# Patient Record
Sex: MALE | Race: BLACK OR AFRICAN AMERICAN | NOT HISPANIC OR LATINO
[De-identification: names, ages, dates, MRNs, and addresses within clinical notes are randomized per-mention and may not be internally consistent; named-entity substitution may affect disease eponyms.]

---

## 2020-06-24 PROBLEM — Z00.00 ENCOUNTER FOR PREVENTIVE HEALTH EXAMINATION: Status: ACTIVE | Noted: 2020-06-24

## 2020-06-27 ENCOUNTER — APPOINTMENT (OUTPATIENT)
Dept: DISASTER EMERGENCY | Facility: CLINIC | Age: 65
End: 2020-06-27

## 2020-06-27 LAB — SARS-COV-2 N GENE NPH QL NAA+PROBE: NOT DETECTED

## 2020-06-30 ENCOUNTER — RESULT REVIEW (OUTPATIENT)
Age: 65
End: 2020-06-30

## 2020-06-30 ENCOUNTER — OUTPATIENT (OUTPATIENT)
Dept: OUTPATIENT SERVICES | Facility: HOSPITAL | Age: 65
LOS: 1 days | Discharge: HOME | End: 2020-06-30
Payer: COMMERCIAL

## 2020-06-30 VITALS
HEIGHT: 69 IN | WEIGHT: 199.08 LBS | SYSTOLIC BLOOD PRESSURE: 161 MMHG | TEMPERATURE: 98 F | DIASTOLIC BLOOD PRESSURE: 76 MMHG | OXYGEN SATURATION: 100 % | RESPIRATION RATE: 16 BRPM | HEART RATE: 80 BPM

## 2020-06-30 PROBLEM — E03.9 HYPOTHYROIDISM, UNSPECIFIED: Chronic | Status: ACTIVE | Noted: 2020-06-24

## 2020-06-30 PROBLEM — Z86.79 PERSONAL HISTORY OF OTHER DISEASES OF THE CIRCULATORY SYSTEM: Chronic | Status: ACTIVE | Noted: 2020-06-24

## 2020-06-30 PROBLEM — I10 ESSENTIAL (PRIMARY) HYPERTENSION: Chronic | Status: ACTIVE | Noted: 2020-06-24

## 2020-06-30 PROBLEM — Z86.73 PERSONAL HISTORY OF TRANSIENT ISCHEMIC ATTACK (TIA), AND CEREBRAL INFARCTION WITHOUT RESIDUAL DEFICITS: Chronic | Status: ACTIVE | Noted: 2020-06-24

## 2020-06-30 PROBLEM — E11.9 TYPE 2 DIABETES MELLITUS WITHOUT COMPLICATIONS: Chronic | Status: ACTIVE | Noted: 2020-06-24

## 2020-06-30 LAB
A1C WITH ESTIMATED AVERAGE GLUCOSE RESULT: 5.8 % — HIGH (ref 4–5.6)
ALBUMIN SERPL ELPH-MCNC: 4.4 G/DL — SIGNIFICANT CHANGE UP (ref 3.3–5)
ALP SERPL-CCNC: 60 U/L — SIGNIFICANT CHANGE UP (ref 40–120)
ALT FLD-CCNC: SIGNIFICANT CHANGE UP U/L (ref 10–45)
ANION GAP SERPL CALC-SCNC: 10 MMOL/L — SIGNIFICANT CHANGE UP (ref 5–17)
APPEARANCE UR: CLEAR — SIGNIFICANT CHANGE UP
APTT BLD: 29.5 SEC — SIGNIFICANT CHANGE UP (ref 27.5–35.5)
AST SERPL-CCNC: SIGNIFICANT CHANGE UP U/L (ref 10–40)
BASOPHILS # BLD AUTO: 0.06 K/UL — SIGNIFICANT CHANGE UP (ref 0–0.2)
BASOPHILS NFR BLD AUTO: 0.8 % — SIGNIFICANT CHANGE UP (ref 0–2)
BILIRUB SERPL-MCNC: 1.4 MG/DL — HIGH (ref 0.2–1.2)
BILIRUB UR-MCNC: NEGATIVE — SIGNIFICANT CHANGE UP
BLD GP AB SCN SERPL QL: NEGATIVE — SIGNIFICANT CHANGE UP
BUN SERPL-MCNC: 27 MG/DL — HIGH (ref 7–23)
CALCIUM SERPL-MCNC: 9.8 MG/DL — SIGNIFICANT CHANGE UP (ref 8.4–10.5)
CHLORIDE SERPL-SCNC: 104 MMOL/L — SIGNIFICANT CHANGE UP (ref 96–108)
CHOLEST SERPL-MCNC: 118 MG/DL — SIGNIFICANT CHANGE UP (ref 10–199)
CK MB CFR SERPL CALC: 9.5 NG/ML — HIGH (ref 0–6.7)
CK SERPL-CCNC: 391 U/L — HIGH (ref 30–200)
CO2 SERPL-SCNC: 23 MMOL/L — SIGNIFICANT CHANGE UP (ref 22–31)
COLOR SPEC: YELLOW — SIGNIFICANT CHANGE UP
CREAT SERPL-MCNC: 0.77 MG/DL — SIGNIFICANT CHANGE UP (ref 0.5–1.3)
DIFF PNL FLD: ABNORMAL
EOSINOPHIL # BLD AUTO: 0.31 K/UL — SIGNIFICANT CHANGE UP (ref 0–0.5)
EOSINOPHIL NFR BLD AUTO: 4.3 % — SIGNIFICANT CHANGE UP (ref 0–6)
ESTIMATED AVERAGE GLUCOSE: 120 MG/DL — HIGH (ref 68–114)
GLUCOSE BLDC GLUCOMTR-MCNC: 138 MG/DL — HIGH (ref 70–99)
GLUCOSE SERPL-MCNC: 147 MG/DL — HIGH (ref 70–99)
GLUCOSE UR QL: NEGATIVE — SIGNIFICANT CHANGE UP
HCT VFR BLD CALC: 33.8 % — LOW (ref 39–50)
HCV AB S/CO SERPL IA: 0.15 S/CO — SIGNIFICANT CHANGE UP
HCV AB SERPL-IMP: SIGNIFICANT CHANGE UP
HDLC SERPL-MCNC: 43 MG/DL — SIGNIFICANT CHANGE UP
HGB BLD-MCNC: 10.9 G/DL — LOW (ref 13–17)
IMM GRANULOCYTES NFR BLD AUTO: 0.1 % — SIGNIFICANT CHANGE UP (ref 0–1.5)
INR BLD: 1.13 — SIGNIFICANT CHANGE UP (ref 0.88–1.16)
KETONES UR-MCNC: NEGATIVE — SIGNIFICANT CHANGE UP
LEUKOCYTE ESTERASE UR-ACNC: NEGATIVE — SIGNIFICANT CHANGE UP
LIPID PNL WITH DIRECT LDL SERPL: 60 MG/DL — SIGNIFICANT CHANGE UP
LYMPHOCYTES # BLD AUTO: 2.02 K/UL — SIGNIFICANT CHANGE UP (ref 1–3.3)
LYMPHOCYTES # BLD AUTO: 28.1 % — SIGNIFICANT CHANGE UP (ref 13–44)
MCHC RBC-ENTMCNC: 28.7 PG — SIGNIFICANT CHANGE UP (ref 27–34)
MCHC RBC-ENTMCNC: 32.2 GM/DL — SIGNIFICANT CHANGE UP (ref 32–36)
MCV RBC AUTO: 88.9 FL — SIGNIFICANT CHANGE UP (ref 80–100)
MONOCYTES # BLD AUTO: 0.56 K/UL — SIGNIFICANT CHANGE UP (ref 0–0.9)
MONOCYTES NFR BLD AUTO: 7.8 % — SIGNIFICANT CHANGE UP (ref 2–14)
NEUTROPHILS # BLD AUTO: 4.24 K/UL — SIGNIFICANT CHANGE UP (ref 1.8–7.4)
NEUTROPHILS NFR BLD AUTO: 58.9 % — SIGNIFICANT CHANGE UP (ref 43–77)
NITRITE UR-MCNC: NEGATIVE — SIGNIFICANT CHANGE UP
NRBC # BLD: 0 /100 WBCS — SIGNIFICANT CHANGE UP (ref 0–0)
PH UR: 6 — SIGNIFICANT CHANGE UP (ref 5–8)
PLATELET # BLD AUTO: 217 K/UL — SIGNIFICANT CHANGE UP (ref 150–400)
POTASSIUM SERPL-MCNC: 4.6 MMOL/L — SIGNIFICANT CHANGE UP (ref 3.5–5.3)
POTASSIUM SERPL-MCNC: SIGNIFICANT CHANGE UP MMOL/L (ref 3.5–5.3)
POTASSIUM SERPL-SCNC: 4.6 MMOL/L — SIGNIFICANT CHANGE UP (ref 3.5–5.3)
POTASSIUM SERPL-SCNC: SIGNIFICANT CHANGE UP MMOL/L (ref 3.5–5.3)
PROT SERPL-MCNC: 7.6 G/DL — SIGNIFICANT CHANGE UP (ref 6–8.3)
PROT UR-MCNC: NEGATIVE MG/DL — SIGNIFICANT CHANGE UP
PROTHROM AB SERPL-ACNC: 13.5 SEC — SIGNIFICANT CHANGE UP (ref 10.6–13.6)
RBC # BLD: 3.8 M/UL — LOW (ref 4.2–5.8)
RBC # FLD: 12 % — SIGNIFICANT CHANGE UP (ref 10.3–14.5)
RH IG SCN BLD-IMP: POSITIVE — SIGNIFICANT CHANGE UP
SODIUM SERPL-SCNC: 137 MMOL/L — SIGNIFICANT CHANGE UP (ref 135–145)
SP GR SPEC: <=1.005 — SIGNIFICANT CHANGE UP (ref 1–1.03)
T4 AB SER-ACNC: 8.06 UG/DL — SIGNIFICANT CHANGE UP (ref 3.17–11.72)
TOTAL CHOLESTEROL/HDL RATIO MEASUREMENT: 2.7 RATIO — LOW (ref 3.4–9.6)
TRIGL SERPL-MCNC: 73 MG/DL — SIGNIFICANT CHANGE UP (ref 10–149)
TSH SERPL-MCNC: 0.03 UIU/ML — LOW (ref 0.35–4.94)
UROBILINOGEN FLD QL: 0.2 E.U./DL — SIGNIFICANT CHANGE UP
WBC # BLD: 7.2 K/UL — SIGNIFICANT CHANGE UP (ref 3.8–10.5)
WBC # FLD AUTO: 7.2 K/UL — SIGNIFICANT CHANGE UP (ref 3.8–10.5)

## 2020-06-30 PROCEDURE — 93880 EXTRACRANIAL BILAT STUDY: CPT

## 2020-06-30 PROCEDURE — 36415 COLL VENOUS BLD VENIPUNCTURE: CPT

## 2020-06-30 PROCEDURE — 84436 ASSAY OF TOTAL THYROXINE: CPT

## 2020-06-30 PROCEDURE — C1887: CPT

## 2020-06-30 PROCEDURE — 93458 L HRT ARTERY/VENTRICLE ANGIO: CPT

## 2020-06-30 PROCEDURE — 80053 COMPREHEN METABOLIC PANEL: CPT

## 2020-06-30 PROCEDURE — 93005 ELECTROCARDIOGRAM TRACING: CPT

## 2020-06-30 PROCEDURE — 93458 L HRT ARTERY/VENTRICLE ANGIO: CPT | Mod: 26

## 2020-06-30 PROCEDURE — 81001 URINALYSIS AUTO W/SCOPE: CPT

## 2020-06-30 PROCEDURE — 80061 LIPID PANEL: CPT

## 2020-06-30 PROCEDURE — 82553 CREATINE MB FRACTION: CPT

## 2020-06-30 PROCEDURE — 94150 VITAL CAPACITY TEST: CPT

## 2020-06-30 PROCEDURE — 82550 ASSAY OF CK (CPK): CPT

## 2020-06-30 PROCEDURE — 93571 IV DOP VEL&/PRESS C FLO 1ST: CPT | Mod: 26,LD

## 2020-06-30 PROCEDURE — 93306 TTE W/DOPPLER COMPLETE: CPT

## 2020-06-30 PROCEDURE — 94010 BREATHING CAPACITY TEST: CPT | Mod: 26

## 2020-06-30 PROCEDURE — 86803 HEPATITIS C AB TEST: CPT

## 2020-06-30 PROCEDURE — 93571 IV DOP VEL&/PRESS C FLO 1ST: CPT | Mod: LD

## 2020-06-30 PROCEDURE — 86850 RBC ANTIBODY SCREEN: CPT

## 2020-06-30 PROCEDURE — 86901 BLOOD TYPING SEROLOGIC RH(D): CPT

## 2020-06-30 PROCEDURE — 84132 ASSAY OF SERUM POTASSIUM: CPT

## 2020-06-30 PROCEDURE — 93880 EXTRACRANIAL BILAT STUDY: CPT | Mod: 26

## 2020-06-30 PROCEDURE — 85730 THROMBOPLASTIN TIME PARTIAL: CPT

## 2020-06-30 PROCEDURE — 70450 CT HEAD/BRAIN W/O DYE: CPT | Mod: 26

## 2020-06-30 PROCEDURE — C1894: CPT

## 2020-06-30 PROCEDURE — 99205 OFFICE O/P NEW HI 60 MIN: CPT

## 2020-06-30 PROCEDURE — 85610 PROTHROMBIN TIME: CPT

## 2020-06-30 PROCEDURE — 93010 ELECTROCARDIOGRAM REPORT: CPT

## 2020-06-30 PROCEDURE — 93306 TTE W/DOPPLER COMPLETE: CPT | Mod: 26

## 2020-06-30 PROCEDURE — 71250 CT THORAX DX C-: CPT | Mod: 26

## 2020-06-30 PROCEDURE — C1753: CPT

## 2020-06-30 PROCEDURE — C1769: CPT

## 2020-06-30 PROCEDURE — 71045 X-RAY EXAM CHEST 1 VIEW: CPT | Mod: 26

## 2020-06-30 PROCEDURE — 85025 COMPLETE CBC W/AUTO DIFF WBC: CPT

## 2020-06-30 PROCEDURE — 70450 CT HEAD/BRAIN W/O DYE: CPT

## 2020-06-30 PROCEDURE — 71045 X-RAY EXAM CHEST 1 VIEW: CPT

## 2020-06-30 PROCEDURE — 82962 GLUCOSE BLOOD TEST: CPT

## 2020-06-30 PROCEDURE — 84480 ASSAY TRIIODOTHYRONINE (T3): CPT

## 2020-06-30 PROCEDURE — C1889: CPT

## 2020-06-30 PROCEDURE — 84443 ASSAY THYROID STIM HORMONE: CPT

## 2020-06-30 PROCEDURE — 71250 CT THORAX DX C-: CPT

## 2020-06-30 PROCEDURE — 83036 HEMOGLOBIN GLYCOSYLATED A1C: CPT

## 2020-06-30 RX ORDER — SODIUM CHLORIDE 9 MG/ML
500 INJECTION INTRAMUSCULAR; INTRAVENOUS; SUBCUTANEOUS
Refills: 0 | Status: DISCONTINUED | OUTPATIENT
Start: 2020-06-30 | End: 2020-06-30

## 2020-06-30 RX ORDER — ASPIRIN/CALCIUM CARB/MAGNESIUM 324 MG
325 TABLET ORAL ONCE
Refills: 0 | Status: COMPLETED | OUTPATIENT
Start: 2020-06-30 | End: 2020-06-30

## 2020-06-30 RX ORDER — DEXTROSE 50 % IN WATER 50 %
12.5 SYRINGE (ML) INTRAVENOUS ONCE
Refills: 0 | Status: DISCONTINUED | OUTPATIENT
Start: 2020-06-30 | End: 2020-07-15

## 2020-06-30 RX ORDER — SODIUM CHLORIDE 9 MG/ML
500 INJECTION INTRAMUSCULAR; INTRAVENOUS; SUBCUTANEOUS
Refills: 0 | Status: DISCONTINUED | OUTPATIENT
Start: 2020-06-30 | End: 2020-07-15

## 2020-06-30 RX ORDER — CHLORHEXIDINE GLUCONATE 213 G/1000ML
1 SOLUTION TOPICAL ONCE
Refills: 0 | Status: DISCONTINUED | OUTPATIENT
Start: 2020-06-30 | End: 2020-06-30

## 2020-06-30 RX ORDER — CLOPIDOGREL BISULFATE 75 MG/1
225 TABLET, FILM COATED ORAL ONCE
Refills: 0 | Status: COMPLETED | OUTPATIENT
Start: 2020-06-30 | End: 2020-06-30

## 2020-06-30 RX ORDER — GLUCAGON INJECTION, SOLUTION 0.5 MG/.1ML
1 INJECTION, SOLUTION SUBCUTANEOUS ONCE
Refills: 0 | Status: DISCONTINUED | OUTPATIENT
Start: 2020-06-30 | End: 2020-07-15

## 2020-06-30 RX ORDER — DEXTROSE 50 % IN WATER 50 %
15 SYRINGE (ML) INTRAVENOUS ONCE
Refills: 0 | Status: DISCONTINUED | OUTPATIENT
Start: 2020-06-30 | End: 2020-07-15

## 2020-06-30 RX ORDER — SODIUM CHLORIDE 9 MG/ML
1000 INJECTION, SOLUTION INTRAVENOUS
Refills: 0 | Status: DISCONTINUED | OUTPATIENT
Start: 2020-06-30 | End: 2020-07-15

## 2020-06-30 RX ORDER — CLOPIDOGREL BISULFATE 75 MG/1
75 TABLET, FILM COATED ORAL ONCE
Refills: 0 | Status: COMPLETED | OUTPATIENT
Start: 2020-06-30 | End: 2020-06-30

## 2020-06-30 RX ORDER — INSULIN LISPRO 100/ML
VIAL (ML) SUBCUTANEOUS ONCE
Refills: 0 | Status: DISCONTINUED | OUTPATIENT
Start: 2020-06-30 | End: 2020-07-15

## 2020-06-30 RX ORDER — DEXTROSE 50 % IN WATER 50 %
25 SYRINGE (ML) INTRAVENOUS ONCE
Refills: 0 | Status: DISCONTINUED | OUTPATIENT
Start: 2020-06-30 | End: 2020-07-15

## 2020-06-30 RX ADMIN — Medication 325 MILLIGRAM(S): at 11:22

## 2020-06-30 RX ADMIN — SODIUM CHLORIDE 75 MILLILITER(S): 9 INJECTION INTRAMUSCULAR; INTRAVENOUS; SUBCUTANEOUS at 16:43

## 2020-06-30 RX ADMIN — CLOPIDOGREL BISULFATE 225 MILLIGRAM(S): 75 TABLET, FILM COATED ORAL at 12:15

## 2020-06-30 RX ADMIN — CLOPIDOGREL BISULFATE 75 MILLIGRAM(S): 75 TABLET, FILM COATED ORAL at 11:23

## 2020-06-30 RX ADMIN — SODIUM CHLORIDE 50 MILLILITER(S): 9 INJECTION INTRAMUSCULAR; INTRAVENOUS; SUBCUTANEOUS at 11:15

## 2020-06-30 NOTE — CONSULT NOTE ADULT - ASSESSMENT
CAD      Dr. Conde has reviewed all the patient's medical records and diagnostic studies during his consultation.  He has had a lengthy discussion with the patient regarding his coronary artery disease and progression.  He has recommended that the patient is a candidate for a coronary artery bypass grafting. He has reviewed all risks, benefits, and alternatives of surgery with the patient including but not limited to stoke, bleeding, infection and death. He has quoted the patient a 1-2% surgical risk.  The patient agrees to proceed with surgery 7/6/20. All preoperative instructions including antibacterial showers x3 reviewed with the patient.

## 2020-06-30 NOTE — PROGRESS NOTE ADULT - SUBJECTIVE AND OBJECTIVE BOX
Interventional Cardiology PA SDA Discharge Note    Patient without complaints. Ambulated and voided without difficulties    Afebrile, VSS    Ext: Right Radial: No hematoma, no bleeding, dressing; C/D/I      Pulses: 2+ intact RAD to baseline     A/P:  63 y/o Obese male, non-smoker, history of Covid Positive (antibody positive), with PMHx HTN, HLD, DM, Hypothyroidism, TIA, who in light of the pt's PMHx, recent abnormal NST and current CCS class III Anginal symptoms, pt is referred for cardiac catheterization with possible intervention.   Pt is now s/p dx cardiac cath on 6/30/20 revealing 3V Coronary Artery Disease:  LM: Normal  LAD: 70% stenosis in pLAD, iFR 0.81  LCx: 80% stenosis in midLCx.   OM2: 80% stenosis  OM3: 100% stenosis  RCA: 100% stenosis in mRCA  Left Heart Catheterization:  LV Gram: 60% EF  LV EDP 18mmHg    CT surgery called to evaluate for possible bypass surgery. Pt will f/u with Dr. Conde for further w/u and management.    1.	Stable for discharge as per attending Dr. Muñiz after bed rest, pt voids, wrist stable and 30 minutes of ambulation.  2.	Follow-up with PMD/Cardiologist Dr. Muñiz/Dr. Mercedes in 1-2 weeks  3.	Discharged forms signed and copies in chart Interventional Cardiology PA SDA Discharge Note    Patient without complaints. Ambulated and voided without difficulties    Afebrile, VSS    Ext: Right Radial: No hematoma, no bleeding, dressing; C/D/I      Pulses: 2+ intact RAD to baseline     A/P:  65 y/o Obese male, non-smoker, history of Covid Positive (antibody positive), with PMHx HTN, HLD, DM, Hypothyroidism, TIA, who in light of the pt's PMHx, recent abnormal NST and current CCS class III Anginal symptoms, pt is referred for cardiac catheterization with possible intervention.   Pt is now s/p dx cardiac cath on 6/30/20 revealing 3V Coronary Artery Disease:  LM: Normal  LAD: 70% stenosis in pLAD, iFR 0.81  LCx: 80% stenosis in midLCx.   OM2: 80% stenosis  OM3: 100% stenosis  RCA: 100% stenosis in mRCA  Left Heart Catheterization:  LV Gram: 60% EF  LV EDP 18mmHg    CTS consulted for CABG. Pt will return on Monday, 7/6/20, for CABG with Dr. Conde. Per CTS recs, pt was instructed to d/c Plavix, start ASA 81mg QD, and instructed to take last dose of Enalapril on Friday 7/3/20. Pt also instructed to hold home Metformin until Thursday 7/2/20.    1.	Stable for discharge as per attending Dr. Muñiz after bed rest, pt voids, wrist stable and 30 minutes of ambulation.  2.	Follow-up with PMD/Cardiologist Dr. Muñiz in 1-2 weeks  3.	Discharged forms signed and copies in chart

## 2020-06-30 NOTE — H&P ADULT - NSICDXPASTMEDICALHX_GEN_ALL_CORE_FT
PAST MEDICAL HISTORY:  DM (diabetes mellitus)     H/O cardiomyopathy     History of TIAs     HLD (hyperlipidemia)     HTN (hypertension)     Hypothyroidism

## 2020-06-30 NOTE — CHART NOTE - NSCHARTNOTEFT_GEN_A_CORE
Cardiac Catheterization Note:    Indication: Unstable Angina with (+) Nuclear Stress Test    Coronary Angiogram:  3V Coronary Artery Disease    LM: Normal  LAD: 70% stenosis in pLAD, iFR 0.81  LCx: 80% stenosis in midLCx.   OM2: 80% stenosis  OM3: 100% stenosis  RCA: 100% stenosis in mRCA    Left Heart Catheterization:    LV Gram: 60% EF  LV EDP 18mmHg    Right Radial Artery Access. D-Stat placed. No complications.    CT surgery called to evaluate for possible bypass surgery.

## 2020-06-30 NOTE — H&P ADULT - ASSESSMENT
63 y/o Obese male, non-smoker, history of Covid Positive (antibody positive), with PMHx HTN, HLD, DM, Hypothyroidism, TIA, Cardiomopathy (EF 66%) presents for cardiac catheterization with possible intervention due to patient's risk factors, CCS class III anginal symptoms, and abnormal NST.    - EKG: NSR at 80 BPM, Q waves in avF and III  - ASA: III	Mallampati: III  - H/H stable at 10.9/33.8. Platelets/Coags stable. Cr. stable.  - Given patient's history of cardiomyopathy, patient given NS @ 50 cc/hr x 4 hours.  - Patient endorses compliance with Plavix 75 mg daily for the past few years. Patient given Plavix 75 mg once and Aspirin 325 mg once prior to procedure.   - Patient has not used metformin since 06/28/2020. Continue to hold until 48 hours post procedure. MISS ordered.   - Patient is a suitable candidate for moderate sedation.     Risks & benefits of procedure and alternative therapy have been explained to the patient including but not limited to: allergic reaction, bleeding w/possible need for blood transfusion, infection, renal and vascular compromise, limb damage, arrhythmia, stroke, vessel dissection/perforation, Myocardial infarction, emergent CABG. Informed consent obtained and in chart.

## 2020-06-30 NOTE — H&P ADULT - HISTORY OF PRESENT ILLNESS
Covid test on 6/27/2 at [facility]. NEGATIVE    Pharmacy:   [facility], [address] [id number].  Escort: Brother       65 y/o Obese male, non-smoker, history of Covid Positive (antibody positive), with PMHx HTN, HLD, DM, Hypothyroidism, TIA, Cardiomopathy (EF 66%) presented to Dr. Francis Muñiz office with c/o CP associated with dyspnea on exertion (grade 1-2), resolves with rest.  Per pt he get a sharp, CP, 3/10, mostly right-sided, when walking up greater than one flight of subway step, lasting a few minutes resolves with breathing deeply and rest.  Pt denies associated SOB, palpitations, diaphoresis, orthopnea, PND, dizziness, syncope, LE swelling or any other complaints at this time.   Subsequent NST 6/13/20:  Anterior, septal and lateral wall ischemia identified.  Diaphragmatic.  EF 66%. Per MD's note, ECHO revealed dilated LA and LV with hypokinetic inferior wall, EF 50%.      In light of the pt's PMHx, recent abnormal NST and current CCS class III Anginal symptoms, pt is referred for cardiac catheterization with possible intervention. Covid test on 6/27/2 at [facility]. NEGATIVE    Pharmacy:   [facility], [address] [address].  Escort: Brother       63 y/o Obese male, non-smoker, history of Covid Positive (antibody positive), with PMHx HTN, HLD, DM, Hypothyroidism, TIA, Cardiomopathy (EF by NST 06/13/2020 66%) presented to Dr. Francis Muñiz office with c/o CP associated with dyspnea on exertion (grade 1-2), resolves with rest.  Per pt he get a sharp, CP, 3/10, mostly right-sided, when walking up greater than one flight of subway step, lasting a few minutes resolves with breathing deeply and rest.  Pt denies associated SOB, palpitations, diaphoresis, orthopnea, PND, dizziness, syncope, LE swelling or any other complaints at this time.   Subsequent NST 6/13/20:  Anterior, septal and lateral wall ischemia identified.  Diaphragmatic.  EF 66%. Per MD's note, ECHO revealed dilated LA and LV with hypokinetic inferior wall, EF 50%.      In light of the pt's PMHx, recent abnormal NST and current CCS class III Anginal symptoms, pt is referred for cardiac catheterization with possible intervention.

## 2020-06-30 NOTE — CONSULT NOTE ADULT - SUBJECTIVE AND OBJECTIVE BOX
Surgeon: Dr. PRIMO Conde     Requesting Physician: Dr. Francis Muñiz (cardiologist); Dr. Nick Corbett (PCP)    HISTORY OF PRESENT ILLNESS:  64 year old male, non-smoker, with a past medical history of COVID infection (antibody positive), with a past medical history of TIA on Plavix 75mg only (6 years ago with left hand weakness that lasted a few minutes, no recurrence), HTN, HLD, DM, hypothyroidism, presented to Dr. Francis Muñiz with c/o chest pain associated with dyspnea on exertion ambulating up 1-2 flights of stairs. Symptoms began in early May 2020 and resolve on own after resting and breathing.  Subsequent NST 6/13/20:  Anterior, septal and lateral wall ischemia identified.  Diaphragmatic.  EF 66%. Patient is now s/p dx cardiac cath on 6/30/20 revealing 3V Coronary Artery Disease:  LM: Normal  LAD: 70% stenosis in pLAD, iFR 0.81  LCx: 80% stenosis in midLCx.   OM2: 80% stenosis  OM3: 100% stenosis  RCA: 100% stenosis in mRCA  Left Heart Catheterization:  LV Gram: 60% EF  LV EDP 18mmHg  Patient loaded with Aspirin and Plavix today. Patient was evaluated by CT surgery and is recommended a candidate for coronary artery bypass grafting.     Of note, patient had covid symptoms on 3/18 with chills and cough. He was not tested at that time and did not require MD visit/hospitalization. Recent antibody test was positive. COVID swab from 6/27 was negative.       PAST MEDICAL HISTORY:  History of TIA  Hypothyroidism  DM (diabetes mellitus)  HLD (hyperlipidemia)  HTN (hypertension)  CAD     PSHx:  denies    SOCIAL HISTORY:  Smoker:  Never smoker   ETOH use:  Hx of ETOH abuse, quit 2 years ago   Ilicit Drug use:no  Occupation:    Assisted device use (Cane / Walker): none   Live with: wife in condo with elevator    FAMILY HISTORY:  heart failure (mother)     MEDICATIONS    · 	Vitamin D3 50,000 intl units (1250 mcg) oral capsule: Last Dose Taken:  , 1  orally  · 	levothyroxine 200 mcg (0.2 mg) oral tablet: Last Dose Taken:  , 1 tab(s) orally once a day  · 	enalapril 10 mg oral tablet: Last Dose Taken:  , 1 tab(s) orally once a day  · 	Plavix 75 mg oral tablet: Last Dose Taken:  , 1 tab(s) orally once a day  · 	Januvia 100 mg oral tablet: Last Dose Taken:  , 1 tab(s) orally once a day  · 	metFORMIN 500 mg oral tablet: Last Dose Taken:  , 1 tab(s) orally 3 times a day  · 	atorvastatin 80 mg oral tablet: Last Dose Taken:  , 1 tab(s) orally once a day  · 	isosorbide mononitrate 30 mg oral tablet, extended release: Last Dose Taken:  , 1 tab(s) orally once a day (in the morning)  · 	Metoprolol Tartrate 50 mg oral tablet: Last Dose Taken:  , 1 tab(s) orally once a day       Allergies  No Known Allergies    PHYSICAL EXAM  Ht: 5'9  Wt: 199lbs   Vital Signs Last 24 Hrs  T(C): 36.4 (30 Jun 2020 10:59), Max: 36.4 (30 Jun 2020 10:59)  T(F): 97.5 (30 Jun 2020 10:59), Max: 97.5 (30 Jun 2020 10:59)  HR: 80 (30 Jun 2020 10:59) (80 - 80)  BP: 161/76 (30 Jun 2020 10:59) (161/76 - 161/76)  BP(mean): 104 (30 Jun 2020 10:59) (104 - 104)  RR: 16 (30 Jun 2020 10:59) (16 - 16)  SpO2: 100% (30 Jun 2020 10:59) (100% - 100%)                                                          LABS:                        10.9   7.20  )-----------( 217      ( 30 Jun 2020 10:29 )             33.8     06-30    137   |  23   |  27  ----------------------------<   147  4.6   |  104  |  0.77     Ca    9.8      30 Jun 2020 10:29    TPro  7.6  /  Alb  4.4  /  TBili  1.4<H>  /  DBili  x   /  AST  see  note Moderate  hemolysis  observed  /  ALT  see  note Moderate  hemolysis  observed  /  AlkPhos  60  06-30    PT/INR - ( 30 Jun 2020 10:29 )   PT: 13.5 sec;   INR: 1.13          PTT - ( 30 Jun 2020 10:29 )  PTT:29.5 sec  Urinalysis Basic - ( 30 Jun 2020 15:33 )    Color: Yellow / Appearance: Clear / SG: <=1.005 / pH: x  Gluc: x / Ketone: NEGATIVE  / Bili: Negative / Urobili: 0.2 E.U./dL   Blood: x / Protein: NEGATIVE mg/dL / Nitrite: NEGATIVE   Leuk Esterase: NEGATIVE / RBC: < 5 /HPF / WBC < 5 /HPF   Sq Epi: x / Non Sq Epi: 0-5 /HPF / Bacteria: Present /HPF      CARDIAC MARKERS ( 30 Jun 2020 10:29 )  x     / x     / 391 U/L / x     / 9.5 ng/mL      Thyroid Stimulating Hormone, Serum: 0.031 uIU/mL (06-30 @ 15:32)      RADIOLOGY & ADDITIONAL STUDIES:  CAROTID U/S: < from: US Duplex Carotid Arteries Complete, Bilateral (06.30.20 @ 16:07) >    IMPRESSION:  50-69 % hemodynamically significant stenosis in the left internal carotid artery with severe calcified plaque in the left carotid bifurcation/proximal ICA.      < end of copied text >      CXR:     CT chest (pending report)        CT head 6/30/20:  FINDINGS:     The ventricles, cisternal spaces, and cortical sulci are normal in size and configuration.    There is no acute intracranial hemorrhage. There is no mass effect, midline shift or extra axial collection.     The gray white differentiation appears preserved without evidence of an acute transcortical infarction.     The bony windows demonstrates no fractures. The visualized paranasal sinuses and mastoid air cells are predominantly clear aside from mild polypoid mucosal thickening in the right maxillary sinus.    IMPRESSION:     Unremarkable noncontrast CT of the brain.    EKG:     TTE / ARTIE: < from: TTE Echo Complete w/o Contrast w/ Doppler (06.30.20 @ 16:13) >   1. There is mild concentric left ventricular hypertrophy. The left ventricle is normal in size and systolic function with a calculated ejection fraction of 60-65%. There are no regional wall motion abnormalities seen. There is normal left ventricular diastolic function and filling pressure.   2. The right ventricle is normal in size. Right ventricular systolic function is normal.   3. There is mild mitral annular calcification. There is trace mitral regurgitation.   4. There was insufficient tricuspid regurgitation detected from which to calculate pulmonary artery systolic pressure.   5. No pericardial effusion.   6. The aortic root is mildly dilated. The aortic root is dilated measuring 3.90 cm.    < end of copied text >    Cardiac Cath:

## 2020-07-01 LAB — T3 SERPL-MCNC: 99 NG/DL — SIGNIFICANT CHANGE UP (ref 80–200)

## 2020-07-01 RX ORDER — METFORMIN HYDROCHLORIDE 500 MG/1
500 TABLET, COATED ORAL 3 TIMES DAILY
Refills: 0 | Status: ACTIVE | COMMUNITY
Start: 2020-07-01

## 2020-07-01 NOTE — H&P ADULT - NSHPSOCIALHISTORY_GEN_ALL_CORE
Smoker:  Never smoker   ETOH use:  Hx of ETOH abuse, quit 2 years ago   Ilicit Drug use:no  Occupation:    Assisted device use (Cane / Walker): none   Live with: wife in condo with elevator

## 2020-07-01 NOTE — H&P ADULT - ASSESSMENT
CAD    Dr. Conde has reviewed all the patient's medical records and diagnostic studies during his consultation.  He has had a lengthy discussion with the patient regarding his coronary artery disease and progression.  He has recommended that the patient is a candidate for a coronary artery bypass grafting. He has reviewed all risks, benefits, and alternatives of surgery with the patient including but not limited to stoke, bleeding, infection and death. He has quoted the patient a 1-2% surgical risk.  The patient agrees to proceed with surgery 7/6/20. All preoperative instructions including antibacterial showers x3 reviewed with the patient.     -NPO past midnight  -Stop Plavix 6/30/20, hold Enalapril 2 days prior to sx.   -Take Metoprolol and synthroid the morning of surgery.   -COVID test 7/3/20.

## 2020-07-01 NOTE — H&P ADULT - NSHPLABSRESULTS_GEN_ALL_CORE
LABS:                        10.9   7.20  )-----------( 217      ( 30 Jun 2020 10:29 )             33.8     06-30    137   |  23   |  27  ----------------------------<   147  4.6   |  104  |  0.77     Ca    9.8      30 Jun 2020 10:29    TPro  7.6  /  Alb  4.4  /  TBili  1.4<H>  /  DBili  x   /  AST  see  note Moderate  hemolysis  observed  /  ALT  see  note Moderate  hemolysis  observed  /  AlkPhos  60  06-30    PT/INR - ( 30 Jun 2020 10:29 )   PT: 13.5 sec;   INR: 1.13          PTT - ( 30 Jun 2020 10:29 )  PTT:29.5 sec  Urinalysis Basic - ( 30 Jun 2020 15:33 )    Color: Yellow / Appearance: Clear / SG: <=1.005 / pH: x  Gluc: x / Ketone: NEGATIVE  / Bili: Negative / Urobili: 0.2 E.U./dL   Blood: x / Protein: NEGATIVE mg/dL / Nitrite: NEGATIVE   Leuk Esterase: NEGATIVE / RBC: < 5 /HPF / WBC < 5 /HPF   Sq Epi: x / Non Sq Epi: 0-5 /HPF / Bacteria: Present /HPF      CARDIAC MARKERS ( 30 Jun 2020 10:29 )  x     / x     / 391 U/L / x     / 9.5 ng/mL      Thyroid Stimulating Hormone, Serum: 0.031 uIU/mL (06-30 @ 15:32)      RADIOLOGY & ADDITIONAL STUDIES:  CAROTID U/S: < from: US Duplex Carotid Arteries Complete, Bilateral (06.30.20 @ 16:07) >    IMPRESSION:  50-69 % hemodynamically significant stenosis in the left internal carotid artery with severe calcified plaque in the left carotid bifurcation/proximal ICA.      < end of copied text >      < from: CT Chest No Cont (06.30.20 @ 17:50) >      Partially visualized possible mesenteric panniculitis; dedicated CT of the abdomen and pelvis can be ordered.    No suspicious thoracic finding.    < end of copied text >    < from: CT Head No Cont (06.30.20 @ 17:43) >    < from: CT Head No Cont (06.30.20 @ 17:43) >    Unremarkable noncontrastCT of the brain.    < end of copied text >    Unremarkable noncontrastCT of the brain.    < end of copied text >    < from: TTE Echo Complete w/o Contrast w/ Doppler (06.30.20 @ 16:13) >       1. There is mild concentric left ventricular hypertrophy. The left ventricle is normal in size and systolic function with a calculated ejection fraction of 60-65%. There are no regional wall motion abnormalities seen. There is normal left ventricular diastolic function and filling pressure.   2. The right ventricle is normal in size. Right ventricular systolic function is normal.   3. There is mild mitral annular calcification. There is trace mitral regurgitation.   4. There was insufficient tricuspid regurgitation detected from which to calculate pulmonary artery systolic pressure.   5. No pericardial effusion.   6. The aortic root is mildly dilated. The aortic root is dilated measuring 3.90 cm.    < end of copied text >

## 2020-07-01 NOTE — H&P ADULT - HISTORY OF PRESENT ILLNESS
4 year old male, non-smoker, with a past medical history of COVID infection (antibody positive), with a past medical history of TIA on Plavix 75mg only (6 years ago with left hand weakness that lasted a few minutes, no recurrence), HTN, HLD, DM, hypothyroidism, presented to Dr. Francis Muñiz with c/o chest pain associated with dyspnea on exertion ambulating up 1-2 flights of stairs. Symptoms began in early May 2020 and resolve on own after resting and breathing.  Subsequent NST 6/13/20:  Anterior, septal and lateral wall ischemia identified.  Diaphragmatic.  EF 66%. Patient is now s/p dx cardiac cath on 6/30/20 revealing 3V Coronary Artery Disease:  LM: Normal  LAD: 70% stenosis in pLAD, iFR 0.81  LCx: 80% stenosis in midLCx.   OM2: 80% stenosis  OM3: 100% stenosis  RCA: 100% stenosis in mRCA  Left Heart Catheterization:  LV Gram: 60% EF  LV EDP 18mmHg  Patient loaded with Aspirin and Plavix today.     Of note, patient had covid symptoms on 3/18 with chills and cough. He was not tested at that time and did not require MD visit/hospitalization. Recent antibody test was positive. COVID swab from 6/27 was negative.     Patient was evaluated by CT surgery and is recommended a candidate for coronary artery bypass grafting. 64 year old male, non-smoker, with a past medical history of COVID infection (antibody positive), with a past medical history of TIA on Plavix 75mg only (6 years ago with left hand weakness that lasted a few minutes, no recurrence), HTN, HLD, DM, hypothyroidism, presented to Dr. Francis Muñiz with c/o chest pain associated with dyspnea on exertion ambulating up 1-2 flights of stairs. Symptoms began in early May 2020 and resolve on own after resting and breathing.  Subsequent NST 6/13/20:  Anterior, septal and lateral wall ischemia identified.  Diaphragmatic.  EF 66%. Patient is now s/p dx cardiac cath on 6/30/20 revealing 3V Coronary Artery Disease:  LM: Normal  LAD: 70% stenosis in pLAD, iFR 0.81  LCx: 80% stenosis in midLCx.   OM2: 80% stenosis  OM3: 100% stenosis  RCA: 100% stenosis in mRCA  Left Heart Catheterization:  LV Gram: 60% EF  LV EDP 18mmHg  Patient loaded with Aspirin and Plavix today.     Of note, patient had covid symptoms on 3/18 with chills and cough. He was not tested at that time and did not require MD visit/hospitalization. Recent antibody test was positive. COVID swab from 6/27 was negative.     Patient was evaluated by CT surgery and is recommended a candidate for coronary artery bypass grafting. 64 year old male, non-smoker, with a past medical history of COVID infection (antibody positive), with a past medical history of TIA on Plavix 75mg only (6 years ago with left hand weakness that lasted a few minutes, no recurrence), HTN, HLD, DM, hypothyroidism, presented to Dr. Francis Muñiz with c/o chest pain associated with dyspnea on exertion ambulating up 1-2 flights of stairs. Symptoms began in early May 2020 and resolve on own after resting and breathing.  Subsequent NST 6/13/20:  Anterior, septal and lateral wall ischemia identified.  Diaphragmatic.  EF 66%. Patient is now s/p dx cardiac cath on 6/30/20 revealing 3V Coronary Artery Disease:  LM: Normal  LAD: 70% stenosis in pLAD, iFR 0.81  LCx: 80% stenosis in midLCx.   OM2: 80% stenosis  OM3: 100% stenosis  RCA: 100% stenosis in mRCA  Left Heart Catheterization:  LV Gram: 60% EF  LV EDP 18mmHg  Patient loaded with Aspirin and Plavix today.     Of note, patient had covid symptoms on 3/18 with chills and cough. He was not tested at that time and did not require MD visit/hospitalization. Recent antibody test was positive. COVID swab from 6/27 was negative.     Patient was evaluated by CT surgery and is recommended a candidate for coronary artery bypass grafting.     Pt seen/examined morning of OR.  No changes to current medical condition.  No recent illness, fevers, chills, cough, sputum, nausea, diarrhea, emesis.  No worsening chest pain, palpitations sob, syncope.  Pt took Metoprolol this AM with sip of water as instructed.  Otherwise NPO since midnight last evening.  Pt has d/c his Plavix and Enalapril as instructed.  Procedure, risks, expectations, recovery discussed with patient, all questions answered.  Consent signed in office prior to today.

## 2020-07-01 NOTE — H&P ADULT - NSHPPHYSICALEXAM_GEN_ALL_CORE
PHYSICAL EXAM  Ht: 5'9  Wt: 199lbs   Vital Signs Last 24 Hrs  T(C): 36.4 (30 Jun 2020 10:59), Max: 36.4 (30 Jun 2020 10:59)  T(F): 97.5 (30 Jun 2020 10:59), Max: 97.5 (30 Jun 2020 10:59)  HR: 80 (30 Jun 2020 10:59) (80 - 80)  BP: 161/76 (30 Jun 2020 10:59) (161/76 - 161/76)  BP(mean): 104 (30 Jun 2020 10:59) (104 - 104)  RR: 16 (30 Jun 2020 10:59) (16 - 16)  SpO2: 100% (30 Jun 2020 10:59) (100% - 100%)

## 2020-07-02 VITALS
OXYGEN SATURATION: 99 % | TEMPERATURE: 97 F | WEIGHT: 199.08 LBS | HEIGHT: 69 IN | SYSTOLIC BLOOD PRESSURE: 159 MMHG | DIASTOLIC BLOOD PRESSURE: 72 MMHG | RESPIRATION RATE: 18 BRPM | HEART RATE: 77 BPM

## 2020-07-02 VITALS — BODY MASS INDEX: 29.49 KG/M2 | HEIGHT: 68.98 IN | WEIGHT: 199.08 LBS

## 2020-07-02 DIAGNOSIS — G45.9 TRANSIENT CEREBRAL ISCHEMIC ATTACK, UNSPECIFIED: ICD-10-CM

## 2020-07-02 DIAGNOSIS — U07.1 COVID-19: ICD-10-CM

## 2020-07-02 NOTE — PRE-OP CHECKLIST - SELECT TESTS ORDERED
Sickle Cell (black patients 3mos-10yr)/EKG/INR/POCT Blood Glucose/CMP/PT/PTT/Type and Screen/CXR/CT of brain, A1C CT chest, echo, doppler studies/CBC

## 2020-07-03 ENCOUNTER — APPOINTMENT (OUTPATIENT)
Dept: DISASTER EMERGENCY | Facility: CLINIC | Age: 65
End: 2020-07-03

## 2020-07-05 ENCOUNTER — TRANSCRIPTION ENCOUNTER (OUTPATIENT)
Age: 65
End: 2020-07-05

## 2020-07-06 ENCOUNTER — INPATIENT (INPATIENT)
Facility: HOSPITAL | Age: 65
LOS: 5 days | Discharge: HOME | DRG: 236 | End: 2020-07-12
Attending: THORACIC SURGERY (CARDIOTHORACIC VASCULAR SURGERY) | Admitting: THORACIC SURGERY (CARDIOTHORACIC VASCULAR SURGERY)
Payer: COMMERCIAL

## 2020-07-06 ENCOUNTER — APPOINTMENT (OUTPATIENT)
Dept: CARDIOTHORACIC SURGERY | Facility: HOSPITAL | Age: 65
End: 2020-07-06

## 2020-07-06 LAB
ALBUMIN SERPL ELPH-MCNC: 2.2 G/DL — LOW (ref 3.3–5)
ALBUMIN SERPL ELPH-MCNC: 3.3 G/DL — SIGNIFICANT CHANGE UP (ref 3.3–5)
ALBUMIN SERPL ELPH-MCNC: 3.5 G/DL — SIGNIFICANT CHANGE UP (ref 3.3–5)
ALP SERPL-CCNC: 30 U/L — LOW (ref 40–120)
ALP SERPL-CCNC: 33 U/L — LOW (ref 40–120)
ALP SERPL-CCNC: 35 U/L — LOW (ref 40–120)
ALT FLD-CCNC: 17 U/L — SIGNIFICANT CHANGE UP (ref 10–45)
ANION GAP SERPL CALC-SCNC: 11 MMOL/L — SIGNIFICANT CHANGE UP (ref 5–17)
APTT BLD: 27.4 SEC — LOW (ref 27.5–35.5)
APTT BLD: 27.9 SEC — SIGNIFICANT CHANGE UP (ref 27.5–35.5)
APTT BLD: 28.1 SEC — SIGNIFICANT CHANGE UP (ref 27.5–35.5)
AST SERPL-CCNC: 36 U/L — SIGNIFICANT CHANGE UP (ref 10–40)
AST SERPL-CCNC: 37 U/L — SIGNIFICANT CHANGE UP (ref 10–40)
AST SERPL-CCNC: 38 U/L — SIGNIFICANT CHANGE UP (ref 10–40)
BASE EXCESS BLDA CALC-SCNC: -2.4 MMOL/L — LOW (ref -2–3)
BASOPHILS # BLD AUTO: 0 K/UL — SIGNIFICANT CHANGE UP (ref 0–0.2)
BASOPHILS NFR BLD AUTO: 0 % — SIGNIFICANT CHANGE UP (ref 0–2)
BILIRUB SERPL-MCNC: 1.1 MG/DL — SIGNIFICANT CHANGE UP (ref 0.2–1.2)
BILIRUB SERPL-MCNC: 1.8 MG/DL — HIGH (ref 0.2–1.2)
BILIRUB SERPL-MCNC: 2.2 MG/DL — HIGH (ref 0.2–1.2)
BUN SERPL-MCNC: 16 MG/DL — SIGNIFICANT CHANGE UP (ref 7–23)
BUN SERPL-MCNC: 17 MG/DL — SIGNIFICANT CHANGE UP (ref 7–23)
BUN SERPL-MCNC: 17 MG/DL — SIGNIFICANT CHANGE UP (ref 7–23)
CALCIUM SERPL-MCNC: 7 MG/DL — LOW (ref 8.4–10.5)
CALCIUM SERPL-MCNC: 8.4 MG/DL — SIGNIFICANT CHANGE UP (ref 8.4–10.5)
CALCIUM SERPL-MCNC: 8.4 MG/DL — SIGNIFICANT CHANGE UP (ref 8.4–10.5)
CHLORIDE SERPL-SCNC: 107 MMOL/L — SIGNIFICANT CHANGE UP (ref 96–108)
CHLORIDE SERPL-SCNC: 108 MMOL/L — SIGNIFICANT CHANGE UP (ref 96–108)
CHLORIDE SERPL-SCNC: 110 MMOL/L — HIGH (ref 96–108)
CO2 SERPL-SCNC: 20 MMOL/L — LOW (ref 22–31)
CO2 SERPL-SCNC: 22 MMOL/L — SIGNIFICANT CHANGE UP (ref 22–31)
CO2 SERPL-SCNC: 23 MMOL/L — SIGNIFICANT CHANGE UP (ref 22–31)
CREAT SERPL-MCNC: 0.62 MG/DL — SIGNIFICANT CHANGE UP (ref 0.5–1.3)
CREAT SERPL-MCNC: 0.64 MG/DL — SIGNIFICANT CHANGE UP (ref 0.5–1.3)
CREAT SERPL-MCNC: 0.72 MG/DL — SIGNIFICANT CHANGE UP (ref 0.5–1.3)
EOSINOPHIL # BLD AUTO: 0 K/UL — SIGNIFICANT CHANGE UP (ref 0–0.5)
EOSINOPHIL NFR BLD AUTO: 0 % — SIGNIFICANT CHANGE UP (ref 0–6)
GAS PNL BLDA: SIGNIFICANT CHANGE UP
GLUCOSE BLDC GLUCOMTR-MCNC: 152 MG/DL — HIGH (ref 70–99)
GLUCOSE BLDC GLUCOMTR-MCNC: 157 MG/DL — HIGH (ref 70–99)
GLUCOSE BLDC GLUCOMTR-MCNC: 158 MG/DL — HIGH (ref 70–99)
GLUCOSE BLDC GLUCOMTR-MCNC: 161 MG/DL — HIGH (ref 70–99)
GLUCOSE BLDC GLUCOMTR-MCNC: 167 MG/DL — HIGH (ref 70–99)
GLUCOSE BLDC GLUCOMTR-MCNC: 183 MG/DL — HIGH (ref 70–99)
GLUCOSE BLDC GLUCOMTR-MCNC: 195 MG/DL — HIGH (ref 70–99)
GLUCOSE BLDC GLUCOMTR-MCNC: 209 MG/DL — HIGH (ref 70–99)
GLUCOSE BLDC GLUCOMTR-MCNC: 212 MG/DL — HIGH (ref 70–99)
GLUCOSE BLDC GLUCOMTR-MCNC: 239 MG/DL — HIGH (ref 70–99)
GLUCOSE SERPL-MCNC: 163 MG/DL — HIGH (ref 70–99)
GLUCOSE SERPL-MCNC: 234 MG/DL — HIGH (ref 70–99)
GLUCOSE SERPL-MCNC: 263 MG/DL — HIGH (ref 70–99)
HCO3 BLDA-SCNC: 23 MMOL/L — SIGNIFICANT CHANGE UP (ref 21–28)
HCT VFR BLD CALC: 28 % — LOW (ref 39–50)
HCT VFR BLD CALC: 29.4 % — LOW (ref 39–50)
HCT VFR BLD CALC: 29.5 % — LOW (ref 39–50)
HGB BLD-MCNC: 10 G/DL — LOW (ref 13–17)
HGB BLD-MCNC: 10.1 G/DL — LOW (ref 13–17)
HGB BLD-MCNC: 9.7 G/DL — LOW (ref 13–17)
INR BLD: 1.28 — HIGH (ref 0.88–1.16)
INR BLD: 1.4 — HIGH (ref 0.88–1.16)
INR BLD: 1.5 — HIGH (ref 0.88–1.16)
LACTATE SERPL-SCNC: 1.7 MMOL/L — SIGNIFICANT CHANGE UP (ref 0.5–2)
LACTATE SERPL-SCNC: 2.3 MMOL/L — HIGH (ref 0.5–2)
LACTATE SERPL-SCNC: 3.3 MMOL/L — HIGH (ref 0.5–2)
LYMPHOCYTES # BLD AUTO: 1.13 K/UL — SIGNIFICANT CHANGE UP (ref 1–3.3)
LYMPHOCYTES # BLD AUTO: 7.8 % — LOW (ref 13–44)
MAGNESIUM SERPL-MCNC: 1.8 MG/DL — SIGNIFICANT CHANGE UP (ref 1.6–2.6)
MAGNESIUM SERPL-MCNC: 2.2 MG/DL — SIGNIFICANT CHANGE UP (ref 1.6–2.6)
MAGNESIUM SERPL-MCNC: 2.4 MG/DL — SIGNIFICANT CHANGE UP (ref 1.6–2.6)
MCHC RBC-ENTMCNC: 29.5 PG — SIGNIFICANT CHANGE UP (ref 27–34)
MCHC RBC-ENTMCNC: 30 PG — SIGNIFICANT CHANGE UP (ref 27–34)
MCHC RBC-ENTMCNC: 30.6 PG — SIGNIFICANT CHANGE UP (ref 27–34)
MCHC RBC-ENTMCNC: 34 GM/DL — SIGNIFICANT CHANGE UP (ref 32–36)
MCHC RBC-ENTMCNC: 34.2 GM/DL — SIGNIFICANT CHANGE UP (ref 32–36)
MCHC RBC-ENTMCNC: 34.6 GM/DL — SIGNIFICANT CHANGE UP (ref 32–36)
MCV RBC AUTO: 86.7 FL — SIGNIFICANT CHANGE UP (ref 80–100)
MCV RBC AUTO: 87.5 FL — SIGNIFICANT CHANGE UP (ref 80–100)
MCV RBC AUTO: 88.3 FL — SIGNIFICANT CHANGE UP (ref 80–100)
MONOCYTES # BLD AUTO: 0.38 K/UL — SIGNIFICANT CHANGE UP (ref 0–0.9)
MONOCYTES NFR BLD AUTO: 2.6 % — SIGNIFICANT CHANGE UP (ref 2–14)
NEUTROPHILS # BLD AUTO: 12.95 K/UL — HIGH (ref 1.8–7.4)
NEUTROPHILS NFR BLD AUTO: 86.1 % — HIGH (ref 43–77)
NRBC # BLD: 0 /100 WBCS — SIGNIFICANT CHANGE UP (ref 0–0)
NRBC # BLD: 0 /100 WBCS — SIGNIFICANT CHANGE UP (ref 0–0)
PCO2 BLDA: 42 MMHG — SIGNIFICANT CHANGE UP (ref 35–48)
PH BLDA: 7.36 — SIGNIFICANT CHANGE UP (ref 7.35–7.45)
PHOSPHATE SERPL-MCNC: 2.1 MG/DL — LOW (ref 2.5–4.5)
PHOSPHATE SERPL-MCNC: 2.7 MG/DL — SIGNIFICANT CHANGE UP (ref 2.5–4.5)
PHOSPHATE SERPL-MCNC: 3.5 MG/DL — SIGNIFICANT CHANGE UP (ref 2.5–4.5)
PLATELET # BLD AUTO: 113 K/UL — LOW (ref 150–400)
PLATELET # BLD AUTO: 122 K/UL — LOW (ref 150–400)
PLATELET # BLD AUTO: 124 K/UL — LOW (ref 150–400)
PO2 BLDA: 142 MMHG — HIGH (ref 83–108)
POTASSIUM SERPL-MCNC: 4.4 MMOL/L — SIGNIFICANT CHANGE UP (ref 3.5–5.3)
POTASSIUM SERPL-MCNC: 4.6 MMOL/L — SIGNIFICANT CHANGE UP (ref 3.5–5.3)
POTASSIUM SERPL-MCNC: 4.7 MMOL/L — SIGNIFICANT CHANGE UP (ref 3.5–5.3)
POTASSIUM SERPL-SCNC: 4.4 MMOL/L — SIGNIFICANT CHANGE UP (ref 3.5–5.3)
POTASSIUM SERPL-SCNC: 4.6 MMOL/L — SIGNIFICANT CHANGE UP (ref 3.5–5.3)
POTASSIUM SERPL-SCNC: 4.7 MMOL/L — SIGNIFICANT CHANGE UP (ref 3.5–5.3)
PROT SERPL-MCNC: 3.6 G/DL — LOW (ref 6–8.3)
PROT SERPL-MCNC: 4.6 G/DL — LOW (ref 6–8.3)
PROT SERPL-MCNC: 5.1 G/DL — LOW (ref 6–8.3)
PROTHROM AB SERPL-ACNC: 15.2 SEC — HIGH (ref 10.6–13.6)
PROTHROM AB SERPL-ACNC: 16.5 SEC — HIGH (ref 10.6–13.6)
PROTHROM AB SERPL-ACNC: 17.7 SEC — HIGH (ref 10.6–13.6)
RBC # BLD: 3.17 M/UL — LOW (ref 4.2–5.8)
RBC # BLD: 3.37 M/UL — LOW (ref 4.2–5.8)
RBC # BLD: 3.39 M/UL — LOW (ref 4.2–5.8)
RBC # FLD: 13 % — SIGNIFICANT CHANGE UP (ref 10.3–14.5)
RBC # FLD: 13.2 % — SIGNIFICANT CHANGE UP (ref 10.3–14.5)
RBC # FLD: 13.5 % — SIGNIFICANT CHANGE UP (ref 10.3–14.5)
SAO2 % BLDA: 98 % — SIGNIFICANT CHANGE UP (ref 95–100)
SODIUM SERPL-SCNC: 140 MMOL/L — SIGNIFICANT CHANGE UP (ref 135–145)
SODIUM SERPL-SCNC: 141 MMOL/L — SIGNIFICANT CHANGE UP (ref 135–145)
SODIUM SERPL-SCNC: 142 MMOL/L — SIGNIFICANT CHANGE UP (ref 135–145)
WBC # BLD: 14.4 K/UL — HIGH (ref 3.8–10.5)
WBC # BLD: 14.45 K/UL — HIGH (ref 3.8–10.5)
WBC # BLD: 16.17 K/UL — HIGH (ref 3.8–10.5)
WBC # FLD AUTO: 14.4 K/UL — HIGH (ref 3.8–10.5)
WBC # FLD AUTO: 14.45 K/UL — HIGH (ref 3.8–10.5)
WBC # FLD AUTO: 16.17 K/UL — HIGH (ref 3.8–10.5)

## 2020-07-06 PROCEDURE — 33533 CABG ARTERIAL SINGLE: CPT | Mod: AS

## 2020-07-06 PROCEDURE — 71045 X-RAY EXAM CHEST 1 VIEW: CPT | Mod: 26

## 2020-07-06 PROCEDURE — 33518 CABG ARTERY-VEIN TWO: CPT | Mod: AS

## 2020-07-06 PROCEDURE — 99291 CRITICAL CARE FIRST HOUR: CPT

## 2020-07-06 PROCEDURE — 33518 CABG ARTERY-VEIN TWO: CPT

## 2020-07-06 PROCEDURE — 93010 ELECTROCARDIOGRAM REPORT: CPT

## 2020-07-06 PROCEDURE — 33508 ENDOSCOPIC VEIN HARVEST: CPT | Mod: AS

## 2020-07-06 PROCEDURE — 99292 CRITICAL CARE ADDL 30 MIN: CPT

## 2020-07-06 PROCEDURE — 33533 CABG ARTERIAL SINGLE: CPT

## 2020-07-06 RX ORDER — SODIUM BICARBONATE 1 MEQ/ML
25 SYRINGE (ML) INTRAVENOUS ONCE
Refills: 0 | Status: COMPLETED | OUTPATIENT
Start: 2020-07-06 | End: 2020-07-06

## 2020-07-06 RX ORDER — LIDOCAINE 4 G/100G
1 CREAM TOPICAL EVERY 24 HOURS
Refills: 0 | Status: DISCONTINUED | OUTPATIENT
Start: 2020-07-06 | End: 2020-07-12

## 2020-07-06 RX ORDER — MAGNESIUM SULFATE 500 MG/ML
2 VIAL (ML) INJECTION ONCE
Refills: 0 | Status: COMPLETED | OUTPATIENT
Start: 2020-07-06 | End: 2020-07-06

## 2020-07-06 RX ORDER — CALCIUM GLUCONATE 100 MG/ML
2 VIAL (ML) INTRAVENOUS ONCE
Refills: 0 | Status: COMPLETED | OUTPATIENT
Start: 2020-07-06 | End: 2020-07-06

## 2020-07-06 RX ORDER — CEFAZOLIN SODIUM 1 G
2000 VIAL (EA) INJECTION EVERY 8 HOURS
Refills: 0 | Status: COMPLETED | OUTPATIENT
Start: 2020-07-06 | End: 2020-07-08

## 2020-07-06 RX ORDER — CHLORHEXIDINE GLUCONATE 213 G/1000ML
15 SOLUTION TOPICAL EVERY 12 HOURS
Refills: 0 | Status: DISCONTINUED | OUTPATIENT
Start: 2020-07-06 | End: 2020-07-06

## 2020-07-06 RX ORDER — ALBUMIN HUMAN 25 %
250 VIAL (ML) INTRAVENOUS
Refills: 0 | Status: COMPLETED | OUTPATIENT
Start: 2020-07-06 | End: 2020-07-06

## 2020-07-06 RX ORDER — ASPIRIN/CALCIUM CARB/MAGNESIUM 324 MG
81 TABLET ORAL DAILY
Refills: 0 | Status: DISCONTINUED | OUTPATIENT
Start: 2020-07-06 | End: 2020-07-09

## 2020-07-06 RX ORDER — FENTANYL CITRATE 50 UG/ML
25 INJECTION INTRAVENOUS
Refills: 0 | Status: DISCONTINUED | OUTPATIENT
Start: 2020-07-06 | End: 2020-07-10

## 2020-07-06 RX ORDER — PHENYLEPHRINE HYDROCHLORIDE 10 MG/ML
0.5 INJECTION INTRAVENOUS
Qty: 160 | Refills: 0 | Status: DISCONTINUED | OUTPATIENT
Start: 2020-07-06 | End: 2020-07-08

## 2020-07-06 RX ORDER — ACETAMINOPHEN 500 MG
1000 TABLET ORAL ONCE
Refills: 0 | Status: COMPLETED | OUTPATIENT
Start: 2020-07-06 | End: 2020-07-06

## 2020-07-06 RX ORDER — ALBUMIN HUMAN 25 %
250 VIAL (ML) INTRAVENOUS ONCE
Refills: 0 | Status: COMPLETED | OUTPATIENT
Start: 2020-07-06 | End: 2020-07-06

## 2020-07-06 RX ORDER — DEXTROSE 50 % IN WATER 50 %
25 SYRINGE (ML) INTRAVENOUS
Refills: 0 | Status: DISCONTINUED | OUTPATIENT
Start: 2020-07-06 | End: 2020-07-08

## 2020-07-06 RX ORDER — DEXMEDETOMIDINE HYDROCHLORIDE IN 0.9% SODIUM CHLORIDE 4 UG/ML
0.4 INJECTION INTRAVENOUS
Qty: 400 | Refills: 0 | Status: DISCONTINUED | OUTPATIENT
Start: 2020-07-06 | End: 2020-07-06

## 2020-07-06 RX ORDER — POLYETHYLENE GLYCOL 3350 17 G/17G
17 POWDER, FOR SOLUTION ORAL DAILY
Refills: 0 | Status: DISCONTINUED | OUTPATIENT
Start: 2020-07-06 | End: 2020-07-12

## 2020-07-06 RX ORDER — OXYCODONE AND ACETAMINOPHEN 5; 325 MG/1; MG/1
2 TABLET ORAL EVERY 6 HOURS
Refills: 0 | Status: DISCONTINUED | OUTPATIENT
Start: 2020-07-06 | End: 2020-07-07

## 2020-07-06 RX ORDER — PANTOPRAZOLE SODIUM 20 MG/1
40 TABLET, DELAYED RELEASE ORAL
Refills: 0 | Status: DISCONTINUED | OUTPATIENT
Start: 2020-07-06 | End: 2020-07-12

## 2020-07-06 RX ORDER — PANTOPRAZOLE SODIUM 20 MG/1
40 TABLET, DELAYED RELEASE ORAL DAILY
Refills: 0 | Status: DISCONTINUED | OUTPATIENT
Start: 2020-07-06 | End: 2020-07-06

## 2020-07-06 RX ORDER — ATORVASTATIN CALCIUM 80 MG/1
80 TABLET, FILM COATED ORAL AT BEDTIME
Refills: 0 | Status: DISCONTINUED | OUTPATIENT
Start: 2020-07-06 | End: 2020-07-12

## 2020-07-06 RX ORDER — OXYCODONE AND ACETAMINOPHEN 5; 325 MG/1; MG/1
1 TABLET ORAL EVERY 6 HOURS
Refills: 0 | Status: DISCONTINUED | OUTPATIENT
Start: 2020-07-06 | End: 2020-07-12

## 2020-07-06 RX ORDER — PHENYLEPHRINE HYDROCHLORIDE 10 MG/ML
0.5 INJECTION INTRAVENOUS
Qty: 40 | Refills: 0 | Status: DISCONTINUED | OUTPATIENT
Start: 2020-07-06 | End: 2020-07-06

## 2020-07-06 RX ORDER — SODIUM CHLORIDE 9 MG/ML
500 INJECTION INTRAMUSCULAR; INTRAVENOUS; SUBCUTANEOUS ONCE
Refills: 0 | Status: COMPLETED | OUTPATIENT
Start: 2020-07-06 | End: 2020-07-07

## 2020-07-06 RX ORDER — DEXTROSE 50 % IN WATER 50 %
50 SYRINGE (ML) INTRAVENOUS
Refills: 0 | Status: DISCONTINUED | OUTPATIENT
Start: 2020-07-06 | End: 2020-07-08

## 2020-07-06 RX ORDER — CHLORHEXIDINE GLUCONATE 213 G/1000ML
5 SOLUTION TOPICAL EVERY 4 HOURS
Refills: 0 | Status: DISCONTINUED | OUTPATIENT
Start: 2020-07-06 | End: 2020-07-06

## 2020-07-06 RX ORDER — INSULIN HUMAN 100 [IU]/ML
1 INJECTION, SOLUTION SUBCUTANEOUS
Qty: 50 | Refills: 0 | Status: DISCONTINUED | OUTPATIENT
Start: 2020-07-06 | End: 2020-07-08

## 2020-07-06 RX ORDER — CHLORHEXIDINE GLUCONATE 213 G/1000ML
1 SOLUTION TOPICAL DAILY
Refills: 0 | Status: DISCONTINUED | OUTPATIENT
Start: 2020-07-06 | End: 2020-07-10

## 2020-07-06 RX ORDER — SODIUM CHLORIDE 9 MG/ML
1000 INJECTION INTRAMUSCULAR; INTRAVENOUS; SUBCUTANEOUS
Refills: 0 | Status: DISCONTINUED | OUTPATIENT
Start: 2020-07-06 | End: 2020-07-10

## 2020-07-06 RX ORDER — HEPARIN SODIUM 5000 [USP'U]/ML
5000 INJECTION INTRAVENOUS; SUBCUTANEOUS EVERY 8 HOURS
Refills: 0 | Status: DISCONTINUED | OUTPATIENT
Start: 2020-07-06 | End: 2020-07-09

## 2020-07-06 RX ORDER — LEVOTHYROXINE SODIUM 125 MCG
200 TABLET ORAL DAILY
Refills: 0 | Status: DISCONTINUED | OUTPATIENT
Start: 2020-07-06 | End: 2020-07-08

## 2020-07-06 RX ADMIN — Medication 125 MILLILITER(S): at 19:35

## 2020-07-06 RX ADMIN — PHENYLEPHRINE HYDROCHLORIDE 16.9 MICROGRAM(S)/KG/MIN: 10 INJECTION INTRAVENOUS at 15:25

## 2020-07-06 RX ADMIN — PHENYLEPHRINE HYDROCHLORIDE 8.47 MICROGRAM(S)/KG/MIN: 10 INJECTION INTRAVENOUS at 20:11

## 2020-07-06 RX ADMIN — Medication 400 MILLIGRAM(S): at 15:22

## 2020-07-06 RX ADMIN — CHLORHEXIDINE GLUCONATE 15 MILLILITER(S): 213 SOLUTION TOPICAL at 18:35

## 2020-07-06 RX ADMIN — Medication 125 MILLILITER(S): at 15:40

## 2020-07-06 RX ADMIN — INSULIN HUMAN 1 UNIT(S)/HR: 100 INJECTION, SOLUTION SUBCUTANEOUS at 16:11

## 2020-07-06 RX ADMIN — Medication 125 MILLILITER(S): at 16:10

## 2020-07-06 RX ADMIN — Medication 200 GRAM(S): at 19:34

## 2020-07-06 RX ADMIN — Medication 400 MILLIGRAM(S): at 22:29

## 2020-07-06 RX ADMIN — Medication 50 GRAM(S): at 15:22

## 2020-07-06 RX ADMIN — Medication 25 MILLIEQUIVALENT(S): at 19:40

## 2020-07-06 RX ADMIN — DEXMEDETOMIDINE HYDROCHLORIDE IN 0.9% SODIUM CHLORIDE 9.03 MICROGRAM(S)/KG/HR: 4 INJECTION INTRAVENOUS at 15:34

## 2020-07-06 RX ADMIN — Medication 125 MILLILITER(S): at 15:37

## 2020-07-06 RX ADMIN — LIDOCAINE 1 PATCH: 4 CREAM TOPICAL at 19:00

## 2020-07-06 RX ADMIN — Medication 100 MILLIGRAM(S): at 21:19

## 2020-07-06 RX ADMIN — PANTOPRAZOLE SODIUM 40 MILLIGRAM(S): 20 TABLET, DELAYED RELEASE ORAL at 18:35

## 2020-07-06 RX ADMIN — Medication 200 GRAM(S): at 14:45

## 2020-07-06 RX ADMIN — INSULIN HUMAN 1 UNIT(S)/HR: 100 INJECTION, SOLUTION SUBCUTANEOUS at 23:11

## 2020-07-06 RX ADMIN — CHLORHEXIDINE GLUCONATE 5 MILLILITER(S): 213 SOLUTION TOPICAL at 18:36

## 2020-07-06 RX ADMIN — LIDOCAINE 1 PATCH: 4 CREAM TOPICAL at 18:57

## 2020-07-06 NOTE — BRIEF OPERATIVE NOTE - COMMENTS
Bryant Murillo PA-C was the first assistant for this case including but not limited to conduit harvesting, first assisting cannulation, distal/proximal anastomosis, hemostasis, chest closure.    I was present for this procedure and participated as first assistant as described by the PA above, unless otherwise noted below.

## 2020-07-06 NOTE — PROGRESS NOTE ADULT - SUBJECTIVE AND OBJECTIVE BOX
63 yo M, non-smoker, with a past medical history of COVID infection (antibody positive), with a past medical history of TIA on Plavix 75mg only (6 years ago with left hand weakness that lasted a few minutes, no recurrence), HTN, HLD, DM, hypothyroidism, presented to Dr. Francis Muñiz with c/o chest pain associated with dyspnea on exertion ambulating up 1-2 flights of stairs. Symptoms began in early May 2020 and resolve on own after resting and breathing.  Subsequent NST 6/13/20:  Anterior, septal and lateral wall ischemia identified.  Diaphragmatic.  EF 66%. Patient is now s/p dx cardiac cath on 6/30/20 revealing 3V Coronary Artery Disease:  s/p CABG x3  Extubated, Given 1 unit of blood in ICU and currently on tavon-synephrine gtt      Vital Signs Last 24 Hrs  T(C): 36.4 (06 Jul 2020 21:26), Max: 36.8 (06 Jul 2020 17:02)  T(F): 97.6 (06 Jul 2020 21:26), Max: 98.3 (06 Jul 2020 17:02)  HR: 89 (06 Jul 2020 22:30) (74 - 90)  BP: 113/61 (06 Jul 2020 20:00) (113/61 - 113/61)  BP(mean): 82 (06 Jul 2020 20:00) (82 - 82)  RR: 20 (06 Jul 2020 22:30) (12 - 24)  SpO2: 100% (06 Jul 2020 22:30) (100% - 100%)    alert, awake, verbal  follows commands  S1S2 reg rate  b/l air entry, no rales  soft abdomen, non distended, non tender  no pedal edema, warm ext b/l  moves all 4 ext  CXR- post op changes, tubes and rains in place, no pnthx  WBC 14.4, hg 10, plt 128  INR 1.3  BUN, Cr - 17, 0.7  Lactate, Blood: 2.3 mmol/L (07-06-20 @ 22:18)    a/p:    CAD s/p CABG  Acute post thoracotomy pain    PO ASA, statin and beta blocker once feasible  trend lactate  MAP goal is ~80, continue with Tavon for now  trend chest tube out put  DVT and GI proph  glycemic control  Pain control PRN    40 minutes of critical care time spent providing medical care for patient's acute illness/conditions that impairs at least one vital organ system and/or poses a high risk of imminent or life threatening deterioration in the patient's condition. It includes time spent evaluating and treating the patient's acute illness as well as time spent reviewing labs, radiology, discussing goals of care with patient and/or patient's family, and discussing the case with a multidisciplinary team in an effort to prevent further life threatening deterioration or end organ damage. This time is independent of any procedures performed.

## 2020-07-06 NOTE — PROGRESS NOTE ADULT - SUBJECTIVE AND OBJECTIVE BOX
CTICU  CRITICAL  CARE  attending     Hand off received 					   Pertinent clinical, laboratory, radiographic, hemodynamic, echocardiographic, respiratory data, microbiologic data and chart were reviewed and analyzed frequently throughout the course of the day and night  Patient seen and examined with CTS/ SH attending at bedside  Pt is a 64y , Male, HEALTH ISSUES - PROBLEM Dx:      , FAMILY HISTORY:  FH: heart failure  PAST MEDICAL & SURGICAL HISTORY:  History of TIAs  Hypothyroidism  H/O cardiomyopathy  DM (diabetes mellitus)  HLD (hyperlipidemia)  HTN (hypertension)  No significant past surgical history    Patient is a 64y old  Male who presents with a chief complaint of CAD (01 Jul 2020 19:33)      14 system review was unremarkable    Vital signs, hemodynamic and respiratory parameters were reviewed from the bedside nursing flowsheet.  ICU Vital Signs Last 24 Hrs  T(C): 36.8 (06 Jul 2020 17:02), Max: 36.8 (06 Jul 2020 17:02)  T(F): 98.3 (06 Jul 2020 17:02), Max: 98.3 (06 Jul 2020 17:02)  HR: 87 (06 Jul 2020 19:00) (74 - 87)  BP: --  BP(mean): --  ABP: 142/63 (06 Jul 2020 19:00) (119/54 - 145/65)  ABP(mean): 85 (06 Jul 2020 19:00) (73 - 90)  RR: 16 (06 Jul 2020 19:00) (12 - 22)  SpO2: 100% (06 Jul 2020 19:00) (100% - 100%)    Adult Advanced Hemodynamics Last 24 Hrs  CVP(mm Hg): 3 (06 Jul 2020 19:15) (2 - 27)  CVP(cm H2O): --  CO: --  CI: --  PA: --  PA(mean): --  PCWP: --  SVR: --  SVRI: --  PVR: --  PVRI: --, ABG - ( 06 Jul 2020 18:45 )  pH, Arterial: 7.36  pH, Blood: x     /  pCO2: 42    /  pO2: 142   / HCO3: 23    / Base Excess: -2.4  /  SaO2: 98                Mode: AC/ CMV (Assist Control/ Continuous Mandatory Ventilation)  RR (machine): 12  TV (machine): 700  FiO2: 50  PEEP: 5  ITime: 1  MAP: 10  PIP: 26    Intake and output was reviewed and the fluid balance was calculated  Daily     Daily   I&O's Summary    06 Jul 2020 07:01  -  06 Jul 2020 21:13  --------------------------------------------------------  IN: 2028.2 mL / OUT: 1295 mL / NET: 733.2 mL        All lines and drain sites were assessed  [facility]      POCT Blood Glucose.: 161 mg/dL (06 Jul 2020 20:21)    No acute change in mental status  Auscultation of the chest reveals equal bs  Abdomen is soft  Extremities are warm and well perfused  Wounds appear clean and unremarkable  Antibiotics are periop    labs  CBC Full  -  ( 06 Jul 2020 16:11 )  WBC Count : 16.17 K/uL  RBC Count : 3.17 M/uL  Hemoglobin : 9.7 g/dL  Hematocrit : 28.0 %  Platelet Count - Automated : 122 K/uL  Mean Cell Volume : 88.3 fl  Mean Cell Hemoglobin : 30.6 pg  Mean Cell Hemoglobin Concentration : 34.6 gm/dL  Auto Neutrophil # : x  Auto Lymphocyte # : x  Auto Monocyte # : x  Auto Eosinophil # : x  Auto Basophil # : x  Auto Neutrophil % : x  Auto Lymphocyte % : x  Auto Monocyte % : x  Auto Eosinophil % : x  Auto Basophil % : x    07-06    140  |  107  |  17  ----------------------------<  263<H>  4.6   |  22  |  0.64    Ca    8.4      06 Jul 2020 16:11  Phos  2.7     07-06  Mg     2.4     07-06    TPro  4.6<L>  /  Alb  3.3  /  TBili  2.2<H>  /  DBili  x   /  AST  38  /  ALT  17  /  AlkPhos  33<L>  07-06    PT/INR - ( 06 Jul 2020 16:11 )   PT: 16.5 sec;   INR: 1.40          PTT - ( 06 Jul 2020 16:11 )  PTT:27.9 sec  The current medications were reviewed   MEDICATIONS  (STANDING):  acetaminophen  IVPB .. 1000 milliGRAM(s) IV Intermittent once  aspirin enteric coated 81 milliGRAM(s) Oral daily  atorvastatin 80 milliGRAM(s) Oral at bedtime  ceFAZolin   IVPB 2000 milliGRAM(s) IV Intermittent every 8 hours  chlorhexidine 2% Cloths 1 Application(s) Topical daily  dextrose 50% Injectable 50 milliLiter(s) IV Push every 15 minutes  dextrose 50% Injectable 25 milliLiter(s) IV Push every 15 minutes  heparin   Injectable 5000 Unit(s) SubCutaneous every 8 hours  insulin regular Infusion 1 Unit(s)/Hr (1 mL/Hr) IV Continuous <Continuous>  levothyroxine 200 MICROGram(s) Oral daily  lidocaine   Patch 1 Patch Transdermal every 24 hours  pantoprazole    Tablet 40 milliGRAM(s) Oral before breakfast  phenylephrine    Infusion 0.5 MICROgram(s)/kG/Min (8.47 mL/Hr) IV Continuous <Continuous>  polyethylene glycol 3350 17 Gram(s) Oral daily  sodium chloride 0.9%. 1000 milliLiter(s) (10 mL/Hr) IV Continuous <Continuous>    MEDICATIONS  (PRN):  fentaNYL    Injectable 25 MICROGram(s) IV Push every 3 hours PRN Severe Pain (7 - 10)  oxycodone    5 mG/acetaminophen 325 mG 1 Tablet(s) Oral every 6 hours PRN Moderate Pain (4 - 6)  oxycodone    5 mG/acetaminophen 325 mG 2 Tablet(s) Oral every 6 hours PRN Severe Pain (7 - 10)       PROBLEM LIST/ ASSESSMENT:  HEALTH ISSUES - PROBLEM Dx:      ,   Patient is a 64y old  Male who presents with a chief complaint of CAD (01 Jul 2020 19:33)     s/p cardiac surgery                My plan includes :  close hemodynamic, ventilatory and drain monitoring and management per post op routine    Monitor for arrhythmias and monitor parameters for organ perfusion  beta blockade not administered due to hemodynamic instability and bradycardia  monitor neurologic status  Head of the bed should remain elevated to 45 deg .   chest PT and IS will be encouraged  monitor adequacy of oxygenation and ventilation and attempt to wean oxygen  antibiotic regimen will be tailored to the clinical, laboratory and microbiologic data  Nutritional goals will be met using po eventually , ensure adequate caloric intake and montior the same  Stress ulcer and VTE prophylaxis will be achieved    Glycemic control is satisfactory  Electrolytes have been repleted as necessary and wound care has been carried out. Pain control has been achieved.   agressive physical therapy and early mobility and ambulation goals will be met   The family was updated about the course and plan  CRITICAL CARE TIME personally provided by me  in evaluation and management, reassessments, review and interpretation of labs and x-rays, ventilator and hemodynamic management, formulating a plan and coordinating care: ___90____ MIN.  Time does not include procedural time. Time spent was non routine post-operarive caRE and included multiple and repeated evaluations at the bedside  CTICU ATTENDING     					    Dexter Davis MD

## 2020-07-07 LAB
ALBUMIN SERPL ELPH-MCNC: 3.3 G/DL — SIGNIFICANT CHANGE UP (ref 3.3–5)
ALBUMIN SERPL ELPH-MCNC: 3.3 G/DL — SIGNIFICANT CHANGE UP (ref 3.3–5)
ALP SERPL-CCNC: 29 U/L — LOW (ref 40–120)
ALP SERPL-CCNC: 31 U/L — LOW (ref 40–120)
ALT FLD-CCNC: 14 U/L — SIGNIFICANT CHANGE UP (ref 10–45)
ALT FLD-CCNC: 16 U/L — SIGNIFICANT CHANGE UP (ref 10–45)
ANION GAP SERPL CALC-SCNC: 10 MMOL/L — SIGNIFICANT CHANGE UP (ref 5–17)
ANION GAP SERPL CALC-SCNC: 16 MMOL/L — SIGNIFICANT CHANGE UP (ref 5–17)
APTT BLD: 28.3 SEC — SIGNIFICANT CHANGE UP (ref 27.5–35.5)
APTT BLD: 32.1 SEC — SIGNIFICANT CHANGE UP (ref 27.5–35.5)
AST SERPL-CCNC: 28 U/L — SIGNIFICANT CHANGE UP (ref 10–40)
AST SERPL-CCNC: 34 U/L — SIGNIFICANT CHANGE UP (ref 10–40)
BILIRUB SERPL-MCNC: 1.6 MG/DL — HIGH (ref 0.2–1.2)
BILIRUB SERPL-MCNC: 2.7 MG/DL — HIGH (ref 0.2–1.2)
BUN SERPL-MCNC: 16 MG/DL — SIGNIFICANT CHANGE UP (ref 7–23)
BUN SERPL-MCNC: 21 MG/DL — SIGNIFICANT CHANGE UP (ref 7–23)
CALCIUM SERPL-MCNC: 7.9 MG/DL — LOW (ref 8.4–10.5)
CALCIUM SERPL-MCNC: 8.2 MG/DL — LOW (ref 8.4–10.5)
CHLORIDE SERPL-SCNC: 103 MMOL/L — SIGNIFICANT CHANGE UP (ref 96–108)
CHLORIDE SERPL-SCNC: 107 MMOL/L — SIGNIFICANT CHANGE UP (ref 96–108)
CO2 SERPL-SCNC: 19 MMOL/L — LOW (ref 22–31)
CO2 SERPL-SCNC: 23 MMOL/L — SIGNIFICANT CHANGE UP (ref 22–31)
CREAT SERPL-MCNC: 0.73 MG/DL — SIGNIFICANT CHANGE UP (ref 0.5–1.3)
CREAT SERPL-MCNC: 0.87 MG/DL — SIGNIFICANT CHANGE UP (ref 0.5–1.3)
GAS PNL BLDA: SIGNIFICANT CHANGE UP
GAS PNL BLDA: SIGNIFICANT CHANGE UP
GLUCOSE BLDC GLUCOMTR-MCNC: 106 MG/DL — HIGH (ref 70–99)
GLUCOSE BLDC GLUCOMTR-MCNC: 132 MG/DL — HIGH (ref 70–99)
GLUCOSE BLDC GLUCOMTR-MCNC: 133 MG/DL — HIGH (ref 70–99)
GLUCOSE BLDC GLUCOMTR-MCNC: 143 MG/DL — HIGH (ref 70–99)
GLUCOSE BLDC GLUCOMTR-MCNC: 159 MG/DL — HIGH (ref 70–99)
GLUCOSE BLDC GLUCOMTR-MCNC: 215 MG/DL — HIGH (ref 70–99)
GLUCOSE BLDC GLUCOMTR-MCNC: 268 MG/DL — HIGH (ref 70–99)
GLUCOSE BLDC GLUCOMTR-MCNC: 276 MG/DL — HIGH (ref 70–99)
GLUCOSE BLDC GLUCOMTR-MCNC: 99 MG/DL — SIGNIFICANT CHANGE UP (ref 70–99)
GLUCOSE SERPL-MCNC: 113 MG/DL — HIGH (ref 70–99)
GLUCOSE SERPL-MCNC: 281 MG/DL — HIGH (ref 70–99)
HCT VFR BLD CALC: 24.4 % — LOW (ref 39–50)
HCT VFR BLD CALC: 27.6 % — LOW (ref 39–50)
HCT VFR BLD CALC: 28.1 % — LOW (ref 39–50)
HGB BLD-MCNC: 8.5 G/DL — LOW (ref 13–17)
HGB BLD-MCNC: 9.4 G/DL — LOW (ref 13–17)
HGB BLD-MCNC: 9.6 G/DL — LOW (ref 13–17)
INR BLD: 1.25 — HIGH (ref 0.88–1.16)
INR BLD: 1.37 — HIGH (ref 0.88–1.16)
LACTATE SERPL-SCNC: 1.6 MMOL/L — SIGNIFICANT CHANGE UP (ref 0.5–2)
LACTATE SERPL-SCNC: 1.6 MMOL/L — SIGNIFICANT CHANGE UP (ref 0.5–2)
MAGNESIUM SERPL-MCNC: 1.7 MG/DL — SIGNIFICANT CHANGE UP (ref 1.6–2.6)
MAGNESIUM SERPL-MCNC: 2 MG/DL — SIGNIFICANT CHANGE UP (ref 1.6–2.6)
MCHC RBC-ENTMCNC: 29.7 PG — SIGNIFICANT CHANGE UP (ref 27–34)
MCHC RBC-ENTMCNC: 29.9 PG — SIGNIFICANT CHANGE UP (ref 27–34)
MCHC RBC-ENTMCNC: 30.5 PG — SIGNIFICANT CHANGE UP (ref 27–34)
MCHC RBC-ENTMCNC: 34.1 GM/DL — SIGNIFICANT CHANGE UP (ref 32–36)
MCHC RBC-ENTMCNC: 34.2 GM/DL — SIGNIFICANT CHANGE UP (ref 32–36)
MCHC RBC-ENTMCNC: 34.8 GM/DL — SIGNIFICANT CHANGE UP (ref 32–36)
MCV RBC AUTO: 87.3 FL — SIGNIFICANT CHANGE UP (ref 80–100)
MCV RBC AUTO: 87.5 FL — SIGNIFICANT CHANGE UP (ref 80–100)
MCV RBC AUTO: 87.5 FL — SIGNIFICANT CHANGE UP (ref 80–100)
NRBC # BLD: 0 /100 WBCS — SIGNIFICANT CHANGE UP (ref 0–0)
PHOSPHATE SERPL-MCNC: 3.8 MG/DL — SIGNIFICANT CHANGE UP (ref 2.5–4.5)
PHOSPHATE SERPL-MCNC: 3.9 MG/DL — SIGNIFICANT CHANGE UP (ref 2.5–4.5)
PLATELET # BLD AUTO: 104 K/UL — LOW (ref 150–400)
PLATELET # BLD AUTO: 124 K/UL — LOW (ref 150–400)
PLATELET # BLD AUTO: 96 K/UL — LOW (ref 150–400)
POTASSIUM SERPL-MCNC: 4.4 MMOL/L — SIGNIFICANT CHANGE UP (ref 3.5–5.3)
POTASSIUM SERPL-MCNC: 4.5 MMOL/L — SIGNIFICANT CHANGE UP (ref 3.5–5.3)
POTASSIUM SERPL-SCNC: 4.4 MMOL/L — SIGNIFICANT CHANGE UP (ref 3.5–5.3)
POTASSIUM SERPL-SCNC: 4.5 MMOL/L — SIGNIFICANT CHANGE UP (ref 3.5–5.3)
PROT SERPL-MCNC: 5 G/DL — LOW (ref 6–8.3)
PROT SERPL-MCNC: 5.3 G/DL — LOW (ref 6–8.3)
PROTHROM AB SERPL-ACNC: 14.8 SEC — HIGH (ref 10.6–13.6)
PROTHROM AB SERPL-ACNC: 16.2 SEC — HIGH (ref 10.6–13.6)
RBC # BLD: 2.79 M/UL — LOW (ref 4.2–5.8)
RBC # BLD: 3.16 M/UL — LOW (ref 4.2–5.8)
RBC # BLD: 3.21 M/UL — LOW (ref 4.2–5.8)
RBC # FLD: 13.8 % — SIGNIFICANT CHANGE UP (ref 10.3–14.5)
RBC # FLD: 14.1 % — SIGNIFICANT CHANGE UP (ref 10.3–14.5)
RBC # FLD: 14.1 % — SIGNIFICANT CHANGE UP (ref 10.3–14.5)
SODIUM SERPL-SCNC: 138 MMOL/L — SIGNIFICANT CHANGE UP (ref 135–145)
SODIUM SERPL-SCNC: 140 MMOL/L — SIGNIFICANT CHANGE UP (ref 135–145)
WBC # BLD: 13.02 K/UL — HIGH (ref 3.8–10.5)
WBC # BLD: 13.16 K/UL — HIGH (ref 3.8–10.5)
WBC # BLD: 14.62 K/UL — HIGH (ref 3.8–10.5)
WBC # FLD AUTO: 13.02 K/UL — HIGH (ref 3.8–10.5)
WBC # FLD AUTO: 13.16 K/UL — HIGH (ref 3.8–10.5)
WBC # FLD AUTO: 14.62 K/UL — HIGH (ref 3.8–10.5)

## 2020-07-07 PROCEDURE — 99291 CRITICAL CARE FIRST HOUR: CPT

## 2020-07-07 PROCEDURE — 99292 CRITICAL CARE ADDL 30 MIN: CPT

## 2020-07-07 PROCEDURE — 71045 X-RAY EXAM CHEST 1 VIEW: CPT | Mod: 26

## 2020-07-07 RX ORDER — CALCIUM GLUCONATE 100 MG/ML
2 VIAL (ML) INTRAVENOUS ONCE
Refills: 0 | Status: COMPLETED | OUTPATIENT
Start: 2020-07-07 | End: 2020-07-07

## 2020-07-07 RX ORDER — ALBUMIN HUMAN 25 %
50 VIAL (ML) INTRAVENOUS
Refills: 0 | Status: COMPLETED | OUTPATIENT
Start: 2020-07-07 | End: 2020-07-07

## 2020-07-07 RX ORDER — DEXTROSE 50 % IN WATER 50 %
15 SYRINGE (ML) INTRAVENOUS ONCE
Refills: 0 | Status: DISCONTINUED | OUTPATIENT
Start: 2020-07-07 | End: 2020-07-12

## 2020-07-07 RX ORDER — INSULIN GLARGINE 100 [IU]/ML
15 INJECTION, SOLUTION SUBCUTANEOUS AT BEDTIME
Refills: 0 | Status: DISCONTINUED | OUTPATIENT
Start: 2020-07-07 | End: 2020-07-08

## 2020-07-07 RX ORDER — FUROSEMIDE 40 MG
20 TABLET ORAL ONCE
Refills: 0 | Status: COMPLETED | OUTPATIENT
Start: 2020-07-07 | End: 2020-07-07

## 2020-07-07 RX ORDER — METOPROLOL TARTRATE 50 MG
12.5 TABLET ORAL
Refills: 0 | Status: DISCONTINUED | OUTPATIENT
Start: 2020-07-08 | End: 2020-07-08

## 2020-07-07 RX ORDER — ALBUMIN HUMAN 25 %
250 VIAL (ML) INTRAVENOUS ONCE
Refills: 0 | Status: COMPLETED | OUTPATIENT
Start: 2020-07-07 | End: 2020-07-07

## 2020-07-07 RX ORDER — DEXTROSE 50 % IN WATER 50 %
25 SYRINGE (ML) INTRAVENOUS ONCE
Refills: 0 | Status: DISCONTINUED | OUTPATIENT
Start: 2020-07-07 | End: 2020-07-12

## 2020-07-07 RX ORDER — INSULIN LISPRO 100/ML
VIAL (ML) SUBCUTANEOUS
Refills: 0 | Status: DISCONTINUED | OUTPATIENT
Start: 2020-07-07 | End: 2020-07-08

## 2020-07-07 RX ORDER — OXYCODONE AND ACETAMINOPHEN 5; 325 MG/1; MG/1
2 TABLET ORAL EVERY 4 HOURS
Refills: 0 | Status: DISCONTINUED | OUTPATIENT
Start: 2020-07-07 | End: 2020-07-12

## 2020-07-07 RX ORDER — GLUCAGON INJECTION, SOLUTION 0.5 MG/.1ML
1 INJECTION, SOLUTION SUBCUTANEOUS ONCE
Refills: 0 | Status: DISCONTINUED | OUTPATIENT
Start: 2020-07-07 | End: 2020-07-12

## 2020-07-07 RX ORDER — SODIUM CHLORIDE 9 MG/ML
1000 INJECTION, SOLUTION INTRAVENOUS
Refills: 0 | Status: DISCONTINUED | OUTPATIENT
Start: 2020-07-07 | End: 2020-07-10

## 2020-07-07 RX ORDER — DEXTROSE 50 % IN WATER 50 %
12.5 SYRINGE (ML) INTRAVENOUS ONCE
Refills: 0 | Status: DISCONTINUED | OUTPATIENT
Start: 2020-07-07 | End: 2020-07-12

## 2020-07-07 RX ORDER — METOPROLOL TARTRATE 50 MG
25 TABLET ORAL
Refills: 0 | Status: DISCONTINUED | OUTPATIENT
Start: 2020-07-07 | End: 2020-07-07

## 2020-07-07 RX ADMIN — Medication 100 MILLIGRAM(S): at 06:31

## 2020-07-07 RX ADMIN — Medication 2: at 07:30

## 2020-07-07 RX ADMIN — Medication 4: at 11:48

## 2020-07-07 RX ADMIN — OXYCODONE AND ACETAMINOPHEN 2 TABLET(S): 5; 325 TABLET ORAL at 21:22

## 2020-07-07 RX ADMIN — Medication 200 GRAM(S): at 17:52

## 2020-07-07 RX ADMIN — POLYETHYLENE GLYCOL 3350 17 GRAM(S): 17 POWDER, FOR SOLUTION ORAL at 11:48

## 2020-07-07 RX ADMIN — Medication 125 MILLILITER(S): at 08:25

## 2020-07-07 RX ADMIN — LIDOCAINE 1 PATCH: 4 CREAM TOPICAL at 07:31

## 2020-07-07 RX ADMIN — FENTANYL CITRATE 25 MICROGRAM(S): 50 INJECTION INTRAVENOUS at 12:31

## 2020-07-07 RX ADMIN — HEPARIN SODIUM 5000 UNIT(S): 5000 INJECTION INTRAVENOUS; SUBCUTANEOUS at 21:21

## 2020-07-07 RX ADMIN — Medication 125 MILLILITER(S): at 20:07

## 2020-07-07 RX ADMIN — Medication 25 MILLIGRAM(S): at 14:43

## 2020-07-07 RX ADMIN — OXYCODONE AND ACETAMINOPHEN 2 TABLET(S): 5; 325 TABLET ORAL at 09:16

## 2020-07-07 RX ADMIN — INSULIN GLARGINE 15 UNIT(S): 100 INJECTION, SOLUTION SUBCUTANEOUS at 22:18

## 2020-07-07 RX ADMIN — LIDOCAINE 1 PATCH: 4 CREAM TOPICAL at 20:20

## 2020-07-07 RX ADMIN — OXYCODONE AND ACETAMINOPHEN 2 TABLET(S): 5; 325 TABLET ORAL at 15:05

## 2020-07-07 RX ADMIN — Medication 50 MILLILITER(S): at 23:10

## 2020-07-07 RX ADMIN — Medication 6: at 22:17

## 2020-07-07 RX ADMIN — Medication 50 MILLILITER(S): at 22:24

## 2020-07-07 RX ADMIN — FENTANYL CITRATE 25 MICROGRAM(S): 50 INJECTION INTRAVENOUS at 19:58

## 2020-07-07 RX ADMIN — Medication 100 MILLIGRAM(S): at 21:20

## 2020-07-07 RX ADMIN — SODIUM CHLORIDE 500 MILLILITER(S): 9 INJECTION INTRAMUSCULAR; INTRAVENOUS; SUBCUTANEOUS at 00:13

## 2020-07-07 RX ADMIN — Medication 100 MILLIGRAM(S): at 14:43

## 2020-07-07 RX ADMIN — PANTOPRAZOLE SODIUM 40 MILLIGRAM(S): 20 TABLET, DELAYED RELEASE ORAL at 06:44

## 2020-07-07 RX ADMIN — ATORVASTATIN CALCIUM 80 MILLIGRAM(S): 80 TABLET, FILM COATED ORAL at 00:21

## 2020-07-07 RX ADMIN — Medication 20 MILLIGRAM(S): at 12:31

## 2020-07-07 RX ADMIN — ATORVASTATIN CALCIUM 80 MILLIGRAM(S): 80 TABLET, FILM COATED ORAL at 21:21

## 2020-07-07 RX ADMIN — HEPARIN SODIUM 5000 UNIT(S): 5000 INJECTION INTRAVENOUS; SUBCUTANEOUS at 06:44

## 2020-07-07 RX ADMIN — Medication 81 MILLIGRAM(S): at 11:48

## 2020-07-07 RX ADMIN — Medication 20 MILLIGRAM(S): at 16:41

## 2020-07-07 RX ADMIN — OXYCODONE AND ACETAMINOPHEN 2 TABLET(S): 5; 325 TABLET ORAL at 02:45

## 2020-07-07 RX ADMIN — HEPARIN SODIUM 5000 UNIT(S): 5000 INJECTION INTRAVENOUS; SUBCUTANEOUS at 14:43

## 2020-07-07 RX ADMIN — Medication 200 MICROGRAM(S): at 06:44

## 2020-07-07 RX ADMIN — Medication 6: at 16:40

## 2020-07-07 RX ADMIN — CHLORHEXIDINE GLUCONATE 1 APPLICATION(S): 213 SOLUTION TOPICAL at 11:28

## 2020-07-07 RX ADMIN — LIDOCAINE 1 PATCH: 4 CREAM TOPICAL at 17:09

## 2020-07-07 NOTE — PROGRESS NOTE ADULT - SUBJECTIVE AND OBJECTIVE BOX
CTICU  CRITICAL  CARE  attending     Hand off received 					   Pertinent clinical, laboratory, radiographic, hemodynamic, echocardiographic, respiratory data, microbiologic data and chart were reviewed and analyzed frequently throughout the course of the day and night  Patient seen and examined with CTS/ SH attending at bedside    Pt is a 64y , Male, post op day # 1 s/p CABG x 3;    acute post H'gic anemia  orthostasis; hemodynamic instability  s/p 2 units PRBC today      , FAMILY HISTORY:  FH: heart failure  PAST MEDICAL & SURGICAL HISTORY:  History of TIAs  Hypothyroidism  H/O cardiomyopathy  DM (diabetes mellitus)  HLD (hyperlipidemia)  HTN (hypertension)  No significant past surgical history    Patient is a 64y old  Male who presents with a chief complaint of CAD (06 Jul 2020 22:42)      14 system review was unremarkable    Vital signs, hemodynamic and respiratory parameters were reviewed from the bedside nursing flowsheet.  ICU Vital Signs Last 24 Hrs  T(C): 36.5 (07 Jul 2020 10:00), Max: 36.8 (06 Jul 2020 17:02)  T(F): 97.7 (07 Jul 2020 10:00), Max: 98.3 (06 Jul 2020 17:02)  HR: 103 (07 Jul 2020 12:00) (74 - 103)  BP: 131/66 (07 Jul 2020 12:00) (110/60 - 131/66)  BP(mean): 91 (07 Jul 2020 12:00) (80 - 91)  ABP: 147/51 (07 Jul 2020 12:00) (100/52 - 147/57)  ABP(mean): 74 (07 Jul 2020 12:00) (61 - 86)  RR: 20 (07 Jul 2020 12:00) (12 - 24)  SpO2: 95% (07 Jul 2020 12:00) (95% - 100%)    Adult Advanced Hemodynamics Last 24 Hrs  CVP(mm Hg): 4 (07 Jul 2020 12:00) (-1 - 27)  CVP(cm H2O): --  CO: --  CI: --  PA: --  PA(mean): --  PCWP: --  SVR: --  SVRI: --  PVR: --  PVRI: --, ABG - ( 07 Jul 2020 03:19 )  pH, Arterial: 7.41  pH, Blood: x     /  pCO2: 37    /  pO2: 112   / HCO3: 23    / Base Excess: -1.2  /  SaO2: 98                Mode: AC/ CMV (Assist Control/ Continuous Mandatory Ventilation)  RR (machine): 12  TV (machine): 700  FiO2: 50  PEEP: 5  ITime: 1  MAP: 10  PIP: 26    Intake and output was reviewed and the fluid balance was calculated  Daily     Daily   I&O's Summary    06 Jul 2020 07:01  -  07 Jul 2020 07:00  --------------------------------------------------------  IN: 3527.8 mL / OUT: 2365 mL / NET: 1162.8 mL    07 Jul 2020 07:01  -  07 Jul 2020 14:16  --------------------------------------------------------  IN: 702.1 mL / OUT: 285 mL / NET: 417.1 mL        All lines and drain sites were assessed  [facility]      POCT Blood Glucose.: 215 mg/dL (07 Jul 2020 11:40)    No acute change in mental status  Auscultation of the chest reveals equal bs  Abdomen is soft  Extremities are warm and well perfused  Wounds appear clean and unremarkable  Antibiotics are periop    labs  CBC Full  -  ( 07 Jul 2020 08:51 )  WBC Count : 13.02 K/uL  RBC Count : 2.79 M/uL  Hemoglobin : 8.5 g/dL  Hematocrit : 24.4 %  Platelet Count - Automated : 104 K/uL  Mean Cell Volume : 87.5 fl  Mean Cell Hemoglobin : 30.5 pg  Mean Cell Hemoglobin Concentration : 34.8 gm/dL  Auto Neutrophil # : x  Auto Lymphocyte # : x  Auto Monocyte # : x  Auto Eosinophil # : x  Auto Basophil # : x  Auto Neutrophil % : x  Auto Lymphocyte % : x  Auto Monocyte % : x  Auto Eosinophil % : x  Auto Basophil % : x    07-07    140  |  107  |  16  ----------------------------<  113<H>  4.4   |  23  |  0.73    Ca    8.2<L>      07 Jul 2020 03:23  Phos  3.8     07-07  Mg     2.0     07-07    TPro  5.0<L>  /  Alb  3.3  /  TBili  1.6<H>  /  DBili  x   /  AST  34  /  ALT  16  /  AlkPhos  29<L>  07-07    PT/INR - ( 07 Jul 2020 03:23 )   PT: 14.8 sec;   INR: 1.25          PTT - ( 07 Jul 2020 03:23 )  PTT:32.1 sec  The current medications were reviewed   MEDICATIONS  (STANDING):  aspirin enteric coated 81 milliGRAM(s) Oral daily  atorvastatin 80 milliGRAM(s) Oral at bedtime  ceFAZolin   IVPB 2000 milliGRAM(s) IV Intermittent every 8 hours  chlorhexidine 2% Cloths 1 Application(s) Topical daily  dextrose 5%. 1000 milliLiter(s) (50 mL/Hr) IV Continuous <Continuous>  dextrose 50% Injectable 12.5 Gram(s) IV Push once  dextrose 50% Injectable 25 Gram(s) IV Push once  dextrose 50% Injectable 25 Gram(s) IV Push once  dextrose 50% Injectable 50 milliLiter(s) IV Push every 15 minutes  dextrose 50% Injectable 25 milliLiter(s) IV Push every 15 minutes  heparin   Injectable 5000 Unit(s) SubCutaneous every 8 hours  insulin lispro (HumaLOG) corrective regimen sliding scale   SubCutaneous Before meals and at bedtime  insulin regular Infusion 1 Unit(s)/Hr (1 mL/Hr) IV Continuous <Continuous>  levothyroxine 200 MICROGram(s) Oral daily  lidocaine   Patch 1 Patch Transdermal every 24 hours  pantoprazole    Tablet 40 milliGRAM(s) Oral before breakfast  phenylephrine    Infusion 0.5 MICROgram(s)/kG/Min (8.47 mL/Hr) IV Continuous <Continuous>  polyethylene glycol 3350 17 Gram(s) Oral daily  sodium chloride 0.9%. 1000 milliLiter(s) (10 mL/Hr) IV Continuous <Continuous>    MEDICATIONS  (PRN):  dextrose 40% Gel 15 Gram(s) Oral once PRN Blood Glucose LESS THAN 70 milliGRAM(s)/deciliter  fentaNYL    Injectable 25 MICROGram(s) IV Push every 3 hours PRN Severe Pain (7 - 10)  glucagon  Injectable 1 milliGRAM(s) IntraMuscular once PRN Glucose LESS THAN 70 milligrams/deciliter  oxycodone    5 mG/acetaminophen 325 mG 1 Tablet(s) Oral every 6 hours PRN Moderate Pain (4 - 6)  oxycodone    5 mG/acetaminophen 325 mG 2 Tablet(s) Oral every 6 hours PRN Severe Pain (7 - 10)       PROBLEM LIST/ ASSESSMENT:  HEALTH ISSUES - PROBLEM Dx:      ,   Patient is a 64y old  Male who presents with a chief complaint of CAD (06 Jul 2020 22:42)     s/p CABG      My plan includes :  close hemodynamic, ventilatory and drain monitoring and management per post op routine    Monitor for arrhythmias and monitor parameters for organ perfusion  monitor neurologic status  Head of the bed should remain elevated to 45 deg .   chest PT and IS will be encouraged  monitor adequacy of oxygenation and ventilation and attempt to wean oxygen  Nutritional goals will be met using po eventually , ensure adequate caloric intake and montior the same  Stress ulcer and VTE prophylaxis will be achieved    Glycemic control is satisfactory  Electrolytes have been repleted as necessary and wound care has been carried out. Pain control has been achieved.   agressive physical therapy and early mobility and ambulation goals will be met   The family was updated about the course and plan  CRITICAL CARE TIME SPENT in evaluation and management, reassessments, review and interpretation of labs and x-rays, ventilator and hemodynamic management, formulating a plan and coordinating care: __55___ MIN.  Time does not include procedural time.  CTICU ATTENDING     					    Yandel Cabrera MD

## 2020-07-07 NOTE — PHYSICAL THERAPY INITIAL EVALUATION ADULT - MANUAL MUSCLE TESTING RESULTS, REHAB EVAL
However, b/l UE grossly assessed against gravity only and functional weakess observed in b/l LE/no strength deficits were identified

## 2020-07-07 NOTE — PHYSICAL THERAPY INITIAL EVALUATION ADULT - CRITERIA FOR SKILLED THERAPEUTIC INTERVENTIONS
functional limitations in following categories/rehab potential/therapy frequency/impairments found/anticipated discharge recommendation

## 2020-07-07 NOTE — PHYSICAL THERAPY INITIAL EVALUATION ADULT - GENERAL OBSERVATIONS, REHAB EVAL
Patient received sitting out of bed in chair, no apparent distress, + radial arterial line, +telemetry, +palma, +sequential pneumatic compression device, .+2LNC, +triple lumen catheter, +temporary pacemaker, +hep lock.

## 2020-07-07 NOTE — PHYSICAL THERAPY INITIAL EVALUATION ADULT - PERTINENT HX OF CURRENT PROBLEM, REHAB EVAL
64 year old male presented to Dr. Francis Muñiz with c/o chest pain associated with dyspnea on exertion ambulating up 1-2 flights of stairs. Symptoms began in early May 2020 and resolve on own after resting and breathing.

## 2020-07-07 NOTE — PROGRESS NOTE ADULT - SUBJECTIVE AND OBJECTIVE BOX
CTICU  CRITICAL  CARE  attending     Hand off received 					   Pertinent clinical, laboratory, radiographic, hemodynamic, echocardiographic, respiratory data, microbiologic data and chart were reviewed and analyzed frequently throughout the course of the day and night    Patient seen and examined with CTS/ SH attending at bedside    64 year old male, non-smoker, with DM, HTN, HLD, Hypothyroidism, history of COVID infection (antibody positive), with a past medical history of TIA on Plavix 75mg only (6 years ago with left hand weakness that lasted a few minutes, no recurrence), HTN, HLD, DM, hypothyroidism,  He presented to Dr. Muñiz withchest pain associated with dyspnea on exertion ambulating up 1-2 flights of stairs. The symptoms began in early May 2020 and resolve on resting and breathing.    Nuclear Stress Test 6/13/20:  Anterior, septal and lateral wall ischemia identified.  EF 66%.   Cardiac cath on 6/30/20 showed triple Vessel Coronary Artery Disease:  LM: Normal  LAD: 70% stenosis in pLAD, iFR 0.81  LCx: 80% stenosis in midLCx.   OM2: 80% stenosis  OM3: 100% stenosis  RCA: 100% stenosis in mRCA  Left Heart Catheterization:  LV Gram: 60% EF  LV EDP 18mmHg        FAMILY HISTORY:  FH: heart failure  PAST MEDICAL & SURGICAL HISTORY:  History of TIAs  Hypothyroidism  H/O cardiomyopathy  DM (diabetes mellitus)  HLD (hyperlipidemia)  HTN (hypertension)  No significant past surgical history      14 system review was unremarkable    Vital signs, hemodynamic and respiratory parameters were reviewed from the bedside nursing flow sheet.  ICU Vital Signs Last 24 Hrs  T(C): 36.7 (07 Jul 2020 21:15), Max: 36.8 (07 Jul 2020 05:00)  T(F): 98 (07 Jul 2020 21:15), Max: 98.3 (07 Jul 2020 05:00)  HR: 92 (07 Jul 2020 23:00) (82 - 113)  BP: 104/56 (07 Jul 2020 23:00) (96/55 - 152/82)  BP(mean): 75 (07 Jul 2020 23:00) (70 - 109)  ABP: 118/52 (07 Jul 2020 23:00) (100/52 - 168/61)  ABP(mean): 70 (07 Jul 2020 23:00) (59 - 89)  RR: 21 (07 Jul 2020 22:00) (18 - 22)  SpO2: 96% (07 Jul 2020 23:00) (93% - 100%)    Adult Advanced Hemodynamics Last 24 Hrs  CVP(mm Hg): 31 (07 Jul 2020 23:00) (-4 - 31)  CVP(cm H2O): --  CO: --  CI: --  PA: --  PA(mean): --  PCWP: --  SVR: --  SVRI: --  PVR: --  PVRI: --, ABG - ( 07 Jul 2020 15:14 )  pH, Arterial: 7.48  pH, Blood: x     /  pCO2: 30    /  pO2: 60    / HCO3: 21    / Base Excess: -1.5  /  SaO2: 92                  Intake and output was reviewed and the fluid balance was calculated  Daily     Daily   I&O's Summary    06 Jul 2020 07:01  -  07 Jul 2020 07:00  --------------------------------------------------------  IN: 3527.8 mL / OUT: 2365 mL / NET: 1162.8 mL    07 Jul 2020 07:01  -  07 Jul 2020 23:33  --------------------------------------------------------  IN: 2022.4 mL / OUT: 1590 mL / NET: 432.4 mL        All lines and drain sites were assessed      Neuro: Follows commands. Moves all 4 extremities.  Neck: No JVD.  CVS: S1, S2, No S3.  Lungs: Good air entry bilaterally.  Abd: Soft. No tenderness. + Bowel sounds.  Vascular: + DP/PT.  Extremities: No edema.  Lymphatic: Normal.  Skin: No abnormalities.      labs  CBC Full  -  ( 07 Jul 2020 15:14 )  WBC Count : 13.16 K/uL  RBC Count : 3.21 M/uL  Hemoglobin : 9.6 g/dL  Hematocrit : 28.1 %  Platelet Count - Automated : 96 K/uL  Mean Cell Volume : 87.5 fl  Mean Cell Hemoglobin : 29.9 pg  Mean Cell Hemoglobin Concentration : 34.2 gm/dL  Auto Neutrophil # : x  Auto Lymphocyte # : x  Auto Monocyte # : x  Auto Eosinophil # : x  Auto Basophil # : x  Auto Neutrophil % : x  Auto Lymphocyte % : x  Auto Monocyte % : x  Auto Eosinophil % : x  Auto Basophil % : x    07-07    138  |  103  |  21  ----------------------------<  281<H>  4.5   |  19<L>  |  0.87    Ca    7.9<L>      07 Jul 2020 15:14  Phos  3.9     07-07  Mg     1.7     07-07    TPro  5.3<L>  /  Alb  3.3  /  TBili  2.7<H>  /  DBili  x   /  AST  28  /  ALT  14  /  AlkPhos  31<L>  07-07    PT/INR - ( 07 Jul 2020 15:14 )   PT: 16.2 sec;   INR: 1.37          PTT - ( 07 Jul 2020 15:14 )  PTT:28.3 sec  The current medications were reviewed   MEDICATIONS  (STANDING):  aspirin enteric coated 81 milliGRAM(s) Oral daily  atorvastatin 80 milliGRAM(s) Oral at bedtime  ceFAZolin   IVPB 2000 milliGRAM(s) IV Intermittent every 8 hours  chlorhexidine 2% Cloths 1 Application(s) Topical daily  dextrose 5%. 1000 milliLiter(s) (50 mL/Hr) IV Continuous <Continuous>  dextrose 50% Injectable 12.5 Gram(s) IV Push once  dextrose 50% Injectable 25 Gram(s) IV Push once  dextrose 50% Injectable 25 Gram(s) IV Push once  dextrose 50% Injectable 50 milliLiter(s) IV Push every 15 minutes  dextrose 50% Injectable 25 milliLiter(s) IV Push every 15 minutes  heparin   Injectable 5000 Unit(s) SubCutaneous every 8 hours  insulin glargine Injectable (LANTUS) 15 Unit(s) SubCutaneous at bedtime  insulin lispro (HumaLOG) corrective regimen sliding scale   SubCutaneous Before meals and at bedtime  insulin regular Infusion 1 Unit(s)/Hr (1 mL/Hr) IV Continuous <Continuous>  levothyroxine 200 MICROGram(s) Oral daily  lidocaine   Patch 1 Patch Transdermal every 24 hours  pantoprazole    Tablet 40 milliGRAM(s) Oral before breakfast  phenylephrine    Infusion 0.5 MICROgram(s)/kG/Min (8.47 mL/Hr) IV Continuous <Continuous>  polyethylene glycol 3350 17 Gram(s) Oral daily  sodium chloride 0.9%. 1000 milliLiter(s) (10 mL/Hr) IV Continuous <Continuous>    MEDICATIONS  (PRN):  dextrose 40% Gel 15 Gram(s) Oral once PRN Blood Glucose LESS THAN 70 milliGRAM(s)/deciliter  fentaNYL    Injectable 25 MICROGram(s) IV Push every 3 hours PRN Severe Pain (7 - 10)  glucagon  Injectable 1 milliGRAM(s) IntraMuscular once PRN Glucose LESS THAN 70 milligrams/deciliter  oxycodone    5 mG/acetaminophen 325 mG 2 Tablet(s) Oral every 4 hours PRN Severe Pain (7 - 10)  oxycodone    5 mG/acetaminophen 325 mG 1 Tablet(s) Oral every 6 hours PRN Moderate Pain (4 - 6)        Patient is a 64y old  Male who presents with a chief complaint of CAD   S/P CABG  Metabolic acidosis.  Uncontrolled DM.  Hemodynamically stable.  Good oxygenation.  Fair urine out put.  Overall doing well.      My plan includes :  OPTIMIZE Glycemic control.  Lantus 15units HS and Lispro 5 units pre meal.   Statin and Betablocker.  Gentle diuresis.  Dual antiplatelet Rx.  Close hemodynamic, ventilatory and drain monitoring and management  Monitor for arrhythmias and monitor parameters for organ perfusion  Monitor neurologic status  Monitor renal function.  Head of the bed should remain elevated to 45 deg .   Chest PT and IS will be encouraged  Monitor adequacy of oxygenation and ventilation and attempt to wean oxygen  Nutritional goals will be met using po eventually , ensure adequate caloric intake and monitor the same  Stress ulcer and VTE prophylaxis will be achieved.  Electrolytes have been repleted as necessary and wound care has been carried out. Pain control has been achieved.   Aggressive physical therapy and early mobility and ambulation goals will be met   The family was updated about the course and plan  CRITICAL CARE TIME SPENT in evaluation and management, reassessments, review and interpretation of labs and x-rays, ventilator and hemodynamic management, formulating a plan and coordinating care: ___30____ MIN.  Time does not include procedural time.  CTICU ATTENDING     					    Robert Rodriguez MD

## 2020-07-07 NOTE — PHYSICAL THERAPY INITIAL EVALUATION ADULT - ACTIVE RANGE OF MOTION EXAMINATION, REHAB EVAL
bilateral  lower extremity Active ROM was WFL (within functional limits)/b/l UE limited 2/2 sternal precautions

## 2020-07-07 NOTE — PHYSICAL THERAPY INITIAL EVALUATION ADULT - ADDITIONAL COMMENTS
Pt lives with wife with stairs to negotiate, no prior use of DME or hx of falls. Pt states he had a 15 block functional tolerance PTA. Pt was independent PTA.

## 2020-07-08 LAB
ALBUMIN SERPL ELPH-MCNC: 3.5 G/DL — SIGNIFICANT CHANGE UP (ref 3.3–5)
ALBUMIN SERPL ELPH-MCNC: 3.6 G/DL — SIGNIFICANT CHANGE UP (ref 3.3–5)
ALBUMIN SERPL ELPH-MCNC: 3.8 G/DL — SIGNIFICANT CHANGE UP (ref 3.3–5)
ALP SERPL-CCNC: 28 U/L — LOW (ref 40–120)
ALP SERPL-CCNC: 33 U/L — LOW (ref 40–120)
ALP SERPL-CCNC: 33 U/L — LOW (ref 40–120)
ALT FLD-CCNC: 10 U/L — SIGNIFICANT CHANGE UP (ref 10–45)
ALT FLD-CCNC: 12 U/L — SIGNIFICANT CHANGE UP (ref 10–45)
ALT FLD-CCNC: 8 U/L — LOW (ref 10–45)
ANION GAP SERPL CALC-SCNC: 12 MMOL/L — SIGNIFICANT CHANGE UP (ref 5–17)
ANION GAP SERPL CALC-SCNC: 12 MMOL/L — SIGNIFICANT CHANGE UP (ref 5–17)
ANION GAP SERPL CALC-SCNC: 14 MMOL/L — SIGNIFICANT CHANGE UP (ref 5–17)
ANION GAP SERPL CALC-SCNC: 15 MMOL/L — SIGNIFICANT CHANGE UP (ref 5–17)
APTT BLD: 30.1 SEC — SIGNIFICANT CHANGE UP (ref 27.5–35.5)
APTT BLD: 31.7 SEC — SIGNIFICANT CHANGE UP (ref 27.5–35.5)
APTT BLD: 51.9 SEC — HIGH (ref 27.5–35.5)
AST SERPL-CCNC: 19 U/L — SIGNIFICANT CHANGE UP (ref 10–40)
AST SERPL-CCNC: 20 U/L — SIGNIFICANT CHANGE UP (ref 10–40)
AST SERPL-CCNC: 20 U/L — SIGNIFICANT CHANGE UP (ref 10–40)
BILIRUB SERPL-MCNC: 2.2 MG/DL — HIGH (ref 0.2–1.2)
BILIRUB SERPL-MCNC: 2.3 MG/DL — HIGH (ref 0.2–1.2)
BILIRUB SERPL-MCNC: 2.5 MG/DL — HIGH (ref 0.2–1.2)
BUN SERPL-MCNC: 25 MG/DL — HIGH (ref 7–23)
BUN SERPL-MCNC: 26 MG/DL — HIGH (ref 7–23)
BUN SERPL-MCNC: 26 MG/DL — HIGH (ref 7–23)
BUN SERPL-MCNC: 27 MG/DL — HIGH (ref 7–23)
CALCIUM SERPL-MCNC: 8.3 MG/DL — LOW (ref 8.4–10.5)
CALCIUM SERPL-MCNC: 8.3 MG/DL — LOW (ref 8.4–10.5)
CALCIUM SERPL-MCNC: 8.5 MG/DL — SIGNIFICANT CHANGE UP (ref 8.4–10.5)
CALCIUM SERPL-MCNC: 8.8 MG/DL — SIGNIFICANT CHANGE UP (ref 8.4–10.5)
CHLORIDE SERPL-SCNC: 101 MMOL/L — SIGNIFICANT CHANGE UP (ref 96–108)
CHLORIDE SERPL-SCNC: 96 MMOL/L — SIGNIFICANT CHANGE UP (ref 96–108)
CHLORIDE SERPL-SCNC: 97 MMOL/L — SIGNIFICANT CHANGE UP (ref 96–108)
CHLORIDE SERPL-SCNC: 99 MMOL/L — SIGNIFICANT CHANGE UP (ref 96–108)
CO2 SERPL-SCNC: 21 MMOL/L — LOW (ref 22–31)
CO2 SERPL-SCNC: 22 MMOL/L — SIGNIFICANT CHANGE UP (ref 22–31)
CREAT SERPL-MCNC: 0.86 MG/DL — SIGNIFICANT CHANGE UP (ref 0.5–1.3)
CREAT SERPL-MCNC: 0.9 MG/DL — SIGNIFICANT CHANGE UP (ref 0.5–1.3)
CREAT SERPL-MCNC: 0.95 MG/DL — SIGNIFICANT CHANGE UP (ref 0.5–1.3)
CREAT SERPL-MCNC: 0.98 MG/DL — SIGNIFICANT CHANGE UP (ref 0.5–1.3)
GAS PNL BLDA: SIGNIFICANT CHANGE UP
GAS PNL BLDA: SIGNIFICANT CHANGE UP
GLUCOSE BLDC GLUCOMTR-MCNC: 255 MG/DL — HIGH (ref 70–99)
GLUCOSE BLDC GLUCOMTR-MCNC: 257 MG/DL — HIGH (ref 70–99)
GLUCOSE BLDC GLUCOMTR-MCNC: 269 MG/DL — HIGH (ref 70–99)
GLUCOSE BLDC GLUCOMTR-MCNC: 271 MG/DL — HIGH (ref 70–99)
GLUCOSE BLDC GLUCOMTR-MCNC: 313 MG/DL — HIGH (ref 70–99)
GLUCOSE SERPL-MCNC: 244 MG/DL — HIGH (ref 70–99)
GLUCOSE SERPL-MCNC: 263 MG/DL — HIGH (ref 70–99)
GLUCOSE SERPL-MCNC: 269 MG/DL — HIGH (ref 70–99)
GLUCOSE SERPL-MCNC: 286 MG/DL — HIGH (ref 70–99)
HCT VFR BLD CALC: 25.1 % — LOW (ref 39–50)
HCT VFR BLD CALC: 25.3 % — LOW (ref 39–50)
HCT VFR BLD CALC: 26.3 % — LOW (ref 39–50)
HCT VFR BLD CALC: 27.4 % — LOW (ref 39–50)
HGB BLD-MCNC: 8.6 G/DL — LOW (ref 13–17)
HGB BLD-MCNC: 8.6 G/DL — LOW (ref 13–17)
HGB BLD-MCNC: 8.9 G/DL — LOW (ref 13–17)
HGB BLD-MCNC: 9.2 G/DL — LOW (ref 13–17)
INR BLD: 1.23 — HIGH (ref 0.88–1.16)
INR BLD: 1.32 — HIGH (ref 0.88–1.16)
INR BLD: 1.48 — HIGH (ref 0.88–1.16)
LACTATE SERPL-SCNC: 1.5 MMOL/L — SIGNIFICANT CHANGE UP (ref 0.5–2)
LACTATE SERPL-SCNC: 1.9 MMOL/L — SIGNIFICANT CHANGE UP (ref 0.5–2)
MAGNESIUM SERPL-MCNC: 1.8 MG/DL — SIGNIFICANT CHANGE UP (ref 1.6–2.6)
MAGNESIUM SERPL-MCNC: 2 MG/DL — SIGNIFICANT CHANGE UP (ref 1.6–2.6)
MAGNESIUM SERPL-MCNC: 2.4 MG/DL — SIGNIFICANT CHANGE UP (ref 1.6–2.6)
MAGNESIUM SERPL-MCNC: 3.3 MG/DL — HIGH (ref 1.6–2.6)
MCHC RBC-ENTMCNC: 29.7 PG — SIGNIFICANT CHANGE UP (ref 27–34)
MCHC RBC-ENTMCNC: 29.7 PG — SIGNIFICANT CHANGE UP (ref 27–34)
MCHC RBC-ENTMCNC: 29.9 PG — SIGNIFICANT CHANGE UP (ref 27–34)
MCHC RBC-ENTMCNC: 30.1 PG — SIGNIFICANT CHANGE UP (ref 27–34)
MCHC RBC-ENTMCNC: 33.6 GM/DL — SIGNIFICANT CHANGE UP (ref 32–36)
MCHC RBC-ENTMCNC: 33.8 GM/DL — SIGNIFICANT CHANGE UP (ref 32–36)
MCHC RBC-ENTMCNC: 34 GM/DL — SIGNIFICANT CHANGE UP (ref 32–36)
MCHC RBC-ENTMCNC: 34.3 GM/DL — SIGNIFICANT CHANGE UP (ref 32–36)
MCV RBC AUTO: 87.2 FL — SIGNIFICANT CHANGE UP (ref 80–100)
MCV RBC AUTO: 87.7 FL — SIGNIFICANT CHANGE UP (ref 80–100)
MCV RBC AUTO: 87.8 FL — SIGNIFICANT CHANGE UP (ref 80–100)
MCV RBC AUTO: 89 FL — SIGNIFICANT CHANGE UP (ref 80–100)
NRBC # BLD: 0 /100 WBCS — SIGNIFICANT CHANGE UP (ref 0–0)
PHOSPHATE SERPL-MCNC: 2.2 MG/DL — LOW (ref 2.5–4.5)
PHOSPHATE SERPL-MCNC: 2.6 MG/DL — SIGNIFICANT CHANGE UP (ref 2.5–4.5)
PHOSPHATE SERPL-MCNC: 2.9 MG/DL — SIGNIFICANT CHANGE UP (ref 2.5–4.5)
PHOSPHATE SERPL-MCNC: 3.2 MG/DL — SIGNIFICANT CHANGE UP (ref 2.5–4.5)
PLATELET # BLD AUTO: 102 K/UL — LOW (ref 150–400)
PLATELET # BLD AUTO: 103 K/UL — LOW (ref 150–400)
PLATELET # BLD AUTO: 84 K/UL — LOW (ref 150–400)
PLATELET # BLD AUTO: 89 K/UL — LOW (ref 150–400)
POTASSIUM SERPL-MCNC: 3.9 MMOL/L — SIGNIFICANT CHANGE UP (ref 3.5–5.3)
POTASSIUM SERPL-MCNC: 4.4 MMOL/L — SIGNIFICANT CHANGE UP (ref 3.5–5.3)
POTASSIUM SERPL-MCNC: 4.4 MMOL/L — SIGNIFICANT CHANGE UP (ref 3.5–5.3)
POTASSIUM SERPL-MCNC: 4.9 MMOL/L — SIGNIFICANT CHANGE UP (ref 3.5–5.3)
POTASSIUM SERPL-SCNC: 3.9 MMOL/L — SIGNIFICANT CHANGE UP (ref 3.5–5.3)
POTASSIUM SERPL-SCNC: 4.4 MMOL/L — SIGNIFICANT CHANGE UP (ref 3.5–5.3)
POTASSIUM SERPL-SCNC: 4.4 MMOL/L — SIGNIFICANT CHANGE UP (ref 3.5–5.3)
POTASSIUM SERPL-SCNC: 4.9 MMOL/L — SIGNIFICANT CHANGE UP (ref 3.5–5.3)
PREALB SERPL-MCNC: 9 MG/DL — LOW (ref 20–40)
PROT SERPL-MCNC: 5.5 G/DL — LOW (ref 6–8.3)
PROT SERPL-MCNC: 5.7 G/DL — LOW (ref 6–8.3)
PROT SERPL-MCNC: 5.8 G/DL — LOW (ref 6–8.3)
PROTHROM AB SERPL-ACNC: 14.6 SEC — HIGH (ref 10.6–13.6)
PROTHROM AB SERPL-ACNC: 15.6 SEC — HIGH (ref 10.6–13.6)
PROTHROM AB SERPL-ACNC: 17.4 SEC — HIGH (ref 10.6–13.6)
RBC # BLD: 2.86 M/UL — LOW (ref 4.2–5.8)
RBC # BLD: 2.9 M/UL — LOW (ref 4.2–5.8)
RBC # BLD: 3 M/UL — LOW (ref 4.2–5.8)
RBC # BLD: 3.08 M/UL — LOW (ref 4.2–5.8)
RBC # FLD: 14.2 % — SIGNIFICANT CHANGE UP (ref 10.3–14.5)
RBC # FLD: 14.4 % — SIGNIFICANT CHANGE UP (ref 10.3–14.5)
RBC # FLD: 14.5 % — SIGNIFICANT CHANGE UP (ref 10.3–14.5)
RBC # FLD: 14.5 % — SIGNIFICANT CHANGE UP (ref 10.3–14.5)
SODIUM SERPL-SCNC: 130 MMOL/L — LOW (ref 135–145)
SODIUM SERPL-SCNC: 130 MMOL/L — LOW (ref 135–145)
SODIUM SERPL-SCNC: 134 MMOL/L — LOW (ref 135–145)
SODIUM SERPL-SCNC: 137 MMOL/L — SIGNIFICANT CHANGE UP (ref 135–145)
T4 FREE SERPL-MCNC: 1.67 NG/DL — HIGH (ref 0.7–1.48)
TSH SERPL-MCNC: 0.06 UIU/ML — LOW (ref 0.35–4.94)
WBC # BLD: 12.66 K/UL — HIGH (ref 3.8–10.5)
WBC # BLD: 13.17 K/UL — HIGH (ref 3.8–10.5)
WBC # BLD: 15.45 K/UL — HIGH (ref 3.8–10.5)
WBC # BLD: 15.82 K/UL — HIGH (ref 3.8–10.5)
WBC # FLD AUTO: 12.66 K/UL — HIGH (ref 3.8–10.5)
WBC # FLD AUTO: 13.17 K/UL — HIGH (ref 3.8–10.5)
WBC # FLD AUTO: 15.45 K/UL — HIGH (ref 3.8–10.5)
WBC # FLD AUTO: 15.82 K/UL — HIGH (ref 3.8–10.5)

## 2020-07-08 PROCEDURE — 99222 1ST HOSP IP/OBS MODERATE 55: CPT | Mod: GC

## 2020-07-08 PROCEDURE — 99291 CRITICAL CARE FIRST HOUR: CPT

## 2020-07-08 PROCEDURE — 71045 X-RAY EXAM CHEST 1 VIEW: CPT | Mod: 26,76

## 2020-07-08 PROCEDURE — 99292 CRITICAL CARE ADDL 30 MIN: CPT

## 2020-07-08 PROCEDURE — 93010 ELECTROCARDIOGRAM REPORT: CPT

## 2020-07-08 RX ORDER — POTASSIUM CHLORIDE 20 MEQ
20 PACKET (EA) ORAL ONCE
Refills: 0 | Status: DISCONTINUED | OUTPATIENT
Start: 2020-07-08 | End: 2020-07-10

## 2020-07-08 RX ORDER — INSULIN LISPRO 100/ML
VIAL (ML) SUBCUTANEOUS
Refills: 0 | Status: DISCONTINUED | OUTPATIENT
Start: 2020-07-08 | End: 2020-07-12

## 2020-07-08 RX ORDER — AMIODARONE HYDROCHLORIDE 400 MG/1
150 TABLET ORAL ONCE
Refills: 0 | Status: COMPLETED | OUTPATIENT
Start: 2020-07-08 | End: 2020-07-08

## 2020-07-08 RX ORDER — AMIODARONE HYDROCHLORIDE 400 MG/1
400 TABLET ORAL EVERY 8 HOURS
Refills: 0 | Status: COMPLETED | OUTPATIENT
Start: 2020-07-08 | End: 2020-07-12

## 2020-07-08 RX ORDER — INSULIN LISPRO 100/ML
5 VIAL (ML) SUBCUTANEOUS
Refills: 0 | Status: DISCONTINUED | OUTPATIENT
Start: 2020-07-08 | End: 2020-07-08

## 2020-07-08 RX ORDER — FUROSEMIDE 40 MG
40 TABLET ORAL ONCE
Refills: 0 | Status: COMPLETED | OUTPATIENT
Start: 2020-07-08 | End: 2020-07-08

## 2020-07-08 RX ORDER — CALCIUM GLUCONATE 100 MG/ML
2 VIAL (ML) INTRAVENOUS ONCE
Refills: 0 | Status: COMPLETED | OUTPATIENT
Start: 2020-07-08 | End: 2020-07-08

## 2020-07-08 RX ORDER — FUROSEMIDE 40 MG
20 TABLET ORAL ONCE
Refills: 0 | Status: COMPLETED | OUTPATIENT
Start: 2020-07-08 | End: 2020-07-08

## 2020-07-08 RX ORDER — METOPROLOL TARTRATE 50 MG
25 TABLET ORAL EVERY 6 HOURS
Refills: 0 | Status: DISCONTINUED | OUTPATIENT
Start: 2020-07-08 | End: 2020-07-09

## 2020-07-08 RX ORDER — POTASSIUM PHOSPHATE, MONOBASIC POTASSIUM PHOSPHATE, DIBASIC 236; 224 MG/ML; MG/ML
15 INJECTION, SOLUTION INTRAVENOUS ONCE
Refills: 0 | Status: DISCONTINUED | OUTPATIENT
Start: 2020-07-08 | End: 2020-07-08

## 2020-07-08 RX ORDER — POTASSIUM CHLORIDE 20 MEQ
20 PACKET (EA) ORAL ONCE
Refills: 0 | Status: COMPLETED | OUTPATIENT
Start: 2020-07-08 | End: 2020-07-08

## 2020-07-08 RX ORDER — ALBUMIN HUMAN 25 %
250 VIAL (ML) INTRAVENOUS
Refills: 0 | Status: COMPLETED | OUTPATIENT
Start: 2020-07-08 | End: 2020-07-08

## 2020-07-08 RX ORDER — METOPROLOL TARTRATE 50 MG
12.5 TABLET ORAL ONCE
Refills: 0 | Status: COMPLETED | OUTPATIENT
Start: 2020-07-08 | End: 2020-07-08

## 2020-07-08 RX ORDER — POTASSIUM CHLORIDE 20 MEQ
40 PACKET (EA) ORAL ONCE
Refills: 0 | Status: DISCONTINUED | OUTPATIENT
Start: 2020-07-08 | End: 2020-07-08

## 2020-07-08 RX ORDER — INSULIN GLARGINE 100 [IU]/ML
26 INJECTION, SOLUTION SUBCUTANEOUS AT BEDTIME
Refills: 0 | Status: DISCONTINUED | OUTPATIENT
Start: 2020-07-08 | End: 2020-07-09

## 2020-07-08 RX ORDER — MAGNESIUM SULFATE 500 MG/ML
2 VIAL (ML) INJECTION ONCE
Refills: 0 | Status: COMPLETED | OUTPATIENT
Start: 2020-07-08 | End: 2020-07-08

## 2020-07-08 RX ORDER — METOPROLOL TARTRATE 50 MG
12.5 TABLET ORAL EVERY 6 HOURS
Refills: 0 | Status: DISCONTINUED | OUTPATIENT
Start: 2020-07-08 | End: 2020-07-08

## 2020-07-08 RX ORDER — INSULIN LISPRO 100/ML
3 VIAL (ML) SUBCUTANEOUS
Refills: 0 | Status: DISCONTINUED | OUTPATIENT
Start: 2020-07-08 | End: 2020-07-09

## 2020-07-08 RX ORDER — LEVOTHYROXINE SODIUM 125 MCG
175 TABLET ORAL DAILY
Refills: 0 | Status: DISCONTINUED | OUTPATIENT
Start: 2020-07-08 | End: 2020-07-12

## 2020-07-08 RX ORDER — AMIODARONE HYDROCHLORIDE 400 MG/1
200 TABLET ORAL DAILY
Refills: 0 | Status: DISCONTINUED | OUTPATIENT
Start: 2020-07-12 | End: 2020-07-12

## 2020-07-08 RX ORDER — AMIODARONE HYDROCHLORIDE 400 MG/1
TABLET ORAL
Refills: 0 | Status: DISCONTINUED | OUTPATIENT
Start: 2020-07-08 | End: 2020-07-12

## 2020-07-08 RX ADMIN — Medication 100 MILLIEQUIVALENT(S): at 13:41

## 2020-07-08 RX ADMIN — CHLORHEXIDINE GLUCONATE 1 APPLICATION(S): 213 SOLUTION TOPICAL at 12:21

## 2020-07-08 RX ADMIN — AMIODARONE HYDROCHLORIDE 300 MILLIGRAM(S): 400 TABLET ORAL at 15:26

## 2020-07-08 RX ADMIN — Medication 40 MILLIGRAM(S): at 07:25

## 2020-07-08 RX ADMIN — Medication 3 UNIT(S): at 16:46

## 2020-07-08 RX ADMIN — Medication 250 MILLILITER(S): at 01:34

## 2020-07-08 RX ADMIN — Medication 12.5 MILLIGRAM(S): at 17:01

## 2020-07-08 RX ADMIN — Medication 100 MILLIGRAM(S): at 04:42

## 2020-07-08 RX ADMIN — HEPARIN SODIUM 5000 UNIT(S): 5000 INJECTION INTRAVENOUS; SUBCUTANEOUS at 22:43

## 2020-07-08 RX ADMIN — Medication 6: at 22:42

## 2020-07-08 RX ADMIN — Medication 200 GRAM(S): at 05:54

## 2020-07-08 RX ADMIN — INSULIN GLARGINE 26 UNIT(S): 100 INJECTION, SOLUTION SUBCUTANEOUS at 22:44

## 2020-07-08 RX ADMIN — Medication 12.5 MILLIGRAM(S): at 12:31

## 2020-07-08 RX ADMIN — AMIODARONE HYDROCHLORIDE 600 MILLIGRAM(S): 400 TABLET ORAL at 13:47

## 2020-07-08 RX ADMIN — Medication 81 MILLIGRAM(S): at 13:44

## 2020-07-08 RX ADMIN — Medication 50 GRAM(S): at 06:42

## 2020-07-08 RX ADMIN — Medication 20 MILLIGRAM(S): at 03:28

## 2020-07-08 RX ADMIN — Medication 62.5 MILLIMOLE(S): at 23:08

## 2020-07-08 RX ADMIN — Medication 25 MILLIGRAM(S): at 17:15

## 2020-07-08 RX ADMIN — Medication 50 GRAM(S): at 13:41

## 2020-07-08 RX ADMIN — Medication 20 MILLIGRAM(S): at 17:15

## 2020-07-08 RX ADMIN — AMIODARONE HYDROCHLORIDE 400 MILLIGRAM(S): 400 TABLET ORAL at 17:21

## 2020-07-08 RX ADMIN — HEPARIN SODIUM 5000 UNIT(S): 5000 INJECTION INTRAVENOUS; SUBCUTANEOUS at 06:10

## 2020-07-08 RX ADMIN — Medication 20 MILLIEQUIVALENT(S): at 13:53

## 2020-07-08 RX ADMIN — Medication 12.5 MILLIGRAM(S): at 13:42

## 2020-07-08 RX ADMIN — Medication 5 UNIT(S): at 14:43

## 2020-07-08 RX ADMIN — Medication 25 MILLIGRAM(S): at 23:04

## 2020-07-08 RX ADMIN — Medication 250 MILLILITER(S): at 01:35

## 2020-07-08 RX ADMIN — AMIODARONE HYDROCHLORIDE 100 MILLIGRAM(S): 400 TABLET ORAL at 17:16

## 2020-07-08 NOTE — CONSULT NOTE ADULT - SUBJECTIVE AND OBJECTIVE BOX
HPI: 64 year old male, non-smoker, with a past medical history of COVID infection (antibody positive), with a past medical history of TIA on Plavix 75mg only (6 years ago with left hand weakness that lasted a few minutes, no recurrence), HTN, HLD, DM, hypothyroidism, presented to Dr. Francis Muñiz with c/o chest pain associated with dyspnea on exertion ambulating up 1-2 flights of stairs. Symptoms began in early May 2020 and resolve on own after resting and breathing.  Subsequent NST 6/13/20:  Anterior, septal and lateral wall ischemia identified.  Diaphragmatic.  EF 66%. Patient is now had dx cardiac cath on 6/30/20 revealing 3V Coronary Artery Disease:  LM: Normal  LAD: 70% stenosis in pLAD, iFR 0.81  LCx: 80% stenosis in midLCx.   OM2: 80% stenosis  OM3: 100% stenosis  RCA: 100% stenosis in mRCA  Left Heart Catheterization:  LV Gram: 60% EF  LV EDP 18mmHg    Now s/p CABG x 3 (lima-lad, svg-proximal marginal, svg-pda) EF 65%, 7/7/2020.    Of note, patient had covid symptoms on 3/18 with chills and cough. He was not tested at that time and did not require MD visit/hospitalization. Recent antibody test was positive. COVID swab from 6/27 was negative.       FSG & insulin:    Dinner FSG   Lispro   +    Ate  Bedtime FSG     Lantus      and Lispro         Breakfast FSG   Lispro    +    Ate  Lunch FSG      Lispro    +    Ate      Age at Dx:  How dx:  Hx and duration of insulin:  Current Therapy: Januvia 100mg daily and metformin 500mg TID  Hx of other regimens:    Home FSG:  Fasting  Lunch  Dinner  Bed    Diet:  Exercise:    Hx of hypoglycemia:  Hx of DKA/HHS:  Complications:  Outpatient Endo:    Hypothyroidism-  Levothyroxine 200mcg daily  6/30: TSH 0.031, TT4 8.06    FH:  DM:  Thyroid:  Autoimmune:  Other:    SH:  Smoking  Etoh:  Recreational Drugs:  Social Life:    PMH & Surgical Hx:I25.10  FH: heart failure  Handoff  History of TIAs  Hypothyroidism  H/O cardiomyopathy  DM (diabetes mellitus)  HLD (hyperlipidemia)  HTN (hypertension)  CAD, multiple vessel  CAD, multiple vessel  CABG, with ARTIE  No significant past surgical history          Current Meds:  aspirin enteric coated 81 milliGRAM(s) Oral daily  atorvastatin 80 milliGRAM(s) Oral at bedtime  chlorhexidine 2% Cloths 1 Application(s) Topical daily  dextrose 40% Gel 15 Gram(s) Oral once PRN  dextrose 5%. 1000 milliLiter(s) IV Continuous <Continuous>  dextrose 50% Injectable 12.5 Gram(s) IV Push once  dextrose 50% Injectable 25 Gram(s) IV Push once  dextrose 50% Injectable 25 Gram(s) IV Push once  fentaNYL    Injectable 25 MICROGram(s) IV Push every 3 hours PRN  glucagon  Injectable 1 milliGRAM(s) IntraMuscular once PRN  heparin   Injectable 5000 Unit(s) SubCutaneous every 8 hours  insulin glargine Injectable (LANTUS) 15 Unit(s) SubCutaneous at bedtime  insulin lispro (HumaLOG) corrective regimen sliding scale   SubCutaneous Before meals and at bedtime  insulin lispro Injectable (HumaLOG) 5 Unit(s) SubCutaneous three times a day with meals  levothyroxine 200 MICROGram(s) Oral daily  lidocaine   Patch 1 Patch Transdermal every 24 hours  metoprolol tartrate 12.5 milliGRAM(s) Oral two times a day  oxycodone    5 mG/acetaminophen 325 mG 2 Tablet(s) Oral every 4 hours PRN  oxycodone    5 mG/acetaminophen 325 mG 1 Tablet(s) Oral every 6 hours PRN  pantoprazole    Tablet 40 milliGRAM(s) Oral before breakfast  polyethylene glycol 3350 17 Gram(s) Oral daily  potassium chloride  20 mEq/100 mL IVPB 20 milliEquivalent(s) IV Intermittent once  sodium chloride 0.9%. 1000 milliLiter(s) IV Continuous <Continuous>      Allergies:  No Known Allergies      ROS:  Denies the following except as indicated.    General: weight loss/weight gain, decreased appetite, fatigue  Eyes: Blurry vision, double vision, visual changes  ENT: Throat pain, changes in voice,   CV: palpitations, SOB, CP, cough  GI: NVD, difficulty swallowing, abdominal pain  : polyuria, dysuria  Endo: abnormal menses, temperature intolerance, decreased libido  MSK: weakness, joint pain  Skin: rash, dryness, diaphoresis  Heme: Easy bruising, bleeding  Neuro: HA, dizziness, lightheadedness, numbness/ tingling  Psych: Anxiety, Depression    Vital Signs Last 24 Hrs  T(C): 37.6 (08 Jul 2020 09:00), Max: 37.6 (08 Jul 2020 09:00)  T(F): 99.7 (08 Jul 2020 09:00), Max: 99.7 (08 Jul 2020 09:00)  HR: 101 (08 Jul 2020 11:00) (82 - 113)  BP: 131/68 (08 Jul 2020 08:00) (96/55 - 152/82)  BP(mean): 94 (08 Jul 2020 08:00) (70 - 109)  RR: 21 (08 Jul 2020 09:00) (18 - 22)  SpO2: 97% (08 Jul 2020 11:00) (93% - 98%)  Height (cm): 175.26 (07-02 @ 12:32)  Weight (kg): 90.3 (07-02 @ 12:32)  BMI (kg/m2): 29.4 (07-02 @ 12:32)      Constitutional: wn/wd in NAD.   HEENT: NCAT, MMM, OP clear, EOMI, , no proptosis or lid retraction  Neck: no thyromegaly or palpable thyroid nodules   Respiratory: lungs CTAB.  Cardiovascular: regular rhythm, normal S1 and S2, no audible murmurs, no peripheral edema  GI: soft, NT/ND, no masses/HSM appreciated.  Neurology: no tremors, DTR 2+  Skin: no visible rashes/lesions. no acanthosis nigricans. no hyperlipotrophy. no cushing's stigmata.  Psychiatric: AAO x 3, normal affect/mood.  Ext: radial pulses intact, DP pulses intact, extremities warm, no cyanosis, clubbing or edema.       LABS:                        8.6    13.17 )-----------( 89       ( 08 Jul 2020 10:49 )             25.3     07-08    134<L>  |  99  |  27<H>  ----------------------------<  269<H>  3.9   |  21<L>  |  0.95    Ca    8.8      08 Jul 2020 10:49  Phos  2.9     07-08  Mg     2.0     07-08    TPro  5.5<L>  /  Alb  3.5  /  TBili  2.3<H>  /  DBili  x   /  AST  20  /  ALT  8<L>  /  AlkPhos  33<L>  07-08    PT/INR - ( 08 Jul 2020 10:49 )   PT: 15.6 sec;   INR: 1.32          PTT - ( 08 Jul 2020 10:49 )  PTT:31.7 sec      Thyroid Stimulating Hormone, Serum: 0.031 (06-30 @ 15:32)      RADIOLOGY & ADDITIONAL STUDIES:  CAPILLARY BLOOD GLUCOSE      POCT Blood Glucose.: 257 mg/dL (08 Jul 2020 11:40)  POCT Blood Glucose.: 255 mg/dL (08 Jul 2020 07:12)  POCT Blood Glucose.: 276 mg/dL (07 Jul 2020 22:10)  POCT Blood Glucose.: 268 mg/dL (07 Jul 2020 16:36)    Will discuss in roudns  A/P: 64 year old male, non-smoker, with a past medical history of COVID infection (antibody positive),  TIA on Plavix 75mg only (6 years ago with left hand weakness that lasted a few minutes, no recurrence), HTN, HLD, DM, hypothyroidism found to have 3VD on dx cath 6/30/2020 and is now s/p CABG 7/7/2020 with post op hyperglycemia.     1.  DM - type 2, controlled, complicated  A1C: 5.8%  Weight: 90kg BMI 29  Cr/GRF: 0.95/85  EF: 65%    Please continue lantus       units at night / morning.  Please continue lispro      units before each meal.  Please continue lispro moderate / low dose sliding scale four times daily with meals and at bedtime    Pt's fingerstick glucose goal is 100-180.    2. Hypothyroidism -   Levothyroxine 200mcg daily  6/30: TSH 0.031, TT4 8.06  --f/u repeat TSH and free T4    Will continue to monitor     For discharge, pt can continue TBD    Pt can follow up at discharge with [facility] by calling  to make an appointment.   Will discuss case with Dr. Jane   and update primary team    To Make an appointment at [address] [place] [address] for the patient, either:  1. Send a STAT task via Izzui to Nathalie Leavitt or Mariama Henson (office managers)   OR  2. Call supervisor's line. P: [phone] (do not give this number to patient). VM is checked periodically.  In the message, specify that this is a hospital discharge follow-up, and that the appt can be made with a NP if there is no timely MD availability.    REMINDERS FOR INSULIN/DIABETES SUPPLIES at DISCHARGE:  INSULIN:   Long actin/Basal Insulin: Examples: Toujeo, Basaglar, Tresiba, Lantus   Short acting/Bolus Insulin: Humalog, Admelog, Novolog  Please ensure that BOTH short acting and long acting insulin are prescribed in the same preparation (Ex: PEN vs VIAL/SOLUTION)     TESTING SUPPLIES:   All glucometer supplies should be written as generic to avoid issues with insurance. Use the free text option in sunrise prescription writer, and type in glucometer test strips, lancets, etc to order.    If sending patient home on insulin PEN, please send:   •	BD bri insulin pen needles for use up to 4 times daily (total quantity 100)  •	Lancets for use up to 4 times daily (total quantity 100)  •	Glucometer Test strips for use up to 4 times daily (total quantity 100)  •	Alcohol swabs for use up to 4 times daily (total quantity 100)  •	Glucometer (If provided by hospital, still provide scripts for lancets, test strips, and swabs)  If sending patient home on insulin VIAL, please send:   •	Insulin syringes (6mm) - for use up to 4 times daily (total quantity 100)  •	Lancets for use up to 4 times daily (total quantity 100)  •	Generic Glucometer Test strips for use up to 4 times daily (total quantity 100)  •	Alcohol swabs for use up to 4 times daily (total quantity 100)  •	Generic Glucometer (If provided by hospital, still provide scripts for lancets, test strips, and swabs)  •	Do not specify brand for testing supplies (such as contour, freestyle, one touch etc) that way the pharmacy has the freedom to pick and change according to what the insurance dictates.  For patients without insurance:   •	Provide social work with appropriate scripts so they may obtain 1 week of samples  •	Provide with glucometer. Glucometers are located at various nursing stations, the nursing office, education, and endocrine fellows office.  •	Please make appointment with Chantale Bal NP or Tamia Lewis RN and SOPHY Andre at the [address] [place] [address] endocrinology clinic. They can see patients without insurance, provide appropriate samples, and assist in getting insurance coverage.     PREFERRED PHARMACY:  [facility] (located on 1st floor next to admitting)  P: [phone]  Hours: M – F 8AM – 8PM, Sat 8AM – 4PM, Sun—closed  If not using [facility], please follow up with chosen pharmacy to ensure insulin prescribed is covered. HPI: 64 year old male, non-smoker, with a past medical history of COVID infection (antibody positive), with a past medical history of TIA on Plavix 75mg only (6 years ago with left hand weakness that lasted a few minutes, no recurrence), HTN, HLD, DM, hypothyroidism, presented to Dr. Francis Muñiz with c/o chest pain associated with dyspnea on exertion ambulating up 1-2 flights of stairs. Symptoms began in early May 2020 and resolve on own after resting and breathing.  Subsequent NST 6/13/20:  Anterior, septal and lateral wall ischemia identified. EF 66%. Patient is now had dx cardiac cath on 6/30/20 revealing 3V Coronary Artery Disease:  LM: Normal  LAD: 70% stenosis in pLAD, iFR 0.81  LCx: 80% stenosis in midLCx.   OM2: 80% stenosis  OM3: 100% stenosis  RCA: 100% stenosis in mRCA  Left Heart Catheterization:  LV Gram: 60% EF  LV EDP 18mmHg    Of note, patient had covid symptoms on 3/18 with chills and cough. He was not tested at that time and did not require MD visit/hospitalization. Recent antibody test was positive. COVID swab from 6/27 was negative.     Now s/p CABG x 3 (lima-lad, svg-proximal marginal, svg-pda) EF 65%, 7/6/2020. He was on insulin drip postop requiring max 7 units/hr. It was discontinued 7/7 at 4AM with FSG 99. Started on lispro MISS before meals and at bedtime with BG in 200s.  Pt has poor appetite. Has sore throat form ET tube. Complains of moderate surgical site pain.    FSG & insulin:  7/7:  Dinner   Lispro 6  Bedtime FSG  276   Lantus  15    and Lispro    6   7/8:  Breakfast    Lispro  6. Had coffee  Lunch      Lispro 5+6.  Ate some soup    Age at Dx: 59 year old  How dx: lab work  Hx and duration of insulin: never  Current Therapy: Januvia 100mg daily and metformin 500mg TID  Hx of other regimens: none    Home FSG:  Fasting 130-140  Bed 120s    Diet: appropriate  Exercise: walks a lot    Hx of hypoglycemia: denies  Hx of DKA/HHS: denies  Complications: denies DR, neuropathy nephropathy  Outpatient Endo: managed by PCP. Lives in [facility]    Hypothyroidism-  Diagnosed 30 years ago and has been on stable dose since. Hasn't had TFTs checked recently.  Levothyroxine 200mcg daily  6/30: TSH 0.031, TT4 8.06  7/8: TSH 0.062, FT4 1.67    FH:  DM: brother  Thyroid: denies  Autoimmune: denies  Other: cardiac    SH:  Smoking: denies  Etoh: denies  Recreational Drugs: denies  Social Life: lives with wife; works as     PMH & Surgical Hx:I25.10  FH: heart failure  Handoff  History of TIAs  Hypothyroidism  H/O cardiomyopathy  DM (diabetes mellitus)  HLD (hyperlipidemia)  HTN (hypertension)  CAD, multiple vessel  CAD, multiple vessel  CABG, with ARTIE  No significant past surgical history          Current Meds:  aspirin enteric coated 81 milliGRAM(s) Oral daily  atorvastatin 80 milliGRAM(s) Oral at bedtime  chlorhexidine 2% Cloths 1 Application(s) Topical daily  dextrose 40% Gel 15 Gram(s) Oral once PRN  dextrose 5%. 1000 milliLiter(s) IV Continuous <Continuous>  dextrose 50% Injectable 12.5 Gram(s) IV Push once  dextrose 50% Injectable 25 Gram(s) IV Push once  dextrose 50% Injectable 25 Gram(s) IV Push once  fentaNYL    Injectable 25 MICROGram(s) IV Push every 3 hours PRN  glucagon  Injectable 1 milliGRAM(s) IntraMuscular once PRN  heparin   Injectable 5000 Unit(s) SubCutaneous every 8 hours  insulin glargine Injectable (LANTUS) 15 Unit(s) SubCutaneous at bedtime  insulin lispro (HumaLOG) corrective regimen sliding scale   SubCutaneous Before meals and at bedtime  insulin lispro Injectable (HumaLOG) 5 Unit(s) SubCutaneous three times a day with meals  levothyroxine 200 MICROGram(s) Oral daily  lidocaine   Patch 1 Patch Transdermal every 24 hours  metoprolol tartrate 12.5 milliGRAM(s) Oral two times a day  oxycodone    5 mG/acetaminophen 325 mG 2 Tablet(s) Oral every 4 hours PRN  oxycodone    5 mG/acetaminophen 325 mG 1 Tablet(s) Oral every 6 hours PRN  pantoprazole    Tablet 40 milliGRAM(s) Oral before breakfast  polyethylene glycol 3350 17 Gram(s) Oral daily  potassium chloride  20 mEq/100 mL IVPB 20 milliEquivalent(s) IV Intermittent once  sodium chloride 0.9%. 1000 milliLiter(s) IV Continuous <Continuous>      Allergies:  No Known Allergies      ROS:  Denies the following except as indicated.    General: weight loss/weight gain, decreased appetite, fatigue  Eyes: Blurry vision, double vision, visual changes  ENT: Throat pain, changes in voice,   CV: palpitations, SOB, CP, cough  GI: NVD, difficulty swallowing, abdominal pain  : polyuria, dysuria  Endo:  temperature intolerance, decreased libido  MSK: weakness, joint pain  Skin: rash, dryness, diaphoresis  Heme: Easy bruising, bleeding  Neuro: HA, dizziness, lightheadedness, numbness/ tingling  Psych: Anxiety, Depression    Vital Signs Last 24 Hrs  T(C): 37.6 (08 Jul 2020 09:00), Max: 37.6 (08 Jul 2020 09:00)  T(F): 99.7 (08 Jul 2020 09:00), Max: 99.7 (08 Jul 2020 09:00)  HR: 101 (08 Jul 2020 11:00) (82 - 113)  BP: 131/68 (08 Jul 2020 08:00) (96/55 - 152/82)  BP(mean): 94 (08 Jul 2020 08:00) (70 - 109)  RR: 21 (08 Jul 2020 09:00) (18 - 22)  SpO2: 97% (08 Jul 2020 11:00) (93% - 98%)  Height (cm): 175.26 (07-02 @ 12:32)  Weight (kg): 90.3 (07-02 @ 12:32)  BMI (kg/m2): 29.4 (07-02 @ 12:32)      Constitutional: wn/wd in NAD.   HEENT: NCAT, MMM, OP clear, EOMI, , no proptosis or lid retraction  Neck: no thyromegaly or palpable thyroid nodules   Respiratory: lungs CTAB.  Cardiovascular: regular rhythm, normal S1 and S2, no audible murmurs, no peripheral edema + sternal dressing c/d/i  GI: soft, NT/ND, no masses/HSM appreciated.  Neurology: no tremors, DTR 2+  Skin: no visible rashes/lesions. no acanthosis nigricans. no hyperlipotrophy. no cushing's stigmata.  Psychiatric: AAO x 3, normal affect/mood.  Ext: radial pulses intact, DP pulses intact, extremities warm, no cyanosis, clubbing or edema.       LABS:                        8.6    13.17 )-----------( 89       ( 08 Jul 2020 10:49 )             25.3     07-08    134<L>  |  99  |  27<H>  ----------------------------<  269<H>  3.9   |  21<L>  |  0.95    Ca    8.8      08 Jul 2020 10:49  Phos  2.9     07-08  Mg     2.0     07-08    TPro  5.5<L>  /  Alb  3.5  /  TBili  2.3<H>  /  DBili  x   /  AST  20  /  ALT  8<L>  /  AlkPhos  33<L>  07-08    PT/INR - ( 08 Jul 2020 10:49 )   PT: 15.6 sec;   INR: 1.32          PTT - ( 08 Jul 2020 10:49 )  PTT:31.7 sec      Thyroid Stimulating Hormone, Serum: 0.031 (06-30 @ 15:32)      RADIOLOGY & ADDITIONAL STUDIES:  CAPILLARY BLOOD GLUCOSE      POCT Blood Glucose.: 257 mg/dL (08 Jul 2020 11:40)  POCT Blood Glucose.: 255 mg/dL (08 Jul 2020 07:12)  POCT Blood Glucose.: 276 mg/dL (07 Jul 2020 22:10)  POCT Blood Glucose.: 268 mg/dL (07 Jul 2020 16:36)    A/P: 64 year old male, non-smoker, with a past medical history of COVID infection (antibody positive),  TIA on Plavix 75mg only (6 years ago with left hand weakness that lasted a few minutes, no recurrence), HTN, HLD, DM, hypothyroidism found to have 3VD on dx cath 6/30/2020 and is now s/p CABG 7/7/2020 with post op hyperglycemia.     1.  DM - type 2, controlled, complicated  A1C: 5.8%  Weight: 90kg BMI 29  Cr/GRF: 0.95/85  EF: 65%    Please increase lantus to 26       units at night.  Please decrease lispro to 3      units before each meal.  Please continue lispro moderate  dose sliding scale four times daily with meals and at bedtime. Please check 3AM FSG and cover with lispro MISS.  Please give glucerna TID.    For discharge, pt might need insulin temporarily. Please have staff RN teach insulin pen injection technique.    Pt's fingerstick glucose goal is 100-180.    2. Hypothyroidism -   Please decrease Levothyroxine to 175mcg daily  6/30: TSH 0.031, TT4 8.06  7/8: TSH 0.062, FT4 1.67      Will continue to monitor     For discharge, pt can continue TBD    Pt can follow up at discharge with [facility] by calling  to make an appointment.   Will discuss case with Dr. Jane   and update primary team    To Make an appointment at [address] [place] [address] for the patient, either:  1. Send a STAT task via Netbooks to Nathalie Leavitt or Mariama Henson (office managers)   OR  2. Call supervisor's line. P: [phone] (do not give this number to patient). VM is checked periodically.  In the message, specify that this is a hospital discharge follow-up, and that the appt can be made with a NP if there is no timely MD availability.    REMINDERS FOR INSULIN/DIABETES SUPPLIES at DISCHARGE:  INSULIN:   Long actin/Basal Insulin: Examples: Toujeo, Basaglar, Tresiba, Lantus   Short acting/Bolus Insulin: Humalog, Admelog, Novolog  Please ensure that BOTH short acting and long acting insulin are prescribed in the same preparation (Ex: PEN vs VIAL/SOLUTION)     TESTING SUPPLIES:   All glucometer supplies should be written as generic to avoid issues with insurance. Use the free text option in sunrise prescription writer, and type in glucometer test strips, lancets, etc to order.    If sending patient home on insulin PEN, please send:   •	BD bri insulin pen needles for use up to 4 times daily (total quantity 100)  •	Lancets for use up to 4 times daily (total quantity 100)  •	Glucometer Test strips for use up to 4 times daily (total quantity 100)  •	Alcohol swabs for use up to 4 times daily (total quantity 100)  •	Glucometer (If provided by hospital, still provide scripts for lancets, test strips, and swabs)  If sending patient home on insulin VIAL, please send:   •	Insulin syringes (6mm) - for use up to 4 times daily (total quantity 100)  •	Lancets for use up to 4 times daily (total quantity 100)  •	Generic Glucometer Test strips for use up to 4 times daily (total quantity 100)  •	Alcohol swabs for use up to 4 times daily (total quantity 100)  •	Generic Glucometer (If provided by hospital, still provide scripts for lancets, test strips, and swabs)  •	Do not specify brand for testing supplies (such as contour, freestyle, one touch etc) that way the pharmacy has the freedom to pick and change according to what the insurance dictates.  For patients without insurance:   •	Provide social work with appropriate scripts so they may obtain 1 week of samples  •	Provide with glucometer. Glucometers are located at various nursing stations, the nursing office, education, and endocrine fellows office.  •	Please make appointment with Chantale Bal NP or Tamia Lewis RN and SOPHY Andre at the [address] [place] [address] endocrinology clinic. They can see patients without insurance, provide appropriate samples, and assist in getting insurance coverage.     PREFERRED PHARMACY:  [facility] (located on 1st floor next to admitting)  P: [phone]  Hours: M – F 8AM – 8PM, Sat 8AM – 4PM, Sun—closed  If not using [facility], please follow up with chosen pharmacy to ensure insulin prescribed is covered.

## 2020-07-08 NOTE — PROGRESS NOTE ADULT - SUBJECTIVE AND OBJECTIVE BOX
CTICU  CRITICAL  CARE  attending     Hand off received 					   Pertinent clinical, laboratory, radiographic, hemodynamic, echocardiographic, respiratory data, microbiologic data and chart were reviewed and analyzed frequently throughout the course of the day and night  Patient seen and examined with CTS/ SH attending at bedside  Pt is a 64y , Male, HEALTH ISSUES - PROBLEM Dx:      , FAMILY HISTORY:  FH: heart failure  PAST MEDICAL & SURGICAL HISTORY:  History of TIAs  Hypothyroidism  H/O cardiomyopathy  DM (diabetes mellitus)  HLD (hyperlipidemia)  HTN (hypertension)  No significant past surgical history    Patient is a 64y old  Male who presents with a chief complaint of CAD (08 Jul 2020 11:45)      14 system review was unremarkable    Vital signs, hemodynamic and respiratory parameters were reviewed from the bedside nursing flowsheet.  ICU Vital Signs Last 24 Hrs  T(C): 36.3 (08 Jul 2020 17:09), Max: 37.6 (08 Jul 2020 09:00)  T(F): 97.4 (08 Jul 2020 17:09), Max: 99.7 (08 Jul 2020 09:00)  HR: 77 (08 Jul 2020 19:00) (77 - 115)  BP: 109/61 (08 Jul 2020 19:00) (100/55 - 134/71)  BP(mean): 80 (08 Jul 2020 19:00) (72 - 94)  ABP: 145/54 (08 Jul 2020 13:00) (106/42 - 157/50)  ABP(mean): 76 (08 Jul 2020 13:00) (59 - 84)  RR: 14 (08 Jul 2020 19:00) (14 - 22)  SpO2: 99% (08 Jul 2020 19:00) (93% - 99%)    Adult Advanced Hemodynamics Last 24 Hrs  CVP(mm Hg): 18 (08 Jul 2020 19:00) (4 - 125)  CVP(cm H2O): --  CO: --  CI: --  PA: --  PA(mean): --  PCWP: --  SVR: --  SVRI: --  PVR: --  PVRI: --, ABG - ( 08 Jul 2020 10:49 )  pH, Arterial: 7.50  pH, Blood: x     /  pCO2: 30    /  pO2: 70    / HCO3: 23    / Base Excess: x     /  SaO2: 95                  Intake and output was reviewed and the fluid balance was calculated  Daily     Daily   I&O's Summary    07 Jul 2020 07:01  -  08 Jul 2020 07:00  --------------------------------------------------------  IN: 3352.4 mL / OUT: 2360 mL / NET: 992.4 mL    08 Jul 2020 07:01  -  08 Jul 2020 19:36  --------------------------------------------------------  IN: 890 mL / OUT: 1455 mL / NET: -565 mL        All lines and drain sites were assessed  [facility]      POCT Blood Glucose.: 313 mg/dL (08 Jul 2020 16:42)    No acute change in mental status  Auscultation of the chest reveals equal bs  Abdomen is soft  Extremities are warm and well perfused  Wounds appear clean and unremarkable  Antibiotics are periop    labs  CBC Full  -  ( 08 Jul 2020 15:14 )  WBC Count : 15.82 K/uL  RBC Count : 3.08 M/uL  Hemoglobin : 9.2 g/dL  Hematocrit : 27.4 %  Platelet Count - Automated : 103 K/uL  Mean Cell Volume : 89.0 fl  Mean Cell Hemoglobin : 29.9 pg  Mean Cell Hemoglobin Concentration : 33.6 gm/dL  Auto Neutrophil # : x  Auto Lymphocyte # : x  Auto Monocyte # : x  Auto Eosinophil # : x  Auto Basophil # : x  Auto Neutrophil % : x  Auto Lymphocyte % : x  Auto Monocyte % : x  Auto Eosinophil % : x  Auto Basophil % : x    07-08    130<L>  |  97  |  25<H>  ----------------------------<  286<H>  4.9   |  21<L>  |  0.90    Ca    8.5      08 Jul 2020 15:14  Phos  2.6     07-08  Mg     3.3     07-08    TPro  5.8<L>  /  Alb  3.6  /  TBili  2.2<H>  /  DBili  x   /  AST  19  /  ALT  12  /  AlkPhos  33<L>  07-08    PT/INR - ( 08 Jul 2020 15:14 )   PT: 14.6 sec;   INR: 1.23          PTT - ( 08 Jul 2020 15:14 )  PTT:30.1 sec  The current medications were reviewed   MEDICATIONS  (STANDING):  aMIOdarone    Tablet 400 milliGRAM(s) Oral every 8 hours  aMIOdarone    Tablet   Oral   aspirin enteric coated 81 milliGRAM(s) Oral daily  atorvastatin 80 milliGRAM(s) Oral at bedtime  chlorhexidine 2% Cloths 1 Application(s) Topical daily  dextrose 5%. 1000 milliLiter(s) (50 mL/Hr) IV Continuous <Continuous>  dextrose 50% Injectable 12.5 Gram(s) IV Push once  dextrose 50% Injectable 25 Gram(s) IV Push once  dextrose 50% Injectable 25 Gram(s) IV Push once  heparin   Injectable 5000 Unit(s) SubCutaneous every 8 hours  insulin glargine Injectable (LANTUS) 26 Unit(s) SubCutaneous at bedtime  insulin lispro (HumaLOG) corrective regimen sliding scale   SubCutaneous <User Schedule>  insulin lispro (HumaLOG) corrective regimen sliding scale   SubCutaneous Before meals and at bedtime  insulin lispro Injectable (HumaLOG) 3 Unit(s) SubCutaneous three times a day with meals  levothyroxine 175 MICROGram(s) Oral daily  lidocaine   Patch 1 Patch Transdermal every 24 hours  metoprolol tartrate 25 milliGRAM(s) Oral every 6 hours  pantoprazole    Tablet 40 milliGRAM(s) Oral before breakfast  polyethylene glycol 3350 17 Gram(s) Oral daily  potassium chloride  20 mEq/100 mL IVPB 20 milliEquivalent(s) IV Intermittent once  sodium chloride 0.9%. 1000 milliLiter(s) (10 mL/Hr) IV Continuous <Continuous>    MEDICATIONS  (PRN):  dextrose 40% Gel 15 Gram(s) Oral once PRN Blood Glucose LESS THAN 70 milliGRAM(s)/deciliter  fentaNYL    Injectable 25 MICROGram(s) IV Push every 3 hours PRN Severe Pain (7 - 10)  glucagon  Injectable 1 milliGRAM(s) IntraMuscular once PRN Glucose LESS THAN 70 milligrams/deciliter  oxycodone    5 mG/acetaminophen 325 mG 2 Tablet(s) Oral every 4 hours PRN Severe Pain (7 - 10)  oxycodone    5 mG/acetaminophen 325 mG 1 Tablet(s) Oral every 6 hours PRN Moderate Pain (4 - 6)       PROBLEM LIST/ ASSESSMENT:  HEALTH ISSUES - PROBLEM Dx:      ,   Patient is a 64y old  Male who presents with a chief complaint of CAD (08 Jul 2020 11:45)     s/p cardiac surgery                My plan includes :  close hemodynamic, ventilatory and drain monitoring and management per post op routine    Monitor for arrhythmias and monitor parameters for organ perfusion  beta blockade not administered due to hemodynamic instability and bradycardia  monitor neurologic status  Head of the bed should remain elevated to 45 deg .   chest PT and IS will be encouraged  monitor adequacy of oxygenation and ventilation and attempt to wean oxygen  antibiotic regimen will be tailored to the clinical, laboratory and microbiologic data  Nutritional goals will be met using po eventually , ensure adequate caloric intake and montior the same  Stress ulcer and VTE prophylaxis will be achieved    Glycemic control is satisfactory  Electrolytes have been repleted as necessary and wound care has been carried out. Pain control has been achieved.   agressive physical therapy and early mobility and ambulation goals will be met   The family was updated about the course and plan  CRITICAL CARE TIME personally provided by me  in evaluation and management, reassessments, review and interpretation of labs and x-rays, ventilator and hemodynamic management, formulating a plan and coordinating care: ___90____ MIN.  Time does not include procedural time. Time spent was non routine post-operarive caRE and included multiple and repeated evaluations at the bedside  CTICU ATTENDING     					    Dexter Davis MD

## 2020-07-08 NOTE — CHART NOTE - NSCHARTNOTEFT_GEN_A_CORE
64 year old male, non-smoker, with a past medical history of COVID infection (antibody positive), with a past medical history of TIA on Plavix 75mg only (6 years ago with left hand weakness that lasted a few minutes, no recurrence), HTN, HLD, DM, hypothyroidism, presented to Dr. Francis Muñiz with c/o chest pain associated with dyspnea on exertion ambulating up 1-2 flights of stairs. Symptoms began in early May 2020 and resolve on own after resting and breathing.  Subsequent NST 6/13/20:  Anterior, septal and lateral wall ischemia identified.  Diaphragmatic.  EF 66%. Patient is now s/p dx cardiac cath on 6/30/20 revealing 3V Coronary Artery Disease.    Pt underwent CABGx3 on 7/6/2020 with Dr. Conde.  Procedure was uneventful.    POD#2 pt developed RUTHY .   Begin time: 13:11.    EKG completed 13:19.   Duration >60 mins

## 2020-07-08 NOTE — PROGRESS NOTE ADULT - SUBJECTIVE AND OBJECTIVE BOX
CTICU  CRITICAL  CARE  attending     Hand off received 					   Pertinent clinical, laboratory, radiographic, hemodynamic, echocardiographic, respiratory data, microbiologic data and chart were reviewed and analyzed frequently throughout the course of the day and night    Patient seen and examined with CTS/ SH attending at bedside        64 year old male, non-smoker, with DM, HTN, HLD, Hypothyroidism, history of COVID infection (antibody positive), with a past medical history of TIA on Plavix 75mg only (6 years ago with left hand weakness that lasted a few minutes, no recurrence), HTN, HLD, DM, hypothyroidism,  He presented to Dr. Muñiz withchest pain associated with dyspnea on exertion ambulating up 1-2 flights of stairs. The symptoms began in early May 2020 and resolve on resting and breathing.    Nuclear Stress Test 6/13/20:  Anterior, septal and lateral wall ischemia identified.  EF 66%.   Cardiac cath on 6/30/20 showed triple Vessel Coronary Artery Disease:  LM: Normal  LAD: 70% stenosis in pLAD, iFR 0.81  LCx: 80% stenosis in midLCx.   OM2: 80% stenosis  OM3: 100% stenosis  RCA: 100% stenosis in mRCA  Left Heart Catheterization:  LV Gram: 60% EF  LV EDP 18      FAMILY HISTORY:  FH: heart failure  PAST MEDICAL & SURGICAL HISTORY:  History of TIAs  Hypothyroidism  H/O cardiomyopathy  DM (diabetes mellitus)  HLD (hyperlipidemia)  HTN (hypertension)  No significant past surgical history        14 system review was unremarkable    Vital signs, hemodynamic and respiratory parameters were reviewed from the bedside nursing flow sheet.  ICU Vital Signs Last 24 Hrs  T(C): 37.8 (08 Jul 2020 21:28), Max: 37.8 (08 Jul 2020 21:28)  T(F): 100 (08 Jul 2020 21:28), Max: 100 (08 Jul 2020 21:28)  HR: 79 (08 Jul 2020 22:00) (77 - 115)  BP: 130/63 (08 Jul 2020 22:00) (104/56 - 142/68)  BP(mean): 89 (08 Jul 2020 22:00) (75 - 98)  ABP: 145/54 (08 Jul 2020 13:00) (115/47 - 157/50)  ABP(mean): 76 (08 Jul 2020 13:00) (67 - 84)  RR: 19 (08 Jul 2020 22:00) (14 - 22)  SpO2: 99% (08 Jul 2020 22:00) (93% - 99%)    Adult Advanced Hemodynamics Last 24 Hrs  CVP(mm Hg): 22 (08 Jul 2020 22:00) (4 - 125)  CVP(cm H2O): --  CO: --  CI: --  PA: --  PA(mean): --  PCWP: --  SVR: --  SVRI: --  PVR: --  PVRI: --, ABG - ( 08 Jul 2020 10:49 )  pH, Arterial: 7.50  pH, Blood: x     /  pCO2: 30    /  pO2: 70    / HCO3: 23    / Base Excess: x     /  SaO2: 95                  Intake and output was reviewed and the fluid balance was calculated  Daily     Daily   I&O's Summary    07 Jul 2020 07:01  -  08 Jul 2020 07:00  --------------------------------------------------------  IN: 3352.4 mL / OUT: 2360 mL / NET: 992.4 mL    08 Jul 2020 07:01  -  08 Jul 2020 22:55  --------------------------------------------------------  IN: 920 mL / OUT: 1760 mL / NET: -840 mL        All lines and drain sites were assessed      Neuro: Follows commands. Moves all 4 extremities.  Neck: No JVD.  CVS: S1, S2, No S3.  Lungs: Good air entry bilaterally.  Abd: Soft. No tenderness. + Bowel sounds.  Vascular: + DP/PT.  Extremities: No edema.  Lymphatic: Normal.  Skin: No abnormalities.      labs  CBC Full  -  ( 08 Jul 2020 21:01 )  WBC Count : 15.45 K/uL  RBC Count : 3.00 M/uL  Hemoglobin : 8.9 g/dL  Hematocrit : 26.3 %  Platelet Count - Automated : 102 K/uL  Mean Cell Volume : 87.7 fl  Mean Cell Hemoglobin : 29.7 pg  Mean Cell Hemoglobin Concentration : 33.8 gm/dL  Auto Neutrophil # : x  Auto Lymphocyte # : x  Auto Monocyte # : x  Auto Eosinophil # : x  Auto Basophil # : x  Auto Neutrophil % : x  Auto Lymphocyte % : x  Auto Monocyte % : x  Auto Eosinophil % : x  Auto Basophil % : x    07-08    130<L>  |  96  |  26<H>  ----------------------------<  263<H>  4.4   |  22  |  0.86    Ca    8.3<L>      08 Jul 2020 21:01  Phos  2.2     07-08  Mg     2.4     07-08    TPro  5.8<L>  /  Alb  3.6  /  TBili  2.2<H>  /  DBili  x   /  AST  19  /  ALT  12  /  AlkPhos  33<L>  07-08    PT/INR - ( 08 Jul 2020 15:14 )   PT: 14.6 sec;   INR: 1.23          PTT - ( 08 Jul 2020 15:14 )  PTT:30.1 sec  The current medications were reviewed   MEDICATIONS  (STANDING):  aMIOdarone    Tablet 400 milliGRAM(s) Oral every 8 hours  aMIOdarone    Tablet   Oral   aspirin enteric coated 81 milliGRAM(s) Oral daily  atorvastatin 80 milliGRAM(s) Oral at bedtime  chlorhexidine 2% Cloths 1 Application(s) Topical daily  dextrose 5%. 1000 milliLiter(s) (50 mL/Hr) IV Continuous <Continuous>  dextrose 50% Injectable 12.5 Gram(s) IV Push once  dextrose 50% Injectable 25 Gram(s) IV Push once  dextrose 50% Injectable 25 Gram(s) IV Push once  heparin   Injectable 5000 Unit(s) SubCutaneous every 8 hours  insulin glargine Injectable (LANTUS) 26 Unit(s) SubCutaneous at bedtime  insulin lispro (HumaLOG) corrective regimen sliding scale   SubCutaneous <User Schedule>  insulin lispro Injectable (HumaLOG) 3 Unit(s) SubCutaneous three times a day with meals  levothyroxine 175 MICROGram(s) Oral daily  lidocaine   Patch 1 Patch Transdermal every 24 hours  metoprolol tartrate 25 milliGRAM(s) Oral every 6 hours  pantoprazole    Tablet 40 milliGRAM(s) Oral before breakfast  polyethylene glycol 3350 17 Gram(s) Oral daily  potassium chloride  20 mEq/100 mL IVPB 20 milliEquivalent(s) IV Intermittent once  sodium chloride 0.9%. 1000 milliLiter(s) (10 mL/Hr) IV Continuous <Continuous>  sodium phosphate IVPB 15 milliMole(s) IV Intermittent once    MEDICATIONS  (PRN):  dextrose 40% Gel 15 Gram(s) Oral once PRN Blood Glucose LESS THAN 70 milliGRAM(s)/deciliter  fentaNYL    Injectable 25 MICROGram(s) IV Push every 3 hours PRN Severe Pain (7 - 10)  glucagon  Injectable 1 milliGRAM(s) IntraMuscular once PRN Glucose LESS THAN 70 milligrams/deciliter  oxycodone    5 mG/acetaminophen 325 mG 2 Tablet(s) Oral every 4 hours PRN Severe Pain (7 - 10)  oxycodone    5 mG/acetaminophen 325 mG 1 Tablet(s) Oral every 6 hours PRN Moderate Pain (4 - 6)        Patient is a 64y old  Male who presents with a chief complaint of CAD   S/P CABG  Metabolic acidosis.  Uncontrolled DM.  Hemodynamically stable.  Good oxygenation.  Fair urine out put.  Overall doing well.      My plan includes :  ADJUST insulin dosage.  Statin and Betablocker.  Dual antiplatelet Rx.  Close hemodynamic, ventilatory and drain monitoring and management  Monitor for arrhythmias and monitor parameters for organ perfusion  Monitor neurologic status  Monitor renal function.  Head of the bed should remain elevated to 45 deg .   Chest PT and IS will be encouraged  Monitor adequacy of oxygenation and ventilation and attempt to wean oxygen  Nutritional goals will be met using po eventually , ensure adequate caloric intake and monitor the same  Stress ulcer and VTE prophylaxis will be achieved    Electrolytes have been repleted as necessary and wound care has been carried out. Pain control has been achieved.   Aggressive physical therapy and early mobility and ambulation goals will be met   The family was updated about the course and plan  CRITICAL CARE TIME SPENT in evaluation and management, reassessments, review and interpretation of labs and x-rays, ventilator and hemodynamic management, formulating a plan and coordinating care: ___30____ MIN.  Time does not include procedural time.  CTICU ATTENDING     					    Robert Rodriguez MD

## 2020-07-08 NOTE — CONSULT NOTE ADULT - ATTENDING COMMENTS
Pt seen on rounds this afternoon.  Was OOB in a chair, appeared comfortable.  Although his hyperglycemia is presumably due to post-op stress, the severity is quite surprising and difficult to explain given his excellent glycemic control on oral agents only prior to admission. He is also fairly resistant to moderate doses of exogenous insulin, with glucoses remaining in the 200 range.  Will increase the Lantus to 26 for tonight, start a small pre-meal standing dose (his PO intake is minimal at this point), and also add coverage at 3 AM.  His TSH level was suppressed pre-op, so his levothyroxine dose is excessive at this point--to decrease to 175 mcg/day.  (He does not know when his TFTs were last checked)

## 2020-07-09 LAB
ALBUMIN SERPL ELPH-MCNC: 3.3 G/DL — SIGNIFICANT CHANGE UP (ref 3.3–5)
ALBUMIN SERPL ELPH-MCNC: 3.3 G/DL — SIGNIFICANT CHANGE UP (ref 3.3–5)
ALP SERPL-CCNC: 35 U/L — LOW (ref 40–120)
ALP SERPL-CCNC: 43 U/L — SIGNIFICANT CHANGE UP (ref 40–120)
ALT FLD-CCNC: 10 U/L — SIGNIFICANT CHANGE UP (ref 10–45)
ALT FLD-CCNC: 23 U/L — SIGNIFICANT CHANGE UP (ref 10–45)
ANION GAP SERPL CALC-SCNC: 10 MMOL/L — SIGNIFICANT CHANGE UP (ref 5–17)
ANION GAP SERPL CALC-SCNC: 13 MMOL/L — SIGNIFICANT CHANGE UP (ref 5–17)
APTT BLD: 27.2 SEC — LOW (ref 27.5–35.5)
APTT BLD: 29.3 SEC — SIGNIFICANT CHANGE UP (ref 27.5–35.5)
AST SERPL-CCNC: 20 U/L — SIGNIFICANT CHANGE UP (ref 10–40)
AST SERPL-CCNC: 36 U/L — SIGNIFICANT CHANGE UP (ref 10–40)
BILIRUB SERPL-MCNC: 1.6 MG/DL — HIGH (ref 0.2–1.2)
BILIRUB SERPL-MCNC: 2 MG/DL — HIGH (ref 0.2–1.2)
BUN SERPL-MCNC: 24 MG/DL — HIGH (ref 7–23)
BUN SERPL-MCNC: 28 MG/DL — HIGH (ref 7–23)
CALCIUM SERPL-MCNC: 8.2 MG/DL — LOW (ref 8.4–10.5)
CALCIUM SERPL-MCNC: 8.4 MG/DL — SIGNIFICANT CHANGE UP (ref 8.4–10.5)
CHLORIDE SERPL-SCNC: 101 MMOL/L — SIGNIFICANT CHANGE UP (ref 96–108)
CHLORIDE SERPL-SCNC: 96 MMOL/L — SIGNIFICANT CHANGE UP (ref 96–108)
CO2 SERPL-SCNC: 21 MMOL/L — LOW (ref 22–31)
CO2 SERPL-SCNC: 24 MMOL/L — SIGNIFICANT CHANGE UP (ref 22–31)
CREAT SERPL-MCNC: 0.81 MG/DL — SIGNIFICANT CHANGE UP (ref 0.5–1.3)
CREAT SERPL-MCNC: 0.88 MG/DL — SIGNIFICANT CHANGE UP (ref 0.5–1.3)
GLUCOSE BLDC GLUCOMTR-MCNC: 196 MG/DL — HIGH (ref 70–99)
GLUCOSE BLDC GLUCOMTR-MCNC: 214 MG/DL — HIGH (ref 70–99)
GLUCOSE BLDC GLUCOMTR-MCNC: 217 MG/DL — HIGH (ref 70–99)
GLUCOSE BLDC GLUCOMTR-MCNC: 231 MG/DL — HIGH (ref 70–99)
GLUCOSE SERPL-MCNC: 188 MG/DL — HIGH (ref 70–99)
GLUCOSE SERPL-MCNC: 260 MG/DL — HIGH (ref 70–99)
HCT VFR BLD CALC: 24.6 % — LOW (ref 39–50)
HCT VFR BLD CALC: 25.8 % — LOW (ref 39–50)
HGB BLD-MCNC: 8.3 G/DL — LOW (ref 13–17)
HGB BLD-MCNC: 8.9 G/DL — LOW (ref 13–17)
INR BLD: 1.09 — SIGNIFICANT CHANGE UP (ref 0.88–1.16)
INR BLD: 1.14 — SIGNIFICANT CHANGE UP (ref 0.88–1.16)
LACTATE SERPL-SCNC: 1 MMOL/L — SIGNIFICANT CHANGE UP (ref 0.5–2)
LACTATE SERPL-SCNC: 1.4 MMOL/L — SIGNIFICANT CHANGE UP (ref 0.5–2)
MAGNESIUM SERPL-MCNC: 2.2 MG/DL — SIGNIFICANT CHANGE UP (ref 1.6–2.6)
MAGNESIUM SERPL-MCNC: 2.3 MG/DL — SIGNIFICANT CHANGE UP (ref 1.6–2.6)
MCHC RBC-ENTMCNC: 29.7 PG — SIGNIFICANT CHANGE UP (ref 27–34)
MCHC RBC-ENTMCNC: 30.6 PG — SIGNIFICANT CHANGE UP (ref 27–34)
MCHC RBC-ENTMCNC: 33.7 GM/DL — SIGNIFICANT CHANGE UP (ref 32–36)
MCHC RBC-ENTMCNC: 34.5 GM/DL — SIGNIFICANT CHANGE UP (ref 32–36)
MCV RBC AUTO: 88.2 FL — SIGNIFICANT CHANGE UP (ref 80–100)
MCV RBC AUTO: 88.7 FL — SIGNIFICANT CHANGE UP (ref 80–100)
NRBC # BLD: 0 /100 WBCS — SIGNIFICANT CHANGE UP (ref 0–0)
NRBC # BLD: 0 /100 WBCS — SIGNIFICANT CHANGE UP (ref 0–0)
PHOSPHATE SERPL-MCNC: 2.6 MG/DL — SIGNIFICANT CHANGE UP (ref 2.5–4.5)
PHOSPHATE SERPL-MCNC: 3.1 MG/DL — SIGNIFICANT CHANGE UP (ref 2.5–4.5)
PLATELET # BLD AUTO: 100 K/UL — LOW (ref 150–400)
PLATELET # BLD AUTO: 130 K/UL — LOW (ref 150–400)
POTASSIUM SERPL-MCNC: 4 MMOL/L — SIGNIFICANT CHANGE UP (ref 3.5–5.3)
POTASSIUM SERPL-MCNC: 4.4 MMOL/L — SIGNIFICANT CHANGE UP (ref 3.5–5.3)
POTASSIUM SERPL-SCNC: 4 MMOL/L — SIGNIFICANT CHANGE UP (ref 3.5–5.3)
POTASSIUM SERPL-SCNC: 4.4 MMOL/L — SIGNIFICANT CHANGE UP (ref 3.5–5.3)
PROT SERPL-MCNC: 5.4 G/DL — LOW (ref 6–8.3)
PROT SERPL-MCNC: 6 G/DL — SIGNIFICANT CHANGE UP (ref 6–8.3)
PROTHROM AB SERPL-ACNC: 13 SEC — SIGNIFICANT CHANGE UP (ref 10.6–13.6)
PROTHROM AB SERPL-ACNC: 13.6 SEC — SIGNIFICANT CHANGE UP (ref 10.6–13.6)
RBC # BLD: 2.79 M/UL — LOW (ref 4.2–5.8)
RBC # BLD: 2.91 M/UL — LOW (ref 4.2–5.8)
RBC # FLD: 13.7 % — SIGNIFICANT CHANGE UP (ref 10.3–14.5)
RBC # FLD: 13.9 % — SIGNIFICANT CHANGE UP (ref 10.3–14.5)
SODIUM SERPL-SCNC: 130 MMOL/L — LOW (ref 135–145)
SODIUM SERPL-SCNC: 135 MMOL/L — SIGNIFICANT CHANGE UP (ref 135–145)
WBC # BLD: 14.35 K/UL — HIGH (ref 3.8–10.5)
WBC # BLD: 15.1 K/UL — HIGH (ref 3.8–10.5)
WBC # FLD AUTO: 14.35 K/UL — HIGH (ref 3.8–10.5)
WBC # FLD AUTO: 15.1 K/UL — HIGH (ref 3.8–10.5)

## 2020-07-09 PROCEDURE — 71045 X-RAY EXAM CHEST 1 VIEW: CPT | Mod: 26

## 2020-07-09 PROCEDURE — 99291 CRITICAL CARE FIRST HOUR: CPT

## 2020-07-09 PROCEDURE — 99292 CRITICAL CARE ADDL 30 MIN: CPT

## 2020-07-09 PROCEDURE — 99232 SBSQ HOSP IP/OBS MODERATE 35: CPT | Mod: GC

## 2020-07-09 PROCEDURE — 76604 US EXAM CHEST: CPT | Mod: 26

## 2020-07-09 RX ORDER — APIXABAN 2.5 MG/1
5 TABLET, FILM COATED ORAL EVERY 12 HOURS
Refills: 0 | Status: DISCONTINUED | OUTPATIENT
Start: 2020-07-09 | End: 2020-07-11

## 2020-07-09 RX ORDER — INSULIN LISPRO 100/ML
7 VIAL (ML) SUBCUTANEOUS
Refills: 0 | Status: DISCONTINUED | OUTPATIENT
Start: 2020-07-09 | End: 2020-07-10

## 2020-07-09 RX ORDER — INSULIN LISPRO 100/ML
4 VIAL (ML) SUBCUTANEOUS ONCE
Refills: 0 | Status: COMPLETED | OUTPATIENT
Start: 2020-07-09 | End: 2020-07-09

## 2020-07-09 RX ORDER — POTASSIUM CHLORIDE 20 MEQ
10 PACKET (EA) ORAL DAILY
Refills: 0 | Status: DISCONTINUED | OUTPATIENT
Start: 2020-07-09 | End: 2020-07-10

## 2020-07-09 RX ORDER — FUROSEMIDE 40 MG
20 TABLET ORAL DAILY
Refills: 0 | Status: DISCONTINUED | OUTPATIENT
Start: 2020-07-09 | End: 2020-07-10

## 2020-07-09 RX ORDER — METOPROLOL TARTRATE 50 MG
50 TABLET ORAL EVERY 8 HOURS
Refills: 0 | Status: DISCONTINUED | OUTPATIENT
Start: 2020-07-09 | End: 2020-07-09

## 2020-07-09 RX ORDER — IPRATROPIUM BROMIDE 0.2 MG/ML
500 SOLUTION, NON-ORAL INHALATION ONCE
Refills: 0 | Status: COMPLETED | OUTPATIENT
Start: 2020-07-09 | End: 2020-07-09

## 2020-07-09 RX ORDER — FUROSEMIDE 40 MG
20 TABLET ORAL ONCE
Refills: 0 | Status: COMPLETED | OUTPATIENT
Start: 2020-07-09 | End: 2020-07-09

## 2020-07-09 RX ORDER — INSULIN LISPRO 100/ML
VIAL (ML) SUBCUTANEOUS
Refills: 0 | Status: DISCONTINUED | OUTPATIENT
Start: 2020-07-09 | End: 2020-07-12

## 2020-07-09 RX ORDER — INSULIN LISPRO 100/ML
5 VIAL (ML) SUBCUTANEOUS
Refills: 0 | Status: DISCONTINUED | OUTPATIENT
Start: 2020-07-09 | End: 2020-07-09

## 2020-07-09 RX ORDER — ASPIRIN/CALCIUM CARB/MAGNESIUM 324 MG
325 TABLET ORAL DAILY
Refills: 0 | Status: DISCONTINUED | OUTPATIENT
Start: 2020-07-09 | End: 2020-07-12

## 2020-07-09 RX ORDER — METOPROLOL TARTRATE 50 MG
50 TABLET ORAL EVERY 6 HOURS
Refills: 0 | Status: DISCONTINUED | OUTPATIENT
Start: 2020-07-09 | End: 2020-07-12

## 2020-07-09 RX ORDER — INSULIN GLARGINE 100 [IU]/ML
32 INJECTION, SOLUTION SUBCUTANEOUS AT BEDTIME
Refills: 0 | Status: DISCONTINUED | OUTPATIENT
Start: 2020-07-09 | End: 2020-07-10

## 2020-07-09 RX ADMIN — Medication 7 UNIT(S): at 16:36

## 2020-07-09 RX ADMIN — HEPARIN SODIUM 5000 UNIT(S): 5000 INJECTION INTRAVENOUS; SUBCUTANEOUS at 06:32

## 2020-07-09 RX ADMIN — Medication 20 MILLIGRAM(S): at 16:17

## 2020-07-09 RX ADMIN — CHLORHEXIDINE GLUCONATE 1 APPLICATION(S): 213 SOLUTION TOPICAL at 11:24

## 2020-07-09 RX ADMIN — Medication 500 MICROGRAM(S): at 21:54

## 2020-07-09 RX ADMIN — Medication 50 MILLIGRAM(S): at 13:44

## 2020-07-09 RX ADMIN — Medication 3 UNIT(S): at 11:54

## 2020-07-09 RX ADMIN — Medication 175 MICROGRAM(S): at 06:34

## 2020-07-09 RX ADMIN — APIXABAN 5 MILLIGRAM(S): 2.5 TABLET, FILM COATED ORAL at 22:01

## 2020-07-09 RX ADMIN — AMIODARONE HYDROCHLORIDE 400 MILLIGRAM(S): 400 TABLET ORAL at 22:01

## 2020-07-09 RX ADMIN — Medication 50 MILLIGRAM(S): at 23:37

## 2020-07-09 RX ADMIN — Medication 81 MILLIGRAM(S): at 11:23

## 2020-07-09 RX ADMIN — Medication 4 UNIT(S): at 16:36

## 2020-07-09 RX ADMIN — Medication 20 MILLIGRAM(S): at 23:37

## 2020-07-09 RX ADMIN — Medication 10 MILLIEQUIVALENT(S): at 08:58

## 2020-07-09 RX ADMIN — Medication 50 MILLIGRAM(S): at 17:02

## 2020-07-09 RX ADMIN — Medication 3 UNIT(S): at 06:53

## 2020-07-09 RX ADMIN — Medication 50 MILLIGRAM(S): at 06:54

## 2020-07-09 RX ADMIN — Medication 2: at 06:53

## 2020-07-09 RX ADMIN — Medication 4: at 11:54

## 2020-07-09 RX ADMIN — AMIODARONE HYDROCHLORIDE 400 MILLIGRAM(S): 400 TABLET ORAL at 06:32

## 2020-07-09 RX ADMIN — INSULIN GLARGINE 32 UNIT(S): 100 INJECTION, SOLUTION SUBCUTANEOUS at 22:33

## 2020-07-09 RX ADMIN — Medication 20 MILLIGRAM(S): at 08:58

## 2020-07-09 RX ADMIN — AMIODARONE HYDROCHLORIDE 400 MILLIGRAM(S): 400 TABLET ORAL at 13:44

## 2020-07-09 NOTE — DIETITIAN INITIAL EVALUATION ADULT. - ADD RECOMMEND
1. Encourage intake through day 2. Reinforce diet ed 3. Manage pain prn 4. Trend wts 5. Monitor and replete lytes

## 2020-07-09 NOTE — PROGRESS NOTE ADULT - SUBJECTIVE AND OBJECTIVE BOX
CTICU  CRITICAL  CARE  attending     Hand off received 					   Pertinent clinical, laboratory, radiographic, hemodynamic, echocardiographic, respiratory data, microbiologic data and chart were reviewed and analyzed frequently throughout the course of the day and night  Patient seen and examined with CTS/ SH attending at bedside  Pt is a 64y , Male, HEALTH ISSUES - PROBLEM Dx:      , FAMILY HISTORY:  FH: heart failure  PAST MEDICAL & SURGICAL HISTORY:  History of TIAs  Hypothyroidism  H/O cardiomyopathy  DM (diabetes mellitus)  HLD (hyperlipidemia)  HTN (hypertension)  No significant past surgical history    Patient is a 64y old  Male who presents with a chief complaint of CAD (2020 12:52)      14 system review was unremarkable    Vital signs, hemodynamic and respiratory parameters were reviewed from the bedside nursing flowsheet.  ICU Vital Signs Last 24 Hrs  T(C): 36 (2020 09:03), Max: 37.8 (2020 21:28)  T(F): 96.8 (2020 09:03), Max: 100 (2020 21:28)  HR: 89 (2020 13:00) (74 - 115)  BP: 131/60 (2020 12:00) (106/66 - 152/70)  BP(mean): 86 (2020 12:00) (80 - 100)  ABP: --  ABP(mean): --  RR: 18 (2020 13:00) (14 - 21)  SpO2: 95% (2020 13:00) (93% - 100%)    Adult Advanced Hemodynamics Last 24 Hrs  CVP(mm Hg): -24 (2020 11:00) (-24 - 37)  CVP(cm H2O): --  CO: --  CI: --  PA: --  PA(mean): --  PCWP: --  SVR: --  SVRI: --  PVR: --  PVRI: --, ABG - ( 2020 10:49 )  pH, Arterial: 7.50  pH, Blood: x     /  pCO2: 30    /  pO2: 70    / HCO3: 23    / Base Excess: x     /  SaO2: 95                  Intake and output was reviewed and the fluid balance was calculated  Daily     Daily Weight in k (2020 13:12)  I&O's Summary    2020 07:01  -  2020 07:00  --------------------------------------------------------  IN: 1010 mL / OUT: 2765 mL / NET: -1755 mL    2020 07:01  -  2020 13:46  --------------------------------------------------------  IN: 10 mL / OUT: 0 mL / NET: 10 mL        All lines and drain sites were assessed  Glycemic trend was reviewedCAPILLARY BLOOD GLUCOSE      POCT Blood Glucose.: 231 mg/dL (2020 11:26)    No acute change in mental status  Auscultation of the chest reveals equal bs  Abdomen is soft  Extremities are warm and well perfused  Wounds appear clean and unremarkable  Antibiotics are periop    labs  CBC Full  -  ( 2020 02:19 )  WBC Count : 14.35 K/uL  RBC Count : 2.79 M/uL  Hemoglobin : 8.3 g/dL  Hematocrit : 24.6 %  Platelet Count - Automated : 100 K/uL  Mean Cell Volume : 88.2 fl  Mean Cell Hemoglobin : 29.7 pg  Mean Cell Hemoglobin Concentration : 33.7 gm/dL  Auto Neutrophil # : x  Auto Lymphocyte # : x  Auto Monocyte # : x  Auto Eosinophil # : x  Auto Basophil # : x  Auto Neutrophil % : x  Auto Lymphocyte % : x  Auto Monocyte % : x  Auto Eosinophil % : x  Auto Basophil % : x    -    130<L>  |  96  |  24<H>  ----------------------------<  188<H>  4.0   |  24  |  0.81    Ca    8.4      2020 02:19  Phos  3.1     07-09  Mg     2.3     07-09    TPro  5.4<L>  /  Alb  3.3  /  TBili  1.6<H>  /  DBili  x   /  AST  20  /  ALT  10  /  AlkPhos  35<L>  07-09    PT/INR - ( 2020 02:19 )   PT: 13.6 sec;   INR: 1.14          PTT - ( 2020 02:19 )  PTT:29.3 sec  The current medications were reviewed   MEDICATIONS  (STANDING):  aMIOdarone    Tablet 400 milliGRAM(s) Oral every 8 hours  aMIOdarone    Tablet   Oral   apixaban 5 milliGRAM(s) Oral every 12 hours  aspirin enteric coated 81 milliGRAM(s) Oral daily  atorvastatin 80 milliGRAM(s) Oral at bedtime  chlorhexidine 2% Cloths 1 Application(s) Topical daily  dextrose 5%. 1000 milliLiter(s) (50 mL/Hr) IV Continuous <Continuous>  dextrose 50% Injectable 12.5 Gram(s) IV Push once  dextrose 50% Injectable 25 Gram(s) IV Push once  dextrose 50% Injectable 25 Gram(s) IV Push once  furosemide    Tablet 20 milliGRAM(s) Oral daily  insulin glargine Injectable (LANTUS) 26 Unit(s) SubCutaneous at bedtime  insulin lispro (HumaLOG) corrective regimen sliding scale   SubCutaneous <User Schedule>  insulin lispro (HumaLOG) corrective regimen sliding scale   SubCutaneous three times a day before meals  insulin lispro Injectable (HumaLOG) 5 Unit(s) SubCutaneous three times a day with meals  levothyroxine 175 MICROGram(s) Oral daily  lidocaine   Patch 1 Patch Transdermal every 24 hours  metoprolol tartrate 50 milliGRAM(s) Oral every 8 hours  pantoprazole    Tablet 40 milliGRAM(s) Oral before breakfast  polyethylene glycol 3350 17 Gram(s) Oral daily  potassium chloride    Tablet ER 10 milliEquivalent(s) Oral daily  potassium chloride  20 mEq/100 mL IVPB 20 milliEquivalent(s) IV Intermittent once  sodium chloride 0.9%. 1000 milliLiter(s) (10 mL/Hr) IV Continuous <Continuous>    MEDICATIONS  (PRN):  dextrose 40% Gel 15 Gram(s) Oral once PRN Blood Glucose LESS THAN 70 milliGRAM(s)/deciliter  fentaNYL    Injectable 25 MICROGram(s) IV Push every 3 hours PRN Severe Pain (7 - 10)  glucagon  Injectable 1 milliGRAM(s) IntraMuscular once PRN Glucose LESS THAN 70 milligrams/deciliter  oxycodone    5 mG/acetaminophen 325 mG 2 Tablet(s) Oral every 4 hours PRN Severe Pain (7 - 10)  oxycodone    5 mG/acetaminophen 325 mG 1 Tablet(s) Oral every 6 hours PRN Moderate Pain (4 - 6)       PROBLEM LIST/ ASSESSMENT:  HEALTH ISSUES - PROBLEM Dx:      ,   Patient is a 64y old  Male who presents with a chief complaint of CAD (2020 12:52)     s/p cardiac surgery                My plan includes :  close hemodynamic, ventilatory and drain monitoring and management per post op routine    Monitor for arrhythmias and monitor parameters for organ perfusion  beta blockade not administered due to hemodynamic instability and bradycardia  monitor neurologic status  Head of the bed should remain elevated to 45 deg .   chest PT and IS will be encouraged  monitor adequacy of oxygenation and ventilation and attempt to wean oxygen  antibiotic regimen will be tailored to the clinical, laboratory and microbiologic data  Nutritional goals will be met using po eventually , ensure adequate caloric intake and montior the same  Stress ulcer and VTE prophylaxis will be achieved    Glycemic control is satisfactory  Electrolytes have been repleted as necessary and wound care has been carried out. Pain control has been achieved.   agressive physical therapy and early mobility and ambulation goals will be met   The family was updated about the course and plan  CRITICAL CARE TIME personally provided by me  in evaluation and management, reassessments, review and interpretation of labs and x-rays, ventilator and hemodynamic management, formulating a plan and coordinating care: ___90____ MIN.  Time does not include procedural time. Time spent was non routine post-operarive caRE and included multiple and repeated evaluations at the bedside  CTICU ATTENDING     					    Dexter Davis MD

## 2020-07-09 NOTE — DIETITIAN INITIAL EVALUATION ADULT. - OTHER INFO
64M non-smoker, with a past medical history of COVID infection (antibody positive), with a past medical history of TIA (6 years ago with left hand weakness that lasted a few minutes, no recurrence), HTN, HLD, DM (A1C 5.8), hypothyroidism, presented c/o chest pain associated with dyspnea on exertion ambulating up 1-2 flights of stairs. Symptoms began in early May 2020 and resolved on own after resting and breathing. Subsequent NST 6/13/20: Anterior, septal and lateral wall ischemia identified. EF 66%. Patient is now dx cardiac cath on 6/30/20 revealing 3V Coronary Artery Disease. Admitted for OR 7/6, underwent CABG w ARTIE, now POD 3. Observed pt sitting up in chair on NC noting some throat pain but improving and was able to eat breakfast ~50%. At home he and his wife meal plan together, watches "sodium and carbs". Denies n/v/d/c, chewing/ swallowing issues or pain impacting intake, skin with surgical incision, whit score 19. Encouraged intake through day and reinforced cst cho diet. NKFA or changes in wt reported. Will follow per protocol.

## 2020-07-09 NOTE — DIETITIAN INITIAL EVALUATION ADULT. - ENERGY NEEDS
Ideal body weight used for calculations as pt >120% of IBW.   ABW 90.3kg, IBW 72kg, 125% IBW, ht 69", BMI 29.4   Nutrient needs based on [facility] standards of care for maintenance in adults, adjusted for post-op needs, age, fluid per team

## 2020-07-09 NOTE — PROGRESS NOTE ADULT - SUBJECTIVE AND OBJECTIVE BOX
INTERVAL HPI/OVERNIGHT EVENTS:    Patient is a 64y old  Male who presents with a chief complaint of CAD (08 Jul 2020 22:55)      Pt reports the following symptoms:    CONSTITUTIONAL:  Negative fever or chills, feels well, good appetite  EYES:  Negative  blurry vision or double vision  CARDIOVASCULAR:  Negative for chest pain or palpitations  RESPIRATORY:  Negative for cough, wheezing, or SOB   GASTROINTESTINAL:  Negative for nausea, vomiting, diarrhea, constipation, or abdominal pain  GENITOURINARY:  Negative frequency, urgency or dysuria  NEUROLOGIC:  No headache, confusion, dizziness, lightheadedness    MEDICATIONS  (STANDING):  aMIOdarone    Tablet 400 milliGRAM(s) Oral every 8 hours  aMIOdarone    Tablet   Oral   apixaban 5 milliGRAM(s) Oral every 12 hours  aspirin enteric coated 81 milliGRAM(s) Oral daily  atorvastatin 80 milliGRAM(s) Oral at bedtime  chlorhexidine 2% Cloths 1 Application(s) Topical daily  dextrose 5%. 1000 milliLiter(s) (50 mL/Hr) IV Continuous <Continuous>  dextrose 50% Injectable 12.5 Gram(s) IV Push once  dextrose 50% Injectable 25 Gram(s) IV Push once  dextrose 50% Injectable 25 Gram(s) IV Push once  furosemide    Tablet 20 milliGRAM(s) Oral daily  insulin glargine Injectable (LANTUS) 26 Unit(s) SubCutaneous at bedtime  insulin lispro (HumaLOG) corrective regimen sliding scale   SubCutaneous <User Schedule>  insulin lispro (HumaLOG) corrective regimen sliding scale   SubCutaneous three times a day before meals  insulin lispro Injectable (HumaLOG) 3 Unit(s) SubCutaneous three times a day with meals  levothyroxine 175 MICROGram(s) Oral daily  lidocaine   Patch 1 Patch Transdermal every 24 hours  metoprolol tartrate 50 milliGRAM(s) Oral every 8 hours  pantoprazole    Tablet 40 milliGRAM(s) Oral before breakfast  polyethylene glycol 3350 17 Gram(s) Oral daily  potassium chloride    Tablet ER 10 milliEquivalent(s) Oral daily  potassium chloride  20 mEq/100 mL IVPB 20 milliEquivalent(s) IV Intermittent once  sodium chloride 0.9%. 1000 milliLiter(s) (10 mL/Hr) IV Continuous <Continuous>    MEDICATIONS  (PRN):  dextrose 40% Gel 15 Gram(s) Oral once PRN Blood Glucose LESS THAN 70 milliGRAM(s)/deciliter  fentaNYL    Injectable 25 MICROGram(s) IV Push every 3 hours PRN Severe Pain (7 - 10)  glucagon  Injectable 1 milliGRAM(s) IntraMuscular once PRN Glucose LESS THAN 70 milligrams/deciliter  oxycodone    5 mG/acetaminophen 325 mG 2 Tablet(s) Oral every 4 hours PRN Severe Pain (7 - 10)  oxycodone    5 mG/acetaminophen 325 mG 1 Tablet(s) Oral every 6 hours PRN Moderate Pain (4 - 6)      PHYSICAL EXAM  Vital Signs Last 24 Hrs  T(C): 36 (09 Jul 2020 09:03), Max: 37.8 (08 Jul 2020 21:28)  T(F): 96.8 (09 Jul 2020 09:03), Max: 100 (08 Jul 2020 21:28)  HR: 85 (09 Jul 2020 12:00) (74 - 115)  BP: 131/60 (09 Jul 2020 12:00) (106/66 - 152/70)  BP(mean): 86 (09 Jul 2020 12:00) (80 - 100)  RR: 18 (09 Jul 2020 11:00) (14 - 21)  SpO2: 95% (09 Jul 2020 12:00) (93% - 100%)    Constitutional: wn/wd in NAD.   HEENT: NCAT, MMM, OP clear, EOMI, no proptosis or lid retraction  Neck: no thyromegaly or palpable thyroid nodules   Respiratory: lungs CTAB.  Cardiovascular: regular rhythm, normal S1 and S2, no audible murmurs, no peripheral edema  GI: soft, NT/ND, no masses/HSM appreciated.  Neurology: no tremors, DTR 2+  Skin: no visible rashes/lesions  Psychiatric: AAO x 3, normal affect/mood.    LABS:                        8.3    14.35 )-----------( 100      ( 09 Jul 2020 02:19 )             24.6     07-09    130<L>  |  96  |  24<H>  ----------------------------<  188<H>  4.0   |  24  |  0.81    Ca    8.4      09 Jul 2020 02:19  Phos  3.1     07-09  Mg     2.3     07-09    TPro  5.4<L>  /  Alb  3.3  /  TBili  1.6<H>  /  DBili  x   /  AST  20  /  ALT  10  /  AlkPhos  35<L>  07-09    PT/INR - ( 09 Jul 2020 02:19 )   PT: 13.6 sec;   INR: 1.14          PTT - ( 09 Jul 2020 02:19 )  PTT:29.3 sec    Thyroid Stimulating Hormone, Serum: 0.062 uIU/mL (07-08 @ 10:49)  Thyroid Stimulating Hormone, Serum: 0.031 uIU/mL (06-30 @ 15:32)      HbA1C:   CAPILLARY BLOOD GLUCOSE      POCT Blood Glucose.: 231 mg/dL (09 Jul 2020 11:26)  POCT Blood Glucose.: 196 mg/dL (09 Jul 2020 06:22)  POCT Blood Glucose.: 271 mg/dL (08 Jul 2020 22:37)  POCT Blood Glucose.: 313 mg/dL (08 Jul 2020 16:42)  POCT Blood Glucose.: 269 mg/dL (08 Jul 2020 14:39)      Insulin Sliding Scale requirements X 24 Hours:    RADIOLOGY & ADDITIONAL TESTS:    A/P: 64y Male with history of DM type II presenting for       1.  DM -     Please continue           units lantus at bedtime  / in the morning and        units lispro with meals and lispro moderate / low dose sliding scale 4 times daily with meals and at bedtime.  Please continue consistent carbohydrate diet.      Goal FSG is   Will continue to monitor   For discharge, pt can continue    Pt can follow up at discharge with [facility] by calling  to make an appointment.   Will discuss case with     and update primary team INTERVAL HPI/OVERNIGHT EVENTS:    Patient is a 64y old  Male who presents with a chief complaint of CAD (2020 22:55)  Patient seen and examined at the bedside  He c/o some sore throat - no cough or sputum production  He is currently in NSR - was in a-fib - on amiodarone  His appetite is getting better.  drinking glucerna shakes  FSG & Insulin received:  Yesterday:  pre-dinner fs, 5 nutritional lispro   units +  8 units lispro SS, soup  bedtime fs, 26 lantus   units +  6  units lispro SS    Today:  pre-breakfast fs, 3 nutritional lispro   units+ 2   units lispro SS, tea, some eggs, potatoe, sausage ( 25%), 1/2 gluerna shake  pre-lunch fs, 3 nutritional lispro   units+4   units lispro SS, had glucerna shake, 1/4 sandwich       Pt reports the following symptoms:  CONSTITUTIONAL:  Negative fever or chills  CARDIOVASCULAR:  Negative for chest pain or palpitations  RESPIRATORY:  Negative for cough, wheezing, or SOB   GASTROINTESTINAL:  Negative for vomiting, diarrhea, constipation  NEUROLOGIC:  No headache, confusion, dizziness, lightheadedness    MEDICATIONS  (STANDING):  aMIOdarone    Tablet 400 milliGRAM(s) Oral every 8 hours  aMIOdarone    Tablet   Oral   apixaban 5 milliGRAM(s) Oral every 12 hours  aspirin enteric coated 81 milliGRAM(s) Oral daily  atorvastatin 80 milliGRAM(s) Oral at bedtime  chlorhexidine 2% Cloths 1 Application(s) Topical daily  dextrose 5%. 1000 milliLiter(s) (50 mL/Hr) IV Continuous <Continuous>  dextrose 50% Injectable 12.5 Gram(s) IV Push once  dextrose 50% Injectable 25 Gram(s) IV Push once  dextrose 50% Injectable 25 Gram(s) IV Push once  furosemide    Tablet 20 milliGRAM(s) Oral daily  insulin glargine Injectable (LANTUS) 26 Unit(s) SubCutaneous at bedtime  insulin lispro (HumaLOG) corrective regimen sliding scale   SubCutaneous <User Schedule>  insulin lispro (HumaLOG) corrective regimen sliding scale   SubCutaneous three times a day before meals  insulin lispro Injectable (HumaLOG) 3 Unit(s) SubCutaneous three times a day with meals  levothyroxine 175 MICROGram(s) Oral daily  lidocaine   Patch 1 Patch Transdermal every 24 hours  metoprolol tartrate 50 milliGRAM(s) Oral every 8 hours  pantoprazole    Tablet 40 milliGRAM(s) Oral before breakfast  polyethylene glycol 3350 17 Gram(s) Oral daily  potassium chloride    Tablet ER 10 milliEquivalent(s) Oral daily  potassium chloride  20 mEq/100 mL IVPB 20 milliEquivalent(s) IV Intermittent once  sodium chloride 0.9%. 1000 milliLiter(s) (10 mL/Hr) IV Continuous <Continuous>    MEDICATIONS  (PRN):  dextrose 40% Gel 15 Gram(s) Oral once PRN Blood Glucose LESS THAN 70 milliGRAM(s)/deciliter  fentaNYL    Injectable 25 MICROGram(s) IV Push every 3 hours PRN Severe Pain (7 - 10)  glucagon  Injectable 1 milliGRAM(s) IntraMuscular once PRN Glucose LESS THAN 70 milligrams/deciliter  oxycodone    5 mG/acetaminophen 325 mG 2 Tablet(s) Oral every 4 hours PRN Severe Pain (7 - 10)  oxycodone    5 mG/acetaminophen 325 mG 1 Tablet(s) Oral every 6 hours PRN Moderate Pain (4 - 6)      PHYSICAL EXAM  Vital Signs Last 24 Hrs  T(C): 36 (2020 09:03), Max: 37.8 (2020 21:28)  T(F): 96.8 (2020 09:03), Max: 100 (2020 21:28)  HR: 85 (2020 12:00) (74 - 115)  BP: 131/60 (2020 12:00) (106/66 - 152/70)  BP(mean): 86 (2020 12:00) (80 - 100)  RR: 18 (2020 11:00) (14 - 21)  SpO2: 95% (2020 12:00) (93% - 100%)    Constitutional: wn/wd in NAD.   Respiratory: lungs CTAB.  Cardiovascular: regular rhythm, normal S1 and S2, no peripheral edema  GI: soft, NT/ND, no masses/HSM appreciated.  Neurology: no tremors, DTR 2+    LABS:                        8.3    14.35 )-----------( 100      ( 2020 02:19 )             24.6     07-09    130<L>  |  96  |  24<H>  ----------------------------<  188<H>  4.0   |  24  |  0.81    Ca    8.4      2020 02:19  Phos  3.1       Mg     2.3         TPro  5.4<L>  /  Alb  3.3  /  TBili  1.6<H>  /  DBili  x   /  AST  20  /  ALT  10  /  AlkPhos  35<L>      PT/INR - ( 2020 02:19 )   PT: 13.6 sec;   INR: 1.14          PTT - ( 2020 02:19 )  PTT:29.3 sec    Thyroid Stimulating Hormone, Serum: 0.062 uIU/mL ( @ 10:49)  Thyroid Stimulating Hormone, Serum: 0.031 uIU/mL ( @ 15:32)      HbA1C:   CAPILLARY BLOOD GLUCOSE      POCT Blood Glucose.: 231 mg/dL (2020 11:26)  POCT Blood Glucose.: 196 mg/dL (2020 06:22)  POCT Blood Glucose.: 271 mg/dL (2020 22:37)  POCT Blood Glucose.: 313 mg/dL (2020 16:42)  POCT Blood Glucose.: 269 mg/dL (2020 14:39)      Insulin Sliding Scale requirements X 24 Hours:    RADIOLOGY & ADDITIONAL TESTS:    A/P: 64y Male with history of DM type II presenting for       1.  DM -     Please continue           units lantus at bedtime  / in the morning and        units lispro with meals and lispro moderate / low dose sliding scale 4 times daily with meals and at bedtime.  Please continue consistent carbohydrate diet.      Goal FSG is   Will continue to monitor   For discharge, pt can continue    Pt can follow up at discharge with [facility] by calling  to make an appointment.   Will discuss case with     and update primary team INTERVAL HPI/OVERNIGHT EVENTS:    Patient is a 64y old  Male who presents with a chief complaint of CAD (2020 22:55)  Patient seen and examined at the bedside  He c/o some sore throat - no cough or sputum production  He is currently in NSR - was in a-fib - on amiodarone  His appetite is getting better.  drinking glucerna shakes  FSG & Insulin received:  Yesterday:  pre-dinner fs, 5 nutritional lispro   units +  8 units lispro SS, soup  bedtime fs, 26 lantus   units +  6  units lispro SS    Today:  pre-breakfast fs, 3 nutritional lispro   units+ 2   units lispro SS, tea, some eggs, potatoe, sausage ( 25%), 1/2 gluerna shake  pre-lunch fs, 3 nutritional lispro   units+4   units lispro SS, had glucerna shake, 1/4 sandwich       Pt reports the following symptoms:  CONSTITUTIONAL:  Negative fever or chills  CARDIOVASCULAR:  Negative for chest pain or palpitations  RESPIRATORY:  Negative for cough, wheezing, or SOB   GASTROINTESTINAL:  Negative for vomiting, diarrhea, constipation  NEUROLOGIC:  No headache, confusion, dizziness, lightheadedness    MEDICATIONS  (STANDING):  aMIOdarone    Tablet 400 milliGRAM(s) Oral every 8 hours  aMIOdarone    Tablet   Oral   apixaban 5 milliGRAM(s) Oral every 12 hours  aspirin enteric coated 81 milliGRAM(s) Oral daily  atorvastatin 80 milliGRAM(s) Oral at bedtime  chlorhexidine 2% Cloths 1 Application(s) Topical daily  dextrose 5%. 1000 milliLiter(s) (50 mL/Hr) IV Continuous <Continuous>  dextrose 50% Injectable 12.5 Gram(s) IV Push once  dextrose 50% Injectable 25 Gram(s) IV Push once  dextrose 50% Injectable 25 Gram(s) IV Push once  furosemide    Tablet 20 milliGRAM(s) Oral daily  insulin glargine Injectable (LANTUS) 26 Unit(s) SubCutaneous at bedtime  insulin lispro (HumaLOG) corrective regimen sliding scale   SubCutaneous <User Schedule>  insulin lispro (HumaLOG) corrective regimen sliding scale   SubCutaneous three times a day before meals  insulin lispro Injectable (HumaLOG) 3 Unit(s) SubCutaneous three times a day with meals  levothyroxine 175 MICROGram(s) Oral daily  lidocaine   Patch 1 Patch Transdermal every 24 hours  metoprolol tartrate 50 milliGRAM(s) Oral every 8 hours  pantoprazole    Tablet 40 milliGRAM(s) Oral before breakfast  polyethylene glycol 3350 17 Gram(s) Oral daily  potassium chloride    Tablet ER 10 milliEquivalent(s) Oral daily  potassium chloride  20 mEq/100 mL IVPB 20 milliEquivalent(s) IV Intermittent once  sodium chloride 0.9%. 1000 milliLiter(s) (10 mL/Hr) IV Continuous <Continuous>    MEDICATIONS  (PRN):  dextrose 40% Gel 15 Gram(s) Oral once PRN Blood Glucose LESS THAN 70 milliGRAM(s)/deciliter  fentaNYL    Injectable 25 MICROGram(s) IV Push every 3 hours PRN Severe Pain (7 - 10)  glucagon  Injectable 1 milliGRAM(s) IntraMuscular once PRN Glucose LESS THAN 70 milligrams/deciliter  oxycodone    5 mG/acetaminophen 325 mG 2 Tablet(s) Oral every 4 hours PRN Severe Pain (7 - 10)  oxycodone    5 mG/acetaminophen 325 mG 1 Tablet(s) Oral every 6 hours PRN Moderate Pain (4 - 6)      PHYSICAL EXAM  Vital Signs Last 24 Hrs  T(C): 36 (2020 09:03), Max: 37.8 (2020 21:28)  T(F): 96.8 (2020 09:03), Max: 100 (2020 21:28)  HR: 85 (2020 12:00) (74 - 115)  BP: 131/60 (2020 12:00) (106/66 - 152/70)  BP(mean): 86 (2020 12:00) (80 - 100)  RR: 18 (2020 11:00) (14 - 21)  SpO2: 95% (2020 12:00) (93% - 100%)    Constitutional: wn/wd in NAD.   Respiratory: lungs CTAB.  Cardiovascular: regular rhythm, normal S1 and S2, no peripheral edema  GI: soft, NT/ND, no masses/HSM appreciated.  Neurology: no tremors, DTR 2+    LABS:                        8.3    14.35 )-----------( 100      ( 2020 02:19 )             24.6     07-09    130<L>  |  96  |  24<H>  ----------------------------<  188<H>  4.0   |  24  |  0.81    Ca    8.4      2020 02:19  Phos  3.1       Mg     2.3         TPro  5.4<L>  /  Alb  3.3  /  TBili  1.6<H>  /  DBili  x   /  AST  20  /  ALT  10  /  AlkPhos  35<L>      PT/INR - ( 2020 02:19 )   PT: 13.6 sec;   INR: 1.14          PTT - ( 2020 02:19 )  PTT:29.3 sec    Thyroid Stimulating Hormone, Serum: 0.062 uIU/mL ( @ 10:49)  Thyroid Stimulating Hormone, Serum: 0.031 uIU/mL ( @ 15:32)      HbA1C:   CAPILLARY BLOOD GLUCOSE      POCT Blood Glucose.: 231 mg/dL (2020 11:26)  POCT Blood Glucose.: 196 mg/dL (2020 06:22)  POCT Blood Glucose.: 271 mg/dL (2020 22:37)  POCT Blood Glucose.: 313 mg/dL (2020 16:42)  POCT Blood Glucose.: 269 mg/dL (2020 14:39)      Insulin Sliding Scale requirements X 24 Hours:    RADIOLOGY & ADDITIONAL TESTS:    A/P: 64 year old male, non-smoker, with a past medical history of COVID infection (antibody positive),  TIA on Plavix 75mg only (6 years ago with left hand weakness that lasted a few minutes, no recurrence), HTN, HLD, DM, hypothyroidism found to have 3VD on dx cath 2020 and is now s/p CABG 2020 with post op hyperglycemia.     1.  DM - type 2, controlled, complicated  A1C: 5.8%  Weight: 90kg BMI 29  Cr/GRF: 0.95/85  EF: 65%    Please increase lantus to 32      units at night.  Please increase lispro to 7     units before each meal.  Please continue lispro moderate  dose sliding scale four times daily with meals and at bedtime. Please check 3AM FSG and cover with lispro MISS.  Please give glucerna TID.    For discharge, pt might need insulin temporarily. Please have staff RN teach insulin pen injection technique.    Pt's fingerstick glucose goal is 100-180.    2. Hypothyroidism -   Please continue Levothyroxine 175mcg daily  : TSH 0.031, TT4 8.06  : TSH 0.062, FT4 1.67      Will continue to monitor     For discharge, pt can continue TBD    Pt can follow up at discharge with [facility] by calling  to make an appointment.   Will discuss case with Dr. Jane   and update primary team

## 2020-07-09 NOTE — CHART NOTE - NSCHARTNOTEFT_GEN_A_CORE
Exam:  US Chest    Procedure Date:    History: 64y Male whose CXR today shows a possible left sided pleural effusion.  Pt is POD #3 from CABGx3     Findings:                    Evaluation of the right side of the thoracic cavity demonstrates                   small pleural effusion                  Lung is freely movable with in thoracic cavity                    Evaluation of the left side of the thoracic cavity demonstrates                   small pleural effusion                  Lung is freely movable with in thoracic cavity      Impression: Small bilateral pleural effusions < 200cc. Discussed with Dr. Davis, no window amenable for drainage.

## 2020-07-10 LAB
ALBUMIN SERPL ELPH-MCNC: 3.2 G/DL — LOW (ref 3.3–5)
ALP SERPL-CCNC: 44 U/L — SIGNIFICANT CHANGE UP (ref 40–120)
ALT FLD-CCNC: 44 U/L — SIGNIFICANT CHANGE UP (ref 10–45)
ANION GAP SERPL CALC-SCNC: 12 MMOL/L — SIGNIFICANT CHANGE UP (ref 5–17)
APPEARANCE UR: CLEAR — SIGNIFICANT CHANGE UP
APTT BLD: 29.1 SEC — SIGNIFICANT CHANGE UP (ref 27.5–35.5)
AST SERPL-CCNC: 52 U/L — HIGH (ref 10–40)
BILIRUB SERPL-MCNC: 2.4 MG/DL — HIGH (ref 0.2–1.2)
BILIRUB UR-MCNC: NEGATIVE — SIGNIFICANT CHANGE UP
BUN SERPL-MCNC: 28 MG/DL — HIGH (ref 7–23)
C PEPTIDE SERPL-MCNC: 5.1 NG/ML — HIGH (ref 1.1–4.4)
CALCIUM SERPL-MCNC: 8.1 MG/DL — LOW (ref 8.4–10.5)
CHLORIDE SERPL-SCNC: 102 MMOL/L — SIGNIFICANT CHANGE UP (ref 96–108)
CO2 SERPL-SCNC: 24 MMOL/L — SIGNIFICANT CHANGE UP (ref 22–31)
COLOR SPEC: YELLOW — SIGNIFICANT CHANGE UP
CREAT SERPL-MCNC: 0.87 MG/DL — SIGNIFICANT CHANGE UP (ref 0.5–1.3)
DIFF PNL FLD: ABNORMAL
GLUCOSE BLDC GLUCOMTR-MCNC: 135 MG/DL — HIGH (ref 70–99)
GLUCOSE BLDC GLUCOMTR-MCNC: 167 MG/DL — HIGH (ref 70–99)
GLUCOSE BLDC GLUCOMTR-MCNC: 174 MG/DL — HIGH (ref 70–99)
GLUCOSE BLDC GLUCOMTR-MCNC: 203 MG/DL — HIGH (ref 70–99)
GLUCOSE BLDC GLUCOMTR-MCNC: 250 MG/DL — HIGH (ref 70–99)
GLUCOSE SERPL-MCNC: 203 MG/DL — HIGH (ref 70–99)
GLUCOSE UR QL: NEGATIVE — SIGNIFICANT CHANGE UP
HCT VFR BLD CALC: 25.3 % — LOW (ref 39–50)
HGB BLD-MCNC: 8.3 G/DL — LOW (ref 13–17)
INR BLD: 1.33 — HIGH (ref 0.88–1.16)
KETONES UR-MCNC: NEGATIVE — SIGNIFICANT CHANGE UP
LEUKOCYTE ESTERASE UR-ACNC: NEGATIVE — SIGNIFICANT CHANGE UP
MAGNESIUM SERPL-MCNC: 2.1 MG/DL — SIGNIFICANT CHANGE UP (ref 1.6–2.6)
MCHC RBC-ENTMCNC: 29.6 PG — SIGNIFICANT CHANGE UP (ref 27–34)
MCHC RBC-ENTMCNC: 32.8 GM/DL — SIGNIFICANT CHANGE UP (ref 32–36)
MCV RBC AUTO: 90.4 FL — SIGNIFICANT CHANGE UP (ref 80–100)
NITRITE UR-MCNC: NEGATIVE — SIGNIFICANT CHANGE UP
NRBC # BLD: 0 /100 WBCS — SIGNIFICANT CHANGE UP (ref 0–0)
PH UR: 6 — SIGNIFICANT CHANGE UP (ref 5–8)
PHOSPHATE SERPL-MCNC: 2.9 MG/DL — SIGNIFICANT CHANGE UP (ref 2.5–4.5)
PLATELET # BLD AUTO: 145 K/UL — LOW (ref 150–400)
POTASSIUM SERPL-MCNC: 4.8 MMOL/L — SIGNIFICANT CHANGE UP (ref 3.5–5.3)
POTASSIUM SERPL-SCNC: 4.8 MMOL/L — SIGNIFICANT CHANGE UP (ref 3.5–5.3)
PROT SERPL-MCNC: 6 G/DL — SIGNIFICANT CHANGE UP (ref 6–8.3)
PROT UR-MCNC: ABNORMAL MG/DL
PROTHROM AB SERPL-ACNC: 15.7 SEC — HIGH (ref 10.6–13.6)
RBC # BLD: 2.8 M/UL — LOW (ref 4.2–5.8)
RBC # FLD: 13.5 % — SIGNIFICANT CHANGE UP (ref 10.3–14.5)
SODIUM SERPL-SCNC: 138 MMOL/L — SIGNIFICANT CHANGE UP (ref 135–145)
SP GR SPEC: 1.01 — SIGNIFICANT CHANGE UP (ref 1–1.03)
UROBILINOGEN FLD QL: 0.2 E.U./DL — SIGNIFICANT CHANGE UP
WBC # BLD: 11.68 K/UL — HIGH (ref 3.8–10.5)
WBC # FLD AUTO: 11.68 K/UL — HIGH (ref 3.8–10.5)

## 2020-07-10 PROCEDURE — 99291 CRITICAL CARE FIRST HOUR: CPT

## 2020-07-10 PROCEDURE — 71045 X-RAY EXAM CHEST 1 VIEW: CPT | Mod: 26

## 2020-07-10 PROCEDURE — 99232 SBSQ HOSP IP/OBS MODERATE 35: CPT | Mod: GC

## 2020-07-10 PROCEDURE — 71045 X-RAY EXAM CHEST 1 VIEW: CPT | Mod: 26,77

## 2020-07-10 RX ORDER — LEVALBUTEROL 1.25 MG/.5ML
0.63 SOLUTION, CONCENTRATE RESPIRATORY (INHALATION) EVERY 6 HOURS
Refills: 0 | Status: DISCONTINUED | OUTPATIENT
Start: 2020-07-10 | End: 2020-07-12

## 2020-07-10 RX ORDER — INSULIN LISPRO 100/ML
10 VIAL (ML) SUBCUTANEOUS
Refills: 0 | Status: DISCONTINUED | OUTPATIENT
Start: 2020-07-10 | End: 2020-07-11

## 2020-07-10 RX ORDER — SODIUM CHLORIDE 9 MG/ML
3 INJECTION INTRAMUSCULAR; INTRAVENOUS; SUBCUTANEOUS EVERY 8 HOURS
Refills: 0 | Status: DISCONTINUED | OUTPATIENT
Start: 2020-07-10 | End: 2020-07-12

## 2020-07-10 RX ORDER — INSULIN GLARGINE 100 [IU]/ML
36 INJECTION, SOLUTION SUBCUTANEOUS AT BEDTIME
Refills: 0 | Status: DISCONTINUED | OUTPATIENT
Start: 2020-07-10 | End: 2020-07-12

## 2020-07-10 RX ORDER — FUROSEMIDE 40 MG
20 TABLET ORAL ONCE
Refills: 0 | Status: COMPLETED | OUTPATIENT
Start: 2020-07-10 | End: 2020-07-10

## 2020-07-10 RX ADMIN — AMIODARONE HYDROCHLORIDE 400 MILLIGRAM(S): 400 TABLET ORAL at 06:15

## 2020-07-10 RX ADMIN — Medication 7 UNIT(S): at 11:01

## 2020-07-10 RX ADMIN — Medication 10 UNIT(S): at 17:49

## 2020-07-10 RX ADMIN — Medication 325 MILLIGRAM(S): at 10:59

## 2020-07-10 RX ADMIN — Medication 175 MICROGRAM(S): at 06:16

## 2020-07-10 RX ADMIN — Medication 10 MILLIEQUIVALENT(S): at 10:57

## 2020-07-10 RX ADMIN — Medication 7 UNIT(S): at 07:42

## 2020-07-10 RX ADMIN — Medication 50 MILLIGRAM(S): at 17:48

## 2020-07-10 RX ADMIN — Medication 20 MILLIGRAM(S): at 06:14

## 2020-07-10 RX ADMIN — Medication 20 MILLIGRAM(S): at 20:15

## 2020-07-10 RX ADMIN — Medication 50 MILLIGRAM(S): at 10:59

## 2020-07-10 RX ADMIN — Medication 50 MILLIGRAM(S): at 06:14

## 2020-07-10 RX ADMIN — SODIUM CHLORIDE 3 MILLILITER(S): 9 INJECTION INTRAMUSCULAR; INTRAVENOUS; SUBCUTANEOUS at 14:03

## 2020-07-10 RX ADMIN — SODIUM CHLORIDE 3 MILLILITER(S): 9 INJECTION INTRAMUSCULAR; INTRAVENOUS; SUBCUTANEOUS at 21:28

## 2020-07-10 RX ADMIN — APIXABAN 5 MILLIGRAM(S): 2.5 TABLET, FILM COATED ORAL at 21:34

## 2020-07-10 RX ADMIN — AMIODARONE HYDROCHLORIDE 400 MILLIGRAM(S): 400 TABLET ORAL at 14:21

## 2020-07-10 RX ADMIN — Medication 4: at 11:00

## 2020-07-10 RX ADMIN — INSULIN GLARGINE 36 UNIT(S): 100 INJECTION, SOLUTION SUBCUTANEOUS at 21:34

## 2020-07-10 RX ADMIN — AMIODARONE HYDROCHLORIDE 400 MILLIGRAM(S): 400 TABLET ORAL at 21:34

## 2020-07-10 RX ADMIN — LEVALBUTEROL 0.63 MILLIGRAM(S): 1.25 SOLUTION, CONCENTRATE RESPIRATORY (INHALATION) at 21:35

## 2020-07-10 RX ADMIN — Medication 2: at 17:48

## 2020-07-10 RX ADMIN — Medication 2: at 07:42

## 2020-07-10 RX ADMIN — Medication 20 MILLIGRAM(S): at 11:26

## 2020-07-10 RX ADMIN — APIXABAN 5 MILLIGRAM(S): 2.5 TABLET, FILM COATED ORAL at 10:12

## 2020-07-10 RX ADMIN — Medication 4: at 04:12

## 2020-07-10 NOTE — PROGRESS NOTE ADULT - SUBJECTIVE AND OBJECTIVE BOX
INTERVAL HPI/OVERNIGHT EVENTS:      Patient is a 64y old  Male who presents with a chief complaint of CAD (2020 22:55)  Patient seen and examined at the bedside. Plan for discharge tomorrow.   His appetite is getting better.  drinking glucerna shakes  We reviewed insulin pen injection technique.    FSG & Insulin received:  Yesterday:  pre-dinner fs, 7 nutritional lispro   units + 4 units lispro SS, soup and roll  bedtime fs, 32 lantus   units    Today:  4Am . Lispro 4.  pre-breakfast fs, 7 nutritional lispro   units+ 2   units lispro SS, toast and 1 glucerna shake  pre-lunch fs, 7 nutritional lispro   units+4   units lispro SS, had 1/2 glucerna shake, potato and pudding     Pt reports the following symptoms:  CONSTITUTIONAL:  Negative fever or chills  CARDIOVASCULAR:  Negative for chest pain or palpitations  RESPIRATORY:  Negative for cough, wheezing, or SOB   GASTROINTESTINAL:  Negative for vomiting, diarrhea, constipation  NEUROLOGIC:  No headache, confusion, dizziness, lightheadedness      MEDICATIONS  (STANDING):  aMIOdarone    Tablet 400 milliGRAM(s) Oral every 8 hours  aMIOdarone    Tablet   Oral   apixaban 5 milliGRAM(s) Oral every 12 hours  aspirin enteric coated 325 milliGRAM(s) Oral daily  atorvastatin 80 milliGRAM(s) Oral at bedtime  dextrose 50% Injectable 12.5 Gram(s) IV Push once  dextrose 50% Injectable 25 Gram(s) IV Push once  dextrose 50% Injectable 25 Gram(s) IV Push once  insulin glargine Injectable (LANTUS) 36 Unit(s) SubCutaneous at bedtime  insulin lispro (HumaLOG) corrective regimen sliding scale   SubCutaneous <User Schedule>  insulin lispro (HumaLOG) corrective regimen sliding scale   SubCutaneous three times a day before meals  insulin lispro Injectable (HumaLOG) 10 Unit(s) SubCutaneous three times a day before meals  levothyroxine 175 MICROGram(s) Oral daily  lidocaine   Patch 1 Patch Transdermal every 24 hours  metoprolol tartrate 50 milliGRAM(s) Oral every 6 hours  pantoprazole    Tablet 40 milliGRAM(s) Oral before breakfast  polyethylene glycol 3350 17 Gram(s) Oral daily  sodium chloride 0.9% lock flush 3 milliLiter(s) IV Push every 8 hours    MEDICATIONS  (PRN):  dextrose 40% Gel 15 Gram(s) Oral once PRN Blood Glucose LESS THAN 70 milliGRAM(s)/deciliter  glucagon  Injectable 1 milliGRAM(s) IntraMuscular once PRN Glucose LESS THAN 70 milligrams/deciliter  oxycodone    5 mG/acetaminophen 325 mG 2 Tablet(s) Oral every 4 hours PRN Severe Pain (7 - 10)  oxycodone    5 mG/acetaminophen 325 mG 1 Tablet(s) Oral every 6 hours PRN Moderate Pain (4 - 6)      PHYSICAL EXAM  Vital Signs Last 24 Hrs  T(C): 36.8 (10 Jul 2020 17:26), Max: 37 (10 Jul 2020 13:49)  T(F): 98.2 (10 Jul 2020 17:26), Max: 98.6 (10 Jul 2020 13:49)  HR: 68 (10 Jul 2020 15:45) (65 - 80)  BP: 121/80 (10 Jul 2020 15:45) (114/72 - 140/69)  BP(mean): 95 (10 Jul 2020 15:45) (85 - 99)  RR: 20 (10 Jul 2020 15:45) (16 - 24)  SpO2: 93% (10 Jul 2020 15:45) (92% - 97%)    Constitutional: wn/wd in NAD.   HEENT: NCAT, MMM, OP clear, EOMI, no proptosis or lid retraction  Neck: no thyromegaly or palpable thyroid nodules   Respiratory: lungs CTAB.  Cardiovascular: regular rhythm, normal S1 and S2, no audible murmurs, no peripheral edema  GI: soft, NT/ND, no masses/HSM appreciated.  Neurology: no tremors, DTR 2+  Skin: no visible rashes/lesions. no acanthosis nigricans. no lipohypertrophy. no cushing's stigmata.  Psychiatric: AAO x 3, normal affect/mood.    LABS:                        8.3    11.68 )-----------( 145      ( 10 Jul 2020 03:44 )             25.3     07-10    138  |  102  |  28<H>  ----------------------------<  203<H>  4.8   |  24  |  0.87    Ca    8.1<L>      10 Jul 2020 03:44  Phos  2.9     07-10  Mg     2.1     07-10    TPro  6.0  /  Alb  3.2<L>  /  TBili  2.4<H>  /  DBili  x   /  AST  52<H>  /  ALT  44  /  AlkPhos  44  07-10    PT/INR - ( 10 Jul 2020 03:44 )   PT: 15.7 sec;   INR: 1.33          PTT - ( 10 Jul 2020 03:44 )  PTT:29.1 sec    Thyroid Stimulating Hormone, Serum: 0.062 uIU/mL ( @ 10:49)  Thyroid Stimulating Hormone, Serum: 0.031 uIU/mL ( @ 15:32)      HbA1C:   CAPILLARY BLOOD GLUCOSE      POCT Blood Glucose.: 174 mg/dL (10 Jul 2020 17:04)  POCT Blood Glucose.: 250 mg/dL (10 Jul 2020 10:48)  POCT Blood Glucose.: 167 mg/dL (10 Jul 2020 07:35)  POCT Blood Glucose.: 203 mg/dL (10 Jul 2020 04:04)  POCT Blood Glucose.: 214 mg/dL (2020 22:26)    A/P: 64 year old male, non-smoker, with a past medical history of COVID infection (antibody positive),  TIA on Plavix 75mg only (6 years ago with left hand weakness that lasted a few minutes, no recurrence), HTN, HLD, DM, hypothyroidism found to have 3VD on dx cath 2020 and is now s/p CABG 2020 with post op hyperglycemia.     1.  DM - type 2, controlled, complicated  A1C: 5.8%  Weight: 90kg BMI 29  Cr/GRF: 0.95/85  EF: 65%    Please increase lantus to 36    units at night.  Please increase lispro to 10     units before each meal.  Please continue lispro moderate  dose sliding scale four times daily with meals and at bedtime. Please check 3AM FSG and cover with lispro MISS.  Please give glucerna TID.    Pt's fingerstick glucose goal is 100-180.    2. Hypothyroidism -   Please continue Levothyroxine 175mcg daily  : TSH 0.031, TT4 8.06  8: TSH 0.062, FT4 1.67    Will continue to monitor     For discharge, basal/bolus dosage TBD. Metformin 500mg BID. Please have staff RN teach insulin pen injection technique.  Levothyroxine 175mcg. Will need repeat TFTs in 4-5 weeks as outpt.  Pt prefers to f/u with PCP by house or will find new doctor closer to him. Gave him my contact information if he had any issues.     Pt can follow up at discharge with [facility] by calling  to make an appointment.   Will discuss case with Dr. Jane   and update primary team

## 2020-07-10 NOTE — PROGRESS NOTE ADULT - SUBJECTIVE AND OBJECTIVE BOX
63 yo M, non-smoker, with a past medical history of COVID infection (antibody positive), with a past medical history of TIA on Plavix 75mg only (6 years ago with left hand weakness that lasted a few minutes, no recurrence), HTN, HLD, DM, hypothyroidism, presented to Dr. Francis Muñiz with c/o chest pain associated with dyspnea on exertion ambulating up 1-2 flights of stairs. Symptoms began in early May 2020 and resolve on own after resting and breathing.  Subsequent NST 6/13/20:  Anterior, septal and lateral wall ischemia identified.  Diaphragmatic.  EF 66%. Patient is now s/p dx cardiac cath on 6/30/20 revealing 3V Coronary Artery Disease:    POD # 4 - CABG x3    Vital Signs Last 24 Hrs  T(C): 36.3 (10 Jul 2020 05:01), Max: 36.7 (09 Jul 2020 17:57)  T(F): 97.4 (10 Jul 2020 05:01), Max: 98 (09 Jul 2020 17:57)  HR: 68 (10 Jul 2020 11:00) (65 - 89)  BP: 130/72 (10 Jul 2020 10:00) (102/55 - 145/68)  BP(mean): 90 (10 Jul 2020 10:00) (73 - 99)  RR: 19 (10 Jul 2020 11:00) (16 - 20)  SpO2: 96% (10 Jul 2020 11:00) (92% - 97%)    alert, awake, verbal  follows commands  S1S2 reg rate  b/l air entry, no wheeze  clean sternal incision  Soft abdomen, non distended  no pedal edema, moves all 4 ext    WBC 11.7, hg 8.3, plt 145  INR 1.3  BUN 27, Cr 0.9    CXR - bibasilar congestion with b/l pleural effusions  Lactate, Blood: 1.4 mmol/L (07-09-20 @ 13:41)    a/p:    CAD s/p CABG  Acute post thoracotomy pain    PO ASA, statin and beta blocker once feasible  daily lasix to keep negative for now  wean off NC as tolerated  DVT and GI proph  glycemic control  Pain control PRN          50 minutes of critical care time spent providing medical care for patient's acute illness/conditions that impairs at least one vital organ system and/or poses a high risk of imminent or life threatening deterioration in the patient's condition. It includes time spent evaluating and treating the patient's acute illness as well as time spent reviewing labs, radiology, discussing goals of care with patient and/or patient's family, and discussing the case with a multidisciplinary team in an effort to prevent further life threatening deterioration or end organ damage. This time is independent of any procedures performed.

## 2020-07-10 NOTE — PROGRESS NOTE ADULT - ASSESSMENT
This is a 63 y/o M with a PMHx of     Plan:    Neurovascular:   -Pain well controlled with current regimen. PRN's:    Cardiovascular:   -Hemodynamically stable.   -Monitor: BP, HR, tele    Respiratory:   -Oxygenating well on room air  -Encourage continued use of IS 10x/hr and frequent ambulation  -CXR:    GI:  -GI PPX:  -PO Diet  -Bowel Regimen:     Renal / :  -Continue to monitor renal function: BUN/Cr  -Monitor I/O's daily     Endocrine:    -No hx of DM or thyroid dx  -A1c:  -TSH:    Hematologic:  -CBC: H/H-  -Coagulation Panel.    ID:  -Temperature:   -CBC: WBC-  -Continue to observe for SIRS/Sepsis Syndrome.    Prophylaxis:  -DVT prophylaxis with 5000 SubQ Heparin q8h.  -Continue with SCD's b/l while patient is at rest     Disposition:  -Discharge home once patient is medically ready This is a 65 y/o M with a PMHx of COVID infection (antibody positive), TA (on plavix 75mg only, 9 y/o with left hand weakness that resolved in minutes, no occurrences), HTN, HLD, DM, hypothyroidisim who presented to Dr. Francis Muñiz with complaints of chest pain associated with LINO starting in May 2020. Patient underwent NST on 6/13/20 which revealed anterior, septal and lateral wall ischemia, EF 66%. Cardiac cath on 6/30/20 showed 3V CAD. Patient referred to Dr. Conde for surgical evaluation and deemed a good surgical candidate. Patient underwent uncomplicated CABGx3 (LIMA-LAD, SVG-OM, SVG-PDA) EF 65%. Arrived to the CTICU on no gtts. Patient briefly required Tavon to keep MAP>80 and also required x4 units pRBCs due to anemia. Pt extubated later in the day on POD#0. On POD#1 pt weaned from Tavon and started on Lopresser, then diuresed. POD#2, pt noted to be in AF and received x3 amio boluses and started PO amio load. BB increased. Patient enrolled in PACES clinical trial for POAF>60minutes. Patient coverted to NSR after amio bolus and has remained in NSR. On POD#3 pt started on eliquis and BB increased. On POD #4 pt remained stable and was cleared for transfer to stepdown unit (9L) and remains stable at this time.     Plan:  Neurovascular:   -Pain well controlled with current regimen. PRN's: percocet 5/325 Q6 hours, Tylenol, lidocaine patch   -hx of TIA (self resolving hand numbness 6 years ago)     Cardiovascular:   -Hemodynamically stable.   -Monitor: BP, HR, tele  -POD#4 from CABGx3 (lima-lad, svg-om, svg-pda), EF 65%     -c/w BB, titrate as tolerated     -c/w  daily     -c/w Atorvastatin 80mg daily   -Atrial Fibrillation: c/w amio load, BB, Eliquis 5mg Q12 hours     Respiratory:   -Oxygenating well on 2lpm of O2 while sitting, requiring 6lpm with ambulation  -Encourage continued use of IS 10x/hr and frequent ambulation  -CXR: no acute pathology, no PTX   -Hx of COVID infection, +antibodies     GI:  -GI PPX: pantoprazole 40mg daily   -PO Diet: Consistent carbs, no snacks   -Bowel Regimen: miralax     Renal / :  -Continue to monitor renal function: BUN/Cr 28/0.87  -Monitor I/O's daily   -Patient x1 dose of lasix this morning, no more lasix at this time per Dr. Conde     Endocrine:    -hypothyroidism     -TSH: 0.062, Free Thyroxine: 1.67 (elevated)    -c/w levothyroxine 175mg daily   -DM type II:     -c/w lantus 32 units at bedtime, RISS, Humalog 7 units      -A1c: 5.8 -- glucose readings anywhere from 190 to high 200s.  -Endocrinology is following the patient, pt may need to go home with insulin temporarily due to high glucose readings -- instructed RN staff to start teaching pt today. Pending recs from today for hypothyroidism management.     Hematologic:  -CBC: H/H- 8.3/25.3   -Coagulation Panel.    ID:  -Temperature: afebrile  -CBC: WBC- 11.6   -Continue to observe for SIRS/Sepsis Syndrome.    Prophylaxis:  -DVT prophylaxis with 5000 SubQ Heparin q8h.  -Continue with SCD's b/l while patient is at rest     Disposition:  -Discharge home once patient is medically ready-- possible tomorrow This is a 63 y/o M with a PMHx of COVID infection (antibody positive), TA (on plavix 75mg only, 9 y/o with left hand weakness that resolved in minutes, no occurrences), HTN, HLD, DM, hypothyroidisim who presented to Dr. Francis Muñiz with complaints of chest pain associated with LINO starting in May 2020. Patient underwent NST on 6/13/20 which revealed anterior, septal and lateral wall ischemia, EF 66%. Cardiac cath on 6/30/20 showed 3V CAD. Patient referred to Dr. Conde for surgical evaluation and deemed a good surgical candidate. Patient underwent uncomplicated CABGx3 (LIMA-LAD, SVG-OM, SVG-PDA) EF 65%. Arrived to the CTICU on no gtts. Patient briefly required Tavon to keep MAP>80 and also required x4 units pRBCs due to anemia. Pt extubated later in the day on POD#0. On POD#1 pt weaned from Tavon and started on Lopresser, then diuresed. POD#2, pt noted to be in AF and received x3 amio boluses and started PO amio load. BB increased. Patient enrolled in PACES clinical trial for POAF>60minutes. Patient coverted to NSR after amio bolus and has remained in NSR. On POD#3 pt started on eliquis and BB increased. On POD #4 pt remained stable and was cleared for transfer to stepdown unit (9L) and remains stable at this time.     Plan:  Neurovascular:   -Pain well controlled with current regimen. PRN's: percocet 5/325 Q6 hours, Tylenol, lidocaine patch   -hx of TIA (self resolving hand numbness 6 years ago)     Cardiovascular:   -Hemodynamically stable.   -Monitor: BP, HR, tele  -POD#4 from CABGx3 (lima-lad, svg-om, svg-pda), EF 65%     -c/w BB, titrate as tolerated     -c/w  daily     -c/w Atorvastatin 80mg daily   -Atrial Fibrillation: c/w amio load, BB, Eliquis 5mg Q12 hours     Respiratory:   -Oxygenating well on 2lpm of O2 while sitting, requiring 6lpm with ambulation - push pulmonary status, ambulate frequently, IS use --wean from O2.   -Encourage continued use of IS 10x/hr and frequent ambulation  -CXR: no acute pathology, no PTX   -Hx of COVID infection, +antibodies     GI:  -GI PPX: pantoprazole 40mg daily   -PO Diet: Consistent carbs, no snacks   -Bowel Regimen: miralax     Renal / :  -Continue to monitor renal function: BUN/Cr 28/0.87  -Monitor I/O's daily   -Patient x1 dose of lasix this morning, no more lasix at this time per Dr. Conde     Endocrine:    -hypothyroidism     -TSH: 0.062, Free Thyroxine: 1.67 (elevated)    -c/w levothyroxine 175mg daily   -DM type II:     -c/w lantus 32 units at bedtime, RISS, Humalog 7 units      -A1c: 5.8 -- glucose readings anywhere from 190 to high 200s.  -Endocrinology is following the patient, pt may need to go home with insulin temporarily due to high glucose readings -- instructed RN staff to start teaching pt today. Pending recs from today for hypothyroidism management.     Hematologic:  -CBC: H/H- 8.3/25.3   -Coagulation Panel.    ID:  -Temperature: afebrile  -CBC: WBC- 11.6   -Continue to observe for SIRS/Sepsis Syndrome.    Prophylaxis:  -DVT prophylaxis with 5000 SubQ Heparin q8h.  -Continue with SCD's b/l while patient is at rest     Disposition:  -Discharge home once patient is medically ready-- possible tomorrow if off O2 This is a 63 y/o M with a PMHx of COVID infection (antibody positive), TA (on plavix 75mg only, 9 y/o with left hand weakness that resolved in minutes, no occurrences), HTN, HLD, DM, hypothyroidisim who presented to Dr. Francis Muñiz with complaints of chest pain associated with LINO starting in May 2020. Patient underwent NST on 6/13/20 which revealed anterior, septal and lateral wall ischemia, EF 66%. Cardiac cath on 6/30/20 showed 3V CAD. Patient referred to Dr. Conde for surgical evaluation and deemed a good surgical candidate. Patient underwent uncomplicated CABGx3 (LIMA-LAD, SVG-OM, SVG-PDA) EF 65%. Arrived to the CTICU on no gtts. Patient briefly required Tavon to keep MAP>80 and also required x4 units pRBCs due to anemia. Pt extubated later in the day on POD#0. On POD#1 pt weaned from Tavon and started on Lopresser, then diuresed. POD#2, pt noted to be in AF and received x3 amio boluses and started PO amio load. BB increased. Patient enrolled in PACES clinical trial for POAF>60minutes. Patient coverted to NSR after amio bolus and has remained in NSR. On POD#3 pt started on eliquis and BB increased. On POD #4 pt remained stable and was cleared for transfer to stepdown unit (9L) and remains stable at this time.     Plan:  Neurovascular:   -Pain well controlled with current regimen. PRN's: percocet 5/325 Q6 hours, Tylenol, lidocaine patch   -hx of TIA (self resolving hand numbness 6 years ago)     Cardiovascular:   -Hemodynamically stable.   -Monitor: BP, HR, tele  -POD#4 from CABGx3 (lima-lad, svg-om, svg-pda), EF 65%     -c/w BB, titrate as tolerated     -c/w  daily     -c/w Atorvastatin 80mg daily   -Atrial Fibrillation: c/w amio load, BB, Eliquis 5mg Q12 hours     Respiratory:   -Oxygenating well on 2lpm of O2 while sitting, requiring 6lpm with ambulation - push pulmonary status, ambulate frequently, IS use --wean from O2.   -Encourage continued use of IS 10x/hr and frequent ambulation  -CXR: no acute pathology, no PTX   -Hx of COVID infection, +antibodies     GI:  -GI PPX: pantoprazole 40mg daily   -PO Diet: Consistent carbs, no snacks   -Bowel Regimen: miralax     Renal / :  -Continue to monitor renal function: BUN/Cr 28/0.87  -Monitor I/O's daily   -Patient x1 dose of lasix this morning, no more lasix at this time per Dr. Conde   -x1 episode of hematuria yesterday afternoon with blood at the meatus, no pain, no urinary sx. UA sent, f/u results. Pt voided later and urine was normal. Monitor. If happens again let Dr. Conde know and call urology team.     Endocrine:    -hypothyroidism     -TSH: 0.062, Free Thyroxine: 1.67 (elevated)    -c/w levothyroxine 175mg daily   -DM type II:     -c/w lantus 32 units at bedtime, RISS, Humalog 7 units      -A1c: 5.8 -- glucose readings anywhere from 190 to high 200s.  -Endocrinology is following the patient, pt may need to go home with insulin temporarily due to high glucose readings -- instructed RN staff to start teaching pt today. Pending recs from today for hypothyroidism management.     Hematologic:  -CBC: H/H- 8.3/25.3   -Coagulation Panel.    ID:  -Temperature: afebrile  -CBC: WBC- 11.6   -Continue to observe for SIRS/Sepsis Syndrome.    Prophylaxis:  -DVT prophylaxis with 5000 SubQ Heparin q8h.  -Continue with SCD's b/l while patient is at rest     Disposition:  -Discharge home once patient is medically ready-- possible tomorrow if off O2

## 2020-07-10 NOTE — PROGRESS NOTE ADULT - SUBJECTIVE AND OBJECTIVE BOX
INTERVAL HPI/OVERNIGHT EVENTS:    Patient is a 64y old  Male who presents with a chief complaint of CAD (10 Jul 2020 12:52)      Pt reports the following symptoms:    CONSTITUTIONAL:  Negative fever or chills, feels well, good appetite  EYES:  Negative  blurry vision or double vision  CARDIOVASCULAR:  Negative for chest pain or palpitations  RESPIRATORY:  Negative for cough, wheezing, or SOB   GASTROINTESTINAL:  Negative for nausea, vomiting, diarrhea, constipation, or abdominal pain  GENITOURINARY:  Negative frequency, urgency or dysuria  NEUROLOGIC:  No headache, confusion, dizziness, lightheadedness    MEDICATIONS  (STANDING):  aMIOdarone    Tablet 400 milliGRAM(s) Oral every 8 hours  aMIOdarone    Tablet   Oral   apixaban 5 milliGRAM(s) Oral every 12 hours  aspirin enteric coated 325 milliGRAM(s) Oral daily  atorvastatin 80 milliGRAM(s) Oral at bedtime  dextrose 50% Injectable 12.5 Gram(s) IV Push once  dextrose 50% Injectable 25 Gram(s) IV Push once  dextrose 50% Injectable 25 Gram(s) IV Push once  insulin glargine Injectable (LANTUS) 32 Unit(s) SubCutaneous at bedtime  insulin lispro (HumaLOG) corrective regimen sliding scale   SubCutaneous <User Schedule>  insulin lispro (HumaLOG) corrective regimen sliding scale   SubCutaneous three times a day before meals  insulin lispro Injectable (HumaLOG) 7 Unit(s) SubCutaneous three times a day with meals  levothyroxine 175 MICROGram(s) Oral daily  lidocaine   Patch 1 Patch Transdermal every 24 hours  metoprolol tartrate 50 milliGRAM(s) Oral every 6 hours  pantoprazole    Tablet 40 milliGRAM(s) Oral before breakfast  polyethylene glycol 3350 17 Gram(s) Oral daily  sodium chloride 0.9% lock flush 3 milliLiter(s) IV Push every 8 hours    MEDICATIONS  (PRN):  dextrose 40% Gel 15 Gram(s) Oral once PRN Blood Glucose LESS THAN 70 milliGRAM(s)/deciliter  glucagon  Injectable 1 milliGRAM(s) IntraMuscular once PRN Glucose LESS THAN 70 milligrams/deciliter  oxycodone    5 mG/acetaminophen 325 mG 2 Tablet(s) Oral every 4 hours PRN Severe Pain (7 - 10)  oxycodone    5 mG/acetaminophen 325 mG 1 Tablet(s) Oral every 6 hours PRN Moderate Pain (4 - 6)      PHYSICAL EXAM  Vital Signs Last 24 Hrs  T(C): 36.6 (10 Jul 2020 09:00), Max: 36.7 (09 Jul 2020 17:57)  T(F): 97.9 (10 Jul 2020 09:00), Max: 98 (09 Jul 2020 17:57)  HR: 68 (10 Jul 2020 12:25) (65 - 88)  BP: 135/63 (10 Jul 2020 12:25) (102/55 - 145/68)  BP(mean): 91 (10 Jul 2020 12:25) (73 - 99)  RR: 20 (10 Jul 2020 12:25) (16 - 20)  SpO2: 95% (10 Jul 2020 12:25) (92% - 97%)    Constitutional: wn/wd in NAD.   HEENT: NCAT, MMM, OP clear, EOMI, no proptosis or lid retraction  Neck: no thyromegaly or palpable thyroid nodules   Respiratory: lungs CTAB.  Cardiovascular: regular rhythm, normal S1 and S2, no audible murmurs, no peripheral edema  GI: soft, NT/ND, no masses/HSM appreciated.  Neurology: no tremors, DTR 2+  Skin: no visible rashes/lesions  Psychiatric: AAO x 3, normal affect/mood.    LABS:                        8.3    11.68 )-----------( 145      ( 10 Jul 2020 03:44 )             25.3     07-10    138  |  102  |  28<H>  ----------------------------<  203<H>  4.8   |  24  |  0.87    Ca    8.1<L>      10 Jul 2020 03:44  Phos  2.9     07-10  Mg     2.1     07-10    TPro  6.0  /  Alb  3.2<L>  /  TBili  2.4<H>  /  DBili  x   /  AST  52<H>  /  ALT  44  /  AlkPhos  44  07-10    PT/INR - ( 10 Jul 2020 03:44 )   PT: 15.7 sec;   INR: 1.33          PTT - ( 10 Jul 2020 03:44 )  PTT:29.1 sec    Thyroid Stimulating Hormone, Serum: 0.062 uIU/mL (07-08 @ 10:49)  Thyroid Stimulating Hormone, Serum: 0.031 uIU/mL (06-30 @ 15:32)      HbA1C:   CAPILLARY BLOOD GLUCOSE      POCT Blood Glucose.: 250 mg/dL (10 Jul 2020 10:48)  POCT Blood Glucose.: 167 mg/dL (10 Jul 2020 07:35)  POCT Blood Glucose.: 203 mg/dL (10 Jul 2020 04:04)  POCT Blood Glucose.: 214 mg/dL (09 Jul 2020 22:26)  POCT Blood Glucose.: 217 mg/dL (09 Jul 2020 16:19)      Insulin Sliding Scale requirements X 24 Hours:    RADIOLOGY & ADDITIONAL TESTS:    A/P: 64y Male with history of DM type II presenting for       1.  DM -     Please continue           units lantus at bedtime  / in the morning and        units lispro with meals and lispro moderate / low dose sliding scale 4 times daily with meals and at bedtime.  Please continue consistent carbohydrate diet.      Goal FSG is   Will continue to monitor   For discharge, pt can continue    Pt can follow up at discharge with [facility] by calling  to make an appointment.   Will discuss case with     and update primary team

## 2020-07-10 NOTE — PROGRESS NOTE ADULT - SUBJECTIVE AND OBJECTIVE BOX
Patient discussed on morning rounds with Dr. Conde     Operation / Date: 7/6/20 - CABGx3 (LIMA-LAD, SVG-OM, SVG-PDA), EF 65%     SUBJECTIVE ASSESSMENT:  Patient is feeling well since being transferred out of  today. Complains he has pain on his RLE near his SVG site but states that it is improving and hurt less than yesterday. Eating/drinking better today than previous days. Moving his bowels since surgery regularly. Denies any CP, palpitations, SOB, wheezing, abd pain, n/v/d/c, fevers or chills.     Vital Signs Last 24 Hrs  T(C): 36.6 (10 Jul 2020 09:00), Max: 36.7 (09 Jul 2020 17:57)  T(F): 97.9 (10 Jul 2020 09:00), Max: 98 (09 Jul 2020 17:57)  HR: 68 (10 Jul 2020 12:25) (65 - 89)  BP: 135/63 (10 Jul 2020 12:25) (102/55 - 145/68)  BP(mean): 91 (10 Jul 2020 12:25) (73 - 99)  RR: 20 (10 Jul 2020 12:25) (16 - 20)  SpO2: 95% (10 Jul 2020 12:25) (92% - 97%)  I&O's Detail    09 Jul 2020 07:01  -  10 Jul 2020 07:00  --------------------------------------------------------  IN:    Oral Fluid: 200 mL    sodium chloride 0.9%: 10 mL  Total IN: 210 mL    OUT:    Voided: 1475 mL  Total OUT: 1475 mL    Total NET: -1265 mL      CHEST TUBE:  No.  JOEL DRAIN:  No.  EPICARDIAL WIRES: No.  TIE DOWNS: No.  CHACKO: No.      PHYSICAL EXAM:  General: sitting in chair in NAD   Neurological: AOx3. Motor skills grossly intact  Cardiovascular: Normal S1/S2. Regular rate/rhythm. No murmurs  Respiratory: Lungs CTA bilaterally. No wheezing or rales  Gastrointestinal: +BS in all 4 quadrants. Non-distended. Soft. Non-tender  Extremities: Strength 5/5 b/l upper/lower extremities. Sensation grossly intact upper/lower extremities. No edema. No calf tenderness.  Vascular: Radial 2+bilaterally, DP 2+ b/l  Incision Sites: sternotomy healing well no erythema, purulence or ecchymosis. Right lower extremity SVG without erythema, mild ecchymosis.       LABS:                        8.3    11.68 )-----------( 145      ( 10 Jul 2020 03:44 )             25.3       COUMADIN:  No.    PT/INR - ( 10 Jul 2020 03:44 )   PT: 15.7 sec;   INR: 1.33          PTT - ( 10 Jul 2020 03:44 )  PTT:29.1 sec    07-10    138  |  102  |  28<H>  ----------------------------<  203<H>  4.8   |  24  |  0.87    Ca    8.1<L>      10 Jul 2020 03:44  Phos  2.9     07-10  Mg     2.1     07-10    TPro  6.0  /  Alb  3.2<L>  /  TBili  2.4<H>  /  DBili  x   /  AST  52<H>  /  ALT  44  /  AlkPhos  44  07-10          MEDICATIONS  (STANDING):  aMIOdarone    Tablet 400 milliGRAM(s) Oral every 8 hours  aMIOdarone    Tablet   Oral   apixaban 5 milliGRAM(s) Oral every 12 hours  aspirin enteric coated 325 milliGRAM(s) Oral daily  atorvastatin 80 milliGRAM(s) Oral at bedtime  furosemide    Tablet 20 milliGRAM(s) Oral daily  insulin glargine Injectable (LANTUS) 32 Unit(s) SubCutaneous at bedtime  insulin lispro (HumaLOG) corrective regimen sliding scale   SubCutaneous <User Schedule>  insulin lispro (HumaLOG) corrective regimen sliding scale   SubCutaneous three times a day before meals  insulin lispro Injectable (HumaLOG) 7 Unit(s) SubCutaneous three times a day with meals  levothyroxine 175 MICROGram(s) Oral daily  lidocaine   Patch 1 Patch Transdermal every 24 hours  metoprolol tartrate 50 milliGRAM(s) Oral every 6 hours  pantoprazole    Tablet 40 milliGRAM(s) Oral before breakfast  polyethylene glycol 3350 17 Gram(s) Oral daily  potassium chloride    Tablet ER 10 milliEquivalent(s) Oral daily  potassium chloride  20 mEq/100 mL IVPB 20 milliEquivalent(s) IV Intermittent once  sodium chloride 0.9% lock flush 3 milliLiter(s) IV Push every 8 hours    MEDICATIONS  (PRN):  dextrose 40% Gel 15 Gram(s) Oral once PRN Blood Glucose LESS THAN 70 milliGRAM(s)/deciliter  glucagon  Injectable 1 milliGRAM(s) IntraMuscular once PRN Glucose LESS THAN 70 milligrams/deciliter  oxycodone    5 mG/acetaminophen 325 mG 2 Tablet(s) Oral every 4 hours PRN Severe Pain (7 - 10)  oxycodone    5 mG/acetaminophen 325 mG 1 Tablet(s) Oral every 6 hours PRN Moderate Pain (4 - 6)        RADIOLOGY & ADDITIONAL TESTS:  < from: Xray Chest 1 View- PORTABLE-Routine (07.09.20 @ 03:50) >  1.  Since July 8, 2020, increased opacification of the left lower lung, possibly increased effusion with adjacentconsolidation/atelectasis.  < end of copied text >

## 2020-07-11 LAB
ANION GAP SERPL CALC-SCNC: 13 MMOL/L — SIGNIFICANT CHANGE UP (ref 5–17)
ANION GAP SERPL CALC-SCNC: 13 MMOL/L — SIGNIFICANT CHANGE UP (ref 5–17)
BASOPHILS # BLD AUTO: 0.03 K/UL — SIGNIFICANT CHANGE UP (ref 0–0.2)
BASOPHILS NFR BLD AUTO: 0.3 % — SIGNIFICANT CHANGE UP (ref 0–2)
BUN SERPL-MCNC: 29 MG/DL — HIGH (ref 7–23)
BUN SERPL-MCNC: 34 MG/DL — HIGH (ref 7–23)
CALCIUM SERPL-MCNC: 8.4 MG/DL — SIGNIFICANT CHANGE UP (ref 8.4–10.5)
CALCIUM SERPL-MCNC: 8.6 MG/DL — SIGNIFICANT CHANGE UP (ref 8.4–10.5)
CHLORIDE SERPL-SCNC: 98 MMOL/L — SIGNIFICANT CHANGE UP (ref 96–108)
CHLORIDE SERPL-SCNC: 98 MMOL/L — SIGNIFICANT CHANGE UP (ref 96–108)
CO2 SERPL-SCNC: 24 MMOL/L — SIGNIFICANT CHANGE UP (ref 22–31)
CO2 SERPL-SCNC: 25 MMOL/L — SIGNIFICANT CHANGE UP (ref 22–31)
CREAT SERPL-MCNC: 0.93 MG/DL — SIGNIFICANT CHANGE UP (ref 0.5–1.3)
CREAT SERPL-MCNC: 1.08 MG/DL — SIGNIFICANT CHANGE UP (ref 0.5–1.3)
EOSINOPHIL # BLD AUTO: 0.35 K/UL — SIGNIFICANT CHANGE UP (ref 0–0.5)
EOSINOPHIL NFR BLD AUTO: 3.4 % — SIGNIFICANT CHANGE UP (ref 0–6)
GLUCOSE BLDC GLUCOMTR-MCNC: 125 MG/DL — HIGH (ref 70–99)
GLUCOSE BLDC GLUCOMTR-MCNC: 147 MG/DL — HIGH (ref 70–99)
GLUCOSE BLDC GLUCOMTR-MCNC: 157 MG/DL — HIGH (ref 70–99)
GLUCOSE BLDC GLUCOMTR-MCNC: 169 MG/DL — HIGH (ref 70–99)
GLUCOSE BLDC GLUCOMTR-MCNC: 200 MG/DL — HIGH (ref 70–99)
GLUCOSE BLDC GLUCOMTR-MCNC: 221 MG/DL — HIGH (ref 70–99)
GLUCOSE SERPL-MCNC: 142 MG/DL — HIGH (ref 70–99)
GLUCOSE SERPL-MCNC: 169 MG/DL — HIGH (ref 70–99)
HCT VFR BLD CALC: 24.6 % — LOW (ref 39–50)
HGB BLD-MCNC: 8.3 G/DL — LOW (ref 13–17)
IMM GRANULOCYTES NFR BLD AUTO: 0.8 % — SIGNIFICANT CHANGE UP (ref 0–1.5)
LYMPHOCYTES # BLD AUTO: 1.4 K/UL — SIGNIFICANT CHANGE UP (ref 1–3.3)
LYMPHOCYTES # BLD AUTO: 13.6 % — SIGNIFICANT CHANGE UP (ref 13–44)
MAGNESIUM SERPL-MCNC: 2 MG/DL — SIGNIFICANT CHANGE UP (ref 1.6–2.6)
MCHC RBC-ENTMCNC: 30.3 PG — SIGNIFICANT CHANGE UP (ref 27–34)
MCHC RBC-ENTMCNC: 33.7 GM/DL — SIGNIFICANT CHANGE UP (ref 32–36)
MCV RBC AUTO: 89.8 FL — SIGNIFICANT CHANGE UP (ref 80–100)
MONOCYTES # BLD AUTO: 0.91 K/UL — HIGH (ref 0–0.9)
MONOCYTES NFR BLD AUTO: 8.8 % — SIGNIFICANT CHANGE UP (ref 2–14)
NEUTROPHILS # BLD AUTO: 7.54 K/UL — HIGH (ref 1.8–7.4)
NEUTROPHILS NFR BLD AUTO: 73.1 % — SIGNIFICANT CHANGE UP (ref 43–77)
NRBC # BLD: 0 /100 WBCS — SIGNIFICANT CHANGE UP (ref 0–0)
PHOSPHATE SERPL-MCNC: 3.8 MG/DL — SIGNIFICANT CHANGE UP (ref 2.5–4.5)
PLATELET # BLD AUTO: 178 K/UL — SIGNIFICANT CHANGE UP (ref 150–400)
POTASSIUM SERPL-MCNC: 4.1 MMOL/L — SIGNIFICANT CHANGE UP (ref 3.5–5.3)
POTASSIUM SERPL-MCNC: 4.9 MMOL/L — SIGNIFICANT CHANGE UP (ref 3.5–5.3)
POTASSIUM SERPL-SCNC: 4.1 MMOL/L — SIGNIFICANT CHANGE UP (ref 3.5–5.3)
POTASSIUM SERPL-SCNC: 4.9 MMOL/L — SIGNIFICANT CHANGE UP (ref 3.5–5.3)
RBC # BLD: 2.74 M/UL — LOW (ref 4.2–5.8)
RBC # FLD: 13.5 % — SIGNIFICANT CHANGE UP (ref 10.3–14.5)
SODIUM SERPL-SCNC: 135 MMOL/L — SIGNIFICANT CHANGE UP (ref 135–145)
SODIUM SERPL-SCNC: 136 MMOL/L — SIGNIFICANT CHANGE UP (ref 135–145)
WBC # BLD: 10.31 K/UL — SIGNIFICANT CHANGE UP (ref 3.8–10.5)
WBC # FLD AUTO: 10.31 K/UL — SIGNIFICANT CHANGE UP (ref 3.8–10.5)

## 2020-07-11 PROCEDURE — 32551 INSERTION OF CHEST TUBE: CPT

## 2020-07-11 PROCEDURE — 71045 X-RAY EXAM CHEST 1 VIEW: CPT | Mod: 26

## 2020-07-11 RX ORDER — FUROSEMIDE 40 MG
20 TABLET ORAL ONCE
Refills: 0 | Status: COMPLETED | OUTPATIENT
Start: 2020-07-11 | End: 2020-07-11

## 2020-07-11 RX ORDER — INSULIN LISPRO 100/ML
10 VIAL (ML) SUBCUTANEOUS
Refills: 0 | Status: DISCONTINUED | OUTPATIENT
Start: 2020-07-11 | End: 2020-07-12

## 2020-07-11 RX ORDER — AMLODIPINE BESYLATE 2.5 MG/1
5 TABLET ORAL DAILY
Refills: 0 | Status: DISCONTINUED | OUTPATIENT
Start: 2020-07-11 | End: 2020-07-11

## 2020-07-11 RX ORDER — ALBUMIN HUMAN 25 %
250 VIAL (ML) INTRAVENOUS ONCE
Refills: 0 | Status: COMPLETED | OUTPATIENT
Start: 2020-07-11 | End: 2020-07-11

## 2020-07-11 RX ORDER — INSULIN LISPRO 100/ML
12 VIAL (ML) SUBCUTANEOUS
Refills: 0 | Status: DISCONTINUED | OUTPATIENT
Start: 2020-07-11 | End: 2020-07-12

## 2020-07-11 RX ADMIN — Medication 50 MILLIGRAM(S): at 12:20

## 2020-07-11 RX ADMIN — AMLODIPINE BESYLATE 5 MILLIGRAM(S): 2.5 TABLET ORAL at 10:08

## 2020-07-11 RX ADMIN — Medication 50 MILLIGRAM(S): at 00:09

## 2020-07-11 RX ADMIN — AMIODARONE HYDROCHLORIDE 400 MILLIGRAM(S): 400 TABLET ORAL at 13:10

## 2020-07-11 RX ADMIN — Medication 2: at 07:29

## 2020-07-11 RX ADMIN — AMIODARONE HYDROCHLORIDE 400 MILLIGRAM(S): 400 TABLET ORAL at 21:43

## 2020-07-11 RX ADMIN — LEVALBUTEROL 0.63 MILLIGRAM(S): 1.25 SOLUTION, CONCENTRATE RESPIRATORY (INHALATION) at 03:54

## 2020-07-11 RX ADMIN — AMIODARONE HYDROCHLORIDE 400 MILLIGRAM(S): 400 TABLET ORAL at 06:03

## 2020-07-11 RX ADMIN — INSULIN GLARGINE 36 UNIT(S): 100 INJECTION, SOLUTION SUBCUTANEOUS at 22:33

## 2020-07-11 RX ADMIN — Medication 325 MILLIGRAM(S): at 10:08

## 2020-07-11 RX ADMIN — Medication 50 MILLIGRAM(S): at 06:04

## 2020-07-11 RX ADMIN — Medication 20 MILLIGRAM(S): at 04:15

## 2020-07-11 RX ADMIN — SODIUM CHLORIDE 3 MILLILITER(S): 9 INJECTION INTRAMUSCULAR; INTRAVENOUS; SUBCUTANEOUS at 21:44

## 2020-07-11 RX ADMIN — Medication 10 UNIT(S): at 07:29

## 2020-07-11 RX ADMIN — Medication 20 MILLIGRAM(S): at 12:20

## 2020-07-11 RX ADMIN — Medication 2: at 17:32

## 2020-07-11 RX ADMIN — Medication 175 MICROGRAM(S): at 06:03

## 2020-07-11 RX ADMIN — Medication 10 UNIT(S): at 12:19

## 2020-07-11 RX ADMIN — Medication 2: at 12:19

## 2020-07-11 RX ADMIN — Medication 10 UNIT(S): at 17:32

## 2020-07-11 RX ADMIN — SODIUM CHLORIDE 3 MILLILITER(S): 9 INJECTION INTRAMUSCULAR; INTRAVENOUS; SUBCUTANEOUS at 10:23

## 2020-07-11 RX ADMIN — Medication 125 MILLILITER(S): at 19:08

## 2020-07-11 RX ADMIN — Medication 50 MILLIGRAM(S): at 17:33

## 2020-07-11 RX ADMIN — SODIUM CHLORIDE 3 MILLILITER(S): 9 INJECTION INTRAMUSCULAR; INTRAVENOUS; SUBCUTANEOUS at 05:38

## 2020-07-11 NOTE — PROCEDURE NOTE - NSPROCDETAILS_GEN_ALL_CORE
Seldinger technique/thoracostomy tube placed percutaneously/ultrasound assessment of fluid (location)
percutaneous/ultrasound assessment of fluid (location)/Seldinger technique

## 2020-07-11 NOTE — PROGRESS NOTE ADULT - SUBJECTIVE AND OBJECTIVE BOX
Patient discussed on morning rounds with Dr. Posada    Operation / Date: 20 - CABGx3 (LIMA-LAD, SVG-OM, SVG-PDA), EF 65%     SUBJECTIVE ASSESSMENT:  64y Male seen at bedside. Patient in on 2L NC this AM after having some SOB and wheezing overnight requiring lasix/nebs. Patient doing much better this morning, weaning O2 to RA and promoting ambulation. Patient also states his legs feel tight and larger than usual but denies any pain. Otherwise pain is well controlled.         Vital Signs Last 24 Hrs  T(C): 36.6 (2020 09:05), Max: 37 (10 Jul 2020 13:49)  T(F): 97.9 (2020 09:05), Max: 98.6 (10 Jul 2020 13:49)  HR: 74 (2020 10:15) (66 - 76)  BP: 136/64 (2020 10:15) (121/80 - 188/114)  BP(mean): 92 (2020 10:15) (87 - 133)  RR: 22 (2020 10:15) (18 - 28)  SpO2: 93% (2020 10:15) (92% - 98%)  I&O's Detail    10 Jul 2020 07:01  -  2020 07:00  --------------------------------------------------------  IN:    Oral Fluid: 600 mL  Total IN: 600 mL    OUT:    Voided: 1400 mL  Total OUT: 1400 mL    Total NET: -800 mL        CHEST TUBE:  No.  JOEL DRAIN:  No.  EPICARDIAL WIRES: Yes  TIE DOWNS: No.  CHACKO: No.      PHYSICAL EXAM:  General: sitting in chair in NAD   Neurological: AOx3. Motor skills grossly intact  Cardiovascular: Normal S1/S2. Regular rate/rhythm. No murmurs  Respiratory: Lungs with slight diminished breath sounds b/l at bases  Gastrointestinal: +BS in all 4 quadrants. Non-distended. Soft. Non-tender  Extremities: Strength 5/5 b/l upper/lower extremities. Sensation grossly intact upper/lower extremities. No edema. No calf tenderness.  Vascular: Radial 2+bilaterally, DP 2+ b/l  Incision Sites: sternotomy healing well no erythema, purulence or ecchymosis. Right lower extremity SVG without erythema, mild drainage/bleeding from incision. Leg with slight bruising and swelling, no signs of infection     LABS:                        8.3    10.31 )-----------( 178      ( 2020 06:00 )             24.6       COUMADIN:  No    PT/INR - ( 10 Jul 2020 03:44 )   PT: 15.7 sec;   INR: 1.33          PTT - ( 10 Jul 2020 03:44 )  PTT:29.1 sec    07-11    136  |  98  |  29<H>  ----------------------------<  142<H>  4.1   |  25  |  0.93    Ca    8.4      2020 06:00  Phos  3.8     07-11  Mg     2.0     07-11    TPro  6.0  /  Alb  3.2<L>  /  TBili  2.4<H>  /  DBili  x   /  AST  52<H>  /  ALT  44  /  AlkPhos  44  07-10      Urinalysis Basic - ( 10 Jul 2020 19:52 )    Color: Yellow / Appearance: Clear / S.015 / pH: x  Gluc: x / Ketone: NEGATIVE  / Bili: Negative / Urobili: 0.2 E.U./dL   Blood: x / Protein: Trace mg/dL / Nitrite: NEGATIVE   Leuk Esterase: NEGATIVE / RBC: > 10 /HPF / WBC < 5 /HPF   Sq Epi: x / Non Sq Epi: 0-5 /HPF / Bacteria: Present /HPF        MEDICATIONS  (STANDING):  aMIOdarone    Tablet 400 milliGRAM(s) Oral every 8 hours  aMIOdarone    Tablet   Oral   amLODIPine   Tablet 5 milliGRAM(s) Oral daily  apixaban 5 milliGRAM(s) Oral every 12 hours  aspirin enteric coated 325 milliGRAM(s) Oral daily  atorvastatin 80 milliGRAM(s) Oral at bedtime  dextrose 50% Injectable 12.5 Gram(s) IV Push once  dextrose 50% Injectable 25 Gram(s) IV Push once  dextrose 50% Injectable 25 Gram(s) IV Push once  insulin glargine Injectable (LANTUS) 36 Unit(s) SubCutaneous at bedtime  insulin lispro (HumaLOG) corrective regimen sliding scale   SubCutaneous <User Schedule>  insulin lispro (HumaLOG) corrective regimen sliding scale   SubCutaneous three times a day before meals  insulin lispro Injectable (HumaLOG) 10 Unit(s) SubCutaneous three times a day before meals  levothyroxine 175 MICROGram(s) Oral daily  lidocaine   Patch 1 Patch Transdermal every 24 hours  metoprolol tartrate 50 milliGRAM(s) Oral every 6 hours  pantoprazole    Tablet 40 milliGRAM(s) Oral before breakfast  polyethylene glycol 3350 17 Gram(s) Oral daily  sodium chloride 0.9% lock flush 3 milliLiter(s) IV Push every 8 hours    MEDICATIONS  (PRN):  dextrose 40% Gel 15 Gram(s) Oral once PRN Blood Glucose LESS THAN 70 milliGRAM(s)/deciliter  glucagon  Injectable 1 milliGRAM(s) IntraMuscular once PRN Glucose LESS THAN 70 milligrams/deciliter  levalbuterol Inhalation 0.63 milliGRAM(s) Inhalation every 6 hours PRN shortness of breath  oxycodone    5 mG/acetaminophen 325 mG 2 Tablet(s) Oral every 4 hours PRN Severe Pain (7 - 10)  oxycodone    5 mG/acetaminophen 325 mG 1 Tablet(s) Oral every 6 hours PRN Moderate Pain (4 - 6)        RADIOLOGY & ADDITIONAL TESTS:    CXR:   Portable exam at chest demonstrates cardiomegaly. Status post sternotomy. Bilateral effusions right greater than left.      This is a 65 y/o M with a PMHx of COVID infection (antibody positive), TA (on plavix 75mg only, 7 y/o with left hand weakness that resolved in minutes, no occurrences), HTN, HLD, DM, hypothyroidisim who presented to Dr. Francis Muñiz with complaints of chest pain associated with LINO starting in May 2020. Patient underwent NST on 20 which revealed anterior, septal and lateral wall ischemia, EF 66%. Cardiac cath on 20 showed 3V CAD. Patient referred to Dr. Conde for surgical evaluation and deemed a good surgical candidate. Patient underwent uncomplicated CABGx3 (LIMA-LAD, SVG-OM, SVG-PDA) EF 65%. Arrived to the CTICU on no gtts. Patient briefly required Tavon to keep MAP>80 and also required x4 units pRBCs due to anemia. Pt extubated later in the day on POD#0. On POD#1 pt weaned from Tavon and started on Lopresser, then diuresed. POD#2, pt noted to be in AF and received x3 amio boluses and started PO amio load. BB increased. Patient enrolled in PACES clinical trial for POAF>60minutes. Patient coverted to NSR after amio bolus and has remained in NSR. On POD#3 pt started on eliquis and BB increased. On POD #4 pt remained stable and was cleared for transfer to stepdown unit (9L) and remains stable at this time. Patient now POD#5, had episode of wheezing/SOB overnight requiring lasix/nebs. Patient with small b/l effusions and possible pigtail placement today. Otherwise weaning O2 to RA     Plan:  Neurovascular:   -Pain well controlled with current regimen. PRN's: percocet 5/325 Q6 hours, Tylenol, lidocaine patch   -hx of TIA (self resolving hand numbness 6 years ago)     Cardiovascular:   -Hemodynamically stable.   -Monitor: BP, HR, tele  -POD#4 from CABGx3 (lima-lad, svg-om, svg-pda), EF 65%     -c/w BB, titrate as tolerated     -c/w  daily     -c/w Atorvastatin 80mg daily   -Atrial Fibrillation: c/w amio load, BB, Eliquis 5mg Q12 hours - Eliquis on hold for pigtail catheter placement today    Respiratory:   -Oxygenating well on RA while sitting, requiring 2lpm with ambulation - push pulmonary status, ambulate frequently, IS use --wean from O2.   - plan for pigtail placement today  -Encourage continued use of IS 10x/hr and frequent ambulation  -CXR: no acute pathology, no PTX   -Hx of COVID infection, +antibodies     GI:  -GI PPX: pantoprazole 40mg daily   -PO Diet: Consistent carbs, no snacks   -Bowel Regimen: miralax     Renal / :  -Continue to monitor renal function: BUN/Cr 29/0.93  -Monitor I/O's daily   -Patient x1 dose of lasix this morning, repeat dose of 20 IV lasix this afternoon. will f/u afternoon BUN/Cr   -x1 episode of hematuria POD#3 with blood at the meatus, no pain, no urinary sx. UA sent, f/u results. Pt voided later and urine was normal. Monitor. If happens again let Dr. Conde know and call urology team.     Endocrine:    -hypothyroidism     -TSH: 0.062, Free Thyroxine: 1.67 (elevated)    -c/w levothyroxine 175mg daily, will f/u for repeat tests in 4-6 weeks per endo  -DM type II:     -c/w lantus 36 units at bedtime, RISS, Humalog 10 units      -A1c: 5.8 -- glucose readings anywhere from 190 to high 200s.  -Endocrinology is following the patient, pt may need to go home with insulin temporarily due to high glucose readings -- instructed RN staff to start teaching pt today. Pending recs from today for hypothyroidism management.     Hematologic:  -CBC: H/H- 8.3/24.6  -Coagulation Panel.    ID:  -Temperature: afebrile  -CBC: WBC- 10.3   -Continue to observe for SIRS/Sepsis Syndrome.    Prophylaxis:  -DVT prophylaxis with 5000 SubQ Heparin q8h.  -Continue with SCD's b/l while patient is at rest     Disposition:  -Discharge home once patient is medically ready-- possible tomorrow if off O2 and post pigtail placement

## 2020-07-12 ENCOUNTER — TRANSCRIPTION ENCOUNTER (OUTPATIENT)
Age: 65
End: 2020-07-12

## 2020-07-12 VITALS — TEMPERATURE: 97 F

## 2020-07-12 LAB
ANION GAP SERPL CALC-SCNC: 12 MMOL/L — SIGNIFICANT CHANGE UP (ref 5–17)
ANION GAP SERPL CALC-SCNC: 14 MMOL/L — SIGNIFICANT CHANGE UP (ref 5–17)
APTT BLD: 26.1 SEC — LOW (ref 27.5–35.5)
BUN SERPL-MCNC: 32 MG/DL — HIGH (ref 7–23)
BUN SERPL-MCNC: 34 MG/DL — HIGH (ref 7–23)
CALCIUM SERPL-MCNC: 8.6 MG/DL — SIGNIFICANT CHANGE UP (ref 8.4–10.5)
CALCIUM SERPL-MCNC: 8.7 MG/DL — SIGNIFICANT CHANGE UP (ref 8.4–10.5)
CHLORIDE SERPL-SCNC: 98 MMOL/L — SIGNIFICANT CHANGE UP (ref 96–108)
CHLORIDE SERPL-SCNC: 98 MMOL/L — SIGNIFICANT CHANGE UP (ref 96–108)
CO2 SERPL-SCNC: 24 MMOL/L — SIGNIFICANT CHANGE UP (ref 22–31)
CO2 SERPL-SCNC: 24 MMOL/L — SIGNIFICANT CHANGE UP (ref 22–31)
CREAT SERPL-MCNC: 1.02 MG/DL — SIGNIFICANT CHANGE UP (ref 0.5–1.3)
CREAT SERPL-MCNC: 1.04 MG/DL — SIGNIFICANT CHANGE UP (ref 0.5–1.3)
GLUCOSE BLDC GLUCOMTR-MCNC: 139 MG/DL — HIGH (ref 70–99)
GLUCOSE BLDC GLUCOMTR-MCNC: 163 MG/DL — HIGH (ref 70–99)
GLUCOSE BLDC GLUCOMTR-MCNC: 171 MG/DL — HIGH (ref 70–99)
GLUCOSE BLDC GLUCOMTR-MCNC: 175 MG/DL — HIGH (ref 70–99)
GLUCOSE SERPL-MCNC: 138 MG/DL — HIGH (ref 70–99)
GLUCOSE SERPL-MCNC: 170 MG/DL — HIGH (ref 70–99)
HCT VFR BLD CALC: 27.4 % — LOW (ref 39–50)
HGB BLD-MCNC: 8.8 G/DL — LOW (ref 13–17)
INR BLD: 1.27 — HIGH (ref 0.88–1.16)
MAGNESIUM SERPL-MCNC: 2.2 MG/DL — SIGNIFICANT CHANGE UP (ref 1.6–2.6)
MCHC RBC-ENTMCNC: 29.7 PG — SIGNIFICANT CHANGE UP (ref 27–34)
MCHC RBC-ENTMCNC: 32.1 GM/DL — SIGNIFICANT CHANGE UP (ref 32–36)
MCV RBC AUTO: 92.6 FL — SIGNIFICANT CHANGE UP (ref 80–100)
NRBC # BLD: 0 /100 WBCS — SIGNIFICANT CHANGE UP (ref 0–0)
PLATELET # BLD AUTO: 230 K/UL — SIGNIFICANT CHANGE UP (ref 150–400)
POTASSIUM SERPL-MCNC: 4.7 MMOL/L — SIGNIFICANT CHANGE UP (ref 3.5–5.3)
POTASSIUM SERPL-MCNC: 4.8 MMOL/L — SIGNIFICANT CHANGE UP (ref 3.5–5.3)
POTASSIUM SERPL-SCNC: 4.7 MMOL/L — SIGNIFICANT CHANGE UP (ref 3.5–5.3)
POTASSIUM SERPL-SCNC: 4.8 MMOL/L — SIGNIFICANT CHANGE UP (ref 3.5–5.3)
PROTHROM AB SERPL-ACNC: 15.1 SEC — HIGH (ref 10.6–13.6)
RBC # BLD: 2.96 M/UL — LOW (ref 4.2–5.8)
RBC # FLD: 13.4 % — SIGNIFICANT CHANGE UP (ref 10.3–14.5)
SODIUM SERPL-SCNC: 134 MMOL/L — LOW (ref 135–145)
SODIUM SERPL-SCNC: 136 MMOL/L — SIGNIFICANT CHANGE UP (ref 135–145)
WBC # BLD: 15.58 K/UL — HIGH (ref 3.8–10.5)
WBC # FLD AUTO: 15.58 K/UL — HIGH (ref 3.8–10.5)

## 2020-07-12 PROCEDURE — 85027 COMPLETE CBC AUTOMATED: CPT

## 2020-07-12 PROCEDURE — 80048 BASIC METABOLIC PNL TOTAL CA: CPT

## 2020-07-12 PROCEDURE — 36430 TRANSFUSION BLD/BLD COMPNT: CPT

## 2020-07-12 PROCEDURE — 71045 X-RAY EXAM CHEST 1 VIEW: CPT | Mod: 26,59

## 2020-07-12 PROCEDURE — 82803 BLOOD GASES ANY COMBINATION: CPT

## 2020-07-12 PROCEDURE — 80053 COMPREHEN METABOLIC PANEL: CPT

## 2020-07-12 PROCEDURE — 85730 THROMBOPLASTIN TIME PARTIAL: CPT

## 2020-07-12 PROCEDURE — 94640 AIRWAY INHALATION TREATMENT: CPT

## 2020-07-12 PROCEDURE — 82962 GLUCOSE BLOOD TEST: CPT

## 2020-07-12 PROCEDURE — 84295 ASSAY OF SERUM SODIUM: CPT

## 2020-07-12 PROCEDURE — 86923 COMPATIBILITY TEST ELECTRIC: CPT

## 2020-07-12 PROCEDURE — 93005 ELECTROCARDIOGRAM TRACING: CPT

## 2020-07-12 PROCEDURE — 71046 X-RAY EXAM CHEST 2 VIEWS: CPT | Mod: 26

## 2020-07-12 PROCEDURE — 85025 COMPLETE CBC W/AUTO DIFF WBC: CPT

## 2020-07-12 PROCEDURE — 85610 PROTHROMBIN TIME: CPT

## 2020-07-12 PROCEDURE — 94002 VENT MGMT INPAT INIT DAY: CPT

## 2020-07-12 PROCEDURE — 86850 RBC ANTIBODY SCREEN: CPT

## 2020-07-12 PROCEDURE — 36415 COLL VENOUS BLD VENIPUNCTURE: CPT

## 2020-07-12 PROCEDURE — 84134 ASSAY OF PREALBUMIN: CPT

## 2020-07-12 PROCEDURE — 84439 ASSAY OF FREE THYROXINE: CPT

## 2020-07-12 PROCEDURE — 81001 URINALYSIS AUTO W/SCOPE: CPT

## 2020-07-12 PROCEDURE — P9047: CPT

## 2020-07-12 PROCEDURE — 84100 ASSAY OF PHOSPHORUS: CPT

## 2020-07-12 PROCEDURE — 97161 PT EVAL LOW COMPLEX 20 MIN: CPT

## 2020-07-12 PROCEDURE — 71046 X-RAY EXAM CHEST 2 VIEWS: CPT

## 2020-07-12 PROCEDURE — P9045: CPT

## 2020-07-12 PROCEDURE — 84443 ASSAY THYROID STIM HORMONE: CPT

## 2020-07-12 PROCEDURE — 82330 ASSAY OF CALCIUM: CPT

## 2020-07-12 PROCEDURE — 93306 TTE W/DOPPLER COMPLETE: CPT

## 2020-07-12 PROCEDURE — 83735 ASSAY OF MAGNESIUM: CPT

## 2020-07-12 PROCEDURE — 86901 BLOOD TYPING SEROLOGIC RH(D): CPT

## 2020-07-12 PROCEDURE — 83605 ASSAY OF LACTIC ACID: CPT

## 2020-07-12 PROCEDURE — P9016: CPT

## 2020-07-12 PROCEDURE — C1889: CPT

## 2020-07-12 PROCEDURE — 84132 ASSAY OF SERUM POTASSIUM: CPT

## 2020-07-12 PROCEDURE — 84681 ASSAY OF C-PEPTIDE: CPT

## 2020-07-12 PROCEDURE — 99231 SBSQ HOSP IP/OBS SF/LOW 25: CPT | Mod: GC

## 2020-07-12 PROCEDURE — 71045 X-RAY EXAM CHEST 1 VIEW: CPT

## 2020-07-12 PROCEDURE — 97116 GAIT TRAINING THERAPY: CPT

## 2020-07-12 RX ORDER — SITAGLIPTIN 50 MG/1
1 TABLET, FILM COATED ORAL
Qty: 30 | Refills: 2
Start: 2020-07-12

## 2020-07-12 RX ORDER — METOPROLOL TARTRATE 50 MG
1 TABLET ORAL
Qty: 0 | Refills: 0 | DISCHARGE

## 2020-07-12 RX ORDER — AMIODARONE HYDROCHLORIDE 400 MG/1
1 TABLET ORAL
Qty: 30 | Refills: 2
Start: 2020-07-12

## 2020-07-12 RX ORDER — FUROSEMIDE 40 MG
20 TABLET ORAL ONCE
Refills: 0 | Status: COMPLETED | OUTPATIENT
Start: 2020-07-12 | End: 2020-07-12

## 2020-07-12 RX ORDER — APIXABAN 2.5 MG/1
1 TABLET, FILM COATED ORAL
Qty: 60 | Refills: 2
Start: 2020-07-12

## 2020-07-12 RX ORDER — INSULIN GLARGINE 100 [IU]/ML
30 INJECTION, SOLUTION SUBCUTANEOUS
Qty: 30 | Refills: 2
Start: 2020-07-12

## 2020-07-12 RX ORDER — POTASSIUM CHLORIDE 20 MEQ
1 PACKET (EA) ORAL
Qty: 3 | Refills: 0
Start: 2020-07-12

## 2020-07-12 RX ORDER — FUROSEMIDE 40 MG
1 TABLET ORAL
Qty: 5 | Refills: 0
Start: 2020-07-12

## 2020-07-12 RX ORDER — PANTOPRAZOLE SODIUM 20 MG/1
1 TABLET, DELAYED RELEASE ORAL
Qty: 30 | Refills: 2
Start: 2020-07-12

## 2020-07-12 RX ORDER — METOPROLOL TARTRATE 50 MG
1 TABLET ORAL
Qty: 60 | Refills: 2
Start: 2020-07-12

## 2020-07-12 RX ORDER — ASPIRIN/CALCIUM CARB/MAGNESIUM 324 MG
1 TABLET ORAL
Qty: 30 | Refills: 2
Start: 2020-07-12

## 2020-07-12 RX ORDER — ASPIRIN/CALCIUM CARB/MAGNESIUM 324 MG
1 TABLET ORAL
Qty: 0 | Refills: 0 | DISCHARGE

## 2020-07-12 RX ORDER — ISOPROPYL ALCOHOL, BENZOCAINE .7; .06 ML/ML; ML/ML
1 SWAB TOPICAL
Qty: 100 | Refills: 1
Start: 2020-07-12 | End: 2020-08-30

## 2020-07-12 RX ORDER — POTASSIUM CHLORIDE 20 MEQ
10 PACKET (EA) ORAL DAILY
Refills: 0 | Status: DISCONTINUED | OUTPATIENT
Start: 2020-07-12 | End: 2020-07-12

## 2020-07-12 RX ORDER — FUROSEMIDE 40 MG
40 TABLET ORAL DAILY
Refills: 0 | Status: DISCONTINUED | OUTPATIENT
Start: 2020-07-12 | End: 2020-07-12

## 2020-07-12 RX ORDER — APIXABAN 2.5 MG/1
5 TABLET, FILM COATED ORAL EVERY 12 HOURS
Refills: 0 | Status: DISCONTINUED | OUTPATIENT
Start: 2020-07-12 | End: 2020-07-12

## 2020-07-12 RX ORDER — METFORMIN HYDROCHLORIDE 850 MG/1
1 TABLET ORAL
Qty: 90 | Refills: 2
Start: 2020-07-12

## 2020-07-12 RX ORDER — LEVOTHYROXINE SODIUM 125 MCG
1 TABLET ORAL
Qty: 30 | Refills: 2
Start: 2020-07-12

## 2020-07-12 RX ORDER — ATORVASTATIN CALCIUM 80 MG/1
1 TABLET, FILM COATED ORAL
Qty: 30 | Refills: 2
Start: 2020-07-12

## 2020-07-12 RX ORDER — OXYCODONE AND ACETAMINOPHEN 5; 325 MG/1; MG/1
2 TABLET ORAL
Qty: 25 | Refills: 0
Start: 2020-07-12

## 2020-07-12 RX ADMIN — APIXABAN 5 MILLIGRAM(S): 2.5 TABLET, FILM COATED ORAL at 17:06

## 2020-07-12 RX ADMIN — Medication 20 MILLIGRAM(S): at 10:58

## 2020-07-12 NOTE — PROGRESS NOTE ADULT - SUBJECTIVE AND OBJECTIVE BOX
INTERVAL HPI/OVERNIGHT EVENTS:    Patient is a 64y old  Male who presents with a chief complaint of CAD (2020 12:32)      Pt reports the following symptoms:    CONSTITUTIONAL:  Negative fever or chills, feels well, good appetite  EYES:  Negative  blurry vision or double vision  CARDIOVASCULAR:  Negative for chest pain or palpitations  RESPIRATORY:  Negative for cough, wheezing, or SOB   GASTROINTESTINAL:  Negative for nausea, vomiting, diarrhea, constipation, or abdominal pain  GENITOURINARY:  Negative frequency, urgency or dysuria  NEUROLOGIC:  No headache, confusion, dizziness, lightheadedness    MEDICATIONS  (STANDING):  aMIOdarone    Tablet 200 milliGRAM(s) Oral daily  aMIOdarone    Tablet   Oral   aspirin enteric coated 325 milliGRAM(s) Oral daily  atorvastatin 80 milliGRAM(s) Oral at bedtime  dextrose 50% Injectable 12.5 Gram(s) IV Push once  dextrose 50% Injectable 25 Gram(s) IV Push once  dextrose 50% Injectable 25 Gram(s) IV Push once  insulin glargine Injectable (LANTUS) 36 Unit(s) SubCutaneous at bedtime  insulin lispro (HumaLOG) corrective regimen sliding scale   SubCutaneous <User Schedule>  insulin lispro (HumaLOG) corrective regimen sliding scale   SubCutaneous three times a day before meals  insulin lispro Injectable (HumaLOG) 10 Unit(s) SubCutaneous with lunch  insulin lispro Injectable (HumaLOG) 10 Unit(s) SubCutaneous with dinner  insulin lispro Injectable (HumaLOG) 12 Unit(s) SubCutaneous with breakfast  levothyroxine 175 MICROGram(s) Oral daily  lidocaine   Patch 1 Patch Transdermal every 24 hours  metoprolol tartrate 50 milliGRAM(s) Oral every 6 hours  pantoprazole    Tablet 40 milliGRAM(s) Oral before breakfast  polyethylene glycol 3350 17 Gram(s) Oral daily  sodium chloride 0.9% lock flush 3 milliLiter(s) IV Push every 8 hours    MEDICATIONS  (PRN):  dextrose 40% Gel 15 Gram(s) Oral once PRN Blood Glucose LESS THAN 70 milliGRAM(s)/deciliter  glucagon  Injectable 1 milliGRAM(s) IntraMuscular once PRN Glucose LESS THAN 70 milligrams/deciliter  levalbuterol Inhalation 0.63 milliGRAM(s) Inhalation every 6 hours PRN shortness of breath  oxycodone    5 mG/acetaminophen 325 mG 2 Tablet(s) Oral every 4 hours PRN Severe Pain (7 - 10)  oxycodone    5 mG/acetaminophen 325 mG 1 Tablet(s) Oral every 6 hours PRN Moderate Pain (4 - 6)      PHYSICAL EXAM  Vital Signs Last 24 Hrs  T(C): 36.2 (2020 13:22), Max: 36.5 (2020 17:59)  T(F): 97.2 (2020 13:22), Max: 97.7 (2020 17:59)  HR: 78 (2020 12:22) (56 - 78)  BP: 148/68 (2020 12:22) (86/51 - 148/68)  BP(mean): 98 (2020 12:22) (63 - 98)  RR: 16 (:22) (16 - 18)  SpO2: 97% (:) (94% - 97%)    Constitutional: wn/wd in NAD.   HEENT: NCAT, MMM, OP clear, EOMI, no proptosis or lid retraction  Neck: no thyromegaly or palpable thyroid nodules   Respiratory: lungs CTAB.  Cardiovascular: regular rhythm, normal S1 and S2, no audible murmurs, no peripheral edema  GI: soft, NT/ND, no masses/HSM appreciated.  Neurology: no tremors, DTR 2+  Skin: no visible rashes/lesions  Psychiatric: AAO x 3, normal affect/mood.    LABS:                        8.8    15.58 )-----------( 230      ( 2020 06:03 )             27.4     07-12    134<L>  |  98  |  34<H>  ----------------------------<  138<H>  4.8   |  24  |  1.02    Ca    8.7      2020 06:03  Phos  3.8     07-11  Mg     2.2     07-12      PT/INR - ( 2020 06:03 )   PT: 15.1 sec;   INR: 1.27          PTT - ( 2020 06:03 )  PTT:26.1 sec  Urinalysis Basic - ( 10 Jul 2020 19:52 )    Color: Yellow / Appearance: Clear / S.015 / pH: x  Gluc: x / Ketone: NEGATIVE  / Bili: Negative / Urobili: 0.2 E.U./dL   Blood: x / Protein: Trace mg/dL / Nitrite: NEGATIVE   Leuk Esterase: NEGATIVE / RBC: > 10 /HPF / WBC < 5 /HPF   Sq Epi: x / Non Sq Epi: 0-5 /HPF / Bacteria: Present /HPF      Thyroid Stimulating Hormone, Serum: 0.062 uIU/mL ( @ 10:49)  Thyroid Stimulating Hormone, Serum: 0.031 uIU/mL ( @ 15:32)      HbA1C:   CAPILLARY BLOOD GLUCOSE      POCT Blood Glucose.: 175 mg/dL (2020 11:18)  POCT Blood Glucose.: 163 mg/dL (2020 06:56)  POCT Blood Glucose.: 139 mg/dL (2020 03:54)  POCT Blood Glucose.: 221 mg/dL (2020 21:47)  POCT Blood Glucose.: 125 mg/dL (2020 18:28)  POCT Blood Glucose.: 169 mg/dL (2020 16:44)        A/P: 64y Male with history of DM type II presenting for       1.  DM -     Please continue           units lantus at bedtime  / in the morning   Continue       units lispro with meals   Cotninue lispro moderate / low dose sliding scale 4 times daily with meals and at bedtime.    Please continue consistent carbohydrate diet.    Goal FSG is   Will continue to monitor   For discharge, TBD    Pt can follow up at discharge with [facility] by calling  to make an appointment.   Will discuss case with     and update primary team    REMINDERS FOR INSULIN/DIABETES SUPPLIES at DISCHARGE:  INSULIN:   Long actin/Basal Insulin: Examples: Toujeo, Basaglar, Tresiba, Lantus   Short acting/Bolus Insulin: Humalog, Admelog, Novolog  Please ensure that BOTH short acting and long acting insulin are prescribed in the same preparation (Ex: PEN vs VIAL/SOLUTION)     TESTING SUPPLIES:   All glucometer supplies should be written as generic to avoid issues with insurance. Use the free text option in sunrise prescription writer, and type in glucometer test strips, lancets, etc to order.    If sending patient home on insulin PEN, please send:   •	BD bri insulin pen needles for use up to 4 times daily (total quantity 100)  •	Lancets for use up to 4 times daily (total quantity 100)  •	Glucometer Test strips for use up to 4 times daily (total quantity 100)  •	Alcohol swabs for use up to 4 times daily (total quantity 100)  •	Glucometer (If provided by hospital, still provide scripts for lancets, test strips, and swabs)  If sending patient home on insulin VIAL, please send:   •	Insulin syringes (6mm) - for use up to 4 times daily (total quantity 100)  •	Lancets for use up to 4 times daily (total quantity 100)  •	Generic Glucometer Test strips for use up to 4 times daily (total quantity 100)  •	Alcohol swabs for use up to 4 times daily (total quantity 100)  •	Generic Glucometer (If provided by hospital, still provide scripts for lancets, test strips, and swabs)  •	Do not specify brand for testing supplies (such as contour, freestyle, one touch etc) that way the pharmacy has the freedom to pick and change according to what the insurance dictates.  For patients without insurance:   •	Provide social work with appropriate scripts so they may obtain 1 week of samples  •	Provide with glucometer. Glucometers are located at various nursing stations, the nursing office, education, and endocrine fellows office.  •	Please make appointment with Chantale Bal NP or Tamia Lewis RN and SOPHY Andre at the [address] [place] [address] endocrinology clinic. They can see patients without insurance, provide appropriate samples, and assist in getting insurance coverage.     PREFERRED PHARMACY:  [facility] (located on 1st floor next to admitting)  P: [phone]  Hours: M – F 8AM – 8PM, Sat 8AM – 4PM, Sun—closed  If not using [facility], please follow up with chosen pharmacy to ensure insulin prescribed is covered. INTERVAL HPI/OVERNIGHT EVENTS:    Patient is a 64y old  Male who presents with a chief complaint of CAD (2020 12:32)  Patient seen at bedside, appetite is well.   He had a muffin and water for breakfast, lunch he began to eat a tray of chicken, mashed potatoes and a fresh fruit platter with cottage cheese.     BG   : DInner 169, bedtime 221 (lantus 36 units w/o SS)  : Breakfast 163, lunch 176     Pt reports the following symptoms:    CONSTITUTIONAL:  Negative fever or chills, feels well, good appetite  EYES:  Negative  blurry vision or double vision  CARDIOVASCULAR:  Negative for chest pain or palpitations  RESPIRATORY:  Negative for cough, wheezing, or SOB   GASTROINTESTINAL:  Negative for nausea, vomiting, diarrhea, constipation, or abdominal pain  GENITOURINARY:  Negative frequency, urgency or dysuria  NEUROLOGIC:  No headache, confusion, dizziness, lightheadedness    MEDICATIONS  (STANDING):  aMIOdarone    Tablet 200 milliGRAM(s) Oral daily  aMIOdarone    Tablet   Oral   aspirin enteric coated 325 milliGRAM(s) Oral daily  atorvastatin 80 milliGRAM(s) Oral at bedtime  dextrose 50% Injectable 12.5 Gram(s) IV Push once  dextrose 50% Injectable 25 Gram(s) IV Push once  dextrose 50% Injectable 25 Gram(s) IV Push once  insulin glargine Injectable (LANTUS) 36 Unit(s) SubCutaneous at bedtime  insulin lispro (HumaLOG) corrective regimen sliding scale   SubCutaneous <User Schedule>  insulin lispro (HumaLOG) corrective regimen sliding scale   SubCutaneous three times a day before meals  insulin lispro Injectable (HumaLOG) 10 Unit(s) SubCutaneous with lunch  insulin lispro Injectable (HumaLOG) 10 Unit(s) SubCutaneous with dinner  insulin lispro Injectable (HumaLOG) 12 Unit(s) SubCutaneous with breakfast  levothyroxine 175 MICROGram(s) Oral daily  lidocaine   Patch 1 Patch Transdermal every 24 hours  metoprolol tartrate 50 milliGRAM(s) Oral every 6 hours  pantoprazole    Tablet 40 milliGRAM(s) Oral before breakfast  polyethylene glycol 3350 17 Gram(s) Oral daily  sodium chloride 0.9% lock flush 3 milliLiter(s) IV Push every 8 hours    MEDICATIONS  (PRN):  dextrose 40% Gel 15 Gram(s) Oral once PRN Blood Glucose LESS THAN 70 milliGRAM(s)/deciliter  glucagon  Injectable 1 milliGRAM(s) IntraMuscular once PRN Glucose LESS THAN 70 milligrams/deciliter  levalbuterol Inhalation 0.63 milliGRAM(s) Inhalation every 6 hours PRN shortness of breath  oxycodone    5 mG/acetaminophen 325 mG 2 Tablet(s) Oral every 4 hours PRN Severe Pain (7 - 10)  oxycodone    5 mG/acetaminophen 325 mG 1 Tablet(s) Oral every 6 hours PRN Moderate Pain (4 - 6)      PHYSICAL EXAM  Vital Signs Last 24 Hrs  T(C): 36.2 (2020 13:22), Max: 36.5 (2020 17:59)  T(F): 97.2 (2020 13:22), Max: 97.7 (2020 17:59)  HR: 78 (2020 12:22) (56 - 78)  BP: 148/68 (2020 12:22) (86/51 - 148/68)  BP(mean): 98 (2020 12:22) (63 - 98)  RR: 16 (2020 12:22) (16 - 18)  SpO2: 97% (2020 12:22) (94% - 97%)    Constitutional: wn/wd in NAD.   HEENT: NCAT, MMM, OP clear, EOMI, no proptosis or lid retraction  Neck: no thyromegaly or palpable thyroid nodules   Respiratory: lungs CTAB.  Cardiovascular: regular rhythm, normal S1 and S2, no audible murmurs, no peripheral edema  GI: soft, NT/ND, no masses/HSM appreciated.  Neurology: no tremors, DTR 2+  Skin: no visible rashes/lesions  Psychiatric: AAO x 3, normal affect/mood.    LABS:                        8.8    15.58 )-----------( 230      ( 2020 06:03 )             27.4     07-12    134<L>  |  98  |  34<H>  ----------------------------<  138<H>  4.8   |  24  |  1.02    Ca    8.7      2020 06:03  Phos  3.8     07-11  Mg     2.2     07-12      PT/INR - ( 2020 06:03 )   PT: 15.1 sec;   INR: 1.27          PTT - ( 2020 06:03 )  PTT:26.1 sec  Urinalysis Basic - ( 10 Jul 2020 19:52 )    Color: Yellow / Appearance: Clear / S.015 / pH: x  Gluc: x / Ketone: NEGATIVE  / Bili: Negative / Urobili: 0.2 E.U./dL   Blood: x / Protein: Trace mg/dL / Nitrite: NEGATIVE   Leuk Esterase: NEGATIVE / RBC: > 10 /HPF / WBC < 5 /HPF   Sq Epi: x / Non Sq Epi: 0-5 /HPF / Bacteria: Present /HPF      Thyroid Stimulating Hormone, Serum: 0.062 uIU/mL ( @ 10:49)  Thyroid Stimulating Hormone, Serum: 0.031 uIU/mL ( @ 15:32)      HbA1C:   CAPILLARY BLOOD GLUCOSE      POCT Blood Glucose.: 175 mg/dL (2020 11:18)  POCT Blood Glucose.: 163 mg/dL (2020 06:56)  POCT Blood Glucose.: 139 mg/dL (2020 03:54)  POCT Blood Glucose.: 221 mg/dL (2020 21:47)  POCT Blood Glucose.: 125 mg/dL (2020 18:28)  POCT Blood Glucose.: 169 mg/dL (2020 16:44)    A/P: 64 year old male, non-smoker, with a past medical history of COVID infection (antibody positive),  TIA on Plavix 75mg only (6 years ago with left hand weakness that lasted a few minutes, no recurrence), HTN, HLD, DM, hypothyroidism found to have 3VD on dx cath 2020 and is now s/p CABG 2020 with post op hyperglycemia.     1.  DM - type 2, controlled, complicated  A1C: 5.8%  Weight: 90kg BMI 29  Cr/GRF: 0.95/85  EF: 65%    Please continue lantus 36 units at night.    Please continue lispro 10 units before each meal.    Please continue lispro moderate  dose sliding scale four times daily with meals and at bedtime.   Pt's fingerstick glucose goal is 100-180.    2. Hypothyroidism -   Please continue Levothyroxine 175mcg daily  : TSH 0.031, TT4 8.06  : TSH 0.062, FT4 1.67    Will continue to monitor     For discharge, basal/bolus dosage TBD. Metformin 500mg BID. Please have staff RN teach insulin pen injection technique.  Levothyroxine 175mcg. Will need repeat TFTs in 4-5 weeks as outpt.  Pt prefers to f/u with PCP by house or will find new doctor closer to him. Gave him my contact information if he had any issues.     Pt can follow up at discharge with [facility] by calling  to make an appointment.   Will discuss case with Dr. Dunn and update primary team INTERVAL HPI/OVERNIGHT EVENTS:    Patient is a 64y old  Male who presents with a chief complaint of CAD (2020 12:32)  Patient seen at bedside, appetite is well.   He had a muffin and water for breakfast, lunch he began to eat a tray of chicken, mashed potatoes and a fresh fruit platter with cottage cheese.     BG   : DInner 169, bedtime 221 (lantus 36 units w/o SS)  : Breakfast 163, lunch 176     Pt reports the following symptoms:    CONSTITUTIONAL:  Negative fever or chills, feels well, good appetite  EYES:  Negative  blurry vision or double vision  CARDIOVASCULAR:  Negative for chest pain or palpitations  RESPIRATORY:  Negative for cough, wheezing, or SOB   GASTROINTESTINAL:  Negative for nausea, vomiting, diarrhea, constipation, or abdominal pain  GENITOURINARY:  Negative frequency, urgency or dysuria  NEUROLOGIC:  No headache, confusion, dizziness, lightheadedness    MEDICATIONS  (STANDING):  aMIOdarone    Tablet 200 milliGRAM(s) Oral daily  aMIOdarone    Tablet   Oral   aspirin enteric coated 325 milliGRAM(s) Oral daily  atorvastatin 80 milliGRAM(s) Oral at bedtime  dextrose 50% Injectable 12.5 Gram(s) IV Push once  dextrose 50% Injectable 25 Gram(s) IV Push once  dextrose 50% Injectable 25 Gram(s) IV Push once  insulin glargine Injectable (LANTUS) 36 Unit(s) SubCutaneous at bedtime  insulin lispro (HumaLOG) corrective regimen sliding scale   SubCutaneous <User Schedule>  insulin lispro (HumaLOG) corrective regimen sliding scale   SubCutaneous three times a day before meals  insulin lispro Injectable (HumaLOG) 10 Unit(s) SubCutaneous with lunch  insulin lispro Injectable (HumaLOG) 10 Unit(s) SubCutaneous with dinner  insulin lispro Injectable (HumaLOG) 12 Unit(s) SubCutaneous with breakfast  levothyroxine 175 MICROGram(s) Oral daily  lidocaine   Patch 1 Patch Transdermal every 24 hours  metoprolol tartrate 50 milliGRAM(s) Oral every 6 hours  pantoprazole    Tablet 40 milliGRAM(s) Oral before breakfast  polyethylene glycol 3350 17 Gram(s) Oral daily  sodium chloride 0.9% lock flush 3 milliLiter(s) IV Push every 8 hours    MEDICATIONS  (PRN):  dextrose 40% Gel 15 Gram(s) Oral once PRN Blood Glucose LESS THAN 70 milliGRAM(s)/deciliter  glucagon  Injectable 1 milliGRAM(s) IntraMuscular once PRN Glucose LESS THAN 70 milligrams/deciliter  levalbuterol Inhalation 0.63 milliGRAM(s) Inhalation every 6 hours PRN shortness of breath  oxycodone    5 mG/acetaminophen 325 mG 2 Tablet(s) Oral every 4 hours PRN Severe Pain (7 - 10)  oxycodone    5 mG/acetaminophen 325 mG 1 Tablet(s) Oral every 6 hours PRN Moderate Pain (4 - 6)      PHYSICAL EXAM  Vital Signs Last 24 Hrs  T(C): 36.2 (2020 13:22), Max: 36.5 (2020 17:59)  T(F): 97.2 (2020 13:22), Max: 97.7 (2020 17:59)  HR: 78 (2020 12:22) (56 - 78)  BP: 148/68 (2020 12:22) (86/51 - 148/68)  BP(mean): 98 (2020 12:22) (63 - 98)  RR: 16 (2020 12:22) (16 - 18)  SpO2: 97% (2020 12:22) (94% - 97%)    Constitutional: wn/wd in NAD.   HEENT: NCAT, MMM, OP clear, EOMI, no proptosis or lid retraction  Neck: no thyromegaly or palpable thyroid nodules   Respiratory: lungs CTAB.  Cardiovascular: regular rhythm, normal S1 and S2, no audible murmurs, no peripheral edema  GI: soft, NT/ND, no masses/HSM appreciated.  Neurology: no tremors, DTR 2+  Skin: no visible rashes/lesions  Psychiatric: AAO x 3, normal affect/mood.    LABS:                        8.8    15.58 )-----------( 230      ( 2020 06:03 )             27.4     07-12    134<L>  |  98  |  34<H>  ----------------------------<  138<H>  4.8   |  24  |  1.02    Ca    8.7      2020 06:03  Phos  3.8     07-11  Mg     2.2     07-12      PT/INR - ( 2020 06:03 )   PT: 15.1 sec;   INR: 1.27          PTT - ( 2020 06:03 )  PTT:26.1 sec  Urinalysis Basic - ( 10 Jul 2020 19:52 )    Color: Yellow / Appearance: Clear / S.015 / pH: x  Gluc: x / Ketone: NEGATIVE  / Bili: Negative / Urobili: 0.2 E.U./dL   Blood: x / Protein: Trace mg/dL / Nitrite: NEGATIVE   Leuk Esterase: NEGATIVE / RBC: > 10 /HPF / WBC < 5 /HPF   Sq Epi: x / Non Sq Epi: 0-5 /HPF / Bacteria: Present /HPF      Thyroid Stimulating Hormone, Serum: 0.062 uIU/mL ( @ 10:49)  Thyroid Stimulating Hormone, Serum: 0.031 uIU/mL ( @ 15:32)      HbA1C:   CAPILLARY BLOOD GLUCOSE      POCT Blood Glucose.: 175 mg/dL (2020 11:18)  POCT Blood Glucose.: 163 mg/dL (2020 06:56)  POCT Blood Glucose.: 139 mg/dL (2020 03:54)  POCT Blood Glucose.: 221 mg/dL (2020 21:47)  POCT Blood Glucose.: 125 mg/dL (2020 18:28)  POCT Blood Glucose.: 169 mg/dL (2020 16:44)    A/P: 64 year old male, non-smoker, with a past medical history of COVID infection (antibody positive),  TIA on Plavix 75mg only (6 years ago with left hand weakness that lasted a few minutes, no recurrence), HTN, HLD, DM, hypothyroidism found to have 3VD on dx cath 2020 and is now s/p CABG 2020 with post op hyperglycemia.     1.  DM - type 2, controlled, complicated  A1C: 5.8%  Weight: 90kg BMI 29  Cr/GRF: 0.95/85  EF: 65%    Please continue lantus 36 units at night.    Please continue lispro 10 units before each meal.    Please continue lispro moderate  dose sliding scale four times daily with meals and at bedtime.   Pt's fingerstick glucose goal is 100-180.    2. Hypothyroidism -   Please continue Levothyroxine 175mcg daily  : TSH 0.031, TT4 8.06  : TSH 0.062, FT4 1.67    Will continue to monitor     For discharge:  Lantus 30 units HS - Please have staff RN teach insulin pen injection technique.  Metformin 500mg BID. Continue Januvia 100mg QD. Levothyroxine 175mcg. Will need repeat TFTs in 4-5 weeks as outpt.  Pt prefers to f/u with PCP by house or will find new doctor closer to him. Gave him my contact information if he had any issues.     Pt can follow up at discharge with [facility] by calling  to make an appointment.   Will discuss case with Dr. Dunn and update primary team

## 2020-07-12 NOTE — DISCHARGE NOTE PROVIDER - HOSPITAL COURSE
This is a 65 y/o M with a PMHx of COVID infection (antibody positive), TA (on plavix 75mg only, 9 y/o with left hand weakness that resolved in minutes, no occurrences), HTN, HLD, DM, hypothyroidisim who presented to Dr. Francis Muñiz with complaints of chest pain associated with LINO starting in May 2020. Patient underwent NST on 6/13/20 which revealed anterior, septal and lateral wall ischemia, EF 66%. Cardiac cath on 6/30/20 showed 3V CAD. Patient referred to Dr. Conde for surgical evaluation and deemed a good surgical candidate. Patient underwent uncomplicated CABGx3 (LIMA-LAD, SVG-OM, SVG-PDA) EF 65%. Arrived to the CTICU on no gtts. Patient briefly required Tavon to keep MAP>80 and also required x4 units pRBCs due to anemia. Pt extubated later in the day on POD#0. On POD#1 pt weaned from Tavon and started on Lopresser, then diuresed. POD#2, pt noted to be in AF and received x3 amio boluses and started PO amio load. BB increased. Patient enrolled in PACES clinical trial for POAF>60minutes. Patient coverted to NSR after amio bolus and has remained in NSR. On POD#3 pt started on eliquis and BB increased. On POD #4 pt remained stable and was cleared for transfer to stepdown unit (9L) and remains stable at this time. Patient now POD#5, had episode of wheezing/SOB overnight requiring lasix/nebs. Patient with small b/l effusions on POD#5, now s/p b/l pigtails which drained about 700cc each. Eliquis was held for procedure and patient tolerated pigtails well. He has continued to be diuresed and he is now stable on RA during ambulation. POD#6 limited echo performed for slightly rising BUN which showed no effusion. Patient is now doing well and ready for discharge on POD#6 with appropriate follow up. This is a 63 y/o M with a PMHx of COVID infection (antibody positive), TA (on plavix 75mg only, 9 y/o with left hand weakness that resolved in minutes, no occurrences), HTN, HLD, DM, hypothyroidisim who presented to Dr. Francis Muñiz with complaints of chest pain associated with LINO starting in May 2020. Patient underwent NST on 6/13/20 which revealed anterior, septal and lateral wall ischemia, EF 66%. Cardiac cath on 6/30/20 showed 3V CAD. Patient referred to Dr. Conde for surgical evaluation and deemed a good surgical candidate. Patient underwent uncomplicated CABGx3 (LIMA-LAD, SVG-OM, SVG-PDA) EF 65%. Arrived to the CTICU on no gtts. Patient briefly required Tavon to keep MAP>80 and also required x4 units pRBCs due to anemia. Pt extubated later in the day on POD#0. On POD#1 pt weaned from Tavon and started on Lopresser, then diuresed. POD#2, pt noted to be in AF and received x3 amio boluses and started PO amio load. BB increased. Patient enrolled in PACES clinical trial for POAF>60minutes. Patient coverted to NSR after amio bolus and has remained in NSR. On POD#3 pt started on eliquis and BB increased. On POD #4 pt remained stable and was cleared for transfer to stepdown unit (9L) and remains stable at this time. Patient now POD#5, had episode of wheezing/SOB overnight requiring lasix/nebs. Patient with small b/l effusions on POD#5, now s/p b/l pigtails which drained about 700cc each. Eliquis was held for procedure and patient tolerated pigtails well. He has continued to be diuresed and he is now stable on RA during ambulation. POD#6 limited echo performed for slightly rising BUN which showed no effusion. Patient is now doing well and ready for discharge on POD#6 with appropriate follow up. He will restart his Eliquis starting 7/12 PM dose.

## 2020-07-12 NOTE — DISCHARGE NOTE NURSING/CASE MANAGEMENT/SOCIAL WORK - PATIENT PORTAL LINK FT
You can access the FollowMyHealth Patient Portal offered by [facility] by registering at the following website: [email or web address]. By joining [employer]’s FollowMyHealth portal, you will also be able to view your health information using other applications (apps) compatible with our system.

## 2020-07-12 NOTE — PROGRESS NOTE ADULT - ATTENDING COMMENTS
Pt seen on rounds this afternoon.  Was OOB in a chair and looked good.  PO intake slowly improving and he has been drinking Glucerna supplements.  Glucoses still elevated to over 200 mg% > 50% of the time, partially due to improved PO intake.  Again somewhat surprisingly his insulin requirements have not shown the expected decrease has his sveta-operative stress is abating.  Will need insulin at discharge, and teaching was implemented.  Will increase his Lantus to 36 units, his premeal to 10 units.  Would plan to have him restart his metformin post discharge, however, and send him home on a somewhat lower dose of Lantus.
Pt seen at bedside, eating lunch, with fellow.  Scheduled for discharge today.  CAD s/p CABG, diabetes, hypothyroidism.  A1c 5.8%, so home regimen was adequate; hyperglycemia during admission likely due to stress of recent surgery.  Discussed importance of glucose control for promoting wound healing and preventing post op infection, so will recommend discharging with glargine, but anticipate he will be able to reduce and then discontinue in the next 2-3 weeks.   Restart metformin and Januvia at home. Advised pt to follow up with his primary doctor in 1 week following discharge.
Pt seen on rounds this afternoon.  PO intake is somewhat improved, but he is also supplementing with Glucerna.  Fingersticks decreased to 190-230 since last night.  Will increase the Lantus to 32 units, the lispro to 7 units (given his improved intake).  It appears that he will likely need to go home on insulin at this point.

## 2020-07-12 NOTE — DISCHARGE NOTE PROVIDER - CARE PROVIDER_API CALL
Javier Conde)  Thoracic and Cardiac Surgery  [address] [place] [address]  Phone: [phone]  Fax: [phone]  Scheduled Appointment: 07/20/2020 01:30 PM    Justin Muñiz  CARDIOVASCULAR DISEASE  [address] [place] [address]  Phone: [phone]  Fax: [phone]  Follow Up Time:     CYNTHIA CHANEY  Internal Medicine  [address] [place] [address]  Phone: [phone]  Fax: [phone]  Scheduled Appointment: 07/17/2020 03:00 PM

## 2020-07-12 NOTE — DISCHARGE NOTE PROVIDER - NSDCFUADDINST_GEN_ALL_CORE_FT
-Please follow up with Dr. Conde on 1:30 PM.  The office is located at [facility], 4th floor. Call us with any questions  #[phone].    - Please follow up with Dr. Dang as stated above.    - Please call Dr. Muñiz's office to confirm your appointment time.    - Please follow up with our Endocrinology clinic at [address] [place] [address], 8th [address]oor, [address]   Phone #[phone] within 2 weeks of discharge.     -Walk daily as tolerated and use your incentive spirometer every hour.    -No driving or strenuous activity/exercise for 6 weeks, or until  cleared by your surgeon.    -Gently clean your incisions with anti-bacterial soap and water, pat  dry.  You may leave them open to air. You may take a shower     -Call your doctor if you have shortness of breath, chest pain not  relieved by pain medication, dizziness, fever >101.5, or increased  redness or drainage from incisions.    - Please continue to place gauze on your right leg if you continue to have slight drainage. Please call if there is an increase in drainage or bleeding.    - Please continue to monitor your glucose and take your insulin as taught to you during your admission

## 2020-07-12 NOTE — DISCHARGE NOTE PROVIDER - NSDCMRMEDTOKEN_GEN_ALL_CORE_FT
aspirin 81 mg oral tablet: 1 tab(s) orally once a day  atorvastatin 80 mg oral tablet: 1 tab(s) orally once a day  enalapril 10 mg oral tablet: 1 tab(s) orally once a day  isosorbide mononitrate 30 mg oral tablet, extended release: 1 tab(s) orally once a day (in the morning)  Januvia 100 mg oral tablet: 1 tab(s) orally once a day  levothyroxine 200 mcg (0.2 mg) oral tablet: 1 tab(s) orally once a day  metFORMIN 500 mg oral tablet: 1 tab(s) orally 3 times a day   Metoprolol Tartrate 50 mg oral tablet: 1 tab(s) orally once a day   Percocet 5 mg-325 mg oral tablet: 1  tab(s) orally every 6 hours, As needed, Severe Pain (7 - 10) MDD:4 tabs  Plavix 75 mg oral tablet: 1 tab(s) orally once a day  Vitamin D3 50,000 intl units (1250 mcg) oral capsule: 1  orally alcohol swabs : Apply topically to affected area 4 times a day   amiodarone 200 mg oral tablet: 1 tab(s) orally once a day   Aspirin Enteric Coated 325 mg oral delayed release tablet: 1 tab(s) orally once a day  atorvastatin 80 mg oral tablet: 1 tab(s) orally once a day  Basaglar KwikPen 100 units/mL subcutaneous solution: 30 unit(s) subcutaneous once a day (at bedtime)   Eliquis 5 mg oral tablet: 1 tab(s) orally every 12 hours  furosemide 40 mg oral tablet: 1 tab(s) orally once a day  glucometer (per patient&#x27;s insurance): Test blood sugars four times a day. Dispense #1 glucometer.  Insulin Pen Needles, 4mm: 1 application subcutaneously 4 times a day. ** Use with insulin pen **   Januvia 100 mg oral tablet: 1 tab(s) orally once a day  Klor-Con 10 mEq oral tablet, extended release: 1 tab(s) orally every other day   lancets: 1 application subcutaneously 4 times a day   levothyroxine 175 mcg (0.175 mg) oral tablet: 1 tab(s) orally once a day  Lopressor 100 mg oral tablet: 1 tab(s) orally 2 times a day   metFORMIN 500 mg oral tablet: 1 tab(s) orally 3 times a day   Percocet 5 mg-325 mg oral tablet: 1  tab(s) orally every 6 hours, As needed, Severe Pain (7 - 10) MDD:4 tabs  Protonix 40 mg oral delayed release tablet: 1 tab(s) orally once a day (before a meal)  test strips (per patient&#x27;s insurance): 1 application subcutaneously 4 times a day. ** Compatible with patient&#x27;s glucometer **  Vitamin D3 50,000 intl units (1250 mcg) oral capsule: 1  orally

## 2020-07-12 NOTE — PROGRESS NOTE ADULT - PROVIDER SPECIALTY LIST ADULT
CT Surgery
Critical Care
Endocrinology
Critical Care
Endocrinology

## 2020-07-12 NOTE — DISCHARGE NOTE PROVIDER - PROVIDER TOKENS
PROVIDER:[TOKEN:[[id number]],SCHEDULEDAPPT:[07/20/2020],SCHEDULEDAPPTTIME:[01:30 PM]],PROVIDER:[TOKEN:[[id number]]],PROVIDER:[TOKEN:[[id number]],SCHEDULEDAPPT:[07/17/2020],SCHEDULEDAPPTTIME:[03:00 PM]]

## 2020-07-12 NOTE — CHART NOTE - NSCHARTNOTEFT_GEN_A_CORE
Called to perform bedside echo to evaluate for pericardial effusion. No obvious effusion noted on my limited assessment; no signs of echocardiographic tamponade.    --  Dominguez Ramirez MD  Cardiology PGY5

## 2020-07-13 PROCEDURE — 93306 TTE W/DOPPLER COMPLETE: CPT | Mod: 26

## 2020-07-13 RX ORDER — ATORVASTATIN CALCIUM 80 MG/1
80 TABLET, FILM COATED ORAL DAILY
Qty: 30 | Refills: 1 | Status: ACTIVE | COMMUNITY
Start: 2020-07-01

## 2020-07-14 DIAGNOSIS — I25.110 ATHEROSCLEROTIC HEART DISEASE OF NATIVE CORONARY ARTERY WITH UNSTABLE ANGINA PECTORIS: ICD-10-CM

## 2020-07-14 DIAGNOSIS — Z82.49 FAMILY HISTORY OF ISCHEMIC HEART DISEASE AND OTHER DISEASES OF THE CIRCULATORY SYSTEM: ICD-10-CM

## 2020-07-14 DIAGNOSIS — E78.5 HYPERLIPIDEMIA, UNSPECIFIED: ICD-10-CM

## 2020-07-14 DIAGNOSIS — R94.39 ABNORMAL RESULT OF OTHER CARDIOVASCULAR FUNCTION STUDY: ICD-10-CM

## 2020-07-14 DIAGNOSIS — I10 ESSENTIAL (PRIMARY) HYPERTENSION: ICD-10-CM

## 2020-07-15 ENCOUNTER — TRANSCRIPTION ENCOUNTER (OUTPATIENT)
Age: 65
End: 2020-07-15

## 2020-07-16 DIAGNOSIS — U07.1 COVID-19: ICD-10-CM

## 2020-07-16 RX ORDER — LEVOTHYROXINE SODIUM 175 UG/1
175 TABLET ORAL
Refills: 0 | Status: ACTIVE | COMMUNITY
Start: 2020-07-01

## 2020-07-16 RX ORDER — ENALAPRIL MALEATE 10 MG/1
10 TABLET ORAL DAILY
Refills: 0 | Status: COMPLETED | COMMUNITY
Start: 2020-07-01 | End: 2020-07-16

## 2020-07-16 RX ORDER — ISOSORBIDE MONONITRATE 30 MG/1
30 TABLET, EXTENDED RELEASE ORAL DAILY
Refills: 1 | Status: COMPLETED | COMMUNITY
Start: 2020-07-01 | End: 2020-07-16

## 2020-07-16 RX ORDER — CLOPIDOGREL 75 MG/1
75 TABLET, FILM COATED ORAL
Qty: 30 | Refills: 0 | Status: COMPLETED | COMMUNITY
Start: 2020-07-01 | End: 2020-07-16

## 2020-07-20 ENCOUNTER — TRANSCRIPTION ENCOUNTER (OUTPATIENT)
Age: 65
End: 2020-07-20

## 2020-07-20 ENCOUNTER — APPOINTMENT (OUTPATIENT)
Dept: CARDIOTHORACIC SURGERY | Facility: CLINIC | Age: 65
End: 2020-07-20

## 2020-07-20 ENCOUNTER — OUTPATIENT (OUTPATIENT)
Dept: OUTPATIENT SERVICES | Facility: HOSPITAL | Age: 65
LOS: 1 days | End: 2020-07-20
Payer: COMMERCIAL

## 2020-07-20 VITALS
HEIGHT: 68 IN | WEIGHT: 194 LBS | HEART RATE: 71 BPM | OXYGEN SATURATION: 98 % | SYSTOLIC BLOOD PRESSURE: 135 MMHG | BODY MASS INDEX: 29.4 KG/M2 | RESPIRATION RATE: 18 BRPM | TEMPERATURE: 96.8 F | DIASTOLIC BLOOD PRESSURE: 69 MMHG

## 2020-07-20 LAB
ALBUMIN SERPL ELPH-MCNC: 3.9 G/DL — SIGNIFICANT CHANGE UP (ref 3.3–5)
ALP SERPL-CCNC: 135 U/L — HIGH (ref 40–120)
ALT FLD-CCNC: 27 U/L — SIGNIFICANT CHANGE UP (ref 10–45)
ANION GAP SERPL CALC-SCNC: 13 MMOL/L — SIGNIFICANT CHANGE UP (ref 5–17)
AST SERPL-CCNC: 31 U/L — SIGNIFICANT CHANGE UP (ref 10–40)
BILIRUB SERPL-MCNC: 1.9 MG/DL — HIGH (ref 0.2–1.2)
BUN SERPL-MCNC: 33 MG/DL — HIGH (ref 7–23)
CALCIUM SERPL-MCNC: 9.5 MG/DL — SIGNIFICANT CHANGE UP (ref 8.4–10.5)
CHLORIDE SERPL-SCNC: 95 MMOL/L — LOW (ref 96–108)
CO2 SERPL-SCNC: 28 MMOL/L — SIGNIFICANT CHANGE UP (ref 22–31)
CREAT SERPL-MCNC: 1.06 MG/DL — SIGNIFICANT CHANGE UP (ref 0.5–1.3)
GLUCOSE SERPL-MCNC: 113 MG/DL — HIGH (ref 70–99)
POTASSIUM SERPL-MCNC: 4.7 MMOL/L — SIGNIFICANT CHANGE UP (ref 3.5–5.3)
POTASSIUM SERPL-SCNC: 4.7 MMOL/L — SIGNIFICANT CHANGE UP (ref 3.5–5.3)
PROT SERPL-MCNC: 7.8 G/DL — SIGNIFICANT CHANGE UP (ref 6–8.3)
SODIUM SERPL-SCNC: 136 MMOL/L — SIGNIFICANT CHANGE UP (ref 135–145)

## 2020-07-20 PROCEDURE — 36415 COLL VENOUS BLD VENIPUNCTURE: CPT

## 2020-07-20 PROCEDURE — 71046 X-RAY EXAM CHEST 2 VIEWS: CPT | Mod: 26

## 2020-07-20 PROCEDURE — 80053 COMPREHEN METABOLIC PANEL: CPT

## 2020-07-20 PROCEDURE — 71046 X-RAY EXAM CHEST 2 VIEWS: CPT

## 2020-07-27 DIAGNOSIS — E78.5 HYPERLIPIDEMIA, UNSPECIFIED: ICD-10-CM

## 2020-07-27 DIAGNOSIS — I25.84 CORONARY ATHEROSCLEROSIS DUE TO CALCIFIED CORONARY LESION: ICD-10-CM

## 2020-07-27 DIAGNOSIS — E83.51 HYPOCALCEMIA: ICD-10-CM

## 2020-07-27 DIAGNOSIS — I48.91 UNSPECIFIED ATRIAL FIBRILLATION: ICD-10-CM

## 2020-07-27 DIAGNOSIS — E03.9 HYPOTHYROIDISM, UNSPECIFIED: ICD-10-CM

## 2020-07-27 DIAGNOSIS — E87.2 ACIDOSIS: ICD-10-CM

## 2020-07-27 DIAGNOSIS — G89.12 ACUTE POST-THORACOTOMY PAIN: ICD-10-CM

## 2020-07-27 DIAGNOSIS — I42.9 CARDIOMYOPATHY, UNSPECIFIED: ICD-10-CM

## 2020-07-27 DIAGNOSIS — I25.119 ATHEROSCLEROTIC HEART DISEASE OF NATIVE CORONARY ARTERY WITH UNSPECIFIED ANGINA PECTORIS: ICD-10-CM

## 2020-07-27 DIAGNOSIS — E83.41 HYPERMAGNESEMIA: ICD-10-CM

## 2020-07-27 DIAGNOSIS — R76.0 RAISED ANTIBODY TITER: ICD-10-CM

## 2020-07-27 DIAGNOSIS — Z86.73 PERSONAL HISTORY OF TRANSIENT ISCHEMIC ATTACK (TIA), AND CEREBRAL INFARCTION WITHOUT RESIDUAL DEFICITS: ICD-10-CM

## 2020-07-27 DIAGNOSIS — Z79.84 LONG TERM (CURRENT) USE OF ORAL HYPOGLYCEMIC DRUGS: ICD-10-CM

## 2020-07-27 DIAGNOSIS — J90 PLEURAL EFFUSION, NOT ELSEWHERE CLASSIFIED: ICD-10-CM

## 2020-07-27 DIAGNOSIS — E83.42 HYPOMAGNESEMIA: ICD-10-CM

## 2020-07-27 DIAGNOSIS — I10 ESSENTIAL (PRIMARY) HYPERTENSION: ICD-10-CM

## 2020-07-27 DIAGNOSIS — D62 ACUTE POSTHEMORRHAGIC ANEMIA: ICD-10-CM

## 2020-07-27 DIAGNOSIS — R00.1 BRADYCARDIA, UNSPECIFIED: ICD-10-CM

## 2020-07-27 DIAGNOSIS — Z86.19 PERSONAL HISTORY OF OTHER INFECTIOUS AND PARASITIC DISEASES: ICD-10-CM

## 2020-07-27 DIAGNOSIS — E11.65 TYPE 2 DIABETES MELLITUS WITH HYPERGLYCEMIA: ICD-10-CM

## 2020-07-27 DIAGNOSIS — E83.39 OTHER DISORDERS OF PHOSPHORUS METABOLISM: ICD-10-CM

## 2020-07-31 ENCOUNTER — APPOINTMENT (OUTPATIENT)
Dept: CARDIOTHORACIC SURGERY | Facility: CLINIC | Age: 65
End: 2020-07-31
Payer: COMMERCIAL

## 2020-07-31 ENCOUNTER — OUTPATIENT (OUTPATIENT)
Dept: OUTPATIENT SERVICES | Facility: HOSPITAL | Age: 65
LOS: 1 days | End: 2020-07-31
Payer: COMMERCIAL

## 2020-07-31 VITALS
RESPIRATION RATE: 18 BRPM | WEIGHT: 188 LBS | DIASTOLIC BLOOD PRESSURE: 77 MMHG | BODY MASS INDEX: 28.49 KG/M2 | HEART RATE: 65 BPM | HEIGHT: 68 IN | SYSTOLIC BLOOD PRESSURE: 158 MMHG | TEMPERATURE: 96 F | OXYGEN SATURATION: 98 %

## 2020-07-31 DIAGNOSIS — I25.10 ATHEROSCLEROTIC HEART DISEASE OF NATIVE CORONARY ARTERY W/OUT ANGINA PECTORIS: ICD-10-CM

## 2020-07-31 PROCEDURE — 71046 X-RAY EXAM CHEST 2 VIEWS: CPT | Mod: 26

## 2020-07-31 PROCEDURE — 99024 POSTOP FOLLOW-UP VISIT: CPT

## 2020-07-31 PROCEDURE — 71046 X-RAY EXAM CHEST 2 VIEWS: CPT

## 2020-08-02 ENCOUNTER — TRANSCRIPTION ENCOUNTER (OUTPATIENT)
Age: 65
End: 2020-08-02

## 2020-08-02 VITALS
OXYGEN SATURATION: 99 % | HEART RATE: 61 BPM | RESPIRATION RATE: 18 BRPM | DIASTOLIC BLOOD PRESSURE: 78 MMHG | SYSTOLIC BLOOD PRESSURE: 158 MMHG | TEMPERATURE: 98 F

## 2020-08-02 LAB
ALBUMIN SERPL ELPH-MCNC: 4.1 G/DL — SIGNIFICANT CHANGE UP (ref 3.3–5)
ALP SERPL-CCNC: 132 U/L — HIGH (ref 40–120)
ALT FLD-CCNC: 29 U/L — SIGNIFICANT CHANGE UP (ref 10–45)
ANION GAP SERPL CALC-SCNC: 12 MMOL/L — SIGNIFICANT CHANGE UP (ref 5–17)
APTT BLD: 30.3 SEC — SIGNIFICANT CHANGE UP (ref 27.5–35.5)
AST SERPL-CCNC: 32 U/L — SIGNIFICANT CHANGE UP (ref 10–40)
BASOPHILS # BLD AUTO: 0.05 K/UL — SIGNIFICANT CHANGE UP (ref 0–0.2)
BASOPHILS NFR BLD AUTO: 0.3 % — SIGNIFICANT CHANGE UP (ref 0–2)
BILIRUB SERPL-MCNC: 1.2 MG/DL — SIGNIFICANT CHANGE UP (ref 0.2–1.2)
BUN SERPL-MCNC: 20 MG/DL — SIGNIFICANT CHANGE UP (ref 7–23)
CALCIUM SERPL-MCNC: 9.7 MG/DL — SIGNIFICANT CHANGE UP (ref 8.4–10.5)
CHLORIDE SERPL-SCNC: 92 MMOL/L — LOW (ref 96–108)
CO2 SERPL-SCNC: 28 MMOL/L — SIGNIFICANT CHANGE UP (ref 22–31)
CREAT SERPL-MCNC: 0.94 MG/DL — SIGNIFICANT CHANGE UP (ref 0.5–1.3)
EOSINOPHIL # BLD AUTO: 0.03 K/UL — SIGNIFICANT CHANGE UP (ref 0–0.5)
EOSINOPHIL NFR BLD AUTO: 0.2 % — SIGNIFICANT CHANGE UP (ref 0–6)
GLUCOSE SERPL-MCNC: 200 MG/DL — HIGH (ref 70–99)
HCT VFR BLD CALC: 37.9 % — LOW (ref 39–50)
HGB BLD-MCNC: 11.9 G/DL — LOW (ref 13–17)
IMM GRANULOCYTES NFR BLD AUTO: 0.4 % — SIGNIFICANT CHANGE UP (ref 0–1.5)
INR BLD: 1.47 — HIGH (ref 0.88–1.16)
LACTATE SERPL-SCNC: 1.3 MMOL/L — SIGNIFICANT CHANGE UP (ref 0.5–2)
LYMPHOCYTES # BLD AUTO: 1.07 K/UL — SIGNIFICANT CHANGE UP (ref 1–3.3)
LYMPHOCYTES # BLD AUTO: 7.3 % — LOW (ref 13–44)
MCHC RBC-ENTMCNC: 28.2 PG — SIGNIFICANT CHANGE UP (ref 27–34)
MCHC RBC-ENTMCNC: 31.4 GM/DL — LOW (ref 32–36)
MCV RBC AUTO: 89.8 FL — SIGNIFICANT CHANGE UP (ref 80–100)
MONOCYTES # BLD AUTO: 0.61 K/UL — SIGNIFICANT CHANGE UP (ref 0–0.9)
MONOCYTES NFR BLD AUTO: 4.2 % — SIGNIFICANT CHANGE UP (ref 2–14)
NEUTROPHILS # BLD AUTO: 12.82 K/UL — HIGH (ref 1.8–7.4)
NEUTROPHILS NFR BLD AUTO: 87.6 % — HIGH (ref 43–77)
NRBC # BLD: 0 /100 WBCS — SIGNIFICANT CHANGE UP (ref 0–0)
PLATELET # BLD AUTO: 257 K/UL — SIGNIFICANT CHANGE UP (ref 150–400)
POTASSIUM SERPL-MCNC: 4.2 MMOL/L — SIGNIFICANT CHANGE UP (ref 3.5–5.3)
POTASSIUM SERPL-SCNC: 4.2 MMOL/L — SIGNIFICANT CHANGE UP (ref 3.5–5.3)
PROT SERPL-MCNC: 8 G/DL — SIGNIFICANT CHANGE UP (ref 6–8.3)
PROTHROM AB SERPL-ACNC: 17.3 SEC — HIGH (ref 10.6–13.6)
RBC # BLD: 4.22 M/UL — SIGNIFICANT CHANGE UP (ref 4.2–5.8)
RBC # FLD: 13.8 % — SIGNIFICANT CHANGE UP (ref 10.3–14.5)
SODIUM SERPL-SCNC: 132 MMOL/L — LOW (ref 135–145)
WBC # BLD: 14.64 K/UL — HIGH (ref 3.8–10.5)
WBC # FLD AUTO: 14.64 K/UL — HIGH (ref 3.8–10.5)

## 2020-08-02 PROCEDURE — 71045 X-RAY EXAM CHEST 1 VIEW: CPT | Mod: 26

## 2020-08-02 RX ORDER — MORPHINE SULFATE 50 MG/1
4 CAPSULE, EXTENDED RELEASE ORAL ONCE
Refills: 0 | Status: DISCONTINUED | OUTPATIENT
Start: 2020-08-02 | End: 2020-08-02

## 2020-08-02 RX ORDER — ONDANSETRON 8 MG/1
4 TABLET, FILM COATED ORAL ONCE
Refills: 0 | Status: COMPLETED | OUTPATIENT
Start: 2020-08-02 | End: 2020-08-02

## 2020-08-02 RX ORDER — IOHEXOL 300 MG/ML
30 INJECTION, SOLUTION INTRAVENOUS ONCE
Refills: 0 | Status: COMPLETED | OUTPATIENT
Start: 2020-08-02 | End: 2020-08-02

## 2020-08-02 RX ADMIN — MORPHINE SULFATE 4 MILLIGRAM(S): 50 CAPSULE, EXTENDED RELEASE ORAL at 22:35

## 2020-08-02 RX ADMIN — ONDANSETRON 4 MILLIGRAM(S): 8 TABLET, FILM COATED ORAL at 21:56

## 2020-08-02 RX ADMIN — MORPHINE SULFATE 4 MILLIGRAM(S): 50 CAPSULE, EXTENDED RELEASE ORAL at 21:56

## 2020-08-02 RX ADMIN — MORPHINE SULFATE 4 MILLIGRAM(S): 50 CAPSULE, EXTENDED RELEASE ORAL at 22:21

## 2020-08-02 RX ADMIN — IOHEXOL 30 MILLILITER(S): 300 INJECTION, SOLUTION INTRAVENOUS at 22:12

## 2020-08-02 NOTE — ED CLERICAL - NS ED CLERK NOTE PRE-ARRIVAL INFORMATION; ADDITIONAL PRE-ARRIVAL INFORMATION
This patient is enrolled in the Follow Your Heart program and has undergone a cardiac surgery procedure and has active care navigation. This patient can be followed up by the care navigation team within 24 hours. To arrange close follow-up or to obtain additional clinical information about this patient, please call the contact number above.     Please call the cardiac surgery team once patient is registered at [phone] for consultation PRIOR to disposition decision.  The patient recently underwent a cardiac surgery procedure and the team can assist in acute medical management.

## 2020-08-02 NOTE — ED PROVIDER NOTE - CLINICAL SUMMARY MEDICAL DECISION MAKING FREE TEXT BOX
Pt w diffuse abdominal pain after lunch w emesis, no sob, chest pain, mainly in lower abdomen  consider colitis, SBO, other  - labs, CT, reassess  signed out to Dr Ken Grant

## 2020-08-02 NOTE — ED PROVIDER NOTE - PHYSICAL EXAMINATION
CONSTITUTIONAL: Awake, alert and in no apparent distress.  HEENT: Head is atraumatic. Eyes clear bilaterally, normal EOMI. Airway patent.  CARDIAC: Normal rate, regular rhythm.  Heart sounds S1, S2.   RESPIRATORY: Breath sounds clear and equal bilaterally. no tachypnea, respiratory distress.   GASTROINTESTINAL: Abdomen soft, diffuse tender no guarding, distension.  MUSCULOSKELETAL: Spine appears normal, no midline spinal tenderness, range of motion is not limited, no muscle or joint tenderness. no bony tenderness.    exam wnl  NEUROLOGICAL: Alert, no focal deficits, no motor or sensory deficits.  SKIN: Skin normal color for race, warm, dry and intact. No evidence of rash.  PSYCHIATRIC: Normal mood and affect. no apparent risk to self or others.

## 2020-08-02 NOTE — ED PROVIDER NOTE - OBJECTIVE STATEMENT
63 y/o M with a PMHx TIA, HTN, HLD, DM, CABG with one day of diffuse abdominal pain after eating lunch and assoc nbnb emesis. Denies f/c, NVD, black/bloody stool, urinary complaints, focal weakness/numbness, lightheadedness, back pain, testicular/penile pain, rashes, recent travel, recent antibiotics, sick contacts, SOB/CP, URI symptoms. not passing flatus, last BM this am normal. no prior abdominal surgeries. 63 y/o M with a PMHx TIA, HTN, HLD, DM, CABG with one day of diffuse abdominal pain after eating lunch and assoc nbnb emesis. Denies f/c, black/bloody stool, urinary complaints, focal weakness/numbness, lightheadedness, back pain, testicular/penile pain, rashes, recent travel, recent antibiotics, sick contacts, SOB/CP, URI symptoms. not passing flatus, last BM this am normal. no prior abdominal surgeries.

## 2020-08-02 NOTE — ED PROVIDER NOTE - PATIENT PORTAL LINK FT
You can access the FollowMyHealth Patient Portal offered by [facility] by registering at the following website: [email or web address]. By joining [employer]’s FollowMyHealth portal, you will also be able to view your health information using other applications (apps) compatible with our system.

## 2020-08-02 NOTE — ED ADULT NURSE NOTE - OBJECTIVE STATEMENT
Pt reports abd pain, N/V since 1200p today. Pt reports he is unable to keep food/fluids down and is unable to take his medications. Pt reports abd pain is sharp and constant. Pt denies any diarrhea or urinary symptoms. Pt had triple bypass sx a few weeks ago. Pt denies CP/SOB, no cough, no fever/chills, no known exposure to covid. Pt Aox4. Ambulatory with steady gait.

## 2020-08-03 ENCOUNTER — INPATIENT (INPATIENT)
Facility: HOSPITAL | Age: 65
LOS: 2 days | Discharge: HOME | DRG: 445 | End: 2020-08-06
Attending: SURGERY | Admitting: SURGERY
Payer: COMMERCIAL

## 2020-08-03 DIAGNOSIS — E78.5 HYPERLIPIDEMIA, UNSPECIFIED: ICD-10-CM

## 2020-08-03 DIAGNOSIS — Z79.82 LONG TERM (CURRENT) USE OF ASPIRIN: ICD-10-CM

## 2020-08-03 DIAGNOSIS — Z86.73 PERSONAL HISTORY OF TRANSIENT ISCHEMIC ATTACK (TIA), AND CEREBRAL INFARCTION WITHOUT RESIDUAL DEFICITS: ICD-10-CM

## 2020-08-03 DIAGNOSIS — I10 ESSENTIAL (PRIMARY) HYPERTENSION: ICD-10-CM

## 2020-08-03 DIAGNOSIS — D72.829 ELEVATED WHITE BLOOD CELL COUNT, UNSPECIFIED: ICD-10-CM

## 2020-08-03 DIAGNOSIS — E87.8 OTHER DISORDERS OF ELECTROLYTE AND FLUID BALANCE, NOT ELSEWHERE CLASSIFIED: ICD-10-CM

## 2020-08-03 DIAGNOSIS — E11.9 TYPE 2 DIABETES MELLITUS WITHOUT COMPLICATIONS: ICD-10-CM

## 2020-08-03 DIAGNOSIS — R10.11 RIGHT UPPER QUADRANT PAIN: ICD-10-CM

## 2020-08-03 DIAGNOSIS — K80.50 CALCULUS OF BILE DUCT WITHOUT CHOLANGITIS OR CHOLECYSTITIS WITHOUT OBSTRUCTION: ICD-10-CM

## 2020-08-03 DIAGNOSIS — E03.9 HYPOTHYROIDISM, UNSPECIFIED: ICD-10-CM

## 2020-08-03 DIAGNOSIS — I48.11 LONGSTANDING PERSISTENT ATRIAL FIBRILLATION: ICD-10-CM

## 2020-08-03 LAB
ALBUMIN SERPL ELPH-MCNC: 4 G/DL — SIGNIFICANT CHANGE UP (ref 3.3–5)
ALP SERPL-CCNC: 123 U/L — HIGH (ref 40–120)
ALT FLD-CCNC: 58 U/L — HIGH (ref 10–45)
ANION GAP SERPL CALC-SCNC: 12 MMOL/L — SIGNIFICANT CHANGE UP (ref 5–17)
APPEARANCE UR: CLEAR — SIGNIFICANT CHANGE UP
AST SERPL-CCNC: 77 U/L — HIGH (ref 10–40)
BILIRUB DIRECT SERPL-MCNC: 0.2 MG/DL — SIGNIFICANT CHANGE UP (ref 0–0.2)
BILIRUB INDIRECT FLD-MCNC: 1.2 MG/DL — HIGH (ref 0.2–1)
BILIRUB SERPL-MCNC: 1.4 MG/DL — HIGH (ref 0.2–1.2)
BILIRUB UR-MCNC: NEGATIVE — SIGNIFICANT CHANGE UP
BUN SERPL-MCNC: 18 MG/DL — SIGNIFICANT CHANGE UP (ref 7–23)
CALCIUM SERPL-MCNC: 9.8 MG/DL — SIGNIFICANT CHANGE UP (ref 8.4–10.5)
CHLORIDE SERPL-SCNC: 92 MMOL/L — LOW (ref 96–108)
CO2 SERPL-SCNC: 28 MMOL/L — SIGNIFICANT CHANGE UP (ref 22–31)
COLOR SPEC: YELLOW — SIGNIFICANT CHANGE UP
CREAT SERPL-MCNC: 0.95 MG/DL — SIGNIFICANT CHANGE UP (ref 0.5–1.3)
DIFF PNL FLD: ABNORMAL
GLUCOSE BLDC GLUCOMTR-MCNC: 166 MG/DL — HIGH (ref 70–99)
GLUCOSE BLDC GLUCOMTR-MCNC: 171 MG/DL — HIGH (ref 70–99)
GLUCOSE BLDC GLUCOMTR-MCNC: 217 MG/DL — HIGH (ref 70–99)
GLUCOSE BLDC GLUCOMTR-MCNC: 253 MG/DL — HIGH (ref 70–99)
GLUCOSE SERPL-MCNC: 234 MG/DL — HIGH (ref 70–99)
GLUCOSE UR QL: NEGATIVE — SIGNIFICANT CHANGE UP
HCT VFR BLD CALC: 37.4 % — LOW (ref 39–50)
HGB BLD-MCNC: 12.1 G/DL — LOW (ref 13–17)
KETONES UR-MCNC: NEGATIVE — SIGNIFICANT CHANGE UP
LEUKOCYTE ESTERASE UR-ACNC: NEGATIVE — SIGNIFICANT CHANGE UP
MAGNESIUM SERPL-MCNC: 1.4 MG/DL — LOW (ref 1.6–2.6)
MCHC RBC-ENTMCNC: 28.6 PG — SIGNIFICANT CHANGE UP (ref 27–34)
MCHC RBC-ENTMCNC: 32.4 GM/DL — SIGNIFICANT CHANGE UP (ref 32–36)
MCV RBC AUTO: 88.4 FL — SIGNIFICANT CHANGE UP (ref 80–100)
NITRITE UR-MCNC: NEGATIVE — SIGNIFICANT CHANGE UP
NRBC # BLD: 0 /100 WBCS — SIGNIFICANT CHANGE UP (ref 0–0)
PH UR: 6 — SIGNIFICANT CHANGE UP (ref 5–8)
PHOSPHATE SERPL-MCNC: 4 MG/DL — SIGNIFICANT CHANGE UP (ref 2.5–4.5)
PLATELET # BLD AUTO: 271 K/UL — SIGNIFICANT CHANGE UP (ref 150–400)
POTASSIUM SERPL-MCNC: 4.9 MMOL/L — SIGNIFICANT CHANGE UP (ref 3.5–5.3)
POTASSIUM SERPL-SCNC: 4.9 MMOL/L — SIGNIFICANT CHANGE UP (ref 3.5–5.3)
PROT SERPL-MCNC: 7.8 G/DL — SIGNIFICANT CHANGE UP (ref 6–8.3)
PROT UR-MCNC: 100 MG/DL
RBC # BLD: 4.23 M/UL — SIGNIFICANT CHANGE UP (ref 4.2–5.8)
RBC # FLD: 14.2 % — SIGNIFICANT CHANGE UP (ref 10.3–14.5)
SARS-COV-2 RNA SPEC QL NAA+PROBE: SIGNIFICANT CHANGE UP
SODIUM SERPL-SCNC: 132 MMOL/L — LOW (ref 135–145)
SP GR SPEC: 1.02 — SIGNIFICANT CHANGE UP (ref 1–1.03)
UROBILINOGEN FLD QL: 0.2 E.U./DL — SIGNIFICANT CHANGE UP
WBC # BLD: 23.27 K/UL — HIGH (ref 3.8–10.5)
WBC # FLD AUTO: 23.27 K/UL — HIGH (ref 3.8–10.5)

## 2020-08-03 PROCEDURE — 99233 SBSQ HOSP IP/OBS HIGH 50: CPT

## 2020-08-03 PROCEDURE — 99233 SBSQ HOSP IP/OBS HIGH 50: CPT | Mod: GC

## 2020-08-03 PROCEDURE — 71045 X-RAY EXAM CHEST 1 VIEW: CPT | Mod: 26

## 2020-08-03 PROCEDURE — 93010 ELECTROCARDIOGRAM REPORT: CPT

## 2020-08-03 PROCEDURE — 74177 CT ABD & PELVIS W/CONTRAST: CPT | Mod: 26

## 2020-08-03 PROCEDURE — 76705 ECHO EXAM OF ABDOMEN: CPT | Mod: 26

## 2020-08-03 PROCEDURE — 99024 POSTOP FOLLOW-UP VISIT: CPT

## 2020-08-03 PROCEDURE — 99285 EMERGENCY DEPT VISIT HI MDM: CPT | Mod: CR

## 2020-08-03 RX ORDER — LEVOTHYROXINE SODIUM 125 MCG
175 TABLET ORAL DAILY
Refills: 0 | Status: DISCONTINUED | OUTPATIENT
Start: 2020-08-03 | End: 2020-08-06

## 2020-08-03 RX ORDER — SODIUM CHLORIDE 9 MG/ML
1000 INJECTION, SOLUTION INTRAVENOUS
Refills: 0 | Status: DISCONTINUED | OUTPATIENT
Start: 2020-08-03 | End: 2020-08-03

## 2020-08-03 RX ORDER — GLUCAGON INJECTION, SOLUTION 0.5 MG/.1ML
1 INJECTION, SOLUTION SUBCUTANEOUS ONCE
Refills: 0 | Status: DISCONTINUED | OUTPATIENT
Start: 2020-08-03 | End: 2020-08-06

## 2020-08-03 RX ORDER — INSULIN GLARGINE 100 [IU]/ML
15 INJECTION, SOLUTION SUBCUTANEOUS AT BEDTIME
Refills: 0 | Status: DISCONTINUED | OUTPATIENT
Start: 2020-08-03 | End: 2020-08-05

## 2020-08-03 RX ORDER — METRONIDAZOLE 500 MG
500 TABLET ORAL EVERY 8 HOURS
Refills: 0 | Status: DISCONTINUED | OUTPATIENT
Start: 2020-08-03 | End: 2020-08-06

## 2020-08-03 RX ORDER — ONDANSETRON 8 MG/1
4 TABLET, FILM COATED ORAL EVERY 6 HOURS
Refills: 0 | Status: DISCONTINUED | OUTPATIENT
Start: 2020-08-03 | End: 2020-08-06

## 2020-08-03 RX ORDER — AMIODARONE HYDROCHLORIDE 400 MG/1
200 TABLET ORAL DAILY
Refills: 0 | Status: DISCONTINUED | OUTPATIENT
Start: 2020-08-03 | End: 2020-08-06

## 2020-08-03 RX ORDER — ASPIRIN 500 MG
325 TABLET ORAL
Refills: 0 | Status: ACTIVE | COMMUNITY
Start: 2020-07-01

## 2020-08-03 RX ORDER — MAGNESIUM SULFATE 500 MG/ML
2 VIAL (ML) INJECTION ONCE
Refills: 0 | Status: COMPLETED | OUTPATIENT
Start: 2020-08-03 | End: 2020-08-03

## 2020-08-03 RX ORDER — MORPHINE SULFATE 50 MG/1
4 CAPSULE, EXTENDED RELEASE ORAL ONCE
Refills: 0 | Status: DISCONTINUED | OUTPATIENT
Start: 2020-08-03 | End: 2020-08-03

## 2020-08-03 RX ORDER — METOPROLOL TARTRATE 50 MG
100 TABLET ORAL
Refills: 0 | Status: DISCONTINUED | OUTPATIENT
Start: 2020-08-03 | End: 2020-08-06

## 2020-08-03 RX ORDER — DEXTROSE 50 % IN WATER 50 %
15 SYRINGE (ML) INTRAVENOUS ONCE
Refills: 0 | Status: DISCONTINUED | OUTPATIENT
Start: 2020-08-03 | End: 2020-08-06

## 2020-08-03 RX ORDER — INSULIN LISPRO 100/ML
VIAL (ML) SUBCUTANEOUS
Refills: 0 | Status: DISCONTINUED | OUTPATIENT
Start: 2020-08-03 | End: 2020-08-06

## 2020-08-03 RX ORDER — PANTOPRAZOLE SODIUM 20 MG/1
40 TABLET, DELAYED RELEASE ORAL
Refills: 0 | Status: DISCONTINUED | OUTPATIENT
Start: 2020-08-03 | End: 2020-08-06

## 2020-08-03 RX ORDER — SODIUM CHLORIDE 9 MG/ML
1000 INJECTION, SOLUTION INTRAVENOUS
Refills: 0 | Status: DISCONTINUED | OUTPATIENT
Start: 2020-08-03 | End: 2020-08-06

## 2020-08-03 RX ORDER — HYDROMORPHONE HYDROCHLORIDE 2 MG/ML
0.5 INJECTION INTRAMUSCULAR; INTRAVENOUS; SUBCUTANEOUS EVERY 4 HOURS
Refills: 0 | Status: DISCONTINUED | OUTPATIENT
Start: 2020-08-03 | End: 2020-08-05

## 2020-08-03 RX ORDER — ATORVASTATIN CALCIUM 80 MG/1
80 TABLET, FILM COATED ORAL AT BEDTIME
Refills: 0 | Status: DISCONTINUED | OUTPATIENT
Start: 2020-08-03 | End: 2020-08-06

## 2020-08-03 RX ORDER — SODIUM CHLORIDE 9 MG/ML
1000 INJECTION INTRAMUSCULAR; INTRAVENOUS; SUBCUTANEOUS
Refills: 0 | Status: DISCONTINUED | OUTPATIENT
Start: 2020-08-03 | End: 2020-08-06

## 2020-08-03 RX ORDER — HEPARIN SODIUM 5000 [USP'U]/ML
5000 INJECTION INTRAVENOUS; SUBCUTANEOUS EVERY 8 HOURS
Refills: 0 | Status: DISCONTINUED | OUTPATIENT
Start: 2020-08-03 | End: 2020-08-04

## 2020-08-03 RX ORDER — ASPIRIN/CALCIUM CARB/MAGNESIUM 324 MG
325 TABLET ORAL DAILY
Refills: 0 | Status: DISCONTINUED | OUTPATIENT
Start: 2020-08-03 | End: 2020-08-04

## 2020-08-03 RX ORDER — DEXTROSE 50 % IN WATER 50 %
25 SYRINGE (ML) INTRAVENOUS ONCE
Refills: 0 | Status: DISCONTINUED | OUTPATIENT
Start: 2020-08-03 | End: 2020-08-06

## 2020-08-03 RX ORDER — HYDROMORPHONE HYDROCHLORIDE 2 MG/ML
1 INJECTION INTRAMUSCULAR; INTRAVENOUS; SUBCUTANEOUS EVERY 4 HOURS
Refills: 0 | Status: DISCONTINUED | OUTPATIENT
Start: 2020-08-03 | End: 2020-08-05

## 2020-08-03 RX ORDER — CEFTRIAXONE 500 MG/1
1000 INJECTION, POWDER, FOR SOLUTION INTRAMUSCULAR; INTRAVENOUS EVERY 24 HOURS
Refills: 0 | Status: DISCONTINUED | OUTPATIENT
Start: 2020-08-03 | End: 2020-08-06

## 2020-08-03 RX ORDER — HYDROMORPHONE HYDROCHLORIDE 2 MG/ML
0.5 INJECTION INTRAMUSCULAR; INTRAVENOUS; SUBCUTANEOUS ONCE
Refills: 0 | Status: DISCONTINUED | OUTPATIENT
Start: 2020-08-03 | End: 2020-08-03

## 2020-08-03 RX ORDER — DEXTROSE 50 % IN WATER 50 %
12.5 SYRINGE (ML) INTRAVENOUS ONCE
Refills: 0 | Status: DISCONTINUED | OUTPATIENT
Start: 2020-08-03 | End: 2020-08-06

## 2020-08-03 RX ADMIN — HYDROMORPHONE HYDROCHLORIDE 1 MILLIGRAM(S): 2 INJECTION INTRAMUSCULAR; INTRAVENOUS; SUBCUTANEOUS at 14:00

## 2020-08-03 RX ADMIN — Medication 100 MILLIGRAM(S): at 21:56

## 2020-08-03 RX ADMIN — Medication 100 MILLIGRAM(S): at 13:38

## 2020-08-03 RX ADMIN — CEFTRIAXONE 100 MILLIGRAM(S): 500 INJECTION, POWDER, FOR SOLUTION INTRAMUSCULAR; INTRAVENOUS at 05:58

## 2020-08-03 RX ADMIN — Medication 1: at 16:52

## 2020-08-03 RX ADMIN — MORPHINE SULFATE 4 MILLIGRAM(S): 50 CAPSULE, EXTENDED RELEASE ORAL at 01:26

## 2020-08-03 RX ADMIN — PANTOPRAZOLE SODIUM 40 MILLIGRAM(S): 20 TABLET, DELAYED RELEASE ORAL at 06:06

## 2020-08-03 RX ADMIN — Medication 1: at 21:55

## 2020-08-03 RX ADMIN — MORPHINE SULFATE 4 MILLIGRAM(S): 50 CAPSULE, EXTENDED RELEASE ORAL at 01:16

## 2020-08-03 RX ADMIN — SODIUM CHLORIDE 80 MILLILITER(S): 9 INJECTION INTRAMUSCULAR; INTRAVENOUS; SUBCUTANEOUS at 15:51

## 2020-08-03 RX ADMIN — Medication 100 MILLIGRAM(S): at 05:58

## 2020-08-03 RX ADMIN — Medication 175 MICROGRAM(S): at 06:44

## 2020-08-03 RX ADMIN — HYDROMORPHONE HYDROCHLORIDE 1 MILLIGRAM(S): 2 INJECTION INTRAMUSCULAR; INTRAVENOUS; SUBCUTANEOUS at 13:37

## 2020-08-03 RX ADMIN — HYDROMORPHONE HYDROCHLORIDE 0.5 MILLIGRAM(S): 2 INJECTION INTRAMUSCULAR; INTRAVENOUS; SUBCUTANEOUS at 22:06

## 2020-08-03 RX ADMIN — ATORVASTATIN CALCIUM 80 MILLIGRAM(S): 80 TABLET, FILM COATED ORAL at 21:55

## 2020-08-03 RX ADMIN — HYDROMORPHONE HYDROCHLORIDE 1 MILLIGRAM(S): 2 INJECTION INTRAMUSCULAR; INTRAVENOUS; SUBCUTANEOUS at 04:25

## 2020-08-03 RX ADMIN — HYDROMORPHONE HYDROCHLORIDE 0.5 MILLIGRAM(S): 2 INJECTION INTRAMUSCULAR; INTRAVENOUS; SUBCUTANEOUS at 16:21

## 2020-08-03 RX ADMIN — HYDROMORPHONE HYDROCHLORIDE 1 MILLIGRAM(S): 2 INJECTION INTRAMUSCULAR; INTRAVENOUS; SUBCUTANEOUS at 10:26

## 2020-08-03 RX ADMIN — Medication 50 GRAM(S): at 12:47

## 2020-08-03 RX ADMIN — ONDANSETRON 4 MILLIGRAM(S): 8 TABLET, FILM COATED ORAL at 04:37

## 2020-08-03 RX ADMIN — HEPARIN SODIUM 5000 UNIT(S): 5000 INJECTION INTRAVENOUS; SUBCUTANEOUS at 13:37

## 2020-08-03 RX ADMIN — Medication 3: at 06:44

## 2020-08-03 RX ADMIN — Medication 325 MILLIGRAM(S): at 11:56

## 2020-08-03 RX ADMIN — HYDROMORPHONE HYDROCHLORIDE 0.5 MILLIGRAM(S): 2 INJECTION INTRAMUSCULAR; INTRAVENOUS; SUBCUTANEOUS at 16:32

## 2020-08-03 RX ADMIN — AMIODARONE HYDROCHLORIDE 200 MILLIGRAM(S): 400 TABLET ORAL at 05:58

## 2020-08-03 RX ADMIN — INSULIN GLARGINE 15 UNIT(S): 100 INJECTION, SOLUTION SUBCUTANEOUS at 21:55

## 2020-08-03 RX ADMIN — Medication 2: at 11:57

## 2020-08-03 RX ADMIN — HEPARIN SODIUM 5000 UNIT(S): 5000 INJECTION INTRAVENOUS; SUBCUTANEOUS at 05:58

## 2020-08-03 RX ADMIN — HYDROMORPHONE HYDROCHLORIDE 1 MILLIGRAM(S): 2 INJECTION INTRAMUSCULAR; INTRAVENOUS; SUBCUTANEOUS at 04:09

## 2020-08-03 RX ADMIN — HEPARIN SODIUM 5000 UNIT(S): 5000 INJECTION INTRAVENOUS; SUBCUTANEOUS at 21:55

## 2020-08-03 RX ADMIN — HYDROMORPHONE HYDROCHLORIDE 1 MILLIGRAM(S): 2 INJECTION INTRAMUSCULAR; INTRAVENOUS; SUBCUTANEOUS at 09:41

## 2020-08-03 NOTE — CONSULT NOTE ADULT - SUBJECTIVE AND OBJECTIVE BOX
Patient is a 64y old  Male who presents with a chief complaint of RUQ abdominal pain (03 Aug 2020 10:49)      HPI:  64M w/ PMHx of HTN, HLD, DM, TIA (s/p plavix), A fib (on eliquis, last dose 8/2), CAD s/p CABGx3 on 7/6/20 w/ Dr. Conde presents with 1 day of RUQ abdominal pain. Patient reports that the pain started yesterday after eating lunch. It is sharp, 10/10 at its worst, radiates to the back, and more or less localized to the RUQ. Associated with nausea and NBNB emesis x2. He has never had pain like this before. No fevers, chills, CP, SOB, dizziness, lightheadedness, dysuria, constipation, diarrhea.     Subjective:  Pt reports that his pain has improved. Still has some epigastric and RUQ pain. ROS is otherwise negative.     Home meds: amiodarone 200mg qd,  qd, atorvastatin 80 qd, eliquis 5mg BID, lasix 40 qd, Januvia 100 qd, levothyroxine 175 qd, lopressor 100 qd, metformin 500 TID, protonix 40 qd, vitamin D       Allergies    No Known Allergies    Intolerances        MEDICATIONS  (STANDING):  aMIOdarone    Tablet 200 milliGRAM(s) Oral daily  aspirin enteric coated 325 milliGRAM(s) Oral daily  atorvastatin 80 milliGRAM(s) Oral at bedtime  cefTRIAXone   IVPB 1000 milliGRAM(s) IV Intermittent every 24 hours  dextrose 5%. 1000 milliLiter(s) (50 mL/Hr) IV Continuous <Continuous>  dextrose 50% Injectable 12.5 Gram(s) IV Push once  dextrose 50% Injectable 25 Gram(s) IV Push once  heparin   Injectable 5000 Unit(s) SubCutaneous every 8 hours  insulin glargine Injectable (LANTUS) 15 Unit(s) SubCutaneous at bedtime  insulin lispro (HumaLOG) corrective regimen sliding scale   SubCutaneous Before meals and at bedtime  levothyroxine 175 MICROGram(s) Oral daily  metoprolol tartrate 100 milliGRAM(s) Oral two times a day  metroNIDAZOLE  IVPB 500 milliGRAM(s) IV Intermittent every 8 hours  pantoprazole    Tablet 40 milliGRAM(s) Oral before breakfast  sodium chloride 0.9%. 1000 milliLiter(s) (80 mL/Hr) IV Continuous <Continuous>    MEDICATIONS  (PRN):  dextrose 40% Gel 15 Gram(s) Oral once PRN Blood Glucose LESS THAN 70 milliGRAM(s)/deciliter  glucagon  Injectable 1 milliGRAM(s) IntraMuscular once PRN Glucose LESS THAN 70 milligrams/deciliter  HYDROmorphone  Injectable 0.5 milliGRAM(s) IV Push every 4 hours PRN Moderate Pain (4 - 6)  HYDROmorphone  Injectable 1 milliGRAM(s) IV Push every 4 hours PRN Severe Pain (7 - 10)  ondansetron Injectable 4 milliGRAM(s) IV Push every 6 hours PRN Nausea      Drug Dosing Weight  Height (cm): 175.3 (03 Aug 2020 08:21)  Weight (kg): 85.3 (03 Aug 2020 08:21)  BMI (kg/m2): 27.8 (03 Aug 2020 08:21)  BSA (m2): 2.01 (03 Aug 2020 08:21)    PAST MEDICAL & SURGICAL HISTORY:  History of TIAs  Hypothyroidism  H/O cardiomyopathy  DM (diabetes mellitus)  HLD (hyperlipidemia)  HTN (hypertension)  No significant past surgical history      FAMILY HISTORY:  FH: heart failure      SOCIAL HISTORY:  never smoker, no EtOH use, no drug use    ADVANCE DIRECTIVES:    Vital Signs Last 24 Hrs  T(C): 36.7 (03 Aug 2020 13:30), Max: 36.8 (03 Aug 2020 05:42)  T(F): 98 (03 Aug 2020 13:30), Max: 98.2 (03 Aug 2020 05:42)  HR: 64 (03 Aug 2020 16:00) (61 - 74)  BP: 123/71 (03 Aug 2020 16:00) (114/64 - 175/83)  BP(mean): 90 (03 Aug 2020 16:00) (82 - 99)  ABP: --  ABP(mean): --  RR: 17 (03 Aug 2020 16:00) (16 - 18)  SpO2: 93% (03 Aug 2020 16:00) (92% - 100%)          I&O's Detail    02 Aug 2020 07:01  -  03 Aug 2020 07:00  --------------------------------------------------------  IN:    IV PiggyBack: 150 mL  Total IN: 150 mL    OUT:  Total OUT: 0 mL    Total NET: 150 mL      03 Aug 2020 07:01  -  03 Aug 2020 17:15  --------------------------------------------------------  IN:    lactated ringers.: 480 mL  Total IN: 480 mL    OUT:  Total OUT: 0 mL    Total NET: 480 mL          PHYSICAL EXAM:      Constitutional: NAD  Eyes: PERRLA  ENMT: MMM  Neck: supple  Back: midline  Respiratory: CTA b/l  Cardiovascular: rrr, s1s2, no m/r/g  Gastrointestinal: soft, + Snyder's sign, + BS  Extremities: wwp  Vascular: + 2 pulses DP/TP  Neurological: AAO x 4  Skin: healing midline incision  Lymph Nodes: no LAD  Musculoskeletal: no joint swelling  Psychiatric: normal affect      LABS:  CBC Full  -  ( 03 Aug 2020 10:20 )  WBC Count : 23.27 K/uL  RBC Count : 4.23 M/uL  Hemoglobin : 12.1 g/dL  Hematocrit : 37.4 %  Platelet Count - Automated : 271 K/uL  Mean Cell Volume : 88.4 fl  Mean Cell Hemoglobin : 28.6 pg  Mean Cell Hemoglobin Concentration : 32.4 gm/dL  Auto Neutrophil # : x  Auto Lymphocyte # : x  Auto Monocyte # : x  Auto Eosinophil # : x  Auto Basophil # : x  Auto Neutrophil % : x  Auto Lymphocyte % : x  Auto Monocyte % : x  Auto Eosinophil % : x  Auto Basophil % : x    08-03    132<L>  |  92<L>  |  18  ----------------------------<  234<H>  4.9   |  28  |  0.95    Ca    9.8      03 Aug 2020 10:20  Phos  4.0     08-03  Mg     1.4     08-03    TPro  7.8  /  Alb  4.0  /  TBili  1.4<H>  /  DBili  0.2  /  AST  77<H>  /  ALT  58<H>  /  AlkPhos  123<H>  08-03    CAPILLARY BLOOD GLUCOSE      POCT Blood Glucose.: 171 mg/dL (03 Aug 2020 16:37)    PT/INR - ( 02 Aug 2020 21:27 )   PT: 17.3 sec;   INR: 1.47          PTT - ( 02 Aug 2020 21:27 )  PTT:30.3 sec      EKG:    ECHO, US:  < from: TTE Echo Complete w/o Contrast w/ Doppler (07.12.20 @ 14:47) >  CONCLUSIONS:     1. Limited study obtained for evaluation of left ventricular function performed by cardiology fellow on call.   2. There is mild concentric left ventricular hypertrophy. The left ventricle is normal in size and systolic function with a calculated ejection fraction of 65%. There are no regional wall motion abnormalities seen.   3. The right atrium is mildly dilated.   4. The right ventricle appears mildly dilated. RV systolic function is normal.   5. There is mild mitral annular calcification. There is trace mitral regurgitation.   6. The aortic root is normal in size.   7. No pericardial effusion.    < end of copied text >      RADIOLOGY:  < from: CT Abdomen and Pelvis w/ Oral Cont and w/ IV Cont (08.03.20 @ 00:03) >  Lower chest: Trace bilateral pleural effusions. Basilar atelectasis. Cardiomegaly. Severe coronary artery and mitral annulus calcifications. Trace pericardial effusion.    Liver: Smooth in contour. No focal mass. Portal and hepatic veins are patent. Periportal edema. Small perihepatic ascites.    Biliary system: Mildly distended gallbladder without evidence of cholelithiasis, wall thickening or pericholecystic fluid. The common bile duct is dilated to 9 mm. No intrahepatic biliary ductal dilatation.    Pancreas: Unremarkable.    Spleen: Unremarkable.    Adrenal glands: Small indeterminant left adrenal nodule. Unremarkable right adrenal gland.    Kidneys: Symmetric parenchymal enhancement. No renal mass. No hydronephrosis. No renal or ureteral stone. Small left renal cyst.  Urinary Bladder: Distended.    Reproductive organs: Enlarged prostate measuring 5 x 4 x 4 cm.    Bowel/Peritoneum: Normal caliber without evidence of obstruction. No appreciable wall thickening. Normal appendix. No extraluminal gas.    Lymph nodes: No lymphadenopathy.    Aorta/IVC: Normal caliber. Moderate atherosclerotic calcifications of the abdominal aorta and its branches.    Abdominal wall: Small fat-containing umbilical hernia.    Bones/Soft tissues: Levoscoliosis of the lumbar spine. Mild degenerative changes of the thoracic and lumbar spines.      IMPRESSION:  Findings suggestive of choledocholithiasis. Consider MRCP for further characterization.    IRafat MD, have reviewed the images and the report and agree with the findings, with the following modification: The following also noted: Status post median sternotomy with traversing wires. Mild inflammatory fat stranding/fluid as well as few foci of gas overlying the sternotomy site. Findings may be related to postsurgical changes versus cellulitis with possible phlegmon.    Gallbladderis distended with no stones identified; however, there is mild pericholecystic inflammatory fat stranding. Correlate for acute cholecystitis and suggest further evaluation with ultrasound.    < end of copied text >

## 2020-08-03 NOTE — CONSULT NOTE ADULT - PROBLEM SELECTOR PROBLEM 7
Normal vision: sees adequately in most situations; can see medication labels, newsprint
Hypothyroidism

## 2020-08-03 NOTE — CONSULT NOTE ADULT - ATTENDING COMMENTS
Please refer to CVD fellow note written above for details.  I reviewed the fellow's documentation, and I have personally seen and examined the patient. I have reviewed vitals, labs, medications, cardiac studies and additional imaging. I agree with the findings and plans as written above with the following additions/amendments:    Agree with note as above  Appreciate CT recs

## 2020-08-03 NOTE — H&P ADULT - NSHPPHYSICALEXAM_GEN_ALL_CORE
Vital Signs Last 24 Hrs  T(C): 36.7 (03 Aug 2020 02:58), Max: 36.7 (02 Aug 2020 20:39)  T(F): 98 (03 Aug 2020 02:58), Max: 98 (02 Aug 2020 20:39)  HR: 74 (03 Aug 2020 02:58) (61 - 74)  BP: 143/75 (03 Aug 2020 02:58) (143/75 - 175/83)  BP(mean): --  RR: 18 (03 Aug 2020 02:58) (18 - 18)  SpO2: 100% (03 Aug 2020 02:58) (95% - 100%)    Physical Exam:  General: NAD, resting comfortably in bed  Pulmonary: Nonlabored breathing, no respiratory distress  Abdominal: soft, mildly distended, midlly tender in RUQ with no rebound or guarding, negative [place], median sternotomy scar from recent CABG healing well   Extremities: WWP, no cyanosis or edema  Neuro: A/O x 3

## 2020-08-03 NOTE — CONSULT NOTE ADULT - ASSESSMENT
64 year old male with history of TA, HTN, HLD, NIDDM, Hypothyroidism, recently diagnosed with 3vCAD via cath and underwent CABG x 3 (LIMA to LAD, SVG to OM, SVG to PDA) with Dr. Conde on 7/6/20 with post-op EF 65%.  Post-operatively, patient developed new onset Atrial Fibrillation, converted to NSR after amiodarone boluses.  He was enrolled in the PACeS clinical trial and randomized into the NOAC group and ultimately started on ASA 325mg QD as well as Eliquis 5mg BID.  Additionally, patient with bilateral pleural effusions on POD 5 s/p bilateral pigtail catheter drainage which were removed that day and he was discharged home on POD 6. On 8/2/20, patient developed acute onset midepigastric supraumbilical pain radiating to his back with post-prandial vomiting that recurred several times overnight without hematemesis, hemoptysis, melena/hematochezia.  He contacted the outpatient call service for CT surgery and was instructed to report to [facility] ED.  In ED, VS unremarkable and labs notable for leukocytosis, WBC 14k and elevated  with Bili 1.9.  He underwent CT C/A/P which incidentally found evidence of choledocholithiasis and was admitted for further evaluation with plan for MRCP on 8/3.  Dr. Elizondo, CT Surgery, consulted given patient's recent cardiac surgical history.     Plan:  Problem 1: Choledocholithiasis  -Discussed case with Dr. Elizondo, imaging and labs reviewed.   -Admitted to general surgery under the care of Dr. Xavier for evaluation  -Planned for MRCP today  -Bili normalized and AP trending down.  No transaminitis noted.   -C/w Rocephin and Flagyl   -Management per primary team.     Problem 2: Atrial Fibrillation post-op  -Patient NSR on admission and evaluation.   -Active patient in clinical trial (PACeS) and on ASA/Eliquis for that.   -Will discuss with Dr. Elizondo regarding continuation of NOAC.   -For now, agree with continued ASA monotherapy and will update once therapy confirmed  -C/w Lopressor     Problem 3: CAD s/p CABG  -C/w ASA, statin, BB  -Monitor HR/BP/Tele  -Incisions healing well    Problem 4: HTN  -Well controlled on current regimen  -C/w Lopressor 100mg BID    I have reviewed clinical labs tests and reports, radiology tests and reports, as well as old patient medical records, and discussed with the referring physician.

## 2020-08-03 NOTE — CONSULT NOTE ADULT - PROBLEM SELECTOR RECOMMENDATION 9
Management as per primary team  -Pending MRCP Pt currently rate controlled, both CTS and Cardiology consulted  -C/w BB and amiodarone as per their recs  -Defer initiation of AC to CTS/surgical team

## 2020-08-03 NOTE — H&P ADULT - ATTENDING COMMENTS
Reports pain has moved to right back. Otherwise no complaints.  AFVSS   Abd soft, ND, mild TTP RUQ    A/P: Cholecystitis vs choledocholithiasis.  1. Ceftriaxone & Flagyl  2. NPO, IVF  3. MRCP to evaluate duct  4. If evidence of obstruction/stones, then consult GI for ERCP  5. Hold Eliquis  6. Discussed need for cholecystectomy. Reports pain has moved to right back. Otherwise no complaints.  AFVSS   Abd soft, ND, mild TTP RUQ    A/P: Cholecystitis vs choledocholithiasis.  However, no stones seen on USG.  1. Ceftriaxone & Flagyl  2. NPO, IVF  3. MRCP to evaluate duct  4. If evidence of obstruction/stones, then consult GI for ERCP  5. Hold Eliquis  6. Discussed need for cholecystectomy.

## 2020-08-03 NOTE — H&P ADULT - NSHPLABSRESULTS_GEN_ALL_CORE
LABS:                        11.9   14.64 )-----------( 257      ( 02 Aug 2020 21:27 )             37.9     08-02    132<L>  |  92<L>  |  20  ----------------------------<  200<H>  4.2   |  28  |  0.94    Ca    9.7      02 Aug 2020 21:27    TPro  8.0  /  Alb  4.1  /  TBili  1.2  /  DBili  x   /  AST  32  /  ALT  29  /  AlkPhos  132<H>  08-02    PT/INR - ( 02 Aug 2020 21:27 )   PT: 17.3 sec;   INR: 1.47          PTT - ( 02 Aug 2020 21:27 )  PTT:30.3 sec  CAPILLARY BLOOD GLUCOSE          LIVER FUNCTIONS - ( 02 Aug 2020 21:27 )  Alb: 4.1 g/dL / Pro: 8.0 g/dL / ALK PHOS: 132 U/L / ALT: 29 U/L / AST: 32 U/L / GGT: x                 Radiology and Additional Studies:  < from: CT Abdomen and Pelvis w/ Oral Cont and w/ IV Cont (08.03.20 @ 00:03) >    Biliary system: Mildly distended gallbladder without evidence of cholelithiasis, wall thickening or pericholecystic fluid. The common bile duct is dilated to 9 mm. No intrahepatic biliary ductal dilatation.    IMPRESSION:  Findings suggestive of choledocholithiasis. Consider MRCP for further characterization.    Gallbladderis distended with no stones identified; however, there is mild pericholecystic inflammatory fat stranding. Correlate for acute cholecystitis and suggest further evaluation with ultrasound.    < from: US Abdomen Limited (08.03.20 @ 01:38) >    Limited exam due to body habitus.    Liver: Normal echogenicity with no visible focal abnormality. Liver size is normal, measuring 15.2 cm in craniocaudal dimension. Hepatopetal flow is noted in the main portal vein.    Intrahepatic ducts: Not dilated.    Common bile duct: Normal diameter, measuring 0.5 cm.    Gallbladder: Distended gallbladder. No visible gallstones. Questionable intraluminal sludge. No wall thickening or pericholecystic fluid. Negative sonographic Snyder's sign.    IMPRESSION:  No ultrasound evidence of cholecystitis.

## 2020-08-03 NOTE — CONSULT NOTE ADULT - ASSESSMENT
64M w/ PMHx of HTN, HLD, DM, TIA (s/p plavix), A fib (on eliquis, last dose 8/2), CAD s/p CABGx3 on 7/6/20 w/ Dr. Conde presents with 1 day of RUQ abdominal pain. consulted with question regarding AC s/p CABG     #CABG 7/6/2020  Pt has been on ASA 325mg since CABG and Eliquis 5mg BID since AF s/p CABG in early July. CHADS VASc 5 points, HAS-BLED 2 points (moderate risk of major bleeding).   - Recommend heparin gtt for the time being in case any procedures are needed during this hospitalization  - If no procedures are needed  can be discontinued and Plavix 75mg QD and c/w Eliquis 5mg BID for a duration of at least 12 months     #Afib   Pt found to have afib on last admission POD #2 from CABG. CHADS VASc 5 points. On Eliquis 5mg BID. Currently in NSR   - C/w Eliquis is no procedures planned  - If procedures are planned please start heparin gtt   - C/w Amio 200mg QD  - C/w lopressor 100mg BID    Pending discussion with Dr. Sheldon Almanzar PGY-2 64M w/ PMHx of HTN, HLD, DM, TIA (s/p plavix), A fib (on eliquis, last dose 8/2), CAD s/p CABGx3 on 7/6/20 w/ Dr. Conde presents with 1 day of RUQ abdominal pain. consulted with question regarding AC s/p CABG     #CABG 7/6/2020  Pt has been on ASA 325mg since CABG and Eliquis 5mg BID since AF s/p CABG in early July. CHADS VASc 5 points, HAS-BLED 2 points (moderate risk of major bleeding).   - Defer to CTS    #Afib   Pt found to have afib on last admission POD #2 from CABG. CHADS VASc 5 points. On Eliquis 5mg BID. Currently in NSR   - C/w Eliquis is no procedures planned  - If procedures are planned please start heparin gtt   - C/w Amio 200mg QD  - C/w lopressor 100mg BID    Pending discussion with Dr. hSeldon Almanzar PGY-2 64M w/ PMHx of HTN, HLD, DM, TIA (s/p plavix), A fib (on eliquis, last dose 8/2), CAD s/p CABGx3 on 7/6/20 w/ Dr. Conde presents with 1 day of RUQ abdominal pain. consulted with question regarding AC s/p CABG     #CABG 7/6/2020  Pt has been on ASA 325mg since CABG and Eliquis 5mg BID since AF s/p CABG in early July. CHADS VASc 5 points, HAS-BLED 2 points (moderate risk of major bleeding).   - Defer to CTS    #Afib   Pt found to have afib on last admission POD #2 from CABG. CHADS VASc 5 points. On Eliquis 5mg BID. Currently in NSR   - Defer to CTS for re-initiation of AC  - C/w Amio 200mg QD  - C/w lopressor 100mg BID    Pending discussion with Dr. Sheldon Almanzar PGY-2 64M w/ PMHx of HTN, HLD, DM, TIA (s/p plavix), A fib (on eliquis, last dose 8/2), CAD s/p CABGx3 on 7/6/20 w/ Dr. Conde presents with 1 day of RUQ abdominal pain. consulted with question regarding AC s/p CABG     #CABG 7/6/2020  Pt has been on ASA 325mg since CABG and Eliquis 5mg BID since AF s/p CABG in early July. CHADS VASc 5 points, HAS-BLED 2 points (moderate risk of major bleeding).   - C/w ASA  - Heparin gtt, once out of window for procedure can be restarted on Eliquis     #Afib   Pt found to have afib on last admission POD #2 from CABG. CHADS VASc 5 points. On Eliquis 5mg BID. Currently in NSR   - Heparin gtt, once out of window for procedure can be restarted on Eliquis   - C/w Amio 200mg QD  - C/w lopressor 100mg BID    Signing off     Discussed with Dr. Sheldon Almanzar PGY-2 64M w/ PMHx of HTN, HLD, DM, TIA (s/p plavix), A fib (on eliquis, last dose 8/2), CAD s/p CABGx3 on 7/6/20 w/ Dr. Conde presents with 1 day of RUQ abdominal pain. consulted with question regarding AC s/p CABG     #CABG 7/6/2020  Pt has been on ASA 325mg since CABG and Eliquis 5mg BID since AF s/p CABG in early July. CHADS VASc 5 points, HAS-BLED 2 points (moderate risk of major bleeding).   - C/w ASA 81mg qd  - Heparin gtt, once out of window for procedure can be restarted on Eliquis     #Afib   Pt found to have afib on last admission POD #2 from CABG. CHADS VASc 5 points. On Eliquis 5mg BID. Currently in NSR   - Heparin gtt, once out of window for procedure can be restarted on Eliquis   - C/w Amio 200mg QD  - C/w lopressor 100mg BID    Signing off     Discussed with Dr. Sheldon Almanzar PGY-2

## 2020-08-03 NOTE — CONSULT NOTE ADULT - PROBLEM SELECTOR RECOMMENDATION 8
Uptrending, likely in setting of choledocholithiasis  -ABx as above  -Pt continues to be afebrile, if fever would draw BCX

## 2020-08-03 NOTE — H&P ADULT - HISTORY OF PRESENT ILLNESS
64M w/ PMHx of HTN, HLD, DM, TIA (s/p plavix), A fib (on eliquis, last dose 8/2), CAD s/p CABGx3 on 7/6/20 w/ Dr. Conde presents with 1 day of RUQ abdominal pain. Patient reports that the pain started yesterday after eating lunch. It is sharp, 10/10 at its worst, radiates to the back, and more or less localized to the RUQ. Associated with nausea and NBNB emesis x2. He has never had pain like this before. No fevers, chills, CP, SOB, dizziness, lightheadedness, dysuria, constipation, diarrhea.     PMH: as above  PSH: CABG x3 in July 2020  Meds: amiodarone 200mg qd,  qd, atorvastatin 80 qd, eliquis 5mg BID, lasix 40 qd, Januvia 100 qd, levothyroxine 175 qd, lopressor 100 qd, metformin 500 TID, protonix 40 qd, vitamin D   All: NKDA  FH: noncontributory  SH: nonsmoker, social ETOH, no illicit drugs

## 2020-08-03 NOTE — CONSULT NOTE ADULT - ASSESSMENT
64M pmh HTN, HLD, DM, TIA (s/p plavix), A fib (on eliquis, last dose 8/2), CAD s/p CABGx3 on 7/6/20 w/ Dr. Conde presents with 1 day of RUQ abdominal pain CT w/ evidence of possible choledocholithiasis and CBD of 9mm now pending MRCP

## 2020-08-03 NOTE — H&P ADULT - ASSESSMENT
64M w/ PMHx of HTN, HLD, DM, TIA (s/p plavix), A fib (on eliquis, last dose 8/2), CAD s/p CABGx3 on 7/6/20 w/ Dr. Conde presents with 1 day of RUQ abdominal pain. AVSS, WBC 14.64, T bili 1.2, alk phos 132. CT with evidence of possible choledocolithiasis and CBD of 9mm, ultrasound negative for acute cholecystitis and CBD 5mm. Due to equivocal findings on CT and u/s, will obtain MRCP for further workup for choledocolithiasis.     Admit to Team 4 surgery under Dr. Xavier  pain/nausea control  NPO except meds/IVF  CTX/flagyl  Home meds (holding lasix and eiliquis)   OOB/Ambulate/SCDs/SQH  AM labs   MRCP ordered  Discussed with chief resident and Dr. Xavier

## 2020-08-03 NOTE — PROGRESS NOTE ADULT - SUBJECTIVE AND OBJECTIVE BOX
STATUS POST:       SUBJECTIVE: Patient seen and examined bedside by chief resident. Doing well today. Still reports some abdominal discomfort but overall that his pain has improved.     aMIOdarone    Tablet 200 milliGRAM(s) Oral daily  aspirin enteric coated 325 milliGRAM(s) Oral daily  cefTRIAXone   IVPB 1000 milliGRAM(s) IV Intermittent every 24 hours  heparin   Injectable 5000 Unit(s) SubCutaneous every 8 hours  metoprolol tartrate 100 milliGRAM(s) Oral two times a day  metroNIDAZOLE  IVPB 500 milliGRAM(s) IV Intermittent every 8 hours      Vital Signs Last 24 Hrs  T(C): 36.7 (03 Aug 2020 13:30), Max: 36.8 (03 Aug 2020 05:42)  T(F): 98 (03 Aug 2020 13:30), Max: 98.2 (03 Aug 2020 05:42)  HR: 68 (03 Aug 2020 19:25) (61 - 74)  BP: 106/59 (03 Aug 2020 19:25) (95/52 - 175/83)  BP(mean): 80 (03 Aug 2020 19:25) (68 - 99)  RR: 16 (03 Aug 2020 19:25) (16 - 20)  SpO2: 94% (03 Aug 2020 19:25) (92% - 100%)  I&O's Detail    02 Aug 2020 07:01  -  03 Aug 2020 07:00  --------------------------------------------------------  IN:    IV PiggyBack: 150 mL  Total IN: 150 mL    OUT:  Total OUT: 0 mL    Total NET: 150 mL      03 Aug 2020 07:01  -  03 Aug 2020 19:58  --------------------------------------------------------  IN:    lactated ringers.: 480 mL    sodium chloride 0.9%.: 480 mL  Total IN: 960 mL    OUT:    Voided: 1000 mL  Total OUT: 1000 mL    Total NET: -40 mL          Physical Exam:  General: No acute distress, resting comfortably in bed  C/V: normal sinus rhythm  Pulm: Nonlabored breathing, no respiratory distress  Abd: abdomen is soft. pain most pronounced in right upper quadrant. positive rivera sign.  Extrem: warm and well perfused, no edema,    LABS:                        12.1   23.27 )-----------( 271      ( 03 Aug 2020 10:20 )             37.4     08-03    132<L>  |  92<L>  |  18  ----------------------------<  234<H>  4.9   |  28  |  0.95    Ca    9.8      03 Aug 2020 10:20  Phos  4.0     08-03  Mg     1.4     08-03    TPro  7.8  /  Alb  4.0  /  TBili  1.4<H>  /  DBili  0.2  /  AST  77<H>  /  ALT  58<H>  /  AlkPhos  123<H>  08-03    PT/INR - ( 02 Aug 2020 21:27 )   PT: 17.3 sec;   INR: 1.47          PTT - ( 02 Aug 2020 21:27 )  PTT:30.3 sec  Urinalysis Basic - ( 03 Aug 2020 18:32 )    Color: Yellow / Appearance: Clear / S.020 / pH: x  Gluc: x / Ketone: NEGATIVE  / Bili: Negative / Urobili: 0.2 E.U./dL   Blood: x / Protein: 100 mg/dL / Nitrite: NEGATIVE   Leuk Esterase: NEGATIVE / RBC: 5-10 /HPF / WBC < 5 /HPF   Sq Epi: x / Non Sq Epi: 0-5 /HPF / Bacteria: Rare /HPF        RADIOLOGY & ADDITIONAL STUDIES:

## 2020-08-03 NOTE — CONSULT NOTE ADULT - SUBJECTIVE AND OBJECTIVE BOX
#INCOMPLETE     HPI:  64M w/ PMHx of HTN, HLD, DM, TIA (s/p plavix), A fib (on eliquis, last dose ), CAD s/p CABGx3 on 20 w/ Dr. Conde presents with 1 day of RUQ abdominal pain. Patient reports that the pain started yesterday after eating lunch. It is sharp, 10/10 at its worst, radiates to the back, and more or less localized to the RUQ. Associated with nausea and NBNB emesis x2. He has never had pain like this before. No fevers, chills, CP, SOB, dizziness, lightheadedness, dysuria, constipation, diarrhea.     Additional Cardiology HPI: Pt follows with cardiologist Dr. Posadas located in [facility]. The patient denies any active CP, SOB or palpitations since being in the hospital. Currently not in afib. Pt states he has been taking ASA 325mg QD since his CABG back in early July, states he has not taken any plavix or brillinta. Does endorse that his cardiologist was discussing starting him on it but has not prescribed it to him yet.      PMH: as above  PSH: CABG x3 in 2020  Meds: amiodarone 200mg qd,  qd, atorvastatin 80 qd, eliquis 5mg BID, lasix 40 qd, Januvia 100 qd, levothyroxine 175 qd, lopressor 100 qd, metformin 500 TID, protonix 40 qd, vitamin D   All: NKDA  FH: noncontributory  SH: nonsmoker, social ETOH, no illicit drugs (03 Aug 2020 03:04)    PAST MEDICAL & SURGICAL HISTORY:  History of TIAs  Hypothyroidism  H/O cardiomyopathy  DM (diabetes mellitus)  HLD (hyperlipidemia)  HTN (hypertension)  No significant past surgical history    Diabetes   Hypertension  Hyperlipidemia  CAD  CAB2020       PREVIOUS DIAGNOSTIC TESTING:    Echocardiogram: : EF 65%, mild RA dilation, mild RV dilation, Trace MR   Catheterization: 20 revealing 3V Coronary Artery Disease:  LM: Normal  LAD: 70% stenosis in pLAD, iFR 0.81  LCx: 80% stenosis in midLCx.   OM2: 80% stenosis  OM3: 100% stenosis  RCA: 100% stenosis in mRCA  Left Heart Catheterization:  LV Gram: 60% EF  LV EDP 18mmHg  	    FAMILY HISTORY:  FH: heart failure    SOCIAL HISTORY:    Non-smoker  No Alcohol Use  No Drug Use    ALLERGIES/INTOLERANCES:  No Known Allergies    HOME MEDICATIONS:    INPATIENT MEDICATIONS:  aMIOdarone    Tablet 200 milliGRAM(s) Oral daily  metoprolol tartrate 100 milliGRAM(s) Oral two times a day    aspirin enteric coated 325 milliGRAM(s) Oral daily  heparin   Injectable 5000 Unit(s) SubCutaneous every 8 hours    atorvastatin 80 milliGRAM(s) Oral at bedtime  cefTRIAXone   IVPB 1000 milliGRAM(s) IV Intermittent every 24 hours  dextrose 40% Gel 15 Gram(s) Oral once PRN  dextrose 5%. 1000 milliLiter(s) IV Continuous <Continuous>  dextrose 50% Injectable 12.5 Gram(s) IV Push once  dextrose 50% Injectable 25 Gram(s) IV Push once  glucagon  Injectable 1 milliGRAM(s) IntraMuscular once PRN  HYDROmorphone  Injectable 0.5 milliGRAM(s) IV Push every 4 hours PRN  HYDROmorphone  Injectable 1 milliGRAM(s) IV Push every 4 hours PRN  insulin lispro (HumaLOG) corrective regimen sliding scale   SubCutaneous three times a day before meals  lactated ringers. 1000 milliLiter(s) IV Continuous <Continuous>  levothyroxine 175 MICROGram(s) Oral daily  metroNIDAZOLE  IVPB 500 milliGRAM(s) IV Intermittent every 8 hours  ondansetron Injectable 4 milliGRAM(s) IV Push every 6 hours PRN  pantoprazole    Tablet 40 milliGRAM(s) Oral before breakfast        PHYSICAL EXAM:    T(C): 36.3 (20 @ 09:48), Max: 36.8 (20 @ 05:42)  HR: 62 (20 @ 09:45) (61 - 74)  BP: 116/59 (20 @ 09:45) (116/59 - 175/83)  RR: 17 (20 @ 09:45) (17 - 18)  SpO2: 95% (20 @ 09:45) (92% - 100%)  Wt(kg): --    I&O's Summary    02 Aug 2020 07:01  -  03 Aug 2020 07:00  --------------------------------------------------------  IN: 150 mL / OUT: 0 mL / NET: 150 mL    GENERAL: NAD, well-developed  HEAD:  NCAT  HEENT: EOMI, PERRL, conjunctiva and sclera clear; moist mucosa; Neck supple, No JVD  CARDIOVASCULAR: RRR, normal S1 S2, no M/R/G, no JVD, neg HJR, nondisplaced PMI, no LE edema  RESPIRATORY: Lungs clear to auscultation b/l, no C/W/R  GASTROINTESTINAL: +BS, soft, non-distended, non-tender, no HSM  VASCULAR: Peripheral pulses palpable 2+ bilaterally  EXTREMITIES: Warm. No clubbing, cyanosis or edema. Normal range of motion.  SKIN: No rashes, lesions, ecchymoses, or cyanosis  NEURO: AAOx3, no focal deficits  PSYCH: Nl behavior, nl affect  LINES:    TELEMETRY: 	      ECG:  	  	  LABS:                        12.1   23.27 )-----------( 271      ( 03 Aug 2020 10:20 )             37.4     08-03    132<L>  |  92<L>  |  18  ----------------------------<  234<H>  4.9   |  28  |  0.95    Ca    9.8      03 Aug 2020 10:20  Phos  4.0     08-03  Mg     1.4     08-03    TPro  8.0  /  Alb  4.1  /  TBili  1.2  /  DBili  x   /  AST  32  /  ALT  29  /  AlkPhos  132<H>  08-02      Lipid Profile:   HgA1c:   TSH:     CARDIAC MARKERS:          proBNP:     RADIOLOGY:      ASSESSMENT/PLAN: HPI:  64M w/ PMHx of HTN, HLD, DM, TIA (s/p plavix), A fib (on eliquis, last dose ), CAD s/p CABGx3 on 20 w/ Dr. Conde presents with 1 day of RUQ abdominal pain. Patient reports that the pain started yesterday after eating lunch. It is sharp, 10/10 at its worst, radiates to the back, and more or less localized to the RUQ. Associated with nausea and NBNB emesis x2. He has never had pain like this before. No fevers, chills, CP, SOB, dizziness, lightheadedness, dysuria, constipation, diarrhea.     Additional Cardiology HPI: Pt follows with cardiologist Dr. Posadas located in [facility]. The patient denies any active CP, SOB or palpitations since being in the hospital. Currently not in afib. Pt states he has been taking ASA 325mg QD since his CABG back in early July, states he has not taken any plavix or brillinta. Does endorse that his cardiologist was discussing starting him on it but has not prescribed it to him yet.      PMH: as above  PSH: CABG x3 in 2020  Meds: amiodarone 200mg qd,  qd, atorvastatin 80 qd, eliquis 5mg BID, lasix 40 qd, Januvia 100 qd, levothyroxine 175 qd, lopressor 100 qd, metformin 500 TID, protonix 40 qd, vitamin D   All: NKDA  FH: noncontributory  SH: nonsmoker, social ETOH, no illicit drugs (03 Aug 2020 03:04)    PAST MEDICAL & SURGICAL HISTORY:  History of TIAs  Hypothyroidism  H/O cardiomyopathy  DM (diabetes mellitus)  HLD (hyperlipidemia)  HTN (hypertension)  No significant past surgical history    Diabetes   Hypertension  Hyperlipidemia  CAD  CAB2020       PREVIOUS DIAGNOSTIC TESTING:    Echocardiogram: : EF 65%, mild RA dilation, mild RV dilation, Trace MR   Catheterization: 20 revealing 3V Coronary Artery Disease:  LM: Normal  LAD: 70% stenosis in pLAD, iFR 0.81  LCx: 80% stenosis in midLCx.   OM2: 80% stenosis  OM3: 100% stenosis  RCA: 100% stenosis in mRCA  Left Heart Catheterization:  LV Gram: 60% EF  LV EDP 18mmHg  	    FAMILY HISTORY:  FH: heart failure    SOCIAL HISTORY:    Non-smoker  No Alcohol Use  No Drug Use    ALLERGIES/INTOLERANCES:  No Known Allergies    HOME MEDICATIONS:    INPATIENT MEDICATIONS:  aMIOdarone    Tablet 200 milliGRAM(s) Oral daily  metoprolol tartrate 100 milliGRAM(s) Oral two times a day    aspirin enteric coated 325 milliGRAM(s) Oral daily  heparin   Injectable 5000 Unit(s) SubCutaneous every 8 hours    atorvastatin 80 milliGRAM(s) Oral at bedtime  cefTRIAXone   IVPB 1000 milliGRAM(s) IV Intermittent every 24 hours  dextrose 40% Gel 15 Gram(s) Oral once PRN  dextrose 5%. 1000 milliLiter(s) IV Continuous <Continuous>  dextrose 50% Injectable 12.5 Gram(s) IV Push once  dextrose 50% Injectable 25 Gram(s) IV Push once  glucagon  Injectable 1 milliGRAM(s) IntraMuscular once PRN  HYDROmorphone  Injectable 0.5 milliGRAM(s) IV Push every 4 hours PRN  HYDROmorphone  Injectable 1 milliGRAM(s) IV Push every 4 hours PRN  insulin lispro (HumaLOG) corrective regimen sliding scale   SubCutaneous three times a day before meals  lactated ringers. 1000 milliLiter(s) IV Continuous <Continuous>  levothyroxine 175 MICROGram(s) Oral daily  metroNIDAZOLE  IVPB 500 milliGRAM(s) IV Intermittent every 8 hours  ondansetron Injectable 4 milliGRAM(s) IV Push every 6 hours PRN  pantoprazole    Tablet 40 milliGRAM(s) Oral before breakfast        PHYSICAL EXAM:    T(C): 36.3 (20 @ 09:48), Max: 36.8 (20 @ 05:42)  HR: 62 (20 @ 09:45) (61 - 74)  BP: 116/59 (20 @ 09:45) (116/59 - 175/83)  RR: 17 (20 @ 09:45) (17 - 18)  SpO2: 95% (20 @ 09:45) (92% - 100%)  Wt(kg): --    I&O's Summary    02 Aug 2020 07:01  -  03 Aug 2020 07:00  --------------------------------------------------------  IN: 150 mL / OUT: 0 mL / NET: 150 mL    GENERAL: NAD, well-developed  HEAD:  NCAT  HEENT: EOMI, PERRL, conjunctiva and sclera clear; moist mucosa; Neck supple, No JVD  CARDIOVASCULAR: RRR, normal S1 S2, no M/R/G, no JVD, neg HJR, nondisplaced PMI, no LE edema  RESPIRATORY: Lungs clear to auscultation b/l, no C/W/R  GASTROINTESTINAL: +BS, soft, non-distended, non-tender, no HSM  VASCULAR: Peripheral pulses palpable 2+ bilaterally  EXTREMITIES: Warm. No clubbing, cyanosis or edema. Normal range of motion.  SKIN: No rashes, lesions, ecchymoses, or cyanosis  NEURO: AAOx3, no focal deficits  PSYCH: Nl behavior, nl affect  LINES:    TELEMETRY: 	      ECG:  	  	  LABS:                        12.1   23.27 )-----------( 271      ( 03 Aug 2020 10:20 )             37.4     08-03    132<L>  |  92<L>  |  18  ----------------------------<  234<H>  4.9   |  28  |  0.95    Ca    9.8      03 Aug 2020 10:20  Phos  4.0     08-03  Mg     1.4     08-03    TPro  8.0  /  Alb  4.1  /  TBili  1.2  /  DBili  x   /  AST  32  /  ALT  29  /  AlkPhos  132<H>  08-02      Lipid Profile:   HgA1c:   TSH:     CARDIAC MARKERS:          proBNP:     RADIOLOGY:      ASSESSMENT/PLAN:

## 2020-08-04 DIAGNOSIS — N17.9 ACUTE KIDNEY FAILURE, UNSPECIFIED: ICD-10-CM

## 2020-08-04 DIAGNOSIS — E11.9 TYPE 2 DIABETES MELLITUS WITHOUT COMPLICATIONS: ICD-10-CM

## 2020-08-04 LAB
ALBUMIN SERPL ELPH-MCNC: 3.7 G/DL — SIGNIFICANT CHANGE UP (ref 3.3–5)
ALP SERPL-CCNC: 108 U/L — SIGNIFICANT CHANGE UP (ref 40–120)
ALT FLD-CCNC: 49 U/L — HIGH (ref 10–45)
ANION GAP SERPL CALC-SCNC: 13 MMOL/L — SIGNIFICANT CHANGE UP (ref 5–17)
APTT BLD: 103.7 SEC — HIGH (ref 27.5–35.5)
APTT BLD: 31.5 SEC — SIGNIFICANT CHANGE UP (ref 27.5–35.5)
AST SERPL-CCNC: 50 U/L — HIGH (ref 10–40)
BILIRUB SERPL-MCNC: 1.6 MG/DL — HIGH (ref 0.2–1.2)
BLD GP AB SCN SERPL QL: NEGATIVE — SIGNIFICANT CHANGE UP
BUN SERPL-MCNC: 25 MG/DL — HIGH (ref 7–23)
CALCIUM SERPL-MCNC: 9.2 MG/DL — SIGNIFICANT CHANGE UP (ref 8.4–10.5)
CHLORIDE SERPL-SCNC: 96 MMOL/L — SIGNIFICANT CHANGE UP (ref 96–108)
CO2 SERPL-SCNC: 26 MMOL/L — SIGNIFICANT CHANGE UP (ref 22–31)
CREAT ?TM UR-MCNC: 172 MG/DL — SIGNIFICANT CHANGE UP
CREAT SERPL-MCNC: 1.26 MG/DL — SIGNIFICANT CHANGE UP (ref 0.5–1.3)
GLUCOSE BLDC GLUCOMTR-MCNC: 174 MG/DL — HIGH (ref 70–99)
GLUCOSE BLDC GLUCOMTR-MCNC: 177 MG/DL — HIGH (ref 70–99)
GLUCOSE BLDC GLUCOMTR-MCNC: 228 MG/DL — HIGH (ref 70–99)
GLUCOSE SERPL-MCNC: 182 MG/DL — HIGH (ref 70–99)
GRAM STN FLD: SIGNIFICANT CHANGE UP
HCT VFR BLD CALC: 35.8 % — LOW (ref 39–50)
HGB BLD-MCNC: 11.4 G/DL — LOW (ref 13–17)
INR BLD: 1.66 — HIGH (ref 0.88–1.16)
MAGNESIUM SERPL-MCNC: 1.9 MG/DL — SIGNIFICANT CHANGE UP (ref 1.6–2.6)
MCHC RBC-ENTMCNC: 28.9 PG — SIGNIFICANT CHANGE UP (ref 27–34)
MCHC RBC-ENTMCNC: 31.8 GM/DL — LOW (ref 32–36)
MCV RBC AUTO: 90.6 FL — SIGNIFICANT CHANGE UP (ref 80–100)
NRBC # BLD: 0 /100 WBCS — SIGNIFICANT CHANGE UP (ref 0–0)
PHOSPHATE SERPL-MCNC: 4.2 MG/DL — SIGNIFICANT CHANGE UP (ref 2.5–4.5)
PLATELET # BLD AUTO: 223 K/UL — SIGNIFICANT CHANGE UP (ref 150–400)
POTASSIUM SERPL-MCNC: 4.7 MMOL/L — SIGNIFICANT CHANGE UP (ref 3.5–5.3)
POTASSIUM SERPL-SCNC: 4.7 MMOL/L — SIGNIFICANT CHANGE UP (ref 3.5–5.3)
PROT SERPL-MCNC: 7.1 G/DL — SIGNIFICANT CHANGE UP (ref 6–8.3)
PROTHROM AB SERPL-ACNC: 19.5 SEC — HIGH (ref 10.6–13.6)
RBC # BLD: 3.95 M/UL — LOW (ref 4.2–5.8)
RBC # FLD: 14.4 % — SIGNIFICANT CHANGE UP (ref 10.3–14.5)
RH IG SCN BLD-IMP: POSITIVE — SIGNIFICANT CHANGE UP
SODIUM SERPL-SCNC: 135 MMOL/L — SIGNIFICANT CHANGE UP (ref 135–145)
SODIUM UR-SCNC: <20 MMOL/L — SIGNIFICANT CHANGE UP
SPECIMEN SOURCE: SIGNIFICANT CHANGE UP
UUN UR-MCNC: 644 MG/DL — SIGNIFICANT CHANGE UP
WBC # BLD: 20.77 K/UL — HIGH (ref 3.8–10.5)
WBC # FLD AUTO: 20.77 K/UL — HIGH (ref 3.8–10.5)

## 2020-08-04 PROCEDURE — 74181 MRI ABDOMEN W/O CONTRAST: CPT | Mod: 26

## 2020-08-04 PROCEDURE — 99233 SBSQ HOSP IP/OBS HIGH 50: CPT | Mod: GC

## 2020-08-04 PROCEDURE — 47490 INCISION OF GALLBLADDER: CPT

## 2020-08-04 PROCEDURE — 99152 MOD SED SAME PHYS/QHP 5/>YRS: CPT

## 2020-08-04 RX ORDER — HEPARIN SODIUM 5000 [USP'U]/ML
INJECTION INTRAVENOUS; SUBCUTANEOUS
Qty: 25000 | Refills: 0 | Status: DISCONTINUED | OUTPATIENT
Start: 2020-08-04 | End: 2020-08-04

## 2020-08-04 RX ORDER — ASPIRIN/CALCIUM CARB/MAGNESIUM 324 MG
81 TABLET ORAL DAILY
Refills: 0 | Status: DISCONTINUED | OUTPATIENT
Start: 2020-08-04 | End: 2020-08-06

## 2020-08-04 RX ORDER — HEPARIN SODIUM 5000 [USP'U]/ML
1400 INJECTION INTRAVENOUS; SUBCUTANEOUS
Qty: 25000 | Refills: 0 | Status: DISCONTINUED | OUTPATIENT
Start: 2020-08-04 | End: 2020-08-04

## 2020-08-04 RX ORDER — ASPIRIN/CALCIUM CARB/MAGNESIUM 324 MG
81 TABLET ORAL DAILY
Refills: 0 | Status: DISCONTINUED | OUTPATIENT
Start: 2020-08-04 | End: 2020-08-04

## 2020-08-04 RX ADMIN — HEPARIN SODIUM 1500 UNIT(S)/HR: 5000 INJECTION INTRAVENOUS; SUBCUTANEOUS at 03:11

## 2020-08-04 RX ADMIN — Medication 100 MILLIGRAM(S): at 06:41

## 2020-08-04 RX ADMIN — Medication 100 MILLIGRAM(S): at 21:35

## 2020-08-04 RX ADMIN — Medication 175 MICROGRAM(S): at 06:41

## 2020-08-04 RX ADMIN — Medication 2: at 22:03

## 2020-08-04 RX ADMIN — CEFTRIAXONE 100 MILLIGRAM(S): 500 INJECTION, POWDER, FOR SOLUTION INTRAMUSCULAR; INTRAVENOUS at 06:42

## 2020-08-04 RX ADMIN — Medication 100 MILLIGRAM(S): at 06:42

## 2020-08-04 RX ADMIN — HYDROMORPHONE HYDROCHLORIDE 1 MILLIGRAM(S): 2 INJECTION INTRAMUSCULAR; INTRAVENOUS; SUBCUTANEOUS at 12:56

## 2020-08-04 RX ADMIN — HYDROMORPHONE HYDROCHLORIDE 0.5 MILLIGRAM(S): 2 INJECTION INTRAMUSCULAR; INTRAVENOUS; SUBCUTANEOUS at 21:50

## 2020-08-04 RX ADMIN — Medication 1: at 16:36

## 2020-08-04 RX ADMIN — HYDROMORPHONE HYDROCHLORIDE 1 MILLIGRAM(S): 2 INJECTION INTRAMUSCULAR; INTRAVENOUS; SUBCUTANEOUS at 08:19

## 2020-08-04 RX ADMIN — INSULIN GLARGINE 15 UNIT(S): 100 INJECTION, SOLUTION SUBCUTANEOUS at 22:04

## 2020-08-04 RX ADMIN — Medication 100 MILLIGRAM(S): at 19:35

## 2020-08-04 RX ADMIN — HYDROMORPHONE HYDROCHLORIDE 0.5 MILLIGRAM(S): 2 INJECTION INTRAMUSCULAR; INTRAVENOUS; SUBCUTANEOUS at 10:04

## 2020-08-04 RX ADMIN — Medication 81 MILLIGRAM(S): at 11:29

## 2020-08-04 RX ADMIN — HYDROMORPHONE HYDROCHLORIDE 1 MILLIGRAM(S): 2 INJECTION INTRAMUSCULAR; INTRAVENOUS; SUBCUTANEOUS at 01:25

## 2020-08-04 RX ADMIN — HYDROMORPHONE HYDROCHLORIDE 0.5 MILLIGRAM(S): 2 INJECTION INTRAMUSCULAR; INTRAVENOUS; SUBCUTANEOUS at 21:35

## 2020-08-04 RX ADMIN — Medication 1: at 06:41

## 2020-08-04 RX ADMIN — ATORVASTATIN CALCIUM 80 MILLIGRAM(S): 80 TABLET, FILM COATED ORAL at 21:35

## 2020-08-04 RX ADMIN — AMIODARONE HYDROCHLORIDE 200 MILLIGRAM(S): 400 TABLET ORAL at 06:46

## 2020-08-04 RX ADMIN — HYDROMORPHONE HYDROCHLORIDE 1 MILLIGRAM(S): 2 INJECTION INTRAMUSCULAR; INTRAVENOUS; SUBCUTANEOUS at 07:30

## 2020-08-04 RX ADMIN — PANTOPRAZOLE SODIUM 40 MILLIGRAM(S): 20 TABLET, DELAYED RELEASE ORAL at 06:42

## 2020-08-04 RX ADMIN — Medication 100 MILLIGRAM(S): at 15:25

## 2020-08-04 RX ADMIN — HYDROMORPHONE HYDROCHLORIDE 1 MILLIGRAM(S): 2 INJECTION INTRAMUSCULAR; INTRAVENOUS; SUBCUTANEOUS at 13:45

## 2020-08-04 RX ADMIN — HYDROMORPHONE HYDROCHLORIDE 0.5 MILLIGRAM(S): 2 INJECTION INTRAMUSCULAR; INTRAVENOUS; SUBCUTANEOUS at 10:59

## 2020-08-04 NOTE — PROGRESS NOTE ADULT - ATTENDING COMMENTS
Still awaiting MRI.  Put on heparin overnight, now stopped.  Symptoms, exam unchanged. Mild TTP RUQ    WBC 20k    A/P: Possible acalculous cholecystitis.  1. MRCP  2. Percutaneous cholecystostomy.  3. Clear liquids after procedure.  4. OOB, IS  5. Ceftriaxone, flagyl

## 2020-08-04 NOTE — PROGRESS NOTE ADULT - SUBJECTIVE AND OBJECTIVE BOX
POST-PROCEDURE NOTE    Procedure: percutaneous cholecystosomy    Diagnosis/Indication: acute gangrenous cholecystitis diagnosed on MRCP    Interventional radiology    S: Pt is resting comfortably in bed and has no complaints. Tolerating CLD, denies nausea, vomiting. Denies CP, SOB, LINO, calf tenderness. Pain controlled with medication.     O:  T(C): --  T(F): --  HR: 80 (08-04-20 @ 20:18) (80 - 94)  BP: 120/61 (08-04-20 @ 20:18) (120/61 - 141/67)  RR: 18 (08-04-20 @ 20:18) (18 - 18)  SpO2: 95% (08-04-20 @ 20:18) (94% - 95%)  Wt(kg): --                        11.4   20.77 )-----------( 223      ( 04 Aug 2020 06:14 )             35.8     08-04    135  |  96  |  25<H>  ----------------------------<  182<H>  4.7   |  26  |  1.26    Ca    9.2      04 Aug 2020 06:14  Phos  4.2     08-04  Mg     1.9     08-04    TPro  7.1  /  Alb  3.7  /  TBili  1.6<H>  /  DBili  x   /  AST  50<H>  /  ALT  49<H>  /  AlkPhos  108  08-04      Gen: NAD, resting comfortably in bed  C/V: NSR  Pulm: Nonlabored breathing, no respiratory distress  Abd: soft, NT/ND, drain in place with dark red/black output, gauze over drain site clean  Extrem: WWP, no calf edema or tenderness, SCDs in place      A/P: 64y Male s/p above procedure  Diet: CLD  IVF: NS@80  Pain/nausea control  SQH/SCDs/OOBA/IS  Dispo pending pain control, PO tolerance, clinical improvement

## 2020-08-04 NOTE — PROGRESS NOTE ADULT - SUBJECTIVE AND OBJECTIVE BOX
SUBJECTIVE: Sitting up, having pain in upper abdomen, no NV. Patient seen and examined bedside by chief resident.    aMIOdarone    Tablet 200 milliGRAM(s) Oral daily  aspirin enteric coated 325 milliGRAM(s) Oral daily  cefTRIAXone   IVPB 1000 milliGRAM(s) IV Intermittent every 24 hours  heparin  Infusion 1400 Unit(s)/Hr IV Continuous <Continuous>  metoprolol tartrate 100 milliGRAM(s) Oral two times a day  metroNIDAZOLE  IVPB 500 milliGRAM(s) IV Intermittent every 8 hours    MEDICATIONS  (PRN):  dextrose 40% Gel 15 Gram(s) Oral once PRN Blood Glucose LESS THAN 70 milliGRAM(s)/deciliter  glucagon  Injectable 1 milliGRAM(s) IntraMuscular once PRN Glucose LESS THAN 70 milligrams/deciliter  HYDROmorphone  Injectable 0.5 milliGRAM(s) IV Push every 4 hours PRN Moderate Pain (4 - 6)  HYDROmorphone  Injectable 1 milliGRAM(s) IV Push every 4 hours PRN Severe Pain (7 - 10)  ondansetron Injectable 4 milliGRAM(s) IV Push every 6 hours PRN Nausea      I&O's Detail    03 Aug 2020 07:01  -  04 Aug 2020 07:00  --------------------------------------------------------  IN:    lactated ringers.: 480 mL    sodium chloride 0.9%.: 480 mL  Total IN: 960 mL    OUT:    Voided: 1200 mL  Total OUT: 1200 mL    Total NET: -240 mL          Vital Signs Last 24 Hrs  T(C): 37.1 (04 Aug 2020 05:02), Max: 37.1 (03 Aug 2020 21:42)  T(F): 98.7 (04 Aug 2020 05:02), Max: 98.8 (03 Aug 2020 21:42)  HR: 74 (04 Aug 2020 04:05) (62 - 74)  BP: 113/57 (04 Aug 2020 04:05) (95/52 - 154/66)  BP(mean): 78 (04 Aug 2020 04:05) (68 - 98)  RR: 18 (04 Aug 2020 04:05) (16 - 20)  SpO2: 95% (04 Aug 2020 04:05) (92% - 96%)    General: NAD, resting comfortably in bed  C/V: NSR  Pulm: Nonlabored breathing, no respiratory distress  Abd: soft, nondistended, TTP epigastric/RUQ, no rebound/guarding      LABS:                        11.4   20.77 )-----------( 223      ( 04 Aug 2020 06:14 )             35.8     08-04    135  |  96  |  25<H>  ----------------------------<  182<H>  4.7   |  26  |  1.26    Ca    9.2      04 Aug 2020 06:14  Phos  4.2     08-04  Mg     1.9     08-04    TPro  7.1  /  Alb  3.7  /  TBili  1.6<H>  /  DBili  x   /  AST  50<H>  /  ALT  49<H>  /  AlkPhos  108  08-04    PT/INR - ( 04 Aug 2020 06:14 )   PT: 19.5 sec;   INR: 1.66          PTT - ( 04 Aug 2020 06:14 )  PTT:103.7 sec  Urinalysis Basic - ( 03 Aug 2020 18:32 )    Color: Yellow / Appearance: Clear / S.020 / pH: x  Gluc: x / Ketone: NEGATIVE  / Bili: Negative / Urobili: 0.2 E.U./dL   Blood: x / Protein: 100 mg/dL / Nitrite: NEGATIVE   Leuk Esterase: NEGATIVE / RBC: 5-10 /HPF / WBC < 5 /HPF   Sq Epi: x / Non Sq Epi: 0-5 /HPF / Bacteria: Rare /HPF        RADIOLOGY & ADDITIONAL STUDIES:  CT Abdomen and Pelvis w/ Oral Cont and w/ IV Cont:   EXAM:  CT ABDOMEN AND PELVIS OC IC                          PROCEDURE DATE:  2020          INTERPRETATION:  CT of the ABDOMEN and PELVIS with intravenous contrast dated 8/3/2020 12:03 AM    INDICATION: Abdominal pain.    TECHNIQUE: CT of the abdomen and pelvis with intravenous and oral contrast. Axial, sagittal, and coronal images were obtained and reviewed.    COMPARISON: None.    FINDINGS:    Lower chest: Trace bilateral pleural effusions. Basilar atelectasis. Cardiomegaly. Severe coronary artery and mitral annulus calcifications. Trace pericardial effusion.    Liver: Smooth in contour. No focal mass. Portal and hepatic veins are patent. Periportal edema. Small perihepatic ascites.    Biliary system: Mildly distended gallbladder without evidence of cholelithiasis, wall thickening or pericholecystic fluid. The common bile duct is dilated to 9 mm. No intrahepatic biliary ductal dilatation.    Pancreas: Unremarkable.    Spleen: Unremarkable.    Adrenal glands: Small indeterminant left adrenal nodule. Unremarkable right adrenal gland.    Kidneys: Symmetric parenchymal enhancement. No renal mass. No hydronephrosis. No renal or ureteral stone. Small left renal cyst.  Urinary Bladder: Distended.    Reproductive organs: Enlarged prostate measuring 5 x 4 x 4 cm.    Bowel/Peritoneum: Normal caliber without evidence of obstruction. No appreciable wall thickening. Normal appendix. No extraluminal gas.    Lymph nodes: No lymphadenopathy.    Aorta/IVC: Normal caliber. Moderate atherosclerotic calcifications of the abdominal aorta and its branches.    Abdominal wall: Small fat-containing umbilical hernia.    Bones/Soft tissues: Levoscoliosis of the lumbar spine. Mild degenerative changes of the thoracic and lumbar spines.      IMPRESSION:  Findings suggestive of choledocholithiasis. Consider MRCP for further characterization.    I, Sedrick Thomas MD, have reviewed the images and the report and agree with the findings, with the following modification: The following also noted: Status post median sternotomy with traversing wires. Mild inflammatory fat stranding/fluid as well as few foci of gas overlying the sternotomy site. Findings may be related to postsurgical changes versus cellulitis with possible phlegmon.    Gallbladderis distended with no stones identified; however, there is mild pericholecystic inflammatory fat stranding. Correlate for acute cholecystitis and suggest further evaluation with ultrasound.          Thank you for the opportunity to participate in the care of this patient.    SUSAN LIMON M.D., RADIOLOGY RESIDENT  This document has been electronically signed.  SEDRICK THOMAS M.D., ATTENDING RADIOLOGIST  This document has been electronically signed. Aug  3 2020  1:00AM               (20 @ 00:03)

## 2020-08-04 NOTE — PROGRESS NOTE ADULT - PROBLEM SELECTOR PLAN 9
Pt currently rate controlled, both CTS and Cardiology consulted  -C/w BB and amiodarone as per their recs  -Defer initiation of AC to CTS/surgical team

## 2020-08-04 NOTE — PROGRESS NOTE ADULT - SUBJECTIVE AND OBJECTIVE BOX
O/N and interval events: None    Subjective:  Pt reports that his pain has improved. Still has some epigastric and RUQ pain. ROS is otherwise negative.     Home meds: amiodarone 200mg qd,  qd, atorvastatin 80 qd, eliquis 5mg BID, lasix 40 qd, Januvia 100 qd, levothyroxine 175 qd, lopressor 100 qd, metformin 500 TID, protonix 40 qd, vitamin D       Allergies    No Known Allergies    Intolerances    MEDICATIONS  (STANDING):  aMIOdarone    Tablet 200 milliGRAM(s) Oral daily  aspirin  chewable 81 milliGRAM(s) Oral daily  atorvastatin 80 milliGRAM(s) Oral at bedtime  cefTRIAXone   IVPB 1000 milliGRAM(s) IV Intermittent every 24 hours  dextrose 5%. 1000 milliLiter(s) (50 mL/Hr) IV Continuous <Continuous>  dextrose 50% Injectable 12.5 Gram(s) IV Push once  dextrose 50% Injectable 25 Gram(s) IV Push once  insulin glargine Injectable (LANTUS) 15 Unit(s) SubCutaneous at bedtime  insulin lispro (HumaLOG) corrective regimen sliding scale   SubCutaneous Before meals and at bedtime  levothyroxine 175 MICROGram(s) Oral daily  metoprolol tartrate 100 milliGRAM(s) Oral two times a day  metroNIDAZOLE  IVPB 500 milliGRAM(s) IV Intermittent every 8 hours  pantoprazole    Tablet 40 milliGRAM(s) Oral before breakfast  sodium chloride 0.9%. 1000 milliLiter(s) (80 mL/Hr) IV Continuous <Continuous>    MEDICATIONS  (PRN):  dextrose 40% Gel 15 Gram(s) Oral once PRN Blood Glucose LESS THAN 70 milliGRAM(s)/deciliter  glucagon  Injectable 1 milliGRAM(s) IntraMuscular once PRN Glucose LESS THAN 70 milligrams/deciliter  HYDROmorphone  Injectable 0.5 milliGRAM(s) IV Push every 4 hours PRN Moderate Pain (4 - 6)  HYDROmorphone  Injectable 1 milliGRAM(s) IV Push every 4 hours PRN Severe Pain (7 - 10)  ondansetron Injectable 4 milliGRAM(s) IV Push every 6 hours PRN Nausea      Drug Dosing Weight  Height (cm): 175.3 (03 Aug 2020 08:21)  Weight (kg): 85.3 (03 Aug 2020 08:21)  BMI (kg/m2): 27.8 (03 Aug 2020 08:21)  BSA (m2): 2.01 (03 Aug 2020 08:21)    PAST MEDICAL & SURGICAL HISTORY:  History of TIAs  Hypothyroidism  H/O cardiomyopathy  DM (diabetes mellitus)  HLD (hyperlipidemia)  HTN (hypertension)  No significant past surgical history      FAMILY HISTORY:  FH: heart failure      SOCIAL HISTORY:  never smoker, no EtOH use, no drug use    ADVANCE DIRECTIVES:        Vital Signs Last 24 Hrs  T(C): 37.1 (04 Aug 2020 17:04), Max: 37.1 (03 Aug 2020 21:42)  T(F): 98.8 (04 Aug 2020 17:04), Max: 98.8 (03 Aug 2020 21:42)  HR: 86 (04 Aug 2020 15:36) (66 - 86)  BP: 134/61 (04 Aug 2020 15:36) (95/52 - 154/66)  BP(mean): 87 (04 Aug 2020 15:36) (68 - 95)  RR: 17 (04 Aug 2020 15:36) (16 - 20)  SpO2: 95% (04 Aug 2020 15:36) (92% - 95%)      PHYSICAL EXAM:      Constitutional: NAD  Eyes: PERRLA  ENMT: MMM  Neck: supple  Back: midline  Respiratory: CTA b/l  Cardiovascular: rrr, s1s2, no m/r/g  Gastrointestinal: soft, + Snyder's sign, + BS  Extremities: wwp  Vascular: + 2 pulses DP/TP  Neurological: AAO x 4  Skin: healing midline incision  Lymph Nodes: no LAD  Musculoskeletal: no joint swelling  Psychiatric: normal affect      LABS:                        11.4   20.77 )-----------( 223      ( 04 Aug 2020 06:14 )             35.8   08-04    135  |  96  |  25<H>  ----------------------------<  182<H>  4.7   |  26  |  1.26    Ca    9.2      04 Aug 2020 06:14  Phos  4.2     08-04  Mg     1.9     08-04    TPro  7.1  /  Alb  3.7  /  TBili  1.6<H>  /  DBili  x   /  AST  50<H>  /  ALT  49<H>  /  AlkPhos  108  08-04      EKG:    ECHO, US:  < from: TTE Echo Complete w/o Contrast w/ Doppler (07.12.20 @ 14:47) >  CONCLUSIONS:     1. Limited study obtained for evaluation of left ventricular function performed by cardiology fellow on call.   2. There is mild concentric left ventricular hypertrophy. The left ventricle is normal in size and systolic function with a calculated ejection fraction of 65%. There are no regional wall motion abnormalities seen.   3. The right atrium is mildly dilated.   4. The right ventricle appears mildly dilated. RV systolic function is normal.   5. There is mild mitral annular calcification. There is trace mitral regurgitation.   6. The aortic root is normal in size.   7. No pericardial effusion.    < end of copied text >      RADIOLOGY:  < from: CT Abdomen and Pelvis w/ Oral Cont and w/ IV Cont (08.03.20 @ 00:03) >  Lower chest: Trace bilateral pleural effusions. Basilar atelectasis. Cardiomegaly. Severe coronary artery and mitral annulus calcifications. Trace pericardial effusion.    Liver: Smooth in contour. No focal mass. Portal and hepatic veins are patent. Periportal edema. Small perihepatic ascites.    Biliary system: Mildly distended gallbladder without evidence of cholelithiasis, wall thickening or pericholecystic fluid. The common bile duct is dilated to 9 mm. No intrahepatic biliary ductal dilatation.    Pancreas: Unremarkable.    Spleen: Unremarkable.    Adrenal glands: Small indeterminant left adrenal nodule. Unremarkable right adrenal gland.    Kidneys: Symmetric parenchymal enhancement. No renal mass. No hydronephrosis. No renal or ureteral stone. Small left renal cyst.  Urinary Bladder: Distended.    Reproductive organs: Enlarged prostate measuring 5 x 4 x 4 cm.    Bowel/Peritoneum: Normal caliber without evidence of obstruction. No appreciable wall thickening. Normal appendix. No extraluminal gas.    Lymph nodes: No lymphadenopathy.    Aorta/IVC: Normal caliber. Moderate atherosclerotic calcifications of the abdominal aorta and its branches.    Abdominal wall: Small fat-containing umbilical hernia.    Bones/Soft tissues: Levoscoliosis of the lumbar spine. Mild degenerative changes of the thoracic and lumbar spines.      IMPRESSION:  Findings suggestive of choledocholithiasis. Consider MRCP for further characterization.    I, Rafat Ayon MD, have reviewed the images and the report and agree with the findings, with the following modification: The following also noted: Status post median sternotomy with traversing wires. Mild inflammatory fat stranding/fluid as well as few foci of gas overlying the sternotomy site. Findings may be related to postsurgical changes versus cellulitis with possible phlegmon.    Gallbladderis distended with no stones identified; however, there is mild pericholecystic inflammatory fat stranding. Correlate for acute cholecystitis and suggest further evaluation with ultrasound.    < end of copied text >

## 2020-08-04 NOTE — PROGRESS NOTE ADULT - PROBLEM SELECTOR PLAN 3
Pt takes Lantus 30 units qHS per outpatient med rec and last note from Endocrine prior to d/c in July  -Given that he is currently NPO c/w15 units Lantus qHS tonight  -C/w MARYANN  -Will adjust insulin once patient starts PO

## 2020-08-05 ENCOUNTER — TRANSCRIPTION ENCOUNTER (OUTPATIENT)
Age: 65
End: 2020-08-05

## 2020-08-05 DIAGNOSIS — I10 ESSENTIAL (PRIMARY) HYPERTENSION: ICD-10-CM

## 2020-08-05 LAB
ALBUMIN SERPL ELPH-MCNC: 2.9 G/DL — LOW (ref 3.3–5)
ALP SERPL-CCNC: 94 U/L — SIGNIFICANT CHANGE UP (ref 40–120)
ALT FLD-CCNC: 37 U/L — SIGNIFICANT CHANGE UP (ref 10–45)
ANION GAP SERPL CALC-SCNC: 10 MMOL/L — SIGNIFICANT CHANGE UP (ref 5–17)
AST SERPL-CCNC: 37 U/L — SIGNIFICANT CHANGE UP (ref 10–40)
BILIRUB SERPL-MCNC: 1.4 MG/DL — HIGH (ref 0.2–1.2)
BUN SERPL-MCNC: 25 MG/DL — HIGH (ref 7–23)
CALCIUM SERPL-MCNC: 8.5 MG/DL — SIGNIFICANT CHANGE UP (ref 8.4–10.5)
CHLORIDE SERPL-SCNC: 98 MMOL/L — SIGNIFICANT CHANGE UP (ref 96–108)
CO2 SERPL-SCNC: 25 MMOL/L — SIGNIFICANT CHANGE UP (ref 22–31)
CREAT SERPL-MCNC: 0.98 MG/DL — SIGNIFICANT CHANGE UP (ref 0.5–1.3)
GLUCOSE BLDC GLUCOMTR-MCNC: 149 MG/DL — HIGH (ref 70–99)
GLUCOSE BLDC GLUCOMTR-MCNC: 167 MG/DL — HIGH (ref 70–99)
GLUCOSE BLDC GLUCOMTR-MCNC: 221 MG/DL — HIGH (ref 70–99)
GLUCOSE BLDC GLUCOMTR-MCNC: 235 MG/DL — HIGH (ref 70–99)
GLUCOSE SERPL-MCNC: 166 MG/DL — HIGH (ref 70–99)
HCT VFR BLD CALC: 30.3 % — LOW (ref 39–50)
HCT VFR BLD CALC: 31.4 % — LOW (ref 39–50)
HGB BLD-MCNC: 9.8 G/DL — LOW (ref 13–17)
HGB BLD-MCNC: 9.9 G/DL — LOW (ref 13–17)
MAGNESIUM SERPL-MCNC: 1.8 MG/DL — SIGNIFICANT CHANGE UP (ref 1.6–2.6)
MCHC RBC-ENTMCNC: 28.5 PG — SIGNIFICANT CHANGE UP (ref 27–34)
MCHC RBC-ENTMCNC: 29 PG — SIGNIFICANT CHANGE UP (ref 27–34)
MCHC RBC-ENTMCNC: 31.5 GM/DL — LOW (ref 32–36)
MCHC RBC-ENTMCNC: 32.3 GM/DL — SIGNIFICANT CHANGE UP (ref 32–36)
MCV RBC AUTO: 89.6 FL — SIGNIFICANT CHANGE UP (ref 80–100)
MCV RBC AUTO: 90.5 FL — SIGNIFICANT CHANGE UP (ref 80–100)
NRBC # BLD: 0 /100 WBCS — SIGNIFICANT CHANGE UP (ref 0–0)
NRBC # BLD: 0 /100 WBCS — SIGNIFICANT CHANGE UP (ref 0–0)
PHOSPHATE SERPL-MCNC: 2.4 MG/DL — LOW (ref 2.5–4.5)
PLATELET # BLD AUTO: 199 K/UL — SIGNIFICANT CHANGE UP (ref 150–400)
PLATELET # BLD AUTO: 203 K/UL — SIGNIFICANT CHANGE UP (ref 150–400)
POTASSIUM SERPL-MCNC: 3.8 MMOL/L — SIGNIFICANT CHANGE UP (ref 3.5–5.3)
POTASSIUM SERPL-SCNC: 3.8 MMOL/L — SIGNIFICANT CHANGE UP (ref 3.5–5.3)
PROT SERPL-MCNC: 6.2 G/DL — SIGNIFICANT CHANGE UP (ref 6–8.3)
RBC # BLD: 3.38 M/UL — LOW (ref 4.2–5.8)
RBC # BLD: 3.47 M/UL — LOW (ref 4.2–5.8)
RBC # FLD: 14.4 % — SIGNIFICANT CHANGE UP (ref 10.3–14.5)
RBC # FLD: 14.4 % — SIGNIFICANT CHANGE UP (ref 10.3–14.5)
SODIUM SERPL-SCNC: 133 MMOL/L — LOW (ref 135–145)
WBC # BLD: 12.85 K/UL — HIGH (ref 3.8–10.5)
WBC # BLD: 9.96 K/UL — SIGNIFICANT CHANGE UP (ref 3.8–10.5)
WBC # FLD AUTO: 12.85 K/UL — HIGH (ref 3.8–10.5)
WBC # FLD AUTO: 9.96 K/UL — SIGNIFICANT CHANGE UP (ref 3.8–10.5)

## 2020-08-05 PROCEDURE — 99233 SBSQ HOSP IP/OBS HIGH 50: CPT | Mod: GC

## 2020-08-05 RX ORDER — POTASSIUM CHLORIDE 20 MEQ
20 PACKET (EA) ORAL ONCE
Refills: 0 | Status: COMPLETED | OUTPATIENT
Start: 2020-08-05 | End: 2020-08-05

## 2020-08-05 RX ORDER — ACETAMINOPHEN 500 MG
650 TABLET ORAL EVERY 6 HOURS
Refills: 0 | Status: DISCONTINUED | OUTPATIENT
Start: 2020-08-05 | End: 2020-08-06

## 2020-08-05 RX ORDER — INSULIN GLARGINE 100 [IU]/ML
24 INJECTION, SOLUTION SUBCUTANEOUS AT BEDTIME
Refills: 0 | Status: DISCONTINUED | OUTPATIENT
Start: 2020-08-05 | End: 2020-08-06

## 2020-08-05 RX ORDER — APIXABAN 2.5 MG/1
5 TABLET, FILM COATED ORAL EVERY 12 HOURS
Refills: 0 | Status: DISCONTINUED | OUTPATIENT
Start: 2020-08-05 | End: 2020-08-06

## 2020-08-05 RX ORDER — APIXABAN 2.5 MG/1
5 TABLET, FILM COATED ORAL EVERY 12 HOURS
Refills: 0 | Status: DISCONTINUED | OUTPATIENT
Start: 2020-08-05 | End: 2020-08-05

## 2020-08-05 RX ORDER — MAGNESIUM SULFATE 500 MG/ML
2 VIAL (ML) INJECTION ONCE
Refills: 0 | Status: COMPLETED | OUTPATIENT
Start: 2020-08-05 | End: 2020-08-05

## 2020-08-05 RX ORDER — OXYCODONE HYDROCHLORIDE 5 MG/1
5 TABLET ORAL EVERY 6 HOURS
Refills: 0 | Status: DISCONTINUED | OUTPATIENT
Start: 2020-08-05 | End: 2020-08-06

## 2020-08-05 RX ADMIN — PANTOPRAZOLE SODIUM 40 MILLIGRAM(S): 20 TABLET, DELAYED RELEASE ORAL at 05:22

## 2020-08-05 RX ADMIN — Medication 81 MILLIGRAM(S): at 11:07

## 2020-08-05 RX ADMIN — APIXABAN 5 MILLIGRAM(S): 2.5 TABLET, FILM COATED ORAL at 21:08

## 2020-08-05 RX ADMIN — INSULIN GLARGINE 24 UNIT(S): 100 INJECTION, SOLUTION SUBCUTANEOUS at 22:42

## 2020-08-05 RX ADMIN — ATORVASTATIN CALCIUM 80 MILLIGRAM(S): 80 TABLET, FILM COATED ORAL at 21:08

## 2020-08-05 RX ADMIN — Medication 2: at 11:34

## 2020-08-05 RX ADMIN — Medication 175 MICROGRAM(S): at 05:22

## 2020-08-05 RX ADMIN — Medication 2: at 17:25

## 2020-08-05 RX ADMIN — Medication 50 GRAM(S): at 09:50

## 2020-08-05 RX ADMIN — AMIODARONE HYDROCHLORIDE 200 MILLIGRAM(S): 400 TABLET ORAL at 05:22

## 2020-08-05 RX ADMIN — Medication 100 MILLIGRAM(S): at 05:22

## 2020-08-05 RX ADMIN — CEFTRIAXONE 100 MILLIGRAM(S): 500 INJECTION, POWDER, FOR SOLUTION INTRAMUSCULAR; INTRAVENOUS at 05:21

## 2020-08-05 RX ADMIN — Medication 1: at 07:09

## 2020-08-05 RX ADMIN — Medication 100 MILLIGRAM(S): at 14:01

## 2020-08-05 RX ADMIN — Medication 100 MILLIGRAM(S): at 06:07

## 2020-08-05 RX ADMIN — Medication 20 MILLIEQUIVALENT(S): at 09:50

## 2020-08-05 RX ADMIN — Medication 100 MILLIGRAM(S): at 17:18

## 2020-08-05 RX ADMIN — Medication 100 MILLIGRAM(S): at 21:08

## 2020-08-05 NOTE — DISCHARGE NOTE PROVIDER - HOSPITAL COURSE
64M w/ PMHx of HTN, HLD, DM, TIA (s/p plavix), A fib (on eliquis, last dose 8/2), CAD s/p CABGx3 on 7/6/20 w/ Dr. Conde presented with 1 day of RUQ abdominal pain. Patient reported that the pain started yesterday after eating lunch. It was sharp, 10/10 at its worst, radiated to the back, and more or less localized to the RUQ. Associated with nausea and NBNB emesis x2. He has never had pain like this before. No fevers, chills, CP, SOB, dizziness, lightheadedness, dysuria, constipation, diarrhea. Patient was admitted to the surgical service and taken for MRCP on day of admission. MRCP resulted the following day with cholecystitis but without stone in the CBD, and was taken to IR for a percutaneous cholecystostomy tube. His post procedure course was unremarkable with advancement of diet, passing trial of void, and pain control. On day of discharge patient was stable to be d/c'd home.

## 2020-08-05 NOTE — DISCHARGE NOTE PROVIDER - CARE PROVIDERS DIRECT ADDRESSES
,[email or web address]_Unknown

## 2020-08-05 NOTE — PROGRESS NOTE ADULT - PROBLEM SELECTOR PLAN 1
Patient s/p Percutaneous drain placement 8/5.   - Management as per primary team.  - On CFTX/Flagyl IV (8/3-) Patient s/p Percutaneous drain placement 8/4.   - Management as per primary team.  - On CFTX/Flagyl IV (8/3-) recommend 5 day course post source control until 8/8 Patient s/p Percutaneous drain placement 8/4.   - Management as per primary team.  - On CFTX/Flagyl IV (8/3-) recommend 5 - 7 day course post source control (8/4)

## 2020-08-05 NOTE — DISCHARGE NOTE PROVIDER - PROVIDER TOKENS
PROVIDER:[TOKEN:[[id number]],FOLLOWUP:[1 week]] PROVIDER:[TOKEN:[[id number]],FOLLOWUP:[1 week]],PROVIDER:[TOKEN:[[id number]],FOLLOWUP:[Routine]] PROVIDER:[TOKEN:[[id number]],FOLLOWUP:[1 week]],PROVIDER:[TOKEN:[[id number]],FOLLOWUP:[Routine]],FREE:[LAST:[[facility]],PHONE:[phone]],FAX:[(   )    -],ADDRESS:[[address] [place] [address]],FOLLOWUP:[1 week]]

## 2020-08-05 NOTE — PROGRESS NOTE ADULT - SUBJECTIVE AND OBJECTIVE BOX
64M pmh HTN, HLD, DM, TIA (s/p plavix), A fib (on eliquis, last dose 8/2), CAD s/p CABGx3 on 7/6/20 w/ Dr. Conde presents with 1 day of RUQ abdominal pain, acute gangrenous cholecystitis diagnosed on MRCP. S/p percutaneous cholecystostomy on 8/4.    Drain flushed easily with 5 cc NS.  Output 135 cc dark bile since placement.  Leukocytosis downtrending.  Will continue to follow.

## 2020-08-05 NOTE — DISCHARGE NOTE PROVIDER - CARE PROVIDER_API CALL
Kortney Faust  SURGERY  [address] [address]  Phone: [phone]  Fax: [phone]  Follow Up Time: 1 week Kortney Faust  SURGERY  [address] [address]  Phone: [phone]  Fax: [phone]  Follow Up Time: 1 week    Javier Conde)  Thoracic and Cardiac Surgery  [address] [address]  Phone: [phone]  Fax: [phone]  Follow Up Time: Routine Kortney Faust  SURGERY  [address] [address]  Phone: [phone]  Fax: [phone]  Follow Up Time: 1 week    Javier Conde)  Thoracic and Cardiac Surgery  [address] [address]  Phone: [phone]  Fax: [phone]  Follow Up Time: Routine    Endocrinology Clinic,   [address]  [place], [place] [address]  Phone: [phone]  Fax: (   )    -  Follow Up Time: 1 week

## 2020-08-05 NOTE — PROGRESS NOTE ADULT - SUBJECTIVE AND OBJECTIVE BOX
STATUS POST:  percutaneous cholecystostomy      SUBJECTIVE: Patient seen and examined bedside by chief resident.  feeling well post procedure abdominal pain resolved. tolerating CLD, is hungry for regular food     aMIOdarone    Tablet 200 milliGRAM(s) Oral daily  aspirin  chewable 81 milliGRAM(s) Oral daily  cefTRIAXone   IVPB 1000 milliGRAM(s) IV Intermittent every 24 hours  metoprolol tartrate 100 milliGRAM(s) Oral two times a day  metroNIDAZOLE  IVPB 500 milliGRAM(s) IV Intermittent every 8 hours      Vital Signs Last 24 Hrs  T(C): 36.8 (05 Aug 2020 04:00), Max: 37.1 (04 Aug 2020 17:04)  T(F): 98.3 (05 Aug 2020 04:00), Max: 98.8 (04 Aug 2020 17:04)  HR: 76 (05 Aug 2020 04:00) (70 - 94)  BP: 114/57 (05 Aug 2020 04:00) (114/57 - 141/67)  BP(mean): 80 (05 Aug 2020 04:00) (80 - 97)  RR: 16 (05 Aug 2020 04:00) (16 - 18)  SpO2: 95% (05 Aug 2020 04:00) (94% - 95%)  I&O's Detail    04 Aug 2020 07:01  -  05 Aug 2020 07:00  --------------------------------------------------------  IN:    IV PiggyBack: 250 mL    Oral Fluid: 460 mL    sodium chloride 0.9%.: 1840 mL  Total IN: 2550 mL    OUT:    Drain: 135 mL    Voided: 1600 mL  Total OUT: 1735 mL    Total NET: 815 mL          General: NAD, resting comfortably in bed  C/V: NSR  Pulm: Nonlabored breathing, no respiratory distress  Abd: soft, NT/ND. perc cholecystostomy tube in place with bilious output  Extrem: WWP, no edema, SCDs in place        LABS:                        11.4   20.77 )-----------( 223      ( 04 Aug 2020 06:14 )             35.8     08-04    135  |  96  |  25<H>  ----------------------------<  182<H>  4.7   |  26  |  1.26    Ca    9.2      04 Aug 2020 06:14  Phos  4.2     08-04  Mg     1.9     08-04    TPro  7.1  /  Alb  3.7  /  TBili  1.6<H>  /  DBili  x   /  AST  50<H>  /  ALT  49<H>  /  AlkPhos  108  08-04    PT/INR - ( 04 Aug 2020 06:14 )   PT: 19.5 sec;   INR: 1.66          PTT - ( 04 Aug 2020 06:14 )  PTT:103.7 sec  Urinalysis Basic - ( 03 Aug 2020 18:32 )    Color: Yellow / Appearance: Clear / S.020 / pH: x  Gluc: x / Ketone: NEGATIVE  / Bili: Negative / Urobili: 0.2 E.U./dL   Blood: x / Protein: 100 mg/dL / Nitrite: NEGATIVE   Leuk Esterase: NEGATIVE / RBC: 5-10 /HPF / WBC < 5 /HPF   Sq Epi: x / Non Sq Epi: 0-5 /HPF / Bacteria: Rare /HPF        RADIOLOGY & ADDITIONAL STUDIES:

## 2020-08-05 NOTE — DISCHARGE NOTE PROVIDER - NSDCMRMEDTOKEN_GEN_ALL_CORE_FT
amiodarone 200 mg oral tablet: 1 tab(s) orally once a day   atorvastatin 80 mg oral tablet: 1 tab(s) orally once a day  Basaglar KwikPen 100 units/mL subcutaneous solution: 30 unit(s) subcutaneous once a day (at bedtime)   Eliquis 5 mg oral tablet: 1 tab(s) orally every 12 hours  enalapril 10 mg oral tablet: 1 tab(s) orally once a day  furosemide 40 mg oral tablet: 1 tab(s) orally once a day  glucometer (per patient&#x27;s insurance): Test blood sugars four times a day. Dispense #1 glucometer.  Insulin Pen Needles, 4mm: 1 application subcutaneously 4 times a day. ** Use with insulin pen **   isosorbide mononitrate: 30 milligram(s) orally once a day  Januvia 100 mg oral tablet: 1 tab(s) orally once a day  Klor-Con 10 mEq oral tablet, extended release: 1 tab(s) orally every other day   lancets: 1 application subcutaneously 4 times a day   levothyroxine 175 mcg (0.175 mg) oral tablet: 1 tab(s) orally once a day  Lopressor 100 mg oral tablet: 1 tab(s) orally 2 times a day   metFORMIN 500 mg oral tablet: 1 tab(s) orally 3 times a day   Protonix 40 mg oral delayed release tablet: 1 tab(s) orally once a day (before a meal)  test strips (per patient&#x27;s insurance): 1 application subcutaneously 4 times a day. ** Compatible with patient&#x27;s glucometer **  Vitamin D3 50,000 intl units (1250 mcg) oral capsule: 1  orally acetaminophen 325 mg oral tablet: 2 tab(s) orally every 6 hours, As needed, Moderate Pain (4 - 6)  amiodarone 200 mg oral tablet: 1 tab(s) orally once a day   atorvastatin 80 mg oral tablet: 1 tab(s) orally once a day  Basaglar KwikPen 100 units/mL subcutaneous solution: 30 unit(s) subcutaneous once a day (at bedtime)   Eliquis 5 mg oral tablet: 1 tab(s) orally every 12 hours  glucometer (per patient&#x27;s insurance): Test blood sugars four times a day. Dispense #1 glucometer.  Insulin Pen Needles, 4mm: 1 application subcutaneously 4 times a day. ** Use with insulin pen **   Januvia 100 mg oral tablet: 1 tab(s) orally once a day  Klor-Con 10 mEq oral tablet, extended release: 1 tab(s) orally every other day   lancets: 1 application subcutaneously 4 times a day   levothyroxine 175 mcg (0.175 mg) oral tablet: 1 tab(s) orally once a day  Lopressor 100 mg oral tablet: 1 tab(s) orally 2 times a day   metFORMIN 500 mg oral tablet: 1 tab(s) orally 3 times a day   oxyCODONE 5 mg oral tablet: 1 tab(s) orally every 6 hours, As needed, Severe Pain (7 - 10) MDD:4  Protonix 40 mg oral delayed release tablet: 1 tab(s) orally once a day (before a meal)  test strips (per patient&#x27;s insurance): 1 application subcutaneously 4 times a day. ** Compatible with patient&#x27;s glucometer **  Vitamin D3 50,000 intl units (1250 mcg) oral capsule: 1  orally acetaminophen 325 mg oral tablet: 2 tab(s) orally every 6 hours, As needed, Moderate Pain (4 - 6)  amiodarone 200 mg oral tablet: 1 tab(s) orally once a day   atorvastatin 80 mg oral tablet: 1 tab(s) orally once a day  Basaglar KwikPen 100 units/mL subcutaneous solution: 30 unit(s) subcutaneous once a day (at bedtime)   cefpodoxime 200 mg oral tablet: 1 tab(s) orally 2 times a day MDD:2 Stop date: 8/9/20  Eliquis 5 mg oral tablet: 1 tab(s) orally every 12 hours  glucometer (per patient&#x27;s insurance): Test blood sugars four times a day. Dispense #1 glucometer.  Insulin Pen Needles, 4mm: 1 application subcutaneously 4 times a day. ** Use with insulin pen **   Januvia 100 mg oral tablet: 1 tab(s) orally once a day  Klor-Con 10 mEq oral tablet, extended release: 1 tab(s) orally every other day   lancets: 1 application subcutaneously 4 times a day   levothyroxine 175 mcg (0.175 mg) oral tablet: 1 tab(s) orally once a day  Lopressor 100 mg oral tablet: 1 tab(s) orally 2 times a day   metFORMIN 500 mg oral tablet: 1 tab(s) orally 3 times a day   metroNIDAZOLE 500 mg oral tablet: 1 tab(s) orally 3 times a day MDD:3; stop date 8/9/20  oxyCODONE 5 mg oral tablet: 1 tab(s) orally every 6 hours, As needed, Severe Pain (7 - 10) MDD:4  Protonix 40 mg oral delayed release tablet: 1 tab(s) orally once a day (before a meal)  test strips (per patient&#x27;s insurance): 1 application subcutaneously 4 times a day. ** Compatible with patient&#x27;s glucometer **  Vitamin D3 50,000 intl units (1250 mcg) oral capsule: 1  orally acetaminophen 325 mg oral tablet: 2 tab(s) orally every 6 hours, As needed, Moderate Pain (4 - 6)  amiodarone 200 mg oral tablet: 1 tab(s) orally once a day   atorvastatin 80 mg oral tablet: 1 tab(s) orally once a day  cefpodoxime 200 mg oral tablet: 1 tab(s) orally 2 times a day MDD:2 Stop date: 8/9/20  Eliquis 5 mg oral tablet: 1 tab(s) orally every 12 hours  glucometer (per patient&#x27;s insurance): Test blood sugars four times a day. Dispense #1 glucometer.  Insulin Pen Needles, 4mm: 1 application subcutaneously 4 times a day. ** Use with insulin pen **   Januvia 100 mg oral tablet: 1 tab(s) orally once a day  Klor-Con 10 mEq oral tablet, extended release: 1 tab(s) orally every other day   lancets: 1 application subcutaneously 4 times a day   levothyroxine 175 mcg (0.175 mg) oral tablet: 1 tab(s) orally once a day  Lopressor 100 mg oral tablet: 1 tab(s) orally 2 times a day   metFORMIN 500 mg oral tablet: 1 tab(s) orally 3 times a day   metroNIDAZOLE 500 mg oral tablet: 1 tab(s) orally 3 times a day MDD:3; stop date 8/9/20  oxyCODONE 5 mg oral tablet: 1 tab(s) orally every 6 hours, As needed, Severe Pain (7 - 10) MDD:4  percutaneous cholecystostomy tube check: percutaneous cholecystostomy tube check    please call [phone] for appointment  Protonix 40 mg oral delayed release tablet: 1 tab(s) orally once a day (before a meal)  test strips (per patient&#x27;s insurance): 1 application subcutaneously 4 times a day. ** Compatible with patient&#x27;s glucometer **  Vitamin D3 50,000 intl units (1250 mcg) oral capsule: 1  orally

## 2020-08-05 NOTE — PROGRESS NOTE ADULT - SUBJECTIVE AND OBJECTIVE BOX
SUBJECTIVE / INTERVAL HPI: Patient seen and examined at bedside. Patient s/p Perc-dharmesh yesterday with no complications, endorsing abd pain has completely resolved, no nausea, emesis, fever or chills. He tolerated liquid diet with plan to advance to regular diet.     Home meds: amiodarone 200mg qd,  qd, atorvastatin 80 qd, eliquis 5mg BID, lasix 40 qd, Januvia 100 qd, levothyroxine 175 qd, lopressor 100 qd, metformin 500 TID, protonix 40 qd, vitamin D    VITAL SIGNS:  Vital Signs Last 24 Hrs  T(C): 36.6 (05 Aug 2020 10:07), Max: 37.1 (04 Aug 2020 17:04)  T(F): 97.8 (05 Aug 2020 10:07), Max: 98.8 (04 Aug 2020 17:04)  HR: 68 (05 Aug 2020 08:36) (68 - 94)  BP: 107/56 (05 Aug 2020 08:36) (107/56 - 141/67)  BP(mean): 75 (05 Aug 2020 08:36) (75 - 97)  RR: 16 (05 Aug 2020 08:36) (16 - 18)  SpO2: 96% (05 Aug 2020 08:36) (94% - 96%)    PHYSICAL EXAM:  General: Obese male sitting in chair in NAD  HEENT: NC/AT; MMM  Neck: supple  Cardiovascular: +S1/S2, RRR no murmurs appreciated   Respiratory: CTA B/L on RA   Gastrointestinal: soft, +BSx4, mildly distended, NT, Right percutaneous drain in place with black biliary fluid   Extremities: WWP; no edema  Vascular: 2+ radial pulses B/L  Neuro: AAOx3    MEDICATIONS:  MEDICATIONS  (STANDING):  aMIOdarone    Tablet 200 milliGRAM(s) Oral daily  aspirin  chewable 81 milliGRAM(s) Oral daily  atorvastatin 80 milliGRAM(s) Oral at bedtime  cefTRIAXone   IVPB 1000 milliGRAM(s) IV Intermittent every 24 hours  dextrose 5%. 1000 milliLiter(s) (50 mL/Hr) IV Continuous <Continuous>  dextrose 50% Injectable 12.5 Gram(s) IV Push once  dextrose 50% Injectable 25 Gram(s) IV Push once  insulin glargine Injectable (LANTUS) 15 Unit(s) SubCutaneous at bedtime  insulin lispro (HumaLOG) corrective regimen sliding scale   SubCutaneous Before meals and at bedtime  levothyroxine 175 MICROGram(s) Oral daily  metoprolol tartrate 100 milliGRAM(s) Oral two times a day  metroNIDAZOLE  IVPB 500 milliGRAM(s) IV Intermittent every 8 hours  pantoprazole    Tablet 40 milliGRAM(s) Oral before breakfast  sodium chloride 0.9%. 1000 milliLiter(s) (80 mL/Hr) IV Continuous <Continuous>    MEDICATIONS  (PRN):  dextrose 40% Gel 15 Gram(s) Oral once PRN Blood Glucose LESS THAN 70 milliGRAM(s)/deciliter  glucagon  Injectable 1 milliGRAM(s) IntraMuscular once PRN Glucose LESS THAN 70 milligrams/deciliter  HYDROmorphone  Injectable 0.5 milliGRAM(s) IV Push every 4 hours PRN Moderate Pain (4 - 6)  HYDROmorphone  Injectable 1 milliGRAM(s) IV Push every 4 hours PRN Severe Pain (7 - 10)  ondansetron Injectable 4 milliGRAM(s) IV Push every 6 hours PRN Nausea      ALLERGIES:  Allergies    No Known Allergies    Intolerances        LABS:                        9.8    12.85 )-----------( 199      ( 05 Aug 2020 08:09 )             30.3     08-05    133<L>  |  98  |  25<H>  ----------------------------<  166<H>  3.8   |  25  |  0.98    Ca    8.5      05 Aug 2020 08:09  Phos  2.4     08-05  Mg     1.8     08-05    TPro  6.2  /  Alb  2.9<L>  /  TBili  1.4<H>  /  DBili  x   /  AST  37  /  ALT  37  /  AlkPhos  94  08-05    PT/INR - ( 04 Aug 2020 06:14 )   PT: 19.5 sec;   INR: 1.66          PTT - ( 04 Aug 2020 06:14 )  PTT:103.7 sec  Urinalysis Basic - ( 03 Aug 2020 18:32 )    Color: Yellow / Appearance: Clear / S.020 / pH: x  Gluc: x / Ketone: NEGATIVE  / Bili: Negative / Urobili: 0.2 E.U./dL   Blood: x / Protein: 100 mg/dL / Nitrite: NEGATIVE   Leuk Esterase: NEGATIVE / RBC: 5-10 /HPF / WBC < 5 /HPF   Sq Epi: x / Non Sq Epi: 0-5 /HPF / Bacteria: Rare /HPF      CAPILLARY BLOOD GLUCOSE      POCT Blood Glucose.: 167 mg/dL (05 Aug 2020 06:59)      RADIOLOGY & ADDITIONAL TESTS: Reviewed.    ECHO, US:  < from: TTE Echo Complete w/o Contrast w/ Doppler (20 @ 14:47) >  CONCLUSIONS:     1. Limited study obtained for evaluation of left ventricular function performed by cardiology fellow on call.   2. There is mild concentric left ventricular hypertrophy. The left ventricle is normal in size and systolic function with a calculated ejection fraction of 65%. There are no regional wall motion abnormalities seen.   3. The right atrium is mildly dilated.   4. The right ventricle appears mildly dilated. RV systolic function is normal.   5. There is mild mitral annular calcification. There is trace mitral regurgitation.   6. The aortic root is normal in size.   7. No pericardial effusion.    < end of copied text >      RADIOLOGY:  < from: CT Abdomen and Pelvis w/ Oral Cont and w/ IV Cont (20 @ 00:03) >  Lower chest: Trace bilateral pleural effusions. Basilar atelectasis. Cardiomegaly. Severe coronary artery and mitral annulus calcifications. Trace pericardial effusion.    Liver: Smooth in contour. No focal mass. Portal and hepatic veins are patent. Periportal edema. Small perihepatic ascites.    Biliary system: Mildly distended gallbladder without evidence of cholelithiasis, wall thickening or pericholecystic fluid. The common bile duct is dilated to 9 mm. No intrahepatic biliary ductal dilatation.    Pancreas: Unremarkable.    Spleen: Unremarkable.    Adrenal glands: Small indeterminant left adrenal nodule. Unremarkable right adrenal gland.    Kidneys: Symmetric parenchymal enhancement. No renal mass. No hydronephrosis. No renal or ureteral stone. Small left renal cyst.  Urinary Bladder: Distended.    Reproductive organs: Enlarged prostate measuring 5 x 4 x 4 cm.    Bowel/Peritoneum: Normal caliber without evidence of obstruction. No appreciable wall thickening. Normal appendix. No extraluminal gas.    Lymph nodes: No lymphadenopathy.    Aorta/IVC: Normal caliber. Moderate atherosclerotic calcifications of the abdominal aorta and its branches.    Abdominal wall: Small fat-containing umbilical hernia.    Bones/Soft tissues: Levoscoliosis of the lumbar spine. Mild degenerative changes of the thoracic and lumbar spines.      IMPRESSION:  Findings suggestive of choledocholithiasis. Consider MRCP for further characterization.    I, Rafat Ayon MD, have reviewed the images and the report and agree with the findings, with the following modification: The following also noted: Status post median sternotomy with traversing wires. Mild inflammatory fat stranding/fluid as well as few foci of gas overlying the sternotomy site. Findings may be related to postsurgical changes versus cellulitis with possible phlegmon.    Gallbladderis distended with no stones identified; however, there is mild pericholecystic inflammatory fat stranding. Correlate for acute cholecystitis and suggest further evaluation with ultrasound.    < end of copied text > SUBJECTIVE / INTERVAL HPI: Patient seen and examined at bedside. Patient s/p Perc-dharmesh yesterday with no complications, endorsing abd pain has completely resolved, no nausea, emesis, fever or chills. He tolerated liquid diet with plan to advance to regular diet. ROS is otherwise negative    Home meds: amiodarone 200mg qd,  qd, atorvastatin 80 qd, eliquis 5mg BID, lasix 40 qd, Januvia 100 qd, levothyroxine 175 qd, lopressor 100 qd, metformin 500 TID, protonix 40 qd, vitamin D    VITAL SIGNS:  Vital Signs Last 24 Hrs  T(C): 36.6 (05 Aug 2020 10:07), Max: 37.1 (04 Aug 2020 17:04)  T(F): 97.8 (05 Aug 2020 10:07), Max: 98.8 (04 Aug 2020 17:04)  HR: 68 (05 Aug 2020 08:36) (68 - 94)  BP: 107/56 (05 Aug 2020 08:36) (107/56 - 141/67)  BP(mean): 75 (05 Aug 2020 08:36) (75 - 97)  RR: 16 (05 Aug 2020 08:36) (16 - 18)  SpO2: 96% (05 Aug 2020 08:36) (94% - 96%)    PHYSICAL EXAM:  General: Obese male sitting in chair in NAD  HEENT: NC/AT; MMM  Neck: supple  Cardiovascular: +S1/S2, RRR no murmurs appreciated   Respiratory: CTA B/L on RA   Gastrointestinal: soft, +BSx4, mildly distended, NT, Right percutaneous drain in place with black biliary fluid   Extremities: WWP; no edema  Vascular: 2+ radial pulses B/L  Neuro: AAOx3    MEDICATIONS:  MEDICATIONS  (STANDING):  aMIOdarone    Tablet 200 milliGRAM(s) Oral daily  aspirin  chewable 81 milliGRAM(s) Oral daily  atorvastatin 80 milliGRAM(s) Oral at bedtime  cefTRIAXone   IVPB 1000 milliGRAM(s) IV Intermittent every 24 hours  dextrose 5%. 1000 milliLiter(s) (50 mL/Hr) IV Continuous <Continuous>  dextrose 50% Injectable 12.5 Gram(s) IV Push once  dextrose 50% Injectable 25 Gram(s) IV Push once  insulin glargine Injectable (LANTUS) 15 Unit(s) SubCutaneous at bedtime  insulin lispro (HumaLOG) corrective regimen sliding scale   SubCutaneous Before meals and at bedtime  levothyroxine 175 MICROGram(s) Oral daily  metoprolol tartrate 100 milliGRAM(s) Oral two times a day  metroNIDAZOLE  IVPB 500 milliGRAM(s) IV Intermittent every 8 hours  pantoprazole    Tablet 40 milliGRAM(s) Oral before breakfast  sodium chloride 0.9%. 1000 milliLiter(s) (80 mL/Hr) IV Continuous <Continuous>    MEDICATIONS  (PRN):  dextrose 40% Gel 15 Gram(s) Oral once PRN Blood Glucose LESS THAN 70 milliGRAM(s)/deciliter  glucagon  Injectable 1 milliGRAM(s) IntraMuscular once PRN Glucose LESS THAN 70 milligrams/deciliter  HYDROmorphone  Injectable 0.5 milliGRAM(s) IV Push every 4 hours PRN Moderate Pain (4 - 6)  HYDROmorphone  Injectable 1 milliGRAM(s) IV Push every 4 hours PRN Severe Pain (7 - 10)  ondansetron Injectable 4 milliGRAM(s) IV Push every 6 hours PRN Nausea      ALLERGIES:  Allergies    No Known Allergies    Intolerances        LABS:                        9.8    12.85 )-----------( 199      ( 05 Aug 2020 08:09 )             30.3     08-05    133<L>  |  98  |  25<H>  ----------------------------<  166<H>  3.8   |  25  |  0.98    Ca    8.5      05 Aug 2020 08:09  Phos  2.4     08-05  Mg     1.8     08-05    TPro  6.2  /  Alb  2.9<L>  /  TBili  1.4<H>  /  DBili  x   /  AST  37  /  ALT  37  /  AlkPhos  94  08-05    PT/INR - ( 04 Aug 2020 06:14 )   PT: 19.5 sec;   INR: 1.66          PTT - ( 04 Aug 2020 06:14 )  PTT:103.7 sec  Urinalysis Basic - ( 03 Aug 2020 18:32 )    Color: Yellow / Appearance: Clear / S.020 / pH: x  Gluc: x / Ketone: NEGATIVE  / Bili: Negative / Urobili: 0.2 E.U./dL   Blood: x / Protein: 100 mg/dL / Nitrite: NEGATIVE   Leuk Esterase: NEGATIVE / RBC: 5-10 /HPF / WBC < 5 /HPF   Sq Epi: x / Non Sq Epi: 0-5 /HPF / Bacteria: Rare /HPF      CAPILLARY BLOOD GLUCOSE      POCT Blood Glucose.: 167 mg/dL (05 Aug 2020 06:59)      RADIOLOGY & ADDITIONAL TESTS: Reviewed.    ECHO, US:  < from: TTE Echo Complete w/o Contrast w/ Doppler (20 @ 14:47) >  CONCLUSIONS:     1. Limited study obtained for evaluation of left ventricular function performed by cardiology fellow on call.   2. There is mild concentric left ventricular hypertrophy. The left ventricle is normal in size and systolic function with a calculated ejection fraction of 65%. There are no regional wall motion abnormalities seen.   3. The right atrium is mildly dilated.   4. The right ventricle appears mildly dilated. RV systolic function is normal.   5. There is mild mitral annular calcification. There is trace mitral regurgitation.   6. The aortic root is normal in size.   7. No pericardial effusion.    < end of copied text >      RADIOLOGY:  < from: CT Abdomen and Pelvis w/ Oral Cont and w/ IV Cont (20 @ 00:03) >  Lower chest: Trace bilateral pleural effusions. Basilar atelectasis. Cardiomegaly. Severe coronary artery and mitral annulus calcifications. Trace pericardial effusion.    Liver: Smooth in contour. No focal mass. Portal and hepatic veins are patent. Periportal edema. Small perihepatic ascites.    Biliary system: Mildly distended gallbladder without evidence of cholelithiasis, wall thickening or pericholecystic fluid. The common bile duct is dilated to 9 mm. No intrahepatic biliary ductal dilatation.    Pancreas: Unremarkable.    Spleen: Unremarkable.    Adrenal glands: Small indeterminant left adrenal nodule. Unremarkable right adrenal gland.    Kidneys: Symmetric parenchymal enhancement. No renal mass. No hydronephrosis. No renal or ureteral stone. Small left renal cyst.  Urinary Bladder: Distended.    Reproductive organs: Enlarged prostate measuring 5 x 4 x 4 cm.    Bowel/Peritoneum: Normal caliber without evidence of obstruction. No appreciable wall thickening. Normal appendix. No extraluminal gas.    Lymph nodes: No lymphadenopathy.    Aorta/IVC: Normal caliber. Moderate atherosclerotic calcifications of the abdominal aorta and its branches.    Abdominal wall: Small fat-containing umbilical hernia.    Bones/Soft tissues: Levoscoliosis of the lumbar spine. Mild degenerative changes of the thoracic and lumbar spines.      IMPRESSION:  Findings suggestive of choledocholithiasis. Consider MRCP for further characterization.    I, Rafat Ayon MD, have reviewed the images and the report and agree with the findings, with the following modification: The following also noted: Status post median sternotomy with traversing wires. Mild inflammatory fat stranding/fluid as well as few foci of gas overlying the sternotomy site. Findings may be related to postsurgical changes versus cellulitis with possible phlegmon.    Gallbladderis distended with no stones identified; however, there is mild pericholecystic inflammatory fat stranding. Correlate for acute cholecystitis and suggest further evaluation with ultrasound.    < end of copied text >

## 2020-08-05 NOTE — PROGRESS NOTE ADULT - PROBLEM SELECTOR PLAN 3
On home Januvia 100qd, metformin 500 TID, Lantus 30U, decreased overnight to 15U in setting of NPO status  - As tolerating PO and good appetite, increased back to 30U tonight   - C/w mISS On home Januvia 100qd, metformin 500 TID, Lantus 30U (prescribed on last admission, although reporting he has not taken medication at home), decreased overnight to 15U in setting of NPO status  - As tolerating PO and good appetite, increase to 80% of home dose 24U tonight   - C/w mISS

## 2020-08-05 NOTE — DISCHARGE NOTE PROVIDER - NSDCFUSCHEDAPPT_GEN_ALL_CORE_FT
LYDIA SHEN ; 08/10/2020 ; [facility] CT Surg [address]  LYDIA SHEN ; 10/19/2020 ; [facility] CT Surg [address] LYDIA SHEN ; 08/10/2020 ; [facility] CT Surg [address]  LYDIA SHEN ; 10/19/2020 ; [facility] CT Surg [address] LYDIA SHEN ; 08/10/2020 ; [facility] CT Surg [address]  LYDIA SHEN ; 10/19/2020 ; [facility] CT Surg [address] LYDIA SHEN ; 08/10/2020 ; [facility] CT Surg [address]  LYDIA SHEN ; 10/19/2020 ; [facility] CT Surg [address] LYDIA SHEN ; 08/10/2020 ; [facility] CT Surg [address]  LYDIA SHEN ; 10/19/2020 ; [facility] CT Surg [address]

## 2020-08-05 NOTE — CHART NOTE - NSCHARTNOTEFT_GEN_A_CORE
Admitting Diagnosis:   Patient is a 64y old  Male who presents with a chief complaint of RUQ abdominal pain (05 Aug 2020 10:27)      Consult: Yes [   ]  No [ x  ]    Reason for Initial Nutrition Assessment:  Length Of Stay     PAST MEDICAL & SURGICAL HISTORY:  History of TIAs  Hypothyroidism  H/O cardiomyopathy  DM (diabetes mellitus)  HLD (hyperlipidemia)  HTN (hypertension)  No significant past surgical history      Current Nutrition Order:  Clear Liquid Diet (8/4)    PO Intake: Excellent (%) [   ]  Good (50-75%) [x   ]  Fair (25-50%) [   ]  Poor (<25%) [   ]  Consumed coffee, broth, jello, icees    GI Issues:   Denies N/V  No postprandial pain  Had 2 BMs o/n  Ordered for zofran and protonix    Pain:  Denies pain  Ordered for tylenol    Skin Integrity:  Surgical incision  Biliary drain    Labs:   08-05    133<L>  |  98  |  25<H>  ----------------------------<  166<H>  3.8   |  25  |  0.98    Ca    8.5      05 Aug 2020 08:09  Phos  2.4     08-05  Mg     1.8     08-05    TPro  6.2  /  Alb  2.9<L>  /  TBili  1.4<H>  /  DBili  x   /  AST  37  /  ALT  37  /  AlkPhos  94  08-05    CAPILLARY BLOOD GLUCOSE      POCT Blood Glucose.: 167 mg/dL (05 Aug 2020 06:59)  POCT Blood Glucose.: 228 mg/dL (04 Aug 2020 21:58)  POCT Blood Glucose.: 174 mg/dL (04 Aug 2020 16:23)    Nutritionally Pertinent Lab Values:  POCT , 228, 174, 177, BUN 25, Pre-alb 9    Medications:  MEDICATIONS  (STANDING):  aMIOdarone    Tablet 200 milliGRAM(s) Oral daily  aspirin  chewable 81 milliGRAM(s) Oral daily  atorvastatin 80 milliGRAM(s) Oral at bedtime  cefTRIAXone   IVPB 1000 milliGRAM(s) IV Intermittent every 24 hours  dextrose 5%. 1000 milliLiter(s) (50 mL/Hr) IV Continuous <Continuous>  dextrose 50% Injectable 12.5 Gram(s) IV Push once  dextrose 50% Injectable 25 Gram(s) IV Push once  insulin glargine Injectable (LANTUS) 15 Unit(s) SubCutaneous at bedtime  insulin lispro (HumaLOG) corrective regimen sliding scale   SubCutaneous Before meals and at bedtime  levothyroxine 175 MICROGram(s) Oral daily  metoprolol tartrate 100 milliGRAM(s) Oral two times a day  metroNIDAZOLE  IVPB 500 milliGRAM(s) IV Intermittent every 8 hours  pantoprazole    Tablet 40 milliGRAM(s) Oral before breakfast  sodium chloride 0.9%. 1000 milliLiter(s) (80 mL/Hr) IV Continuous <Continuous>    MEDICATIONS  (PRN):  dextrose 40% Gel 15 Gram(s) Oral once PRN Blood Glucose LESS THAN 70 milliGRAM(s)/deciliter  glucagon  Injectable 1 milliGRAM(s) IntraMuscular once PRN Glucose LESS THAN 70 milligrams/deciliter  HYDROmorphone  Injectable 0.5 milliGRAM(s) IV Push every 4 hours PRN Moderate Pain (4 - 6)  HYDROmorphone  Injectable 1 milliGRAM(s) IV Push every 4 hours PRN Severe Pain (7 - 10)  ondansetron Injectable 4 milliGRAM(s) IV Push every 6 hours PRN Nausea      Admitted Anthropometrics:  Height: 5'9", IBW 160lbs+/-10%, %%, BMI 27.8    Weight: 188lbs (8/3)  Daily     Weight Change:   pt was 198lbs ~1mo ago prior to triple bypass surgery. Current BW is 188lbs. Pt attributes weight change to carrying water weight prior to CABG. Had variable appetite s/p CABG and reduced intake w/ recent onset of RUQ pain.   Nutrition Focused Physical Exam: Completed [ x ]  Unable to complete [   ]  No remarkable findings. No malnutrition noted at this time.     Estimated energy needs:   ABW (85.45kg) used for calculations as pt between % of IBW.   Nutrient needs based on [facility] standards of care for maintenance in adults.   Needs adjusted for inflammatory state and recent CABG  2136-2563kcal/day (25-30kcal/kg)  86-102g pro/day (1-1.2g pro/kg)  Fluids per team    Subjective:   64M PMH HTN, HLD, DM, TIA (s/p plavix), A fib (on eliquis, last dose 8/2), CAD s/p CABGx3 on 7/6/20 w/ Dr. Conde presents with 1 day of RUQ abdominal pain CT w/ evidence of possible choledocholithiasis and CBD of 9mm now s/p Percutaneous- dharmesh drain placement. Of note pt had been primarily NPO/Clears since 1 day PTA (8/2). Diet adv. to clears 8/4. Pt seen in room, resting in chair. Reported having broth, coffee, icees, and jello w/ good tolerance. Denies N/V, had 2 loose BMs o/n. Of note, pt was 198lbs ~1mo ago prior to triple bypass surgery. Current BW is 188lbs. Pt attributes weight change to carrying water weight prior to CABG. Had variable appetite s/p CABG and reduced intake w/ recent onset of RUQ pain. NKFA, avoids high sodium foods and added salt. Primarily consumes a diet high in lean proteins, vegetables, and greek yogurt. Discussed likely advancement DASH/TLC and provided education on diet relative to heart health and gallbladder. Pt receptive and expressed understanding. NFKA or dietary restrictions. Skin: surgical incision; GI: WNL per flowsheet. RD to follow.     Nutrition Diagnosis:  inadequate energy intake RT unable to meet needs on NPO diet order AEB pt meeting <25% EER at present    Goal:  Pt to consistently meet % of estimated needs PO     Recommendations:  1. Recommend advancing diet to DASH/TLC diet once medically feasible  2. Encourage Ensure Clears while on CLD  3. Monitor for s/s intolerance as diet is advanced  4. Pain and bowel regimen per team  5. Monitor and replete lytes PRN    Education:   Diet advancement process. Purpose of DASH/TLC diet. Purpose of EsnureClears while on CLD.     Risk Level: High [  x ] Moderate [   ] Low [   ]

## 2020-08-05 NOTE — DISCHARGE NOTE PROVIDER - NSDCCPCAREPLAN_GEN_ALL_CORE_FT
PRINCIPAL DISCHARGE DIAGNOSIS  Diagnosis: Abdominal pain  Assessment and Plan of Treatment: s/p percutaneous cholecystostomy tube

## 2020-08-05 NOTE — PROGRESS NOTE ADULT - PROBLEM SELECTOR PLAN 4
Rate controlled In 70s, with cardiology and CTS following.   - C/w AMiodarone 200qd and Lopressor 200 BID   - Defer AC to CTS/Surgical team Rate controlled In 70s, with cardiology and CTS following.   - C/w Amiodarone 200qd and Lopressor 200 BID   - Defer AC to CTS/Surgical team

## 2020-08-05 NOTE — PROGRESS NOTE ADULT - ATTENDING COMMENTS
Feels much better, almost no pain. Tolerated clears  MRCP no stones  IR perc cholecystostomy performed.  AFVSS  Abd soft, minimal RUQ TTP    A/P: Acalculous cholecystitis.  1 month s/p CABG, was on Eliquis for Afib  1. Low fat diet  2. OOB  3. Ceftriaxone/flagyl, will continue abx for total of 1 week.  4. If OK with IR, restart Eliquis.

## 2020-08-05 NOTE — DISCHARGE NOTE PROVIDER - NSDCFUADDINST_GEN_ALL_CORE_FT
General Discharge Instructions:  Please resume all regular home medications unless specifically advised not to take a particular medication. Also, please take any new medications as prescribed.  Please get plenty of rest, continue to ambulate several times per day, and drink adequate amounts of fluids. Avoid lifting weights greater than 5-10 lbs until you follow-up with your surgeon, who will instruct you further regarding activity restrictions.  Avoid driving or operating heavy machinery while taking pain medications.  Please follow-up with your surgeon and Primary Care Provider (PCP) as advised.  Incision Care:  *Please call your doctor or nurse practitioner if you have increased pain, swelling, redness, or drainage from the incision site.  *Avoid swimming and baths until your follow-up appointment.  *You may shower, and wash surgical incisions with a mild soap and warm water. Gently pat the area dry.  *If you have staples, they will be removed at your follow-up appointment.  *If you have steri-strips, they will fall off on their own. Please remove any remaining strips 7-10 days after surgery.  Warning Signs:  Please call your doctor or nurse practitioner if you experience the following:  *You experience new chest pain, pressure, squeezing or tightness.  *New or worsening cough, shortness of breath, or wheeze.  *If you are vomiting and cannot keep down fluids or your medications.  *You are getting dehydrated due to continued vomiting, diarrhea, or other reasons. Signs of dehydration include dry mouth, rapid heartbeat, or feeling dizzy or faint when standing.  *You see blood or dark/black material when you vomit or have a bowel movement.  *You experience burning when you urinate, have blood in your urine, or experience a discharge.  *Your pain is not improving within 8-12 hours or is not gone within 24 hours. Call or return immediately if your pain is getting worse, changes location, or moves to your chest or back.  *You have shaking chills, or fever greater than 101.5 degrees Fahrenheit or 38 degrees Celsius.  *Any change in your symptoms, or any new symptoms that concern you.  JAMAL Drain Care:  *Please look at the site every day for signs of infection (increased redness or pain, swelling, odor, yellow or bloody discharge, warm to touch, fever).  *Maintain suction of the bulb.  *Note color, consistency, and amount of fluid in the drain. Call the doctor, nurse practitioner, or VNA nurse if the amount increases significantly or changes in character.  *Be sure to empty the drain frequently. Record the output, if instructed to do so.  *You may shower; wash the area gently with warm, soapy water.  *Keep the insertion site clean and dry otherwise.  *Avoid swimming, baths, hot tubs; do not submerge yourself in water.  *Make sure to keep the drain attached securely to your body to prevent pulling or dislocation. General Discharge Instructions:  Please resume all regular home medications unless specifically advised not to take a particular medication. Also, please take any new medications as prescribed.  Please get plenty of rest, continue to ambulate several times per day, and drink adequate amounts of fluids. Avoid lifting weights greater than 5-10 lbs until you follow-up with your surgeon, who will instruct you further regarding activity restrictions.  Avoid driving or operating heavy machinery while taking pain medications.  Please follow-up with your surgeon and Primary Care Provider (PCP) as advised.  Incision Care:  *Please call your doctor or nurse practitioner if you have increased pain, swelling, redness, or drainage from the incision site.  *Avoid swimming and baths until your follow-up appointment.  *You may shower, and wash surgical incisions with a mild soap and warm water. Gently pat the area dry.  *If you have staples, they will be removed at your follow-up appointment.  *If you have steri-strips, they will fall off on their own. Please remove any remaining strips 7-10 days after surgery.  Warning Signs:  Please call your doctor or nurse practitioner if you experience the following:  *You experience new chest pain, pressure, squeezing or tightness.  *New or worsening cough, shortness of breath, or wheeze.  *If you are vomiting and cannot keep down fluids or your medications.  *You are getting dehydrated due to continued vomiting, diarrhea, or other reasons. Signs of dehydration include dry mouth, rapid heartbeat, or feeling dizzy or faint when standing.  *You see blood or dark/black material when you vomit or have a bowel movement.  *You experience burning when you urinate, have blood in your urine, or experience a discharge.  *Your pain is not improving within 8-12 hours or is not gone within 24 hours. Call or return immediately if your pain is getting worse, changes location, or moves to your chest or back.  *You have shaking chills, or fever greater than 101.5 degrees Fahrenheit or 38 degrees Celsius.  *Any change in your symptoms, or any new symptoms that concern you.  JAMAL Drain Care:  *Please look at the site every day for signs of infection (increased redness or pain, swelling, odor, yellow or bloody discharge, warm to touch, fever).  *Maintain suction of the bulb.  *Note color, consistency, and amount of fluid in the drain. Call the doctor, nurse practitioner, or VNA nurse if the amount increases significantly or changes in character.  *Be sure to empty the drain frequently. Record the output, if instructed to do so.  *You may shower; wash the area gently with warm, soapy water.  *Keep the insertion site clean and dry otherwise.  *Avoid swimming, baths, hot tubs; do not submerge yourself in water.  *Make sure to keep the drain attached securely to your body to prevent pulling or dislocation.  Please follow up with Dr. Conde as scheduled or in 1 week. Some of your blood pressure medications are being held, check your blood pressure daily; if SBP above 140 please restart medications in a stepwise fashion, first enalapril, then lasix, then isosorbide. Please call his office if you have any questions General Discharge Instructions:  Please resume all regular home medications unless specifically advised not to take a particular medication. Also, please take any new medications as prescribed.  Please get plenty of rest, continue to ambulate several times per day, and drink adequate amounts of fluids. Avoid lifting weights greater than 5-10 lbs until you follow-up with your surgeon, who will instruct you further regarding activity restrictions.  Avoid driving or operating heavy machinery while taking pain medications.  Please follow-up with your surgeon and Primary Care Provider (PCP) as advised.  Incision Care:  *Please call your doctor or nurse practitioner if you have increased pain, swelling, redness, or drainage from the incision site.  *Avoid swimming and baths until your follow-up appointment.  *You may shower, and wash surgical incisions with a mild soap and warm water. Gently pat the area dry.  *If you have staples, they will be removed at your follow-up appointment.  *If you have steri-strips, they will fall off on their own. Please remove any remaining strips 7-10 days after surgery.  Warning Signs:  Please call your doctor or nurse practitioner if you experience the following:  *You experience new chest pain, pressure, squeezing or tightness.  *New or worsening cough, shortness of breath, or wheeze.  *If you are vomiting and cannot keep down fluids or your medications.  *You are getting dehydrated due to continued vomiting, diarrhea, or other reasons. Signs of dehydration include dry mouth, rapid heartbeat, or feeling dizzy or faint when standing.  *You see blood or dark/black material when you vomit or have a bowel movement.  *You experience burning when you urinate, have blood in your urine, or experience a discharge.  *Your pain is not improving within 8-12 hours or is not gone within 24 hours. Call or return immediately if your pain is getting worse, changes location, or moves to your chest or back.  *You have shaking chills, or fever greater than 101.5 degrees Fahrenheit or 38 degrees Celsius.  *Any change in your symptoms, or any new symptoms that concern you.  JAMAL Drain Care:  *Please look at the site every day for signs of infection (increased redness or pain, swelling, odor, yellow or bloody discharge, warm to touch, fever).  *Maintain suction of the bulb.  *Note color, consistency, and amount of fluid in the drain. Call the doctor, nurse practitioner, or VNA nurse if the amount increases significantly or changes in character.  *Be sure to empty the drain frequently. Record the output, if instructed to do so.  *You may shower; wash the area gently with warm, soapy water.  *Keep the insertion site clean and dry otherwise.  *Avoid swimming, baths, hot tubs; do not submerge yourself in water.  *Make sure to keep the drain attached securely to your body to prevent pulling or dislocation.  Please follow up with Dr. Conde as scheduled or in 1 week. Some of your blood pressure medications are being held, check your blood pressure daily; if SBP above 140 please restart medications in a stepwise fashion, first enalapril, then lasix, then isosorbide. Please call his office if you have any questions  You will need a drain study prior to follow up with Dr. Faust. Make an appointment with [facility] in 1-2 weeks.

## 2020-08-06 ENCOUNTER — TRANSCRIPTION ENCOUNTER (OUTPATIENT)
Age: 65
End: 2020-08-06

## 2020-08-06 VITALS — DIASTOLIC BLOOD PRESSURE: 64 MMHG | SYSTOLIC BLOOD PRESSURE: 136 MMHG | HEART RATE: 66 BPM

## 2020-08-06 LAB
ALBUMIN SERPL ELPH-MCNC: 3 G/DL — LOW (ref 3.3–5)
ALP SERPL-CCNC: 111 U/L — SIGNIFICANT CHANGE UP (ref 40–120)
ALT FLD-CCNC: 37 U/L — SIGNIFICANT CHANGE UP (ref 10–45)
ANION GAP SERPL CALC-SCNC: 10 MMOL/L — SIGNIFICANT CHANGE UP (ref 5–17)
AST SERPL-CCNC: 39 U/L — SIGNIFICANT CHANGE UP (ref 10–40)
BILIRUB SERPL-MCNC: 1.2 MG/DL — SIGNIFICANT CHANGE UP (ref 0.2–1.2)
BUN SERPL-MCNC: 13 MG/DL — SIGNIFICANT CHANGE UP (ref 7–23)
CALCIUM SERPL-MCNC: 8.5 MG/DL — SIGNIFICANT CHANGE UP (ref 8.4–10.5)
CHLORIDE SERPL-SCNC: 101 MMOL/L — SIGNIFICANT CHANGE UP (ref 96–108)
CO2 SERPL-SCNC: 25 MMOL/L — SIGNIFICANT CHANGE UP (ref 22–31)
CREAT SERPL-MCNC: 0.73 MG/DL — SIGNIFICANT CHANGE UP (ref 0.5–1.3)
GLUCOSE BLDC GLUCOMTR-MCNC: 155 MG/DL — HIGH (ref 70–99)
GLUCOSE BLDC GLUCOMTR-MCNC: 200 MG/DL — HIGH (ref 70–99)
GLUCOSE SERPL-MCNC: 148 MG/DL — HIGH (ref 70–99)
HCT VFR BLD CALC: 32.5 % — LOW (ref 39–50)
HGB BLD-MCNC: 10.2 G/DL — LOW (ref 13–17)
MAGNESIUM SERPL-MCNC: 2 MG/DL — SIGNIFICANT CHANGE UP (ref 1.6–2.6)
MCHC RBC-ENTMCNC: 28.7 PG — SIGNIFICANT CHANGE UP (ref 27–34)
MCHC RBC-ENTMCNC: 31.4 GM/DL — LOW (ref 32–36)
MCV RBC AUTO: 91.3 FL — SIGNIFICANT CHANGE UP (ref 80–100)
NRBC # BLD: 0 /100 WBCS — SIGNIFICANT CHANGE UP (ref 0–0)
PHOSPHATE SERPL-MCNC: 2.3 MG/DL — LOW (ref 2.5–4.5)
PLATELET # BLD AUTO: 227 K/UL — SIGNIFICANT CHANGE UP (ref 150–400)
POTASSIUM SERPL-MCNC: 4 MMOL/L — SIGNIFICANT CHANGE UP (ref 3.5–5.3)
POTASSIUM SERPL-SCNC: 4 MMOL/L — SIGNIFICANT CHANGE UP (ref 3.5–5.3)
PROT SERPL-MCNC: 6.4 G/DL — SIGNIFICANT CHANGE UP (ref 6–8.3)
RBC # BLD: 3.56 M/UL — LOW (ref 4.2–5.8)
RBC # FLD: 14.2 % — SIGNIFICANT CHANGE UP (ref 10.3–14.5)
SODIUM SERPL-SCNC: 136 MMOL/L — SIGNIFICANT CHANGE UP (ref 135–145)
WBC # BLD: 8.74 K/UL — SIGNIFICANT CHANGE UP (ref 3.8–10.5)
WBC # FLD AUTO: 8.74 K/UL — SIGNIFICANT CHANGE UP (ref 3.8–10.5)

## 2020-08-06 PROCEDURE — 84300 ASSAY OF URINE SODIUM: CPT

## 2020-08-06 PROCEDURE — C1769: CPT

## 2020-08-06 PROCEDURE — 80053 COMPREHEN METABOLIC PANEL: CPT

## 2020-08-06 PROCEDURE — 83735 ASSAY OF MAGNESIUM: CPT

## 2020-08-06 PROCEDURE — 87635 SARS-COV-2 COVID-19 AMP PRB: CPT

## 2020-08-06 PROCEDURE — 81001 URINALYSIS AUTO W/SCOPE: CPT

## 2020-08-06 PROCEDURE — C1894: CPT

## 2020-08-06 PROCEDURE — 85610 PROTHROMBIN TIME: CPT

## 2020-08-06 PROCEDURE — C1729: CPT

## 2020-08-06 PROCEDURE — 82962 GLUCOSE BLOOD TEST: CPT

## 2020-08-06 PROCEDURE — 71045 X-RAY EXAM CHEST 1 VIEW: CPT

## 2020-08-06 PROCEDURE — 74177 CT ABD & PELVIS W/CONTRAST: CPT

## 2020-08-06 PROCEDURE — 47490 INCISION OF GALLBLADDER: CPT

## 2020-08-06 PROCEDURE — 82570 ASSAY OF URINE CREATININE: CPT

## 2020-08-06 PROCEDURE — 99233 SBSQ HOSP IP/OBS HIGH 50: CPT | Mod: GC

## 2020-08-06 PROCEDURE — 87205 SMEAR GRAM STAIN: CPT

## 2020-08-06 PROCEDURE — 85025 COMPLETE CBC W/AUTO DIFF WBC: CPT

## 2020-08-06 PROCEDURE — 76705 ECHO EXAM OF ABDOMEN: CPT

## 2020-08-06 PROCEDURE — 87070 CULTURE OTHR SPECIMN AEROBIC: CPT

## 2020-08-06 PROCEDURE — 74181 MRI ABDOMEN W/O CONTRAST: CPT

## 2020-08-06 PROCEDURE — 80076 HEPATIC FUNCTION PANEL: CPT

## 2020-08-06 PROCEDURE — 86901 BLOOD TYPING SEROLOGIC RH(D): CPT

## 2020-08-06 PROCEDURE — 80048 BASIC METABOLIC PNL TOTAL CA: CPT

## 2020-08-06 PROCEDURE — 99285 EMERGENCY DEPT VISIT HI MDM: CPT | Mod: 25

## 2020-08-06 PROCEDURE — 99152 MOD SED SAME PHYS/QHP 5/>YRS: CPT

## 2020-08-06 PROCEDURE — 84540 ASSAY OF URINE/UREA-N: CPT

## 2020-08-06 PROCEDURE — 87075 CULTR BACTERIA EXCEPT BLOOD: CPT

## 2020-08-06 PROCEDURE — 86850 RBC ANTIBODY SCREEN: CPT

## 2020-08-06 PROCEDURE — 96375 TX/PRO/DX INJ NEW DRUG ADDON: CPT

## 2020-08-06 PROCEDURE — 85027 COMPLETE CBC AUTOMATED: CPT

## 2020-08-06 PROCEDURE — 85730 THROMBOPLASTIN TIME PARTIAL: CPT

## 2020-08-06 PROCEDURE — 96374 THER/PROPH/DIAG INJ IV PUSH: CPT

## 2020-08-06 PROCEDURE — 93005 ELECTROCARDIOGRAM TRACING: CPT

## 2020-08-06 PROCEDURE — 83605 ASSAY OF LACTIC ACID: CPT

## 2020-08-06 PROCEDURE — 83690 ASSAY OF LIPASE: CPT

## 2020-08-06 PROCEDURE — 36415 COLL VENOUS BLD VENIPUNCTURE: CPT

## 2020-08-06 PROCEDURE — 96376 TX/PRO/DX INJ SAME DRUG ADON: CPT

## 2020-08-06 PROCEDURE — 84100 ASSAY OF PHOSPHORUS: CPT

## 2020-08-06 RX ORDER — CEFPODOXIME PROXETIL 100 MG
1 TABLET ORAL
Qty: 8 | Refills: 0
Start: 2020-08-06 | End: 2020-08-09

## 2020-08-06 RX ORDER — OXYCODONE HYDROCHLORIDE 5 MG/1
1 TABLET ORAL
Qty: 5 | Refills: 0
Start: 2020-08-06

## 2020-08-06 RX ORDER — METRONIDAZOLE 500 MG
1 TABLET ORAL
Qty: 12 | Refills: 0
Start: 2020-08-06 | End: 2020-08-09

## 2020-08-06 RX ORDER — ACETAMINOPHEN 500 MG
2 TABLET ORAL
Qty: 0 | Refills: 0 | DISCHARGE
Start: 2020-08-06

## 2020-08-06 RX ORDER — ISOSORBIDE MONONITRATE 60 MG/1
30 TABLET, EXTENDED RELEASE ORAL
Qty: 0 | Refills: 0 | DISCHARGE

## 2020-08-06 RX ADMIN — CEFTRIAXONE 100 MILLIGRAM(S): 500 INJECTION, POWDER, FOR SOLUTION INTRAMUSCULAR; INTRAVENOUS at 04:01

## 2020-08-06 RX ADMIN — Medication 62.5 MILLIMOLE(S): at 08:56

## 2020-08-06 RX ADMIN — AMIODARONE HYDROCHLORIDE 200 MILLIGRAM(S): 400 TABLET ORAL at 05:05

## 2020-08-06 RX ADMIN — APIXABAN 5 MILLIGRAM(S): 2.5 TABLET, FILM COATED ORAL at 08:56

## 2020-08-06 RX ADMIN — Medication 100 MILLIGRAM(S): at 05:04

## 2020-08-06 RX ADMIN — Medication 1: at 06:00

## 2020-08-06 RX ADMIN — Medication 1: at 12:14

## 2020-08-06 RX ADMIN — PANTOPRAZOLE SODIUM 40 MILLIGRAM(S): 20 TABLET, DELAYED RELEASE ORAL at 05:55

## 2020-08-06 RX ADMIN — Medication 175 MICROGRAM(S): at 05:04

## 2020-08-06 NOTE — PHYSICAL THERAPY INITIAL EVALUATION ADULT - PERTINENT HX OF CURRENT PROBLEM, REHAB EVAL
64M w/ PMHx of HTN, HLD, DM, TIA (s/p plavix), A fib (on eliquis, last dose 8/2), CAD s/p CABGx3 on 7/6/20 w/ Dr. Conde presents with 1 day of RUQ abdominal pain. Patient reports that the pain started yesterday after eating lunch. It is sharp, 10/10 at its worst, radiates to the back, and more or less localized to the RUQ.

## 2020-08-06 NOTE — PROGRESS NOTE ADULT - PROBLEM SELECTOR PLAN 2
Pt w/ uptrend Cr. and BUN, suspect prerenal in setting of being NPO and infectious state  -check urine electrolytes  -strict i/o  -Trend w/ AM BMP  -C/w IVF
FeNA with Pre-renal etiology, currently improved from Cr 1.26 to 0.98.  - C/w BMP qd
Resolved  FeNA with Pre-renal etiology, currently improved from Cr 1.26 to 0.98.  - C/w BMP qd

## 2020-08-06 NOTE — PROGRESS NOTE ADULT - SUBJECTIVE AND OBJECTIVE BOX
SUBJECTIVE / INTERVAL HPI: None    Subjective  Pt feels well - back to baseline. Has no acute complaints. ROS is negative.    Home meds: amiodarone 200mg qd,  qd, atorvastatin 80 qd, eliquis 5mg BID, lasix 40 qd, Januvia 100 qd, levothyroxine 175 qd, lopressor 100 qd, metformin 500 TID, protonix 40 qd, vitamin D    Vital Signs Last 24 Hrs  T(C): 36.9 (06 Aug 2020 09:28), Max: 36.9 (06 Aug 2020 09:28)  T(F): 98.4 (06 Aug 2020 09:28), Max: 98.4 (06 Aug 2020 09:28)  HR: 66 (06 Aug 2020 08:30) (60 - 74)  BP: 118/58 (06 Aug 2020 08:30) (114/60 - 136/64)  BP(mean): 82 (06 Aug 2020 08:30) (81 - 96)  RR: 17 (06 Aug 2020 08:30) (16 - 17)  SpO2: 99% (06 Aug 2020 08:30) (96% - 99%)    PHYSICAL EXAM:  General: Obese male sitting in chair in NAD  HEENT: NC/AT; MMM  Neck: supple  Cardiovascular: +S1/S2, RRR no murmurs appreciated   Respiratory: CTA B/L on RA   Gastrointestinal: soft, +BSx4, mildly distended, NT, Right percutaneous drain in place with black biliary fluid   Extremities: WWP; no edema  Vascular: 2+ radial pulses B/L  Neuro: AAOx3  Psych: normal affect    MEDICATIONS  (STANDING):  aMIOdarone    Tablet 200 milliGRAM(s) Oral daily  apixaban 5 milliGRAM(s) Oral every 12 hours  aspirin  chewable 81 milliGRAM(s) Oral daily  atorvastatin 80 milliGRAM(s) Oral at bedtime  cefTRIAXone   IVPB 1000 milliGRAM(s) IV Intermittent every 24 hours  dextrose 5%. 1000 milliLiter(s) (50 mL/Hr) IV Continuous <Continuous>  dextrose 50% Injectable 12.5 Gram(s) IV Push once  dextrose 50% Injectable 25 Gram(s) IV Push once  insulin glargine Injectable (LANTUS) 24 Unit(s) SubCutaneous at bedtime  insulin lispro (HumaLOG) corrective regimen sliding scale   SubCutaneous Before meals and at bedtime  levothyroxine 175 MICROGram(s) Oral daily  metoprolol tartrate 100 milliGRAM(s) Oral two times a day  metroNIDAZOLE  IVPB 500 milliGRAM(s) IV Intermittent every 8 hours  pantoprazole    Tablet 40 milliGRAM(s) Oral before breakfast  sodium chloride 0.9%. 1000 milliLiter(s) (80 mL/Hr) IV Continuous <Continuous>    MEDICATIONS  (PRN):  acetaminophen   Tablet .. 650 milliGRAM(s) Oral every 6 hours PRN Moderate Pain (4 - 6)  dextrose 40% Gel 15 Gram(s) Oral once PRN Blood Glucose LESS THAN 70 milliGRAM(s)/deciliter  glucagon  Injectable 1 milliGRAM(s) IntraMuscular once PRN Glucose LESS THAN 70 milligrams/deciliter  ondansetron Injectable 4 milliGRAM(s) IV Push every 6 hours PRN Nausea  oxyCODONE    IR 5 milliGRAM(s) Oral every 6 hours PRN Severe Pain (7 - 10)    ALLERGIES:  Allergies    No Known Allergies    Intolerances        LABS:                                   10.2   8.74  )-----------( 227      ( 06 Aug 2020 06:09 )             32.5   08-06    136  |  101  |  13  ----------------------------<  148<H>  4.0   |  25  |  0.73    Ca    8.5      06 Aug 2020 06:09  Phos  2.3     08-06  Mg     2.0     08-06    TPro  6.4  /  Alb  3.0<L>  /  TBili  1.2  /  DBili  x   /  AST  39  /  ALT  37  /  AlkPhos  111  08-06      RADIOLOGY & ADDITIONAL TESTS: Reviewed.    ECHO, US:  < from: TTE Echo Complete w/o Contrast w/ Doppler (07.12.20 @ 14:47) >  CONCLUSIONS:     1. Limited study obtained for evaluation of left ventricular function performed by cardiology fellow on call.   2. There is mild concentric left ventricular hypertrophy. The left ventricle is normal in size and systolic function with a calculated ejection fraction of 65%. There are no regional wall motion abnormalities seen.   3. The right atrium is mildly dilated.   4. The right ventricle appears mildly dilated. RV systolic function is normal.   5. There is mild mitral annular calcification. There is trace mitral regurgitation.   6. The aortic root is normal in size.   7. No pericardial effusion.    < end of copied text >      RADIOLOGY:  < from: CT Abdomen and Pelvis w/ Oral Cont and w/ IV Cont (08.03.20 @ 00:03) >  Lower chest: Trace bilateral pleural effusions. Basilar atelectasis. Cardiomegaly. Severe coronary artery and mitral annulus calcifications. Trace pericardial effusion.    Liver: Smooth in contour. No focal mass. Portal and hepatic veins are patent. Periportal edema. Small perihepatic ascites.    Biliary system: Mildly distended gallbladder without evidence of cholelithiasis, wall thickening or pericholecystic fluid. The common bile duct is dilated to 9 mm. No intrahepatic biliary ductal dilatation.    Pancreas: Unremarkable.    Spleen: Unremarkable.    Adrenal glands: Small indeterminant left adrenal nodule. Unremarkable right adrenal gland.    Kidneys: Symmetric parenchymal enhancement. No renal mass. No hydronephrosis. No renal or ureteral stone. Small left renal cyst.  Urinary Bladder: Distended.    Reproductive organs: Enlarged prostate measuring 5 x 4 x 4 cm.    Bowel/Peritoneum: Normal caliber without evidence of obstruction. No appreciable wall thickening. Normal appendix. No extraluminal gas.    Lymph nodes: No lymphadenopathy.    Aorta/IVC: Normal caliber. Moderate atherosclerotic calcifications of the abdominal aorta and its branches.    Abdominal wall: Small fat-containing umbilical hernia.    Bones/Soft tissues: Levoscoliosis of the lumbar spine. Mild degenerative changes of the thoracic and lumbar spines.      IMPRESSION:  Findings suggestive of choledocholithiasis. Consider MRCP for further characterization.    I, Rafat Ayon MD, have reviewed the images and the report and agree with the findings, with the following modification: The following also noted: Status post median sternotomy with traversing wires. Mild inflammatory fat stranding/fluid as well as few foci of gas overlying the sternotomy site. Findings may be related to postsurgical changes versus cellulitis with possible phlegmon.    Gallbladderis distended with no stones identified; however, there is mild pericholecystic inflammatory fat stranding. Correlate for acute cholecystitis and suggest further evaluation with ultrasound.    < end of copied text >

## 2020-08-06 NOTE — PHYSICAL THERAPY INITIAL EVALUATION ADULT - GENERAL OBSERVATIONS, REHAB EVAL
pt received seated out of bed no acute distress +tele +IV, cleared for PT by MARIA D Merida, agreeable to PT Eval. Left as found +call dejah, MARIA D Merida aware. FIM 2

## 2020-08-06 NOTE — PROGRESS NOTE ADULT - REASON FOR ADMISSION
RUQ abdominal pain

## 2020-08-06 NOTE — PROGRESS NOTE ADULT - SUBJECTIVE AND OBJECTIVE BOX
INTERVAL HPI/OVERNIGHT EVENTS: MARGO    STATUS POST:  percutaneous cholecystostomy    POST OPERATIVE DAY #: 2    SUBJECTIVE:  pt seen sitting in chair, feels good. without complaints at this time    MEDICATIONS  (STANDING):  aMIOdarone    Tablet 200 milliGRAM(s) Oral daily  apixaban 5 milliGRAM(s) Oral every 12 hours  aspirin  chewable 81 milliGRAM(s) Oral daily  atorvastatin 80 milliGRAM(s) Oral at bedtime  cefTRIAXone   IVPB 1000 milliGRAM(s) IV Intermittent every 24 hours  dextrose 5%. 1000 milliLiter(s) (50 mL/Hr) IV Continuous <Continuous>  dextrose 50% Injectable 12.5 Gram(s) IV Push once  dextrose 50% Injectable 25 Gram(s) IV Push once  insulin glargine Injectable (LANTUS) 24 Unit(s) SubCutaneous at bedtime  insulin lispro (HumaLOG) corrective regimen sliding scale   SubCutaneous Before meals and at bedtime  levothyroxine 175 MICROGram(s) Oral daily  metoprolol tartrate 100 milliGRAM(s) Oral two times a day  metroNIDAZOLE  IVPB 500 milliGRAM(s) IV Intermittent every 8 hours  pantoprazole    Tablet 40 milliGRAM(s) Oral before breakfast  sodium chloride 0.9%. 1000 milliLiter(s) (80 mL/Hr) IV Continuous <Continuous>    MEDICATIONS  (PRN):  acetaminophen   Tablet .. 650 milliGRAM(s) Oral every 6 hours PRN Moderate Pain (4 - 6)  dextrose 40% Gel 15 Gram(s) Oral once PRN Blood Glucose LESS THAN 70 milliGRAM(s)/deciliter  glucagon  Injectable 1 milliGRAM(s) IntraMuscular once PRN Glucose LESS THAN 70 milligrams/deciliter  ondansetron Injectable 4 milliGRAM(s) IV Push every 6 hours PRN Nausea  oxyCODONE    IR 5 milliGRAM(s) Oral every 6 hours PRN Severe Pain (7 - 10)      Vital Signs Last 24 Hrs  T(C): 36.5 (06 Aug 2020 05:06), Max: 36.7 (05 Aug 2020 14:00)  T(F): 97.7 (06 Aug 2020 05:06), Max: 98.1 (05 Aug 2020 14:00)  HR: 64 (06 Aug 2020 04:05) (60 - 74)  BP: 136/64 (06 Aug 2020 04:05) (107/56 - 136/64)  BP(mean): 94 (06 Aug 2020 04:05) (75 - 96)  RR: 16 (06 Aug 2020 04:05) (16 - 16)  SpO2: 98% (06 Aug 2020 04:05) (96% - 99%)    PHYSICAL EXAM:      Constitutional: A&Ox3    Respiratory: non labored breathing, no respiratory distress    Cardiovascular: NSR, RRR    Gastrointestinal: soft, nontender, nondistended, x1 percutaneous cholecystostomy tube with brown bilious output    Extremities: (-) edema                  I&O's Detail    05 Aug 2020 07:01  -  06 Aug 2020 07:00  --------------------------------------------------------  IN:    IV PiggyBack: 400 mL    sodium chloride 0.9%.: 1520 mL  Total IN: 1920 mL    OUT:    Drain: 100 mL    Voided: 600 mL  Total OUT: 700 mL    Total NET: 1220 mL          LABS:                        10.2   8.74  )-----------( 227      ( 06 Aug 2020 06:09 )             32.5     08-06    136  |  101  |  13  ----------------------------<  148<H>  4.0   |  25  |  0.73    Ca    8.5      06 Aug 2020 06:09  Phos  2.3     08-06  Mg     2.0     08-06    TPro  6.4  /  Alb  3.0<L>  /  TBili  1.2  /  DBili  x   /  AST  39  /  ALT  37  /  AlkPhos  111  08-06          RADIOLOGY & ADDITIONAL STUDIES:

## 2020-08-06 NOTE — PROGRESS NOTE ADULT - PROBLEM SELECTOR PLAN 1
Patient s/p Percutaneous drain placement 8/4.   - Management as per primary team.  - On CFTX/Flagyl IV (8/3-) recommend 5 - 7 day course post source control (8/4)

## 2020-08-06 NOTE — PROGRESS NOTE ADULT - ASSESSMENT
64M pmh HTN, HLD, DM, TIA (s/p plavix), A fib (on eliquis, last dose 8/2), CAD s/p CABGx3 on 7/6/20 w/ Dr. Conde presents with 1 day of RUQ abdominal pain CT w/ evidence of possible choledocholithiasis and CBD of 9mm now pending MRCP
64M PMH HTN, HLD, DM, TIA (s/p plavix), A fib (on eliquis, last dose 8/2), CAD s/p CABGx3 on 7/6/20 w/ Dr. Conde presents with 1 day of RUQ abdominal pain CT w/ evidence of possible choledocholithiasis and CBD of 9mm now s/p Percutaneous- dharmesh drain placement.
64M PMH HTN, HLD, DM, TIA (s/p plavix), A fib (on eliquis, last dose 8/2), CAD s/p CABGx3 on 7/6/20 w/ Dr. Conde presents with 1 day of RUQ abdominal pain CT w/ evidence of possible choledocholithiasis and CBD of 9mm now s/p Percutaneous- dharmesh drain placement.
64M w/ PMHx of HTN, HLD, DM, TIA (s/p plavix), A fib (on eliquis, last dose 8/2), CAD s/p CABGx3 on 7/6/20 w/ Dr. Cnode presents with 1 day of RUQ abdominal pain. AVSS, WBC 14.64, T bili 1.2, alk phos 132. CT with evidence of possible choledocolithiasis and CBD of 9mm, ultrasound negative for acute cholecystitis and CBD 5mm.     NPO/IVF  Pain/nausea control  CTX/flagyl  Home meds (holding lasix and eiliquis)   OOBA/SCDs/SQH  AM labs
64M w/ PMHx of HTN, HLD, DM, TIA (s/p plavix), A fib (on eliquis, last dose 8/2), CAD s/p CABGx3 on 7/6/20 w/ Dr. Conde presents with 1 day of RUQ abdominal pain. AVSS, WBC 14.64, T bili 1.2, alk phos 132. CT with evidence of possible choledocolithiasis and CBD of 9mm, ultrasound negative for acute cholecystitis and CBD 5mm.     CLD/ IVF   Pain/nausea control  CTX/flagyl  Home meds (holding lasix and eiliquis)   OOBA/SCDs/SQH  AM labs
64M w/ PMHx of HTN, HLD, DM, TIA (s/p plavix), A fib (on eliquis, last dose 8/2), CAD s/p CABGx3 on 7/6/20 w/ Dr. Conde presents with 1 day of RUQ abdominal pain. AVSS, WBC 14.64, T bili 1.2, alk phos 132. CT with evidence of possible choledocolithiasis and CBD of 9mm, ultrasound negative for acute cholecystitis and CBD 5mm.     CLD/ IVF   Pain/nausea control  CTX/flagyl  home Eliquis  Home meds (holding lasix)   OOBA/SCDs/SQH  AM labs   dc home today
Assessment  64M w/ PMHx of HTN, HLD, DM, TIA (s/p plavix), A fib (on eliquis, last dose 8/2), CAD s/p CABGx3 on 7/6/20 w/ Dr. Conde presents with 1 day of RUQ abdominal pain. AVSS, WBC 14.64, T bili 1.2, alk phos 132. CT with evidence of possible choledocolithiasis and CBD of 9mm, ultrasound negative for acute cholecystitis and CBD 5mm. Due to equivocal findings on CT and u/s, will obtain MRCP for further workup for choledocolithiasis.     Plan:    GI  -Admitted to team 4 surgery under Dr. Xavier  -Patient to undergo MRCP today with IR.  -Consult with GI pending MRCP.  -Preop tonight for OR tomorrow    Cardiac  -SubQ heparin and ASA ordered  -Follow up with CT surgery when to restart NoAC (eliquis)    Endo  -15 units Lantus tonight

## 2020-08-06 NOTE — PROGRESS NOTE ADULT - PROVIDER SPECIALTY LIST ADULT
Hospitalist
Hospitalist
Intervent Radiology
Surgery
Vascular Surgery
Hospitalist

## 2020-08-06 NOTE — PROGRESS NOTE ADULT - PROBLEM SELECTOR PROBLEM 4
History of TIAs
Longstanding persistent atrial fibrillation
Longstanding persistent atrial fibrillation

## 2020-08-06 NOTE — PROGRESS NOTE ADULT - PROBLEM SELECTOR PLAN 8
Stable but still elevated  -C/w Ceftriaxone/Flagyl
Currently normotensive sBP 100-120s.
Currently normotensive sBP 100-120s.

## 2020-08-06 NOTE — PROGRESS NOTE ADULT - PROBLEM SELECTOR PLAN 3
On home Januvia 100qd, metformin 500 TID,   On chart review patient was previously discharged on Lantus 30U w/ plan to d/c insulin outpatient which patient did. Reports that he has had excellent glucose control at home w/ Januvia and Metformin (checks his FS 5 - 6 times a day range <180)  -Can d/c on home Januvia and Metformin - will discuss this plan w/ his outpatient PMD Dr. Corbett - w/ plan to initiate insulin if glucose control requires it

## 2020-08-06 NOTE — PROGRESS NOTE ADULT - PROBLEM SELECTOR PLAN 4
Rate controlled In 70s, with cardiology and CTS following.   - C/w Amiodarone 200qd and Lopressor 200 BID   -Currently on Eliquis

## 2020-08-06 NOTE — DISCHARGE NOTE NURSING/CASE MANAGEMENT/SOCIAL WORK - PATIENT PORTAL LINK FT
You can access the FollowMyHealth Patient Portal offered by [facility] by registering at the following website: [email or web address]. By joining [employer]’s FollowMyHealth portal, you will also be able to view your health information using other applications (apps) compatible with our system.

## 2020-08-10 ENCOUNTER — APPOINTMENT (OUTPATIENT)
Dept: CARDIOTHORACIC SURGERY | Facility: CLINIC | Age: 65
End: 2020-08-10

## 2020-08-10 LAB
CULTURE RESULTS: NO GROWTH — SIGNIFICANT CHANGE UP
SPECIMEN SOURCE: SIGNIFICANT CHANGE UP

## 2020-08-11 ENCOUNTER — TRANSCRIPTION ENCOUNTER (OUTPATIENT)
Age: 65
End: 2020-08-11

## 2020-08-12 ENCOUNTER — TRANSCRIPTION ENCOUNTER (OUTPATIENT)
Age: 65
End: 2020-08-12

## 2020-08-12 DIAGNOSIS — I10 ESSENTIAL (PRIMARY) HYPERTENSION: ICD-10-CM

## 2020-08-12 DIAGNOSIS — E03.9 HYPOTHYROIDISM, UNSPECIFIED: ICD-10-CM

## 2020-08-12 DIAGNOSIS — Z95.1 PRESENCE OF AORTOCORONARY BYPASS GRAFT: ICD-10-CM

## 2020-08-12 DIAGNOSIS — E78.5 HYPERLIPIDEMIA, UNSPECIFIED: ICD-10-CM

## 2020-08-12 DIAGNOSIS — N17.9 ACUTE KIDNEY FAILURE, UNSPECIFIED: ICD-10-CM

## 2020-08-12 DIAGNOSIS — I25.10 ATHEROSCLEROTIC HEART DISEASE OF NATIVE CORONARY ARTERY WITHOUT ANGINA PECTORIS: ICD-10-CM

## 2020-08-12 DIAGNOSIS — K81.0 ACUTE CHOLECYSTITIS: ICD-10-CM

## 2020-08-12 DIAGNOSIS — E87.1 HYPO-OSMOLALITY AND HYPONATREMIA: ICD-10-CM

## 2020-08-12 DIAGNOSIS — I48.11 LONGSTANDING PERSISTENT ATRIAL FIBRILLATION: ICD-10-CM

## 2020-08-12 DIAGNOSIS — Z86.73 PERSONAL HISTORY OF TRANSIENT ISCHEMIC ATTACK (TIA), AND CEREBRAL INFARCTION WITHOUT RESIDUAL DEFICITS: ICD-10-CM

## 2020-08-12 DIAGNOSIS — E11.65 TYPE 2 DIABETES MELLITUS WITH HYPERGLYCEMIA: ICD-10-CM

## 2020-08-12 DIAGNOSIS — Z79.84 LONG TERM (CURRENT) USE OF ORAL HYPOGLYCEMIC DRUGS: ICD-10-CM

## 2020-08-12 DIAGNOSIS — K80.50 CALCULUS OF BILE DUCT WITHOUT CHOLANGITIS OR CHOLECYSTITIS WITHOUT OBSTRUCTION: ICD-10-CM

## 2020-08-12 DIAGNOSIS — Z79.01 LONG TERM (CURRENT) USE OF ANTICOAGULANTS: ICD-10-CM

## 2020-08-12 DIAGNOSIS — K82.A1 GANGRENE OF GALLBLADDER IN CHOLECYSTITIS: ICD-10-CM

## 2020-08-17 ENCOUNTER — APPOINTMENT (OUTPATIENT)
Dept: SURGERY | Facility: CLINIC | Age: 65
End: 2020-08-17
Payer: COMMERCIAL

## 2020-08-17 VITALS
OXYGEN SATURATION: 97 % | BODY MASS INDEX: 29.1 KG/M2 | DIASTOLIC BLOOD PRESSURE: 77 MMHG | HEART RATE: 69 BPM | HEIGHT: 68 IN | TEMPERATURE: 96.8 F | WEIGHT: 192 LBS | SYSTOLIC BLOOD PRESSURE: 132 MMHG

## 2020-08-17 DIAGNOSIS — Z86.39 PERSONAL HISTORY OF OTHER ENDOCRINE, NUTRITIONAL AND METABOLIC DISEASE: ICD-10-CM

## 2020-08-17 DIAGNOSIS — Z86.79 PERSONAL HISTORY OF OTHER DISEASES OF THE CIRCULATORY SYSTEM: ICD-10-CM

## 2020-08-17 PROCEDURE — 99203 OFFICE O/P NEW LOW 30 MIN: CPT

## 2020-08-17 RX ORDER — FUROSEMIDE 40 MG/1
40 TABLET ORAL DAILY
Qty: 30 | Refills: 3 | Status: DISCONTINUED | COMMUNITY
Start: 2020-07-16 | End: 2020-08-17

## 2020-08-17 NOTE — PHYSICAL EXAM
[Calm] : calm [de-identified] : NAD, comfortable [de-identified] : NCAT, no scleral icterus [de-identified] : Warm, dry. [de-identified] : +BS soft NT ND.  No hepatosplenomegaly.  Cholecystostomy tube in place with clear bile draining. [de-identified] : No clubbing, cyanosis, or edema. [de-identified] : A&Ox3

## 2020-08-17 NOTE — HISTORY OF PRESENT ILLNESS
[de-identified] : He presented today for follow up.  He complained of some pain at the cholecystostomy tube site.  There is ongoing drainage into the cholecystostomy tube, but it is decreasing slowly. [de-identified] : Mr. Ortiz presented to the [facility] emergency room on August 2, 2020, complaining of a one-day history of right upper quadrant pain.  He was found to have an elevated WBC count of 14.64 with a left shift of 87.6% neutrophils, an elevated alkaline phosphatase of 132, and otherwise normal liver function tests (total bilirubin 1.2, AST 32, ALT 29).  He underwent a CT abdomen/pelvis on August 3, 2020, which demonstrated findings suggestive of choledocholithiasis (a dilated common bile duct of 0.9 cm) and cholecystitis without cholelithiasis.  He underwent a right upper quadrant ultrasound on August 3, 2020, which demonstrated no evidence of cholecystitis, no cholelithiasis, possible gallbladder sludge, and a normal diameter common bile duct of 0.5 cm.  He underwent an MRCP on August 4, 2020, which demonstrated no intrahepatic or extra hepatic biliary dilatation, no choledocholithiasis, and acute gangrenous cholecystitis with focal disruption of the wall.  He underwent a percutaneous cholecystostomy tube placement on August 4, 2020.  He was discharged home on August 6, 2020, after his WBC count normalized and his pain improved.

## 2020-08-17 NOTE — DATA REVIEWED
[FreeTextEntry1] : Labs (8/2/2020) - elevated WBC count of 14.64 with a left shift of 87.6% neutrophils, an elevated alkaline phosphatase of 132, and otherwise normal liver function tests (total bilirubin 1.2, AST 32, ALT 29).  \par \par CT abdomen/pelvis (8/3/2020) - findings suggestive of choledocholithiasis (a dilated common bile duct of 0.9 cm) and cholecystitis without cholelithiasis.  \par \par RUQ US (8/3/2020) - no evidence of cholecystitis, no cholelithiasis, possible gallbladder sludge, and a normal diameter common bile duct of 0.5 cm.  \par \par MRCP (8/4/2020) - no intrahepatic or extra hepatic biliary dilatation, no choledocholithiasis, and acute gangrenous cholecystitis with focal disruption of the wall.  \par \par IR procedure (8/4/2020) - percutaneous cholecystostomy tube placement.

## 2020-08-17 NOTE — CONSULT LETTER
[FreeTextEntry1] : 2020\par \par \par \par Santhosh Corbett M.D.\par [address] [facility]\[place] [address]\par Telephone #: [phone]\par \par \par Re: Jass Ortiz\par : 1955\par \par \par Dear Dr. Corbett:\par \par I had the opportunity to see Mr. Ortiz for follow up after he was admitted to [facility] for acute acalculous cholecystitis from 2020, through 2020.  He initially presented to the [facility] emergency room on 2020, complaining of a one-day history of right upper quadrant pain.  He was found to have an elevated WBC count of 14.64 with a left shift of 87.6% neutrophils, an elevated alkaline phosphatase of 132, and otherwise normal liver function tests (total bilirubin 1.2, AST 32, ALT 29).  He underwent a CT abdomen/pelvis on August 3, 2020, which demonstrated findings suggestive of choledocholithiasis (a dilated common bile duct of 0.9 cm) and cholecystitis without cholelithiasis.  He underwent a right upper quadrant ultrasound on August 3, 2020, which demonstrated no evidence of cholecystitis, no cholelithiasis, possible gallbladder sludge, and a normal diameter common bile duct of 0.5 cm.  He underwent an MRCP on 2020, which demonstrated no intrahepatic or extra hepatic biliary dilatation, no choledocholithiasis, and acute gangrenous cholecystitis with focal disruption of the wall.  He underwent a percutaneous cholecystostomy tube placement on 2020.  He was discharged home on 2020, after his WBC count normalized and his pain improved.  He presented today for follow up.  He complained of some pain at the cholecystostomy tube site.  There is ongoing drainage into the cholecystostomy tube, but it is decreasing slowly.\par \par On physical examination, his height is 5 feet 8 inches, weight is 192 pounds, and BMI is 29.19. His temperature is 96.8°F, blood pressure is 132/77, heart rate 69, and O2 saturation is 97% on room air. In general, he is a well-dressed, well-nourished man who appears his stated age and is in no acute distress.  He is calm, alert, and oriented x3.  HEENT exam demonstrates no scleral icterus and a normocephalic atraumatic appearance. His abdomen is soft, non-tender, and non-distended. There is no hepatosplenomegaly.  The cholecystostomy tube is in place and draining clear bile.  His extremities are warm and dry with no clubbing, cyanosis, or edema. \par \par In summary, Mr. Ortiz is a 64-year-old man with acute acalculous cholecystitis who underwent a percutaneous cholecystostomy tube placement on 2020.  We will plan for an IR tube study in 4 weeks to evaluate whether the cystic duct is patent.  If the cystic duct is patent, the cholecystostomy tube can be removed as he does not have cholelithiasis.  If the cystic duct remains obstructed, he would require a cholecystectomy at that point.\par \par Thank you for the opportunity to care for this patient. Please do not hesitate to contact me in the event that you have any questions or concerns about the care of this patient.\par \par Sincerely,\par \par \par \par Kortney Faust M.D.

## 2020-08-17 NOTE — ASSESSMENT
[FreeTextEntry1] : Mr. Ortiz is a 64-year-old man with acute acalculous cholecystitis who underwent a percutaneous cholecystostomy tube placement on August 4, 2020.  We will plan for an IR tube study in 4 weeks to evaluate whether the cystic duct is patent.  If the cystic duct is patent, the cholecystostomy tube can be removed as he does not have cholelithiasis.  If the cystic duct remains obstructed, he would require a cholecystectomy at that point.

## 2020-09-08 ENCOUNTER — LABORATORY RESULT (OUTPATIENT)
Age: 65
End: 2020-09-08

## 2020-09-11 ENCOUNTER — RESULT REVIEW (OUTPATIENT)
Age: 65
End: 2020-09-11

## 2020-09-11 ENCOUNTER — APPOINTMENT (OUTPATIENT)
Dept: INTERVENTIONAL RADIOLOGY/VASCULAR | Facility: HOSPITAL | Age: 65
End: 2020-09-11
Payer: COMMERCIAL

## 2020-09-11 ENCOUNTER — OUTPATIENT (OUTPATIENT)
Dept: OUTPATIENT SERVICES | Facility: HOSPITAL | Age: 65
LOS: 1 days | End: 2020-09-11
Payer: COMMERCIAL

## 2020-09-11 LAB — GLUCOSE BLDC GLUCOMTR-MCNC: 133 MG/DL — HIGH (ref 70–99)

## 2020-09-11 PROCEDURE — 82962 GLUCOSE BLOOD TEST: CPT

## 2020-09-11 PROCEDURE — 47537 REMOVAL BILIARY DRG CATH: CPT

## 2020-09-15 ENCOUNTER — APPOINTMENT (OUTPATIENT)
Dept: NEUROLOGY | Facility: CLINIC | Age: 65
End: 2020-09-15
Payer: COMMERCIAL

## 2020-09-15 VITALS
BODY MASS INDEX: 28.79 KG/M2 | DIASTOLIC BLOOD PRESSURE: 80 MMHG | HEIGHT: 68 IN | OXYGEN SATURATION: 98 % | SYSTOLIC BLOOD PRESSURE: 134 MMHG | HEART RATE: 70 BPM | TEMPERATURE: 98 F | WEIGHT: 190 LBS

## 2020-09-15 PROCEDURE — 99204 OFFICE O/P NEW MOD 45 MIN: CPT

## 2020-09-16 NOTE — DISCUSSION/SUMMARY
[FreeTextEntry1] : 63 yo M with PMHx CAD s/p CABG, HTN, HLD, DM presents for inability to move left 4th and 5th digit.\par On exam found to have B/L muscle wasting/atrophy of the FDI muscles and intrinsic hand muscle weakness, ?EDM atrophy- raising concern for lower cspine vs b/l ulnar pathology\par \par plan:\par MRI cervical spine w/out contrast\par EMG/NCS\par Continue OT\par F/u after testing

## 2020-09-16 NOTE — HISTORY OF PRESENT ILLNESS
[FreeTextEntry1] : 63 yo M with PMHx CAD s/p CABG, HTN, HLD, DM presents for inability to move left 4th and 5th digit.\par \par Patient reports that he was hospitalized for a week in July for triple bypass. Upon discharge he noticed he has  tightness in fourth and fifth digit of left hand. Had iv in dorsum of left hand but denies significant pain or swelling.\par He has been doing OT and reports its 15% better but has  trouble with writing and eating (he is left-handed). \par Denies neck pain and does not believe he has weakness in the right hand. Of note, his right 5th digit is slightly contracted as well which he says its from a rugby accident many year ago. \par \par 10 point ROS reviewed and scanned into system

## 2020-09-16 NOTE — PHYSICAL EXAM
[General Appearance - Alert] : alert [General Appearance - In No Acute Distress] : in no acute distress [Oriented To Time, Place, And Person] : oriented to person, place, and time [Impaired Insight] : insight and judgment were intact [Person] : oriented to person [Place] : oriented to place [Time] : oriented to time [Fluency] : fluency intact [Cranial Nerves Optic (II)] : visual acuity intact bilaterally,  visual fields full to confrontation, pupils equal round and reactive to light [Cranial Nerves Oculomotor (III)] : extraocular motion intact [Cranial Nerves Trigeminal (V)] : facial sensation intact symmetrically [Cranial Nerves Facial (VII)] : face symmetrical [Cranial Nerves Vestibulocochlear (VIII)] : hearing was intact bilaterally [Cranial Nerves Glossopharyngeal (IX)] : tongue and palate midline [Cranial Nerves Accessory (XI - Cranial And Spinal)] : head turning and shoulder shrug symmetric [Cranial Nerves Hypoglossal (XII)] : there was no tongue deviation with protrusion [Involuntary Movements] : no involuntary movements were seen [Motor Handedness Left-Handed] : the patient is left hand dominant [Sensation Tactile Decrease] : light touch was intact [Abnormal Walk] : normal gait [1+] : Ankle jerk left 1+ [FreeTextEntry4] : No tongue fasiculations [FreeTextEntry6] : B/L muscle wasting/atrophy of the first dorsal interosseous muscles\par  B/L intrinsic hand muscle weakness\par Left hand- 4th and 5th digit slight contracted (claw hand)\par Right hand- fifth digit minimally contracted \par otherwise good strength

## 2020-09-18 NOTE — PROGRESS NOTE ADULT - SUBJECTIVE AND OBJECTIVE BOX
Patient discussed on morning rounds with Dr. Posada/Dr. Conde    Operation / Date: 20 - CABGx3 (LIMA-LAD, SVG-OM, SVG-PDA), EF 65%     Surgeon: Dr. Conde    Referring Physician: Dr. Muñiz    SUBJECTIVE ASSESSMENT:  64y Male seen at bedside. Patient states he feels much better after having his pigtail catheters placed yesterday. He has no acute complaints and ambulated multiple times in the hallway today     Hospital Course:  This is a 63 y/o M with a PMHx of COVID infection (antibody positive), TA (on plavix 75mg only, 7 y/o with left hand weakness that resolved in minutes, no occurrences), HTN, HLD, DM, hypothyroidisim who presented to Dr. Francis Muñiz with complaints of chest pain associated with LINO starting in May 2020. Patient underwent NST on 20 which revealed anterior, septal and lateral wall ischemia, EF 66%. Cardiac cath on 20 showed 3V CAD. Patient referred to Dr. Conde for surgical evaluation and deemed a good surgical candidate. Patient underwent uncomplicated CABGx3 (LIMA-LAD, SVG-OM, SVG-PDA) EF 65%. Arrived to the CTICU on no gtts. Patient briefly required Tavon to keep MAP>80 and also required x4 units pRBCs due to anemia. Pt extubated later in the day on POD#0. On POD#1 pt weaned from Tavon and started on Lopresser, then diuresed. POD#2, pt noted to be in AF and received x3 amio boluses and started PO amio load. BB increased. Patient enrolled in PACES clinical trial for POAF>60minutes. Patient coverted to NSR after amio bolus and has remained in NSR. On POD#3 pt started on eliquis and BB increased. On POD #4 pt remained stable and was cleared for transfer to stepdown unit (9L) and remains stable at this time. Patient now POD#5, had episode of wheezing/SOB overnight requiring lasix/nebs. Patient with small b/l effusions on POD#5, now s/p b/l pigtails which drained about 700cc each. Eliquis was held for procedure and patient tolerated pigtails well. He has continued to be diuresed and he is now stable on RA during ambulation. POD#6 limited echo performed for slightly rising BUN which showed no effusion. Patient is now doing well and ready for discharge on POD#6 with appropriate follow up. He will restart his Eliquis starting 7/12 PM dose.     Vital Signs Last 24 Hrs  T(C): 36.2 (2020 13:22), Max: 36.5 (2020 17:59)  T(F): 97.2 (2020 13:22), Max: 97.7 (2020 17:59)  HR: 78 (2020 12:22) (56 - 78)  BP: 148/68 (2020 12:22) (86/51 - 148/68)  BP(mean): 98 (2020 12:22) (63 - 98)  RR: 16 (:22) (16 - 18)  SpO2: 97% (:) (94% - 97%)  I&O's Detail    2020 07:01  -  2020 07:00  --------------------------------------------------------  IN:    Oral Fluid: 1000 mL  Total IN: 1000 mL    OUT:    Chest Tube: 550 mL    Chest Tube: 730 mL    Voided: 1300 mL  Total OUT: 2580 mL    Total NET: -1580 mL      2020 07:01  -  2020 16:13  --------------------------------------------------------  IN:  Total IN: 0 mL    OUT:    Voided: 1000 mL  Total OUT: 1000 mL    Total NET: -1000 mL      EPICARDIAL WIRES REMOVED: Yes  TIE DOWNS REMOVED: Yes    PHYSICAL EXAM:  General: sitting in chair in NAD   Neurological: AOx3. Motor skills grossly intact  Cardiovascular: Normal S1/S2. Regular rate/rhythm. No murmurs  Respiratory: Lungs with slight diminished breath sounds b/l at bases  Gastrointestinal: +BS in all 4 quadrants. Non-distended. Soft. Non-tender  Extremities: Strength 5/5 b/l upper/lower extremities. Sensation grossly intact upper/lower extremities. No edema. No calf tenderness.  Vascular: Radial 2+bilaterally, DP 2+ b/l  Incision Sites: sternotomy healing well no erythema, purulence or ecchymosis. Right lower extremity SVG without erythema, mild drainage/bleeding from incision. Leg with slight bruising and swelling, no signs of infection. L leg EVH site stable incision C/D/I, ecchymosis and mild swelling of L thigh       LABS:                        8.8    15.58 )-----------( 230      ( 2020 06:03 )             27.4       COUMADIN: No.    On Eliquis    PT/INR - ( 2020 06:03 )   PT: 15.1 sec;   INR: 1.27          PTT - ( 2020 06:03 )  PTT:26.1 sec    07-12    136  |  98  |  32<H>  ----------------------------<  170<H>  4.7   |  24  |  1.04    Ca    8.6      2020 14:53  Phos  3.8     07-11  Mg     2.2     07-12        Urinalysis Basic - ( 10 Jul 2020 19:52 )    Color: Yellow / Appearance: Clear / S.015 / pH: x  Gluc: x / Ketone: NEGATIVE  / Bili: Negative / Urobili: 0.2 E.U./dL   Blood: x / Protein: Trace mg/dL / Nitrite: NEGATIVE   Leuk Esterase: NEGATIVE / RBC: > 10 /HPF / WBC < 5 /HPF   Sq Epi: x / Non Sq Epi: 0-5 /HPF / Bacteria: Present /HPF        MEDICATIONS  (STANDING):  aMIOdarone    Tablet 200 milliGRAM(s) Oral daily  aMIOdarone    Tablet   Oral   apixaban 5 milliGRAM(s) Oral every 12 hours  aspirin enteric coated 325 milliGRAM(s) Oral daily  atorvastatin 80 milliGRAM(s) Oral at bedtime  dextrose 50% Injectable 12.5 Gram(s) IV Push once  dextrose 50% Injectable 25 Gram(s) IV Push once  dextrose 50% Injectable 25 Gram(s) IV Push once  furosemide    Tablet 40 milliGRAM(s) Oral daily  insulin glargine Injectable (LANTUS) 36 Unit(s) SubCutaneous at bedtime  insulin lispro (HumaLOG) corrective regimen sliding scale   SubCutaneous <User Schedule>  insulin lispro (HumaLOG) corrective regimen sliding scale   SubCutaneous three times a day before meals  insulin lispro Injectable (HumaLOG) 10 Unit(s) SubCutaneous with lunch  insulin lispro Injectable (HumaLOG) 10 Unit(s) SubCutaneous with dinner  insulin lispro Injectable (HumaLOG) 12 Unit(s) SubCutaneous with breakfast  levothyroxine 175 MICROGram(s) Oral daily  lidocaine   Patch 1 Patch Transdermal every 24 hours  metoprolol tartrate 50 milliGRAM(s) Oral every 6 hours  pantoprazole    Tablet 40 milliGRAM(s) Oral before breakfast  polyethylene glycol 3350 17 Gram(s) Oral daily  potassium chloride    Tablet ER 10 milliEquivalent(s) Oral daily  sodium chloride 0.9% lock flush 3 milliLiter(s) IV Push every 8 hours      Discharge CXR:  no pleural effusions    Discharge ECHO:  no pericardial effusion    EKG from day of discharge in paper chart    Care Providers for Follow up (PCP/Outpatient Provider)	Javier Conde)  Thoracic and Cardiac Surgery  [address] [place] [address]  Phone: [phone]  Fax: [phone]  Scheduled Appointment: 2020 01:30 PM    Justin Muñiz  CARDIOVASCULAR DISEASE  [address] [place] [address]  Phone: [phone]  Fax: [phone]  Follow Up Time:     CYNTHIA CHANEY  Internal Medicine  [address] [place] [address]  Phone: [phone]  Fax: [phone]  Scheduled Appointment: 2020 03:00 PM none affiliated

## 2020-10-19 ENCOUNTER — APPOINTMENT (OUTPATIENT)
Dept: NEUROLOGY | Facility: CLINIC | Age: 65
End: 2020-10-19
Payer: COMMERCIAL

## 2020-10-19 ENCOUNTER — APPOINTMENT (OUTPATIENT)
Dept: CARDIOTHORACIC SURGERY | Facility: CLINIC | Age: 65
End: 2020-10-19
Payer: COMMERCIAL

## 2020-10-19 VITALS
OXYGEN SATURATION: 98 % | RESPIRATION RATE: 16 BRPM | SYSTOLIC BLOOD PRESSURE: 134 MMHG | TEMPERATURE: 96.3 F | BODY MASS INDEX: 28.58 KG/M2 | WEIGHT: 193 LBS | HEIGHT: 69 IN | HEART RATE: 77 BPM | DIASTOLIC BLOOD PRESSURE: 62 MMHG

## 2020-10-19 VITALS
DIASTOLIC BLOOD PRESSURE: 76 MMHG | SYSTOLIC BLOOD PRESSURE: 126 MMHG | HEIGHT: 69 IN | TEMPERATURE: 98 F | HEART RATE: 77 BPM | RESPIRATION RATE: 16 BRPM | WEIGHT: 192 LBS | OXYGEN SATURATION: 99 % | BODY MASS INDEX: 28.44 KG/M2

## 2020-10-19 DIAGNOSIS — Z95.1 PRESENCE OF AORTOCORONARY BYPASS GRAFT: ICD-10-CM

## 2020-10-19 DIAGNOSIS — E11.42 TYPE 2 DIABETES MELLITUS WITH DIABETIC POLYNEUROPATHY: ICD-10-CM

## 2020-10-19 DIAGNOSIS — Z09 ENCOUNTER FOR FOLLOW-UP EXAMINATION AFTER COMPLETED TREATMENT FOR CONDITIONS OTHER THAN MALIGNANT NEOPLASM: ICD-10-CM

## 2020-10-19 DIAGNOSIS — Z00.6 ENCOUNTER FOR EXAMINATION FOR NORMAL COMPARISON AND CONTROL IN CLINICAL RESEARCH PROGRAM: ICD-10-CM

## 2020-10-19 PROCEDURE — 99072 ADDL SUPL MATRL&STAF TM PHE: CPT

## 2020-10-19 PROCEDURE — 99214 OFFICE O/P EST MOD 30 MIN: CPT | Mod: 25

## 2020-10-19 PROCEDURE — 95913 NRV CNDJ TEST 13/> STUDIES: CPT

## 2020-10-19 PROCEDURE — 95885 MUSC TST DONE W/NERV TST LIM: CPT | Mod: 59

## 2020-10-19 PROCEDURE — 93005 ELECTROCARDIOGRAM TRACING: CPT

## 2020-10-19 PROCEDURE — 99212 OFFICE O/P EST SF 10 MIN: CPT

## 2020-10-19 RX ORDER — POTASSIUM CHLORIDE 750 MG/1
10 TABLET, FILM COATED, EXTENDED RELEASE ORAL DAILY
Qty: 14 | Refills: 3 | Status: DISCONTINUED | COMMUNITY
Start: 2020-07-16 | End: 2020-10-19

## 2020-10-19 RX ORDER — INSULIN GLARGINE 100 [IU]/ML
100 INJECTION, SOLUTION SUBCUTANEOUS
Refills: 0 | Status: DISCONTINUED | COMMUNITY
Start: 2020-07-16 | End: 2020-10-19

## 2020-10-19 RX ORDER — OXYCODONE HYDROCHLORIDE AND ACETAMINOPHEN 5; 325 MG/1; MG/1
5-325 TABLET ORAL
Qty: 10 | Refills: 0 | Status: DISCONTINUED | COMMUNITY
Start: 2020-07-16 | End: 2020-10-19

## 2020-10-19 RX ORDER — AMIODARONE HYDROCHLORIDE 200 MG/1
200 TABLET ORAL DAILY
Qty: 30 | Refills: 0 | Status: DISCONTINUED | COMMUNITY
Start: 2020-07-16 | End: 2020-10-19

## 2020-10-19 RX ORDER — APIXABAN 5 MG/1
5 TABLET, FILM COATED ORAL
Qty: 60 | Refills: 0 | Status: DISCONTINUED | COMMUNITY
Start: 2020-07-16 | End: 2020-10-19

## 2020-10-19 RX ORDER — PANTOPRAZOLE 40 MG/1
40 TABLET, DELAYED RELEASE ORAL DAILY
Qty: 30 | Refills: 0 | Status: DISCONTINUED | COMMUNITY
Start: 2020-08-11 | End: 2020-10-19

## 2020-10-19 NOTE — HISTORY OF PRESENT ILLNESS
[FreeTextEntry1] : Referred by Dr. Baires for left hand weakness\par He reports onset of symptoms after CABG in July; associated with difficulty manipulating objects with his left hand (he is left handed)\par No exacerbating factors\par Severity: moderate \par \par ROS: no numbness or tingling; denies neck pain \par \par Reviewed: \par prior notes\par Labs - COVID PCR negative\par CT head unremarkable \par \par PMH: diabetes mellitus \par

## 2020-10-19 NOTE — PHYSICAL EXAM
[FreeTextEntry1] : Gen: appears well, well-nourished, no acute distress\par \par MS: awake, alert, oriented, speech fluent, comprehension intact, good fund of knowledge, recent and remote memory intact, attention intact\par \par CN: PERRL, EOMI, visual fields full, facial strength and sensation intact and symmetric, palate elevation symmetric, tongue midline, no tongue atrophy or fasciculations\par \par Motor: normal tone; FDI and intrinsic hand muscle atrophy b/l\par Left finger extension 4, right 5; finger abduction 2/5 b/l; APB 4+ R, 4 L; proximal UE 5/5 symmetric\par hip flexion 4 b/l, knee flex/ext 5, ankle dorsiflexion 4+ R, 4 L, EHL/EDB 4- b/l\par \par Sensory: vibration absent in LE, mild-mod reduced at fingers b/l\par \par Reflexes: 1+ UE, absent LE b/l \par \par Coordination: no dysmetria on finger to nose\par \par Gait: normal, Romberg negative

## 2020-10-19 NOTE — ASSESSMENT
[FreeTextEntry1] : NCS/EMG showed moderate to severe sensorimotor polyneuropathy, predominantly axonal, likely due to diabetes mellitus\par Would recommend looking for other potential causes by checking: TSH, SPEP, ESR, ANCA, RF, ganglioside panel\par \par Also evidence of b/l median nerve entrapments (moderate b/l) and b/l ulnar nerve entrapments at the elbows (severe b/l worse on the left)\par Refer to hand surgery or neurosurgery for ulnar nerve release - likely will need both sides but can start with left\par Elbow brace at night in the meantime\par f/u MRI C spine although unlikely to be cause of symptoms given hyporeflexia (not hyper)\par \par See separate procedure note for full results of study.

## 2020-10-19 NOTE — PROCEDURE
[FreeTextEntry1] : Nerve Conduction and Electromyography Report [FreeTextEntry3] : Electro Physiologic Findings:\par \par Limb temperature was monitored and maintained at approximately 30 – 34° C in the lower extremities, and 32 – 36° C in the upper extremities.\par \par The median sensory responses were absent bilaterally. The median mixed nerve responses were low amplitude and slow across the wrists bilaterally, worse on the right. The median motor velocities were slow bilaterally; the distal motor latency was prolonged on the right, and borderline on the left. The median motor amplitude was normal on the right and borderline on the left. There was no conduction block across the wrists on either side. \par \par The ulnar sensory responses were absent bilaterally. The ulnar motor responses were low amplitude bilaterally, worse on the left; there was slowing across the elbows bilaterally without conduction block. \par \par The right radial sensory response was low amplitude and mildly slow; the left sural sensory response was absent. The left tibial motor response was also absent. \par \par Needle electromyography was performed on select bilateral upper and left lower extremity appendicular muscles. There was moderate active and mild chronic denervation in the left tibialis anterior. There was also evidence of chronic denervation in the left abductor pollicis brevis and right first dorsal interosseous muscles. The left first dorsal interosseous muscle demonstrated a neurogenic firing pattern and decreased insertional activity suggestive of neurogenic muscle atrophy. \par \par Clinical Electrophysiological Impression: \par \par This electrodiagnostic study demonstrated evidence of a moderate to severe sensorimotor polyneuropathy with predominantly axonal features. There was also evidence of moderate bilateral median nerve entrapments at the wrists; and severe bilateral ulnar nerve entrapments at the elbows, worse on the left.

## 2020-11-24 ENCOUNTER — APPOINTMENT (OUTPATIENT)
Dept: NEUROLOGY | Facility: CLINIC | Age: 65
End: 2020-11-24

## 2021-01-10 ENCOUNTER — INPATIENT (INPATIENT)
Facility: HOSPITAL | Age: 66
LOS: 4 days | Discharge: HOME | DRG: 446 | End: 2021-01-15
Attending: SURGERY | Admitting: SURGERY
Payer: COMMERCIAL

## 2021-01-10 VITALS
HEIGHT: 69 IN | TEMPERATURE: 98 F | WEIGHT: 188.05 LBS | OXYGEN SATURATION: 100 % | DIASTOLIC BLOOD PRESSURE: 82 MMHG | HEART RATE: 75 BPM | SYSTOLIC BLOOD PRESSURE: 144 MMHG | RESPIRATION RATE: 18 BRPM

## 2021-01-10 DIAGNOSIS — E83.42 HYPOMAGNESEMIA: ICD-10-CM

## 2021-01-10 DIAGNOSIS — Z95.1 PRESENCE OF AORTOCORONARY BYPASS GRAFT: ICD-10-CM

## 2021-01-10 DIAGNOSIS — D63.8 ANEMIA IN OTHER CHRONIC DISEASES CLASSIFIED ELSEWHERE: ICD-10-CM

## 2021-01-10 DIAGNOSIS — K81.9 CHOLECYSTITIS, UNSPECIFIED: ICD-10-CM

## 2021-01-10 DIAGNOSIS — Z86.73 PERSONAL HISTORY OF TRANSIENT ISCHEMIC ATTACK (TIA), AND CEREBRAL INFARCTION WITHOUT RESIDUAL DEFICITS: ICD-10-CM

## 2021-01-10 DIAGNOSIS — Z79.84 LONG TERM (CURRENT) USE OF ORAL HYPOGLYCEMIC DRUGS: ICD-10-CM

## 2021-01-10 DIAGNOSIS — Z79.02 LONG TERM (CURRENT) USE OF ANTITHROMBOTICS/ANTIPLATELETS: ICD-10-CM

## 2021-01-10 DIAGNOSIS — I10 ESSENTIAL (PRIMARY) HYPERTENSION: ICD-10-CM

## 2021-01-10 DIAGNOSIS — E03.9 HYPOTHYROIDISM, UNSPECIFIED: ICD-10-CM

## 2021-01-10 DIAGNOSIS — E78.5 HYPERLIPIDEMIA, UNSPECIFIED: ICD-10-CM

## 2021-01-10 DIAGNOSIS — E11.9 TYPE 2 DIABETES MELLITUS WITHOUT COMPLICATIONS: ICD-10-CM

## 2021-01-10 DIAGNOSIS — K80.62 CALCULUS OF GALLBLADDER AND BILE DUCT WITH ACUTE CHOLECYSTITIS WITHOUT OBSTRUCTION: ICD-10-CM

## 2021-01-10 DIAGNOSIS — R74.01 ELEVATION OF LEVELS OF LIVER TRANSAMINASE LEVELS: ICD-10-CM

## 2021-01-10 DIAGNOSIS — I25.10 ATHEROSCLEROTIC HEART DISEASE OF NATIVE CORONARY ARTERY WITHOUT ANGINA PECTORIS: ICD-10-CM

## 2021-01-10 DIAGNOSIS — Z79.82 LONG TERM (CURRENT) USE OF ASPIRIN: ICD-10-CM

## 2021-01-10 LAB
ALBUMIN SERPL ELPH-MCNC: 3.9 G/DL — SIGNIFICANT CHANGE UP (ref 3.3–5)
ALP SERPL-CCNC: 462 U/L — HIGH (ref 40–120)
ALT FLD-CCNC: 924 U/L — HIGH (ref 10–45)
ANION GAP SERPL CALC-SCNC: 14 MMOL/L — SIGNIFICANT CHANGE UP (ref 5–17)
APPEARANCE UR: CLEAR — SIGNIFICANT CHANGE UP
APTT BLD: 32.4 SEC — SIGNIFICANT CHANGE UP (ref 27.5–35.5)
AST SERPL-CCNC: 479 U/L — HIGH (ref 10–40)
BASOPHILS # BLD AUTO: 0.06 K/UL — SIGNIFICANT CHANGE UP (ref 0–0.2)
BASOPHILS NFR BLD AUTO: 0.5 % — SIGNIFICANT CHANGE UP (ref 0–2)
BILIRUB SERPL-MCNC: 5.9 MG/DL — HIGH (ref 0.2–1.2)
BILIRUB UR-MCNC: ABNORMAL
BUN SERPL-MCNC: 22 MG/DL — SIGNIFICANT CHANGE UP (ref 7–23)
CALCIUM SERPL-MCNC: 9.5 MG/DL — SIGNIFICANT CHANGE UP (ref 8.4–10.5)
CHLORIDE SERPL-SCNC: 94 MMOL/L — LOW (ref 96–108)
CO2 SERPL-SCNC: 25 MMOL/L — SIGNIFICANT CHANGE UP (ref 22–31)
COLOR SPEC: YELLOW — SIGNIFICANT CHANGE UP
CREAT SERPL-MCNC: 1.43 MG/DL — HIGH (ref 0.5–1.3)
DIFF PNL FLD: NEGATIVE — SIGNIFICANT CHANGE UP
EOSINOPHIL # BLD AUTO: 0.14 K/UL — SIGNIFICANT CHANGE UP (ref 0–0.5)
EOSINOPHIL NFR BLD AUTO: 1.1 % — SIGNIFICANT CHANGE UP (ref 0–6)
GLUCOSE BLDC GLUCOMTR-MCNC: 87 MG/DL — SIGNIFICANT CHANGE UP (ref 70–99)
GLUCOSE SERPL-MCNC: 108 MG/DL — HIGH (ref 70–99)
GLUCOSE UR QL: NEGATIVE — SIGNIFICANT CHANGE UP
HCT VFR BLD CALC: 31.3 % — LOW (ref 39–50)
HGB BLD-MCNC: 10 G/DL — LOW (ref 13–17)
IMM GRANULOCYTES NFR BLD AUTO: 0.3 % — SIGNIFICANT CHANGE UP (ref 0–1.5)
INR BLD: 1.22 — HIGH (ref 0.88–1.16)
KETONES UR-MCNC: NEGATIVE — SIGNIFICANT CHANGE UP
LEUKOCYTE ESTERASE UR-ACNC: NEGATIVE — SIGNIFICANT CHANGE UP
LIDOCAIN IGE QN: 100 U/L — HIGH (ref 7–60)
LYMPHOCYTES # BLD AUTO: 1.78 K/UL — SIGNIFICANT CHANGE UP (ref 1–3.3)
LYMPHOCYTES # BLD AUTO: 14.3 % — SIGNIFICANT CHANGE UP (ref 13–44)
MCHC RBC-ENTMCNC: 29.4 PG — SIGNIFICANT CHANGE UP (ref 27–34)
MCHC RBC-ENTMCNC: 31.9 GM/DL — LOW (ref 32–36)
MCV RBC AUTO: 92.1 FL — SIGNIFICANT CHANGE UP (ref 80–100)
MONOCYTES # BLD AUTO: 1 K/UL — HIGH (ref 0–0.9)
MONOCYTES NFR BLD AUTO: 8 % — SIGNIFICANT CHANGE UP (ref 2–14)
NEUTROPHILS # BLD AUTO: 9.46 K/UL — HIGH (ref 1.8–7.4)
NEUTROPHILS NFR BLD AUTO: 75.8 % — SIGNIFICANT CHANGE UP (ref 43–77)
NITRITE UR-MCNC: NEGATIVE — SIGNIFICANT CHANGE UP
NRBC # BLD: 0 /100 WBCS — SIGNIFICANT CHANGE UP (ref 0–0)
PH UR: 6 — SIGNIFICANT CHANGE UP (ref 5–8)
PLATELET # BLD AUTO: 385 K/UL — SIGNIFICANT CHANGE UP (ref 150–400)
POTASSIUM SERPL-MCNC: 4.5 MMOL/L — SIGNIFICANT CHANGE UP (ref 3.5–5.3)
POTASSIUM SERPL-SCNC: 4.5 MMOL/L — SIGNIFICANT CHANGE UP (ref 3.5–5.3)
PROT SERPL-MCNC: 8 G/DL — SIGNIFICANT CHANGE UP (ref 6–8.3)
PROT UR-MCNC: NEGATIVE MG/DL — SIGNIFICANT CHANGE UP
PROTHROM AB SERPL-ACNC: 14.5 SEC — HIGH (ref 10.6–13.6)
RBC # BLD: 3.4 M/UL — LOW (ref 4.2–5.8)
RBC # FLD: 12.1 % — SIGNIFICANT CHANGE UP (ref 10.3–14.5)
SARS-COV-2 RNA SPEC QL NAA+PROBE: SIGNIFICANT CHANGE UP
SODIUM SERPL-SCNC: 133 MMOL/L — LOW (ref 135–145)
SP GR SPEC: 1.02 — SIGNIFICANT CHANGE UP (ref 1–1.03)
UROBILINOGEN FLD QL: 1 E.U./DL — SIGNIFICANT CHANGE UP
WBC # BLD: 12.48 K/UL — HIGH (ref 3.8–10.5)
WBC # FLD AUTO: 12.48 K/UL — HIGH (ref 3.8–10.5)

## 2021-01-10 PROCEDURE — 76705 ECHO EXAM OF ABDOMEN: CPT | Mod: 26

## 2021-01-10 PROCEDURE — 99285 EMERGENCY DEPT VISIT HI MDM: CPT

## 2021-01-10 PROCEDURE — 74177 CT ABD & PELVIS W/CONTRAST: CPT | Mod: 26

## 2021-01-10 PROCEDURE — 93010 ELECTROCARDIOGRAM REPORT: CPT

## 2021-01-10 RX ORDER — GLUCAGON INJECTION, SOLUTION 0.5 MG/.1ML
1 INJECTION, SOLUTION SUBCUTANEOUS ONCE
Refills: 0 | Status: DISCONTINUED | OUTPATIENT
Start: 2021-01-10 | End: 2021-01-11

## 2021-01-10 RX ORDER — MORPHINE SULFATE 50 MG/1
4 CAPSULE, EXTENDED RELEASE ORAL ONCE
Refills: 0 | Status: DISCONTINUED | OUTPATIENT
Start: 2021-01-10 | End: 2021-01-10

## 2021-01-10 RX ORDER — CEFTRIAXONE 500 MG/1
1000 INJECTION, POWDER, FOR SOLUTION INTRAMUSCULAR; INTRAVENOUS EVERY 24 HOURS
Refills: 0 | Status: DISCONTINUED | OUTPATIENT
Start: 2021-01-10 | End: 2021-01-15

## 2021-01-10 RX ORDER — SODIUM CHLORIDE 9 MG/ML
1000 INJECTION, SOLUTION INTRAVENOUS
Refills: 0 | Status: DISCONTINUED | OUTPATIENT
Start: 2021-01-10 | End: 2021-01-11

## 2021-01-10 RX ORDER — HYDROMORPHONE HYDROCHLORIDE 2 MG/ML
0.5 INJECTION INTRAMUSCULAR; INTRAVENOUS; SUBCUTANEOUS EVERY 6 HOURS
Refills: 0 | Status: DISCONTINUED | OUTPATIENT
Start: 2021-01-10 | End: 2021-01-12

## 2021-01-10 RX ORDER — DEXTROSE 50 % IN WATER 50 %
15 SYRINGE (ML) INTRAVENOUS ONCE
Refills: 0 | Status: DISCONTINUED | OUTPATIENT
Start: 2021-01-10 | End: 2021-01-11

## 2021-01-10 RX ORDER — SODIUM CHLORIDE 9 MG/ML
1000 INJECTION INTRAMUSCULAR; INTRAVENOUS; SUBCUTANEOUS ONCE
Refills: 0 | Status: COMPLETED | OUTPATIENT
Start: 2021-01-10 | End: 2021-01-10

## 2021-01-10 RX ORDER — HEPARIN SODIUM 5000 [USP'U]/ML
5000 INJECTION INTRAVENOUS; SUBCUTANEOUS EVERY 8 HOURS
Refills: 0 | Status: DISCONTINUED | OUTPATIENT
Start: 2021-01-10 | End: 2021-01-15

## 2021-01-10 RX ORDER — DEXTROSE 50 % IN WATER 50 %
25 SYRINGE (ML) INTRAVENOUS ONCE
Refills: 0 | Status: DISCONTINUED | OUTPATIENT
Start: 2021-01-10 | End: 2021-01-11

## 2021-01-10 RX ORDER — INFLUENZA VIRUS VACCINE 15; 15; 15; 15 UG/.5ML; UG/.5ML; UG/.5ML; UG/.5ML
0.5 SUSPENSION INTRAMUSCULAR ONCE
Refills: 0 | Status: DISCONTINUED | OUTPATIENT
Start: 2021-01-10 | End: 2021-01-15

## 2021-01-10 RX ORDER — INSULIN LISPRO 100/ML
VIAL (ML) SUBCUTANEOUS
Refills: 0 | Status: DISCONTINUED | OUTPATIENT
Start: 2021-01-10 | End: 2021-01-11

## 2021-01-10 RX ORDER — FUROSEMIDE 40 MG
40 TABLET ORAL DAILY
Refills: 0 | Status: DISCONTINUED | OUTPATIENT
Start: 2021-01-10 | End: 2021-01-14

## 2021-01-10 RX ORDER — METRONIDAZOLE 500 MG
500 TABLET ORAL EVERY 8 HOURS
Refills: 0 | Status: DISCONTINUED | OUTPATIENT
Start: 2021-01-10 | End: 2021-01-15

## 2021-01-10 RX ORDER — SODIUM CHLORIDE 9 MG/ML
1000 INJECTION, SOLUTION INTRAVENOUS
Refills: 0 | Status: DISCONTINUED | OUTPATIENT
Start: 2021-01-10 | End: 2021-01-12

## 2021-01-10 RX ORDER — IOHEXOL 300 MG/ML
30 INJECTION, SOLUTION INTRAVENOUS ONCE
Refills: 0 | Status: COMPLETED | OUTPATIENT
Start: 2021-01-10 | End: 2021-01-10

## 2021-01-10 RX ORDER — FUROSEMIDE 40 MG
40 TABLET ORAL DAILY
Refills: 0 | Status: DISCONTINUED | OUTPATIENT
Start: 2021-01-10 | End: 2021-01-10

## 2021-01-10 RX ORDER — ONDANSETRON 8 MG/1
4 TABLET, FILM COATED ORAL EVERY 6 HOURS
Refills: 0 | Status: DISCONTINUED | OUTPATIENT
Start: 2021-01-10 | End: 2021-01-15

## 2021-01-10 RX ORDER — CLOPIDOGREL BISULFATE 75 MG/1
75 TABLET, FILM COATED ORAL DAILY
Refills: 0 | Status: DISCONTINUED | OUTPATIENT
Start: 2021-01-11 | End: 2021-01-11

## 2021-01-10 RX ORDER — DEXTROSE 50 % IN WATER 50 %
12.5 SYRINGE (ML) INTRAVENOUS ONCE
Refills: 0 | Status: DISCONTINUED | OUTPATIENT
Start: 2021-01-10 | End: 2021-01-11

## 2021-01-10 RX ORDER — METOPROLOL TARTRATE 50 MG
25 TABLET ORAL DAILY
Refills: 0 | Status: DISCONTINUED | OUTPATIENT
Start: 2021-01-10 | End: 2021-01-15

## 2021-01-10 RX ORDER — LEVOTHYROXINE SODIUM 125 MCG
175 TABLET ORAL DAILY
Refills: 0 | Status: DISCONTINUED | OUTPATIENT
Start: 2021-01-10 | End: 2021-01-15

## 2021-01-10 RX ADMIN — MORPHINE SULFATE 4 MILLIGRAM(S): 50 CAPSULE, EXTENDED RELEASE ORAL at 13:14

## 2021-01-10 RX ADMIN — Medication 100 MILLIGRAM(S): at 21:12

## 2021-01-10 RX ADMIN — SODIUM CHLORIDE 70 MILLILITER(S): 9 INJECTION, SOLUTION INTRAVENOUS at 18:26

## 2021-01-10 RX ADMIN — SODIUM CHLORIDE 1000 MILLILITER(S): 9 INJECTION INTRAMUSCULAR; INTRAVENOUS; SUBCUTANEOUS at 13:15

## 2021-01-10 RX ADMIN — IOHEXOL 30 MILLILITER(S): 300 INJECTION, SOLUTION INTRAVENOUS at 13:14

## 2021-01-10 RX ADMIN — CEFTRIAXONE 100 MILLIGRAM(S): 500 INJECTION, POWDER, FOR SOLUTION INTRAMUSCULAR; INTRAVENOUS at 18:26

## 2021-01-10 RX ADMIN — HEPARIN SODIUM 5000 UNIT(S): 5000 INJECTION INTRAVENOUS; SUBCUTANEOUS at 21:40

## 2021-01-10 NOTE — ED ADULT TRIAGE NOTE - CHIEF COMPLAINT QUOTE
c/o R sided abdominal pain starting last night also notes "dark urine." denies n/v/d, denies fevers.

## 2021-01-10 NOTE — CONSULT NOTE ADULT - SUBJECTIVE AND OBJECTIVE BOX
Cardiology Consult    HPI: 65M PMH HTN, CAD (CABG 2020, LIMA-LAD, SVG-prox marginal, SVG-PDA), Afib (previously on Eliquis, recently d/c'ed), hx of cholecystitis s/p perc dharmesh (Aug 2020) presented with R side abdominal pain with dark urine. Found to have choledocholithiasis and intrahepatic duct dilitation and admitted to surgical service. Patient pending urgent ERCP with GI and lap dharmesh with possible open. Cardiology consulted for preoperative evaluation. Patient denies active chest pain, no shortness of breath. Denies dyspnea on exertion. No LE edema, no orthopnea. No palpitations.   Of note, patient states he had a positive stress test in  after noted dyspnea on exertion (initially thought was related to hx of covid infection in March). 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 Underwent CABG x3 LIMA-LAD, SVG-prox marginal, SVG-PDA on 20. Of note course was complicated by post op Afib and was on eliquis. Patient states since surgery no chest pain, no dyspnea on exertion. Walks up 4 flights of stairs without issue (4th floor walk up).   PMH: HTN, CAD (CABG 2020, LIMA-LAD, SVG-prox marginal, SVG-PDA), DM, Afib (previously on Eliquis, recently d/c'ed), hx of cholecystitis s/p perc dharmesh  Meds: Metformin 500 TID, Lipitor 80mg qD, Januvia 100mg, toprol xl 25 qD, synthroid 175 mcg, folic acid 1mg qD, clopidogrel 75mg qD, lasix 40mg qD  PSH: CABG ()  Social: Never tobacco, reports former ETOH heavy use but has significantly cut down over last 2-3 years, denies other recreational drugs      OBJECTIVE  Vitals:  T(C): 36.2 (01-10-21 @ 22:08), Max: 37.1 (01-10-21 @ 15:35)  HR: 85 (01-10-21 @ 22:08) (71 - 85)  BP: 134/65 (01-10-21 @ 22:08) (126/76 - 144/82)  RR: 16 (01-10-21 @ 22:08) (16 - 18)  SpO2: 99% (01-10-21 @ 22:08) (99% - 100%)  Wt(kg): --    I/O:  I&O's Summary      PHYSICAL EXAM:  Appearance: NAD. Speaking in full sentences.   HEENT: No JVD  Cardiovascular: RRR, S1, S2 with no murmurs.  Respiratory: Lungs CTAB. 	  Extremities: No edema b/l. No erythema b/l. LE WWP b/l.  Vascular: DP intact  Neurologic:  Alert and awake. Moving all extremities. Following commands.   	  LABS:                        10.0   12.48 )-----------( 385      ( 10 Josesito 2021 13:36 )             31.3     01-10    133<L>  |  94<L>  |  22  ----------------------------<  108<H>  4.5   |  25  |  1.43<H>    Ca    9.5      10 Josesito 2021 13:35    TPro  8.0  /  Alb  3.9  /  TBili  5.9<H>  /  DBili  x   /  AST  479<H>  /  ALT  924<H>  /  AlkPhos  462<H>  01-10    PT/INR - ( 10 Josesito 2021 13:32 )   PT: 14.5 sec;   INR: 1.22          PTT - ( 10 Josesito 2021 13:32 )  PTT:32.4 sec  Urinalysis Basic - ( 10 Josesito 2021 13:34 )    Color: Yellow / Appearance: Clear / S.020 / pH: x  Gluc: x / Ketone: NEGATIVE  / Bili: Moderate / Urobili: 1.0 E.U./dL   Blood: x / Protein: NEGATIVE mg/dL / Nitrite: NEGATIVE   Leuk Esterase: NEGATIVE / RBC: x / WBC x   Sq Epi: x / Non Sq Epi: x / Bacteria: x        RADIOLOGY & ADDITIONAL TESTS:    TTE (20): Mild concentric left ventricular hypertrophy. The left ventricle is normal in size and systolic function with a calculated ejection fraction of 65%. There are no regional wall motion abnormalities seen.   3. The right atrium is mildly dilated.   4. The right ventricle appears mildly dilated. RV systolic function is normal.   5. There is mild mitral annular calcification. There is trace mitral regurgitation.   6. The aortic root is normal in size.   7. No pericardial effusion    LHC ( 20):  LM: Normal  LAD: 70% stenosis in pLAD, iFR 0.81  LCx: 80% stenosis in midLCx.   OM2: 80% stenosis  OM3: 100% stenosis  RCA: 100% stenosis in mRCA  Left Heart Catheterization:  LV Gram: 60% EF  LV EDP 18mmHg      MEDICATIONS  (STANDING):  cefTRIAXone   IVPB 1000 milliGRAM(s) IV Intermittent every 24 hours  dextrose 40% Gel 15 Gram(s) Oral once  dextrose 5%. 1000 milliLiter(s) (50 mL/Hr) IV Continuous <Continuous>  dextrose 5%. 1000 milliLiter(s) (100 mL/Hr) IV Continuous <Continuous>  dextrose 50% Injectable 25 Gram(s) IV Push once  dextrose 50% Injectable 12.5 Gram(s) IV Push once  dextrose 50% Injectable 25 Gram(s) IV Push once  furosemide    Tablet 40 milliGRAM(s) Oral daily  glucagon  Injectable 1 milliGRAM(s) IntraMuscular once  heparin   Injectable 5000 Unit(s) SubCutaneous every 8 hours  influenza   Vaccine 0.5 milliLiter(s) IntraMuscular once  insulin lispro (ADMELOG) corrective regimen sliding scale   SubCutaneous Before meals and at bedtime  lactated ringers. 1000 milliLiter(s) (70 mL/Hr) IV Continuous <Continuous>  levothyroxine 175 MICROGram(s) Oral daily  metoprolol succinate ER 25 milliGRAM(s) Oral daily  metroNIDAZOLE  IVPB 500 milliGRAM(s) IV Intermittent every 8 hours    MEDICATIONS  (PRN):  HYDROmorphone  Injectable 0.5 milliGRAM(s) IV Push every 6 hours PRN Moderate Pain (4 - 6)  ondansetron Injectable 4 milliGRAM(s) IV Push every 6 hours PRN Nausea

## 2021-01-10 NOTE — ED PROVIDER NOTE - OBJECTIVE STATEMENT
65 year old male with history of HTN, HLD, DM, TIA, A fib (Eliquis), CAD s/p CABGx3 on 7/6/20 w/ Dr. Conde presents to ED with concern for right lower quadrant abdominal discomfort x 2 days.  Patient states symptoms began yesterday and have persisted since onset.  He notes dark appearing urine, however denies dysuria, fever, chills, chest pain, shortness of breath, back pain, nausea, emesis, changes to bowel movements (last BM today and normal per patient), peripheral edema, rashes, recent travel, known sick contacts or any additional acute complaints or concerns at this time.

## 2021-01-10 NOTE — ED ADULT NURSE NOTE - OBJECTIVE STATEMENT
64 y/o male  here c/o RLQ pain since last night and also dark urine. pt states " I've been having gall bladder issues and the pain started yesterday again but is lower, also my urine was clear and now is very dark" denies n/v/d, denies fevers.

## 2021-01-10 NOTE — H&P ADULT - HISTORY OF PRESENT ILLNESS
65M with PMH of HTN, HLD, DM, TIA (s/p plavix), A fib, CAD s/p CABG x3 on 7/6/20 (Dr. Conde), acute cholecystitis s/p perc dharmesh (Aug 2020, perc dharmesh removed Sept 2020) presents with 1 day history of RUQ pain and dark urine. Pt reports sudden onset of RUQ pain starting yesterday described as sharp, constant, no radiation, associated with dark urine. Pain worsens with movement and palpation of RUQ. Denies fever, chills, n/v/d. Passing flatus, having regular bowel movement. Denies CP, Palpitations, SOB. Denies recent travel or sick contacts.

## 2021-01-10 NOTE — CONSULT NOTE ADULT - ATTENDING COMMENTS
Initial attending contact date 1/11/21 . See fellow note written above for details. I reviewed the fellow documentation. I have personally seen and examined this patient. I reviewed vitals, labs, medications, cardiac studies, and additional imaging. I agree with the above fellow's findings and plans as written above with the following additions/statements.    -Pt s/p 3v CABG 7/20 with post op Afib. Was dc'd on ASA/eliquis  -He follows up with Dr Muñiz, outpatient cardiologist and has been on asa/plavix. Eliquis DC'd likey due to maintenance of NSR post CABG.  -Pt reports excellent exercise tolerance, able to walk 4 flight of stairs without anginal equivalents. EKG NSR without ischemic changes  -Last echo 7/20: normal EF  -Pt considered intermediate risk for MRCP/ possible lap dharmesh and may proceed as planned without any further cardiac work up.  -Ok to hold asa/plavix prior to surgery, to resume post op; Cont home dose metoprolol  -Will continue to follow post op

## 2021-01-10 NOTE — ED PROVIDER NOTE - PHYSICAL EXAMINATION
VITAL SIGNS: I have reviewed nursing notes and confirm.  CONSTITUTIONAL: Well-developed; well-nourished; in no acute distress.   SKIN:  warm and dry, no acute rash.   HEAD:  normocephalic, atraumatic.  EYES: PERRL.  EOM intact; conjunctiva and sclera clear.  ENT: No nasal discharge; airway clear.   NECK: Supple; non tender.  CARD: S1, S2 normal; no murmurs, gallops, or rubs. Regular rate and rhythm.   RESP:  Clear to auscultation b/l, no wheezes, rales or rhonchi.  ABD: Normal bowel sounds; soft; non-distended; + TTP over RLQ; no guarding/rebound.  EXT: Normal ROM. No clubbing, cyanosis or edema. 2+ pulses to b/l ue/le.  NEURO: Alert, oriented, grossly unremarkable.  5/5 strength x 4 extremities against gravity and external force.  No drift x 4 extremities.  Sensation intact and symmetric x 4 extremities.  No facial asymmetry.    PSYCH: Cooperative, mood and affect appropriate.

## 2021-01-10 NOTE — ED PROVIDER NOTE - NS ED ROS FT
Constitutional: No fever or chills.   Eyes: No pain, blurry vision, or discharge.  ENMT: No hearing changes, pain, discharge or infections. No neck pain or stiffness.  Cardiac: No chest pain, SOB or edema. No chest pain with exertion.  Respiratory: No cough or respiratory distress. No hemoptysis. No history of asthma or RAD.  GI: No nausea, vomiting, diarrhea, + RLQ pain.  : + Concentrated appearing urine.  No dysuria, frequency or burning.  MS: No myalgia, muscle weakness, joint pain or back pain.  Neuro: No headache or weakness. No LOC.  Skin: No skin rash.   Endocrine: No history of thyroid disease or diabetes.  Except as documented in the HPI, all other systems are negative.

## 2021-01-10 NOTE — ED PROVIDER NOTE - CLINICAL SUMMARY MEDICAL DECISION MAKING FREE TEXT BOX
Patient presents to ED with concern for Patient presents to ED with concern for RLQ abd pain over the past several days.  Noted recent acute cholecystitis s/p perc dharmesh (Aug 2020, perc dharmesh removed Sept 2020) and now with similar symptoms, though noting pain to more RLQ.  Labs, CT and US ordered and reviewed.  Gen sx team in ED to evaluate and requesting admission to [facility].  I spoke with radiology re concern for imaging consistent with possible cholengiocarcinoma and relayed this concern for general surgery team (Dr. Ware) upon admission.  Patient is aware of plan for admission and in agreement.  Will admit at this time.

## 2021-01-10 NOTE — ED ADULT NURSE NOTE - PMH
DM (diabetes mellitus)    H/O cardiomyopathy    History of TIAs    HLD (hyperlipidemia)    HTN (hypertension)    Hypothyroidism     Choledocholithiasis    DM (diabetes mellitus)    H/O cardiomyopathy    History of TIAs    HLD (hyperlipidemia)    HTN (hypertension)    Hypothyroidism

## 2021-01-10 NOTE — CONSULT NOTE ADULT - SUBJECTIVE AND OBJECTIVE BOX
HPI:  65M w/ PMHx of HTN, HLD, DM, CAD s/p CABG 7/20, afib, hx of TIA and hx of acute cholecystitis s/p perc dharmesh 8/20 p/w 2d of abdominal pain.  Patient reported abdominal pain started yesterday, localized to the right side.  Denies inciting factor or trauma.  Sx worse with food.  Sx associated with change in urine, appearing darker.  As sx persisted, he came to the ED after work today.  Patient denies fever, chill, cp, sob, vomiting, change in BM.  Patient denies toxic habit and denies fam hx of GI malignancy or biliary disease.  In the ED, patient had CT notable for "new mild intrahepatic biliary ductal dilatation... with a 2.0 cm long segment of abnormal luminal narrowing, wall thickening, enhancement at the confluence of the hepatic duct...suspicious for cholangiocarcinoma." Distal CBD measures 0.6 cm.  LAD noted.  Patient denies hx of endoscopy or colonoscopy.    Allergies    No Known Allergies    Intolerances      Home Medications:  folic acid 1 mg oral tablet: 1 tab(s) orally once a day (10 Josesito 2021 17:14)  furosemide 40 mg oral tablet: 1 tab(s) orally once a day (10 Josesito 2021 17:14)    MEDICATIONS:  MEDICATIONS  (STANDING):  cefTRIAXone   IVPB 1000 milliGRAM(s) IV Intermittent every 24 hours  dextrose 40% Gel 15 Gram(s) Oral once  dextrose 5%. 1000 milliLiter(s) (50 mL/Hr) IV Continuous <Continuous>  dextrose 5%. 1000 milliLiter(s) (100 mL/Hr) IV Continuous <Continuous>  dextrose 50% Injectable 25 Gram(s) IV Push once  dextrose 50% Injectable 12.5 Gram(s) IV Push once  dextrose 50% Injectable 25 Gram(s) IV Push once  furosemide    Tablet 40 milliGRAM(s) Oral daily  glucagon  Injectable 1 milliGRAM(s) IntraMuscular once  heparin   Injectable 5000 Unit(s) SubCutaneous every 8 hours  insulin lispro (ADMELOG) corrective regimen sliding scale   SubCutaneous Before meals and at bedtime  lactated ringers. 1000 milliLiter(s) (70 mL/Hr) IV Continuous <Continuous>  levothyroxine 175 MICROGram(s) Oral daily  metoprolol succinate ER 25 milliGRAM(s) Oral daily  metroNIDAZOLE  IVPB 500 milliGRAM(s) IV Intermittent every 8 hours    MEDICATIONS  (PRN):  HYDROmorphone  Injectable 0.5 milliGRAM(s) IV Push every 6 hours PRN Moderate Pain (4 - 6)  ondansetron Injectable 4 milliGRAM(s) IV Push every 6 hours PRN Nausea    PAST MEDICAL & SURGICAL HISTORY:  Choledocholithiasis    History of TIAs    Hypothyroidism    H/O cardiomyopathy    DM (diabetes mellitus)    HLD (hyperlipidemia)    HTN (hypertension)    No significant past surgical history      FAMILY HISTORY:  FH: heart failure  Denies fam hx of GI malignancy or biliary disease      SOCIAL HISTORY:  Denies toxic habit    REVIEW OF SYSTEMS:  All other 10 review of systems is negative except as indicated in HPI    Vital Signs Last 24 Hrs  T(C): 36.7 (10 Josesito 2021 20:28), Max: 37.1 (10 Josesito 2021 15:35)  T(F): 98 (10 Josesito 2021 20:28), Max: 98.7 (10 Josesito 2021 15:35)  HR: 72 (10 Josesito 2021 20:28) (71 - 75)  BP: 140/69 (10 Josesito 2021 20:28) (126/76 - 144/82)  BP(mean): --  RR: 18 (10 Josesito 2021 20:28) (16 - 18)  SpO2: 99% (10 Josesito 2021 20:28) (99% - 100%)      PHYSICAL EXAM:    General: Well developed; well nourished; in no acute distress  Eyes: moist conjunctivae, sclerae icteric  HENT: Moist mucous membranes, tongue midline  Neck: Trachea midline, supple  Lungs: Normal respiratory effort and no intercostal retractions  Cardiovascular: RRR, S1S2  Abdomen: Soft, mild ttp of the RLQ, non-distended; Normal bowel sounds; No rebound or guarding  Extremities: Normal range of motion, No clubbing, cyanosis or edema  Neurological: Alert and oriented x3, nonfocal exam  Skin: Warm and dry, Jaundice    LABS:                        10.0   12.48 )-----------( 385      ( 10 Josesito 2021 13:36 )             31.3     01-10    133<L>  |  94<L>  |  22  ----------------------------<  108<H>  4.5   |  25  |  1.43<H>    Ca    9.5      10 Josesito 2021 13:35    TPro  8.0  /  Alb  3.9  /  TBili  5.9<H>  /  DBili  x   /  AST  479<H>  /  ALT  924<H>  /  AlkPhos  462<H>  01-10        PT/INR - ( 10 Josesito 2021 13:32 )   PT: 14.5 sec;   INR: 1.22          PTT - ( 10 Josesito 2021 13:32 )  PTT:32.4 sec    RADIOLOGY & ADDITIONAL STUDIES:   reviewed

## 2021-01-10 NOTE — H&P ADULT - ASSESSMENT
65M with PMH of HTN, HLD, DM, TIA (s/p plavix), A fib, CAD s/p CABG x3 on 7/6/20 (Dr. Conde), acute cholecystitis s/p perc dharmesh (Aug 2020, perc dharmesh removed Sept 2020) presents with 1 day history of RUQ pain and dark urine with clinical and radiographic evidence of acute cholecystitis with choledocholithiasis, cholangiocarcinoma cannot be ruled out.     Plan  Admit to surgery team 4, Dr. Weir, Telemetry  NPO, IVF  IV ceftriaxone and flagyl  MRCP  GI consult for possible choledocholithiasis.  Dilaudid for pain  Zofran as needed  Cardiology consult. Hold plavix for possible surgical intervention  HSQ DVT PPX  Discussed with attending and chief resident 65M with PMH of HTN, HLD, DM, TIA (s/p plavix), A fib, CAD s/p CABG x3 on 7/6/20 (Dr. Conde), acute cholecystitis s/p perc dharmesh (Aug 2020, perc dharmesh removed Sept 2020) presents with 1 day history of RUQ pain and dark urine with clinical and radiographic evidence of acute cholecystitis with choledocholithiasis, cholangiocarcinoma cannot be ruled out.     Plan  Admit to surgery team 4, Dr. Weir, Telemetry  NPO, IVF  IV ceftriaxone and flagyl  MRCP  GI consult for ERCP +/- EUS  Dilaudid for pain  Zofran as needed  Cardiology consult. Continue plavix  HSQ DVT PPX  Discussed with attending and chief resident

## 2021-01-10 NOTE — H&P ADULT - NSHPLABSRESULTS_GEN_ALL_CORE
10.0   12.48 )-----------( 385      ( 10 Josesito 2021 13:36 )             31.3   01-10    133<L>  |  94<L>  |  22  ----------------------------<  108<H>  4.5   |  25  |  1.43<H>    Ca    9.5      10 Josesito 2021 13:35    TPro  8.0  /  Alb  3.9  /  TBili  5.9<H>  /  DBili  x   /  AST  479<H>  /  ALT  924<H>  /  AlkPhos  462<H>  01-10  < from: US Abdomen Limited (01.10.21 @ 16:07) >    IMPRESSION:  1. As on the CT scan of 1/10/2021, there are findings suspicious for gangrenous acute cholecystitis.    2. Dilated common hepatic duct. On the CT scan there freda suspicion of a mass at the confluence of the hepatic ducts. Cholangiocarcinoma should be ruled out.    < end of copied text >    < from: CT Abdomen and Pelvis w/ Oral Cont and w/ IV Cont (01.10.21 @ 14:54) >      Impression: 1. Since 8/4/2020, there is new mild intrahepatic biliary ductal dilatation in both lobes of the liver, with a 2.0 cm mass at the confluence of the hepatic ducts. These findings are suspicious for cholangiocarcinoma.    2. Recurrent acute cholecystitis. The presence of intraluminal membranes and the marked surrounding inflammatory change again raise the suspicion of gangrenous cholecystitis.    3. Mild lymphadenopathy in the right upper quadrant.    4. Small bowel ileus.        Case discussed with Dr. Ha on 1/10/2021 at 4:53 PM.        < end of copied text >

## 2021-01-10 NOTE — H&P ADULT - NSICDXPASTMEDICALHX_GEN_ALL_CORE_FT
PAST MEDICAL HISTORY:  Choledocholithiasis     DM (diabetes mellitus)     H/O cardiomyopathy     History of TIAs     HLD (hyperlipidemia)     HTN (hypertension)     Hypothyroidism

## 2021-01-10 NOTE — H&P ADULT - NSHPPHYSICALEXAM_GEN_ALL_CORE
Vital Signs Last 24 Hrs  T(C): 37.1 (10 Josesito 2021 15:35), Max: 37.1 (10 Josesito 2021 15:35)  T(F): 98.7 (10 Josesito 2021 15:35), Max: 98.7 (10 Josesito 2021 15:35)  HR: 71 (10 Josesito 2021 15:35) (71 - 75)  BP: 126/76 (10 Josesito 2021 15:35) (126/76 - 144/82)  BP(mean): --  RR: 16 (10 Josesito 2021 15:35) (16 - 18)  SpO2: 99% (10 Josesito 2021 15:35) (99% - 100%)    I&O's Summary      Physical Exam:  General: NAD, resting comfortably in bed  HEENT: MMM  Pulmonary: nonlabored breathing, normal resp effort  Cardiovascular: RRR  Abdominal: soft, nondistended, TTP RUQ, no rebound or guarding, Snyder's negative  Extremities: WWP, no edema, no calf tenderness  Neuro: no focal deficits  Psych: pleasant

## 2021-01-10 NOTE — CONSULT NOTE ADULT - SUBJECTIVE AND OBJECTIVE BOX
65M with PMH of HTN, HLD, DM, TIA (s/p plavix), A fib (on Eliquis), CAD s/p CABG x3 on 20 (Dr. Conde), acute cholecystitis s/p perc dharmesh (Aug 2020, perc dharmesh removed 2020) presents with 1 day history of RUQ pain and dark urine. Pt reports sudden onset of RUQ pain starting yesterday described as sharp, constant, no radiation, associated with dark urine. Pain worsens with movement and palpation of RUQ. Denies fever, chills, n/v/d. Passing flatus, having regular bowel movement. Denies CP, Palpitations, SOB. Denies recent travel or sick contacts.         PAST MEDICAL & SURGICAL HISTORY:  Choledocholithiasis    History of TIAs    Hypothyroidism    H/O cardiomyopathy    DM (diabetes mellitus)    HLD (hyperlipidemia)    HTN (hypertension)    No significant past surgical history      Home Medications:  acetaminophen 325 mg oral tablet: 2 tab(s) orally every 6 hours, As needed, Moderate Pain (4 - 6) (06 Aug 2020 09:21)  Vitamin D3 50,000 intl units (1250 mcg) oral capsule: 1  orally (03 Aug 2020 16:42)        Allergies    No Known Allergies    Intolerances        FAMILY HISTORY:  FH: heart failure        ROS: otherwise negative.    Vital Signs Last 24 Hrs  T(C): 37.1 (10 Josesito 2021 15:35), Max: 37.1 (10 Josesito 2021 15:35)  T(F): 98.7 (10 Josesito 2021 15:35), Max: 98.7 (10 Josesito 2021 15:35)  HR: 71 (10 Josesito 2021 15:35) (71 - 75)  BP: 126/76 (10 Josesito 2021 15:35) (126/76 - 144/82)  BP(mean): --  RR: 16 (10 Josesito 2021 15:35) (16 - 18)  SpO2: 99% (10 Josesito 2021 15:35) (99% - 100%)    I&O's Summary      Physical Exam:  General: NAD, resting comfortably in bed  HEENT: MMM  Pulmonary: nonlabored breathing, normal resp effort  Cardiovascular: RRR  Abdominal: soft, nondistended, TTP RUQ, no rebound or guarding, Snyder's negative  Extremities: WWP, no edema, no calf tenderness  Neuro: no focal deficits  Psych: pleasant    LABS:                        10.0   12.48 )-----------( 385      ( 10 Josesito 2021 13:36 )             31.3     01-10    133<L>  |  94<L>  |  22  ----------------------------<  108<H>  4.5   |  25  |  1.43<H>    Ca    9.5      10 Josesito 2021 13:35    TPro  8.0  /  Alb  3.9  /  TBili  5.9<H>  /  DBili  x   /  AST  479<H>  /  ALT  924<H>  /  AlkPhos  462<H>  01-10    PT/INR - ( 10 Josesito 2021 13:32 )   PT: 14.5 sec;   INR: 1.22          PTT - ( 10 Joseisto 2021 13:32 )  PTT:32.4 sec  Urinalysis Basic - ( 10 Josesito 2021 13:34 )    Color: Yellow / Appearance: Clear / S.020 / pH: x  Gluc: x / Ketone: NEGATIVE  / Bili: Moderate / Urobili: 1.0 E.U./dL   Blood: x / Protein: NEGATIVE mg/dL / Nitrite: NEGATIVE   Leuk Esterase: NEGATIVE / RBC: x / WBC x   Sq Epi: x / Non Sq Epi: x / Bacteria: x      CAPILLARY BLOOD GLUCOSE        LIVER FUNCTIONS - ( 10 Josesito 2021 13:35 )  Alb: 3.9 g/dL / Pro: 8.0 g/dL / ALK PHOS: 462 U/L / ALT: 924 U/L / AST: 479 U/L / GGT: x             Cultures:      RADIOLOGY & ADDITIONAL STUDIES:

## 2021-01-10 NOTE — ED PROVIDER NOTE - PMH
Choledocholithiasis    DM (diabetes mellitus)    H/O cardiomyopathy    History of TIAs    HLD (hyperlipidemia)    HTN (hypertension)    Hypothyroidism

## 2021-01-11 DIAGNOSIS — I10 ESSENTIAL (PRIMARY) HYPERTENSION: ICD-10-CM

## 2021-01-11 DIAGNOSIS — E03.9 HYPOTHYROIDISM, UNSPECIFIED: ICD-10-CM

## 2021-01-11 DIAGNOSIS — K80.50 CALCULUS OF BILE DUCT WITHOUT CHOLANGITIS OR CHOLECYSTITIS WITHOUT OBSTRUCTION: ICD-10-CM

## 2021-01-11 DIAGNOSIS — D64.9 ANEMIA, UNSPECIFIED: ICD-10-CM

## 2021-01-11 DIAGNOSIS — Z01.818 ENCOUNTER FOR OTHER PREPROCEDURAL EXAMINATION: ICD-10-CM

## 2021-01-11 DIAGNOSIS — E78.5 HYPERLIPIDEMIA, UNSPECIFIED: ICD-10-CM

## 2021-01-11 DIAGNOSIS — E11.9 TYPE 2 DIABETES MELLITUS WITHOUT COMPLICATIONS: ICD-10-CM

## 2021-01-11 DIAGNOSIS — I25.10 ATHEROSCLEROTIC HEART DISEASE OF NATIVE CORONARY ARTERY WITHOUT ANGINA PECTORIS: ICD-10-CM

## 2021-01-11 LAB
A1C WITH ESTIMATED AVERAGE GLUCOSE RESULT: 5.1 % — SIGNIFICANT CHANGE UP (ref 4–5.6)
ALBUMIN SERPL ELPH-MCNC: 3.6 G/DL — SIGNIFICANT CHANGE UP (ref 3.3–5)
ALP SERPL-CCNC: 474 U/L — HIGH (ref 40–120)
ALT FLD-CCNC: 647 U/L — HIGH (ref 10–45)
ANION GAP SERPL CALC-SCNC: 13 MMOL/L — SIGNIFICANT CHANGE UP (ref 5–17)
ANION GAP SERPL CALC-SCNC: 15 MMOL/L — SIGNIFICANT CHANGE UP (ref 5–17)
AST SERPL-CCNC: 275 U/L — HIGH (ref 10–40)
BILIRUB DIRECT SERPL-MCNC: 3.6 MG/DL — HIGH (ref 0–0.2)
BILIRUB SERPL-MCNC: 5 MG/DL — HIGH (ref 0.2–1.2)
BUN SERPL-MCNC: 17 MG/DL — SIGNIFICANT CHANGE UP (ref 7–23)
BUN SERPL-MCNC: 19 MG/DL — SIGNIFICANT CHANGE UP (ref 7–23)
CALCIUM SERPL-MCNC: 9.6 MG/DL — SIGNIFICANT CHANGE UP (ref 8.4–10.5)
CALCIUM SERPL-MCNC: 9.6 MG/DL — SIGNIFICANT CHANGE UP (ref 8.4–10.5)
CHLORIDE SERPL-SCNC: 96 MMOL/L — SIGNIFICANT CHANGE UP (ref 96–108)
CHLORIDE SERPL-SCNC: 98 MMOL/L — SIGNIFICANT CHANGE UP (ref 96–108)
CO2 SERPL-SCNC: 24 MMOL/L — SIGNIFICANT CHANGE UP (ref 22–31)
CO2 SERPL-SCNC: 24 MMOL/L — SIGNIFICANT CHANGE UP (ref 22–31)
CREAT SERPL-MCNC: 0.8 MG/DL — SIGNIFICANT CHANGE UP (ref 0.5–1.3)
CREAT SERPL-MCNC: 0.92 MG/DL — SIGNIFICANT CHANGE UP (ref 0.5–1.3)
CULTURE RESULTS: NO GROWTH — SIGNIFICANT CHANGE UP
ESTIMATED AVERAGE GLUCOSE: 100 MG/DL — SIGNIFICANT CHANGE UP (ref 68–114)
GLUCOSE BLDC GLUCOMTR-MCNC: 109 MG/DL — HIGH (ref 70–99)
GLUCOSE BLDC GLUCOMTR-MCNC: 110 MG/DL — HIGH (ref 70–99)
GLUCOSE BLDC GLUCOMTR-MCNC: 144 MG/DL — HIGH (ref 70–99)
GLUCOSE SERPL-MCNC: 109 MG/DL — HIGH (ref 70–99)
GLUCOSE SERPL-MCNC: 150 MG/DL — HIGH (ref 70–99)
HCT VFR BLD CALC: 31.5 % — LOW (ref 39–50)
HGB BLD-MCNC: 9.9 G/DL — LOW (ref 13–17)
MAGNESIUM SERPL-MCNC: 1.2 MG/DL — LOW (ref 1.6–2.6)
MAGNESIUM SERPL-MCNC: 2.2 MG/DL — SIGNIFICANT CHANGE UP (ref 1.6–2.6)
MCHC RBC-ENTMCNC: 29.4 PG — SIGNIFICANT CHANGE UP (ref 27–34)
MCHC RBC-ENTMCNC: 31.4 GM/DL — LOW (ref 32–36)
MCV RBC AUTO: 93.5 FL — SIGNIFICANT CHANGE UP (ref 80–100)
NRBC # BLD: 0 /100 WBCS — SIGNIFICANT CHANGE UP (ref 0–0)
PHOSPHATE SERPL-MCNC: 3.6 MG/DL — SIGNIFICANT CHANGE UP (ref 2.5–4.5)
PHOSPHATE SERPL-MCNC: 4.5 MG/DL — SIGNIFICANT CHANGE UP (ref 2.5–4.5)
PLATELET # BLD AUTO: 351 K/UL — SIGNIFICANT CHANGE UP (ref 150–400)
POTASSIUM SERPL-MCNC: 3.6 MMOL/L — SIGNIFICANT CHANGE UP (ref 3.5–5.3)
POTASSIUM SERPL-MCNC: 3.8 MMOL/L — SIGNIFICANT CHANGE UP (ref 3.5–5.3)
POTASSIUM SERPL-SCNC: 3.6 MMOL/L — SIGNIFICANT CHANGE UP (ref 3.5–5.3)
POTASSIUM SERPL-SCNC: 3.8 MMOL/L — SIGNIFICANT CHANGE UP (ref 3.5–5.3)
PROT SERPL-MCNC: 7.2 G/DL — SIGNIFICANT CHANGE UP (ref 6–8.3)
RBC # BLD: 3.37 M/UL — LOW (ref 4.2–5.8)
RBC # FLD: 12.3 % — SIGNIFICANT CHANGE UP (ref 10.3–14.5)
SODIUM SERPL-SCNC: 135 MMOL/L — SIGNIFICANT CHANGE UP (ref 135–145)
SODIUM SERPL-SCNC: 135 MMOL/L — SIGNIFICANT CHANGE UP (ref 135–145)
SPECIMEN SOURCE: SIGNIFICANT CHANGE UP
WBC # BLD: 16.47 K/UL — HIGH (ref 3.8–10.5)
WBC # FLD AUTO: 16.47 K/UL — HIGH (ref 3.8–10.5)

## 2021-01-11 PROCEDURE — 99223 1ST HOSP IP/OBS HIGH 75: CPT

## 2021-01-11 PROCEDURE — 99223 1ST HOSP IP/OBS HIGH 75: CPT | Mod: GC

## 2021-01-11 PROCEDURE — 74181 MRI ABDOMEN W/O CONTRAST: CPT | Mod: 26

## 2021-01-11 PROCEDURE — 99222 1ST HOSP IP/OBS MODERATE 55: CPT

## 2021-01-11 RX ORDER — SODIUM CHLORIDE 9 MG/ML
1000 INJECTION, SOLUTION INTRAVENOUS
Refills: 0 | Status: DISCONTINUED | OUTPATIENT
Start: 2021-01-11 | End: 2021-01-15

## 2021-01-11 RX ORDER — DEXTROSE 50 % IN WATER 50 %
15 SYRINGE (ML) INTRAVENOUS ONCE
Refills: 0 | Status: DISCONTINUED | OUTPATIENT
Start: 2021-01-11 | End: 2021-01-15

## 2021-01-11 RX ORDER — SODIUM CHLORIDE 9 MG/ML
1000 INJECTION, SOLUTION INTRAVENOUS
Refills: 0 | Status: DISCONTINUED | OUTPATIENT
Start: 2021-01-11 | End: 2021-01-11

## 2021-01-11 RX ORDER — GLUCAGON INJECTION, SOLUTION 0.5 MG/.1ML
1 INJECTION, SOLUTION SUBCUTANEOUS ONCE
Refills: 0 | Status: DISCONTINUED | OUTPATIENT
Start: 2021-01-11 | End: 2021-01-11

## 2021-01-11 RX ORDER — POLYETHYLENE GLYCOL 3350 17 G/17G
17 POWDER, FOR SOLUTION ORAL ONCE
Refills: 0 | Status: DISCONTINUED | OUTPATIENT
Start: 2021-01-11 | End: 2021-01-15

## 2021-01-11 RX ORDER — DEXTROSE 50 % IN WATER 50 %
25 SYRINGE (ML) INTRAVENOUS ONCE
Refills: 0 | Status: DISCONTINUED | OUTPATIENT
Start: 2021-01-11 | End: 2021-01-15

## 2021-01-11 RX ORDER — DEXTROSE 50 % IN WATER 50 %
25 SYRINGE (ML) INTRAVENOUS ONCE
Refills: 0 | Status: DISCONTINUED | OUTPATIENT
Start: 2021-01-11 | End: 2021-01-11

## 2021-01-11 RX ORDER — INSULIN LISPRO 100/ML
VIAL (ML) SUBCUTANEOUS EVERY 6 HOURS
Refills: 0 | Status: DISCONTINUED | OUTPATIENT
Start: 2021-01-11 | End: 2021-01-15

## 2021-01-11 RX ORDER — MAGNESIUM SULFATE 500 MG/ML
2 VIAL (ML) INJECTION EVERY 6 HOURS
Refills: 0 | Status: COMPLETED | OUTPATIENT
Start: 2021-01-11 | End: 2021-01-11

## 2021-01-11 RX ORDER — GLUCAGON INJECTION, SOLUTION 0.5 MG/.1ML
1 INJECTION, SOLUTION SUBCUTANEOUS ONCE
Refills: 0 | Status: DISCONTINUED | OUTPATIENT
Start: 2021-01-11 | End: 2021-01-15

## 2021-01-11 RX ORDER — DEXTROSE 50 % IN WATER 50 %
12.5 SYRINGE (ML) INTRAVENOUS ONCE
Refills: 0 | Status: DISCONTINUED | OUTPATIENT
Start: 2021-01-11 | End: 2021-01-11

## 2021-01-11 RX ORDER — DEXTROSE 50 % IN WATER 50 %
15 SYRINGE (ML) INTRAVENOUS ONCE
Refills: 0 | Status: DISCONTINUED | OUTPATIENT
Start: 2021-01-11 | End: 2021-01-11

## 2021-01-11 RX ORDER — DEXTROSE 50 % IN WATER 50 %
12.5 SYRINGE (ML) INTRAVENOUS ONCE
Refills: 0 | Status: DISCONTINUED | OUTPATIENT
Start: 2021-01-11 | End: 2021-01-15

## 2021-01-11 RX ORDER — POLYETHYLENE GLYCOL 3350 17 G/17G
17 POWDER, FOR SOLUTION ORAL ONCE
Refills: 0 | Status: DISCONTINUED | OUTPATIENT
Start: 2021-01-11 | End: 2021-01-11

## 2021-01-11 RX ORDER — INSULIN LISPRO 100/ML
VIAL (ML) SUBCUTANEOUS
Refills: 0 | Status: DISCONTINUED | OUTPATIENT
Start: 2021-01-11 | End: 2021-01-11

## 2021-01-11 RX ORDER — INSULIN LISPRO 100/ML
VIAL (ML) SUBCUTANEOUS AT BEDTIME
Refills: 0 | Status: DISCONTINUED | OUTPATIENT
Start: 2021-01-11 | End: 2021-01-11

## 2021-01-11 RX ADMIN — SODIUM CHLORIDE 70 MILLILITER(S): 9 INJECTION, SOLUTION INTRAVENOUS at 12:10

## 2021-01-11 RX ADMIN — HYDROMORPHONE HYDROCHLORIDE 0.5 MILLIGRAM(S): 2 INJECTION INTRAMUSCULAR; INTRAVENOUS; SUBCUTANEOUS at 02:00

## 2021-01-11 RX ADMIN — HEPARIN SODIUM 5000 UNIT(S): 5000 INJECTION INTRAVENOUS; SUBCUTANEOUS at 13:32

## 2021-01-11 RX ADMIN — Medication 100 MILLIGRAM(S): at 05:11

## 2021-01-11 RX ADMIN — Medication 50 GRAM(S): at 09:09

## 2021-01-11 RX ADMIN — Medication 40 MILLIGRAM(S): at 06:16

## 2021-01-11 RX ADMIN — Medication 100 MILLIGRAM(S): at 13:31

## 2021-01-11 RX ADMIN — Medication 100 MILLIGRAM(S): at 23:19

## 2021-01-11 RX ADMIN — Medication 175 MICROGRAM(S): at 06:16

## 2021-01-11 RX ADMIN — HEPARIN SODIUM 5000 UNIT(S): 5000 INJECTION INTRAVENOUS; SUBCUTANEOUS at 06:16

## 2021-01-11 RX ADMIN — Medication 50 GRAM(S): at 12:11

## 2021-01-11 RX ADMIN — HEPARIN SODIUM 5000 UNIT(S): 5000 INJECTION INTRAVENOUS; SUBCUTANEOUS at 23:18

## 2021-01-11 RX ADMIN — Medication 25 MILLIGRAM(S): at 06:16

## 2021-01-11 RX ADMIN — CEFTRIAXONE 100 MILLIGRAM(S): 500 INJECTION, POWDER, FOR SOLUTION INTRAMUSCULAR; INTRAVENOUS at 16:57

## 2021-01-11 RX ADMIN — HYDROMORPHONE HYDROCHLORIDE 0.5 MILLIGRAM(S): 2 INJECTION INTRAMUSCULAR; INTRAVENOUS; SUBCUTANEOUS at 13:42

## 2021-01-11 NOTE — CONSULT NOTE ADULT - PROBLEM SELECTOR RECOMMENDATION 6
A1C 5.1  On po at home   Can hold at this time, mISS and FS TID   Likely can DC oral as A1C 5.1 however rec discuss with PMD

## 2021-01-11 NOTE — CONSULT NOTE ADULT - ASSESSMENT
65M w/ PMHx of HTN, HLD, DM, CAD s/p CABG 7/20, afib, hx of TIA and hx of acute cholecystitis s/p perc dharmesh 8/20 p/w 2d of abdominal pain.  GI consulted for biliary dilation.    #Intrahepatic biliary dilation  -CT demonstrates intraabdominal LAD and new mild intrahepatic biliary ductal dilatation with a 2.0 cm long segment of abnormal luminal narrowing, wall thickening, enhancement at the confluence of the hepatic duct, suspicious for cholangiocarcinoma. Distal CBD measures 0.6 cm.   -NPO   -Continue abx  -Hold plavix if no contraindication  -Continue supportive management  -Obtain MRCP    #Elevated LT  Patient with elevated LT with CT notable for dilated biliary duct as well as concern for cholecystitis.    -Continue to trend LT  -Obtain MRCP  -Management of suspected cholecystitis per surgical team    Case d/w biliary attg
65M with PMH of HTN, HLD, DM, TIA (s/p plavix), A fib (on Eliquis), CAD s/p CABG x3 on 7/6/20 (Dr. Conde), acute cholecystitis s/p perc dharmesh (Aug 2020, perc dharmesh removed Sept 2020) presents with 1 day history of RUQ pain and dark urine.    Plan pending imaging studies. 
ASSESSMENT:  65M PMH HTN, CAD (CABG 7/2020, LIMA-LAD, SVG-prox marginal, SVG-PDA), Afib (previously on Eliquis, recently d/c'ed), hx of cholecystitis s/p perc dharmesh (Aug 2020) presented with R side abdominal pain with dark urine and found to have choledocholithiasis. Cardiology consulted for pre-operative evaluation for the following urgent procedures: ERCP and lap dharmesh possible open.     PLAN:   #Pre-Operative Evaluation  RCRI: 2/Class III (hx of CAD, hx of TIA), 10.1% of 30 day risk of death, MI or cardiac arrest  Dexter: 0.1% risk of MI or cardiac arrest intraoperatively or up to 30 days post-op  NSQIP: 0.2% for cardiac complication  Mets are >4, 4 flights of stairs without CP/dyspnea  EKG: NSR with nonspecific St changes  No active chest pain, no shortness of breath, euvolemic on exam  Patient is Intermediate for urgent low to intermediate risk procedures  - Continue with patients outpatient beta-blocker, metoprolol XL 25 mg qD  - Ok to hold statin in setting of transaminitis  - Ok to hold plavix for pending procedures    #CAD  As above, no active chest pain. Patient recently revascularized- CABGx3 in July 2020  - continue with beta blocker  - Ok to hold statin and plavix    #Afib  Hx of Post op Afib  Previously on Eliquis but states that recently discontinued without patient cardiologist, EKG NSR  CHADsVASC-5 (CAD, DM, TIA, HTN)  - Continue with beta blocker as above    Recommendations final when signed by attending. 
65 year old male with a pmh of HTN, HLD, DM, TIA, Afib, CAD s/p CABG x3 on DAPT (7/2020) and gangrenous cholecystitis s/p percutaneous cholecystostomy (8/4/2020 and removal 9/2020) who presented to [facility] with clinical and radiologic evidence consistent with recurrent acute calculous cholecystitis /possible gangrenous cholecystitis with possible choledocholithiasis. CT A/P on 1/10/20 notable for new mild intrahepatic biliary ductal dilatation in both lobes of the liver, with the duct is dilated to the confluence of the right and left hepatic ducts. There is a 2.0 cm long segment of abnormal luminal narrowing and wall thickening and enhancement at the confluence of the hepatic ducts. The more distal common bile duct is normal in caliber, measuring 0.6 cm. A 1.1 cm portacaval lymph node with 1.0 cm lymph node adjacent to common hepatic artery. Surgical oncology consulted for further recommendations for new 2cm mass along the hepatic confluence.     No acute surgical intervention  F/u stat MRCP  Obtain tumor markers CEA, CA 19-9, AFP  Recommend GI consult for ERCP/EUS with Bx of lesion  Further management per primary  Discussed with Dr. Wagner and chief surgery resident  Team 1C will continue to follow  
65M with PMH of HTN, HLD, DM, TIA (s/p plavix), A fib, CAD s/p CABG x3 on 7/6/20 (Dr. Conde), acute cholecystitis s/p perc dharmesh (Aug 2020, perc dharmesh removed Sept 2020) presents with 1 day history of RUQ pain with clinical and radiographic evidence of acute cholecystitis with choledocholithiasis, cholangiocarcinoma cannot be ruled out

## 2021-01-11 NOTE — CONSULT NOTE ADULT - PROBLEM SELECTOR RECOMMENDATION 3
Cardiology consulted, appreciate recs   OK to hold ASA/Plavix at this time     #post op afib   Had episode of Afib in the past   Was on eliquis and DC as outpt as now in sinus   continue to monitor rhythm

## 2021-01-11 NOTE — CONSULT NOTE ADULT - SUBJECTIVE AND OBJECTIVE BOX
65 year old male with a pmh of HTN, HLD, DM, TIA, Afib, CAD s/p CABG x3 on DAPT (2020) and gangrenous cholecystitis s/p percutaneous cholecystostomy (2020 and removal 2020) who presented to [facility] on 1/10/20 with 1 day history of RUQ pain with associated dark/brown urine. Onset of pain was spontaneous and described as sharp, non-radiating, constant, and exacerbated with movement with associated dark/brown urine. Reports relief of symptoms since admission and denies any current nausea or vomiting. Last bowel movement was earlier this morning and was normal in color and in caliber. Denies any recent unintentional weightloss, night sweats       cefTRIAXone   IVPB 1000 milliGRAM(s) IV Intermittent every 24 hours  furosemide    Tablet 40 milliGRAM(s) Oral daily  heparin   Injectable 5000 Unit(s) SubCutaneous every 8 hours  metoprolol succinate ER 25 milliGRAM(s) Oral daily  metroNIDAZOLE  IVPB 500 milliGRAM(s) IV Intermittent every 8 hours      Vital Signs Last 24 Hrs  T(C): 36.8 (2021 09:15), Max: 37.4 (2021 05:12)  T(F): 98.3 (2021 09:15), Max: 99.3 (2021 05:12)  HR: 84 (2021 09:15) (71 - 102)  BP: 122/69 (2021 09:15) (117/66 - 144/82)  BP(mean): --  RR: 18 (2021 09:15) (16 - 20)  SpO2: 99% (2021 09:15) (98% - 100%)  I&O's Detail    10 Josesito 2021 07:01  -  2021 07:00  --------------------------------------------------------  IN:    IV PiggyBack: 200 mL    Lactated Ringers: 630 mL    Oral Fluid: 60 mL  Total IN: 890 mL    OUT:    Voided (mL): 820 mL  Total OUT: 820 mL    Total NET: 70 mL      2021 07:01  -  2021 10:51  --------------------------------------------------------  IN:    IV PiggyBack: 50 mL    Lactated Ringers: 70 mL  Total IN: 120 mL    OUT:  Total OUT: 0 mL    Total NET: 120 mL          General: NAD, resting comfortably in bed  C/V: NSR  Pulm: Nonlabored breathing, no respiratory distress  Abd: soft, NT/ND.  Extrem: WWP, no edema, SCDs in place        LABS:                        9.9    16.47 )-----------( 351      ( 2021 07:05 )             31.5         135  |  98  |  17  ----------------------------<  150<H>  3.8   |  24  |  0.92    Ca    9.6      2021 07:05  Phos  3.6       Mg     1.2         TPro  7.2  /  Alb  3.6  /  TBili  5.0<H>  /  DBili  3.6<H>  /  AST  275<H>  /  ALT  647<H>  /  AlkPhos  474<H>      PT/INR - ( 10 Josesito 2021 13:32 )   PT: 14.5 sec;   INR: 1.22          PTT - ( 10 Josesito 2021 13:32 )  PTT:32.4 sec  Urinalysis Basic - ( 10 Josesito 2021 13:34 )    Color: Yellow / Appearance: Clear / S.020 / pH: x  Gluc: x / Ketone: NEGATIVE  / Bili: Moderate / Urobili: 1.0 E.U./dL   Blood: x / Protein: NEGATIVE mg/dL / Nitrite: NEGATIVE   Leuk Esterase: NEGATIVE / RBC: x / WBC x   Sq Epi: x / Non Sq Epi: x / Bacteria: x        RADIOLOGY & ADDITIONAL STUDIES:   65 year old male with a pmh of HTN, HLD, DM, TIA, Afib, CAD s/p CABG x3 on DAPT (2020) and gangrenous cholecystitis s/p percutaneous cholecystostomy (2020 and removal 2020) who presented to [facility] on 1/10/20 with 1 day history of RUQ pain with associated dark/brown urine. Onset of pain was spontaneous and described as sharp, non-radiating, constant, and exacerbated with movement with associated dark/brown urine. Reports relief of symptoms since admission and denies any current nausea or vomiting. Last bowel movement was earlier this morning and was normal in color and in caliber. Has never has a colonoscopy Denies any recent unintentional weightloss, night sweats, f/c, CP, SOB, melena, hematochezia, dysuria, weakness or pain in extremities.     PMH: HTN, TIA, HLD, DM, CAD x3 (CABG 2020, LIMA-LAD, SVG-prox marginal, SVG-PDA) on DAPT, Afib (previously on Eliquis), hypothyroidism, gangrenous cholecystitis  Meds: Metformin 500 TID, Lipitor 80mg qD, Januvia 100mg, toprol xl 25 qD, synthroid 175 mcg, folic acid 1mg qD, clopidogrel 75mg qD, lasix 40mg qD  PSH: CABG (), Percutaneous cholecystectomy 2020  FamHx: Heart failure, No history of GI malignancy, liver or gallbladder malignancy  Social: Never tobacco, reports former ETOH heavy use but has significantly cut down over last 2-3 years, denies other recreational drugs      cefTRIAXone   IVPB 1000 milliGRAM(s) IV Intermittent every 24 hours  furosemide    Tablet 40 milliGRAM(s) Oral daily  heparin   Injectable 5000 Unit(s) SubCutaneous every 8 hours  metoprolol succinate ER 25 milliGRAM(s) Oral daily  metroNIDAZOLE  IVPB 500 milliGRAM(s) IV Intermittent every 8 hours      Vital Signs Last 24 Hrs  T(C): 36.8 (2021 09:15), Max: 37.4 (2021 05:12)  T(F): 98.3 (2021 09:15), Max: 99.3 (2021 05:12)  HR: 84 (2021 09:15) (71 - 102)  BP: 122/69 (2021 09:15) (117/66 - 144/82)  BP(mean): --  RR: 18 (2021 09:15) (16 - 20)  SpO2: 99% (2021 09:15) (98% - 100%)  I&O's Detail    10 Josesito 2021 07:01  -  2021 07:00  --------------------------------------------------------  IN:    IV PiggyBack: 200 mL    Lactated Ringers: 630 mL    Oral Fluid: 60 mL  Total IN: 890 mL    OUT:    Voided (mL): 820 mL  Total OUT: 820 mL    Total NET: 70 mL      2021 07:01  -  2021 10:51  --------------------------------------------------------  IN:    IV PiggyBack: 50 mL    Lactated Ringers: 70 mL  Total IN: 120 mL    OUT:  Total OUT: 0 mL    Total NET: 120 mL        General: NAD, resting comfortably in bed  C/V: NSR  Pulm: Nonlabored breathing, no respiratory distress  Abd: soft, minimally TTP in the RUQ. No rebound or guarding  Extrem: WWP, no edema, SCDs in place      LABS:                        9.9    16.47 )-----------( 351      ( 2021 07:05 )             31.5     -11    135  |  98  |  17  ----------------------------<  150<H>  3.8   |  24  |  0.92    Ca    9.6      2021 07:05  Phos  3.6     11  Mg     1.2         TPro  7.2  /  Alb  3.6  /  TBili  5.0<H>  /  DBili  3.6<H>  /  AST  275<H>  /  ALT  647<H>  /  AlkPhos  474<H>      PT/INR - ( 10 Josesito 2021 13:32 )   PT: 14.5 sec;   INR: 1.22          PTT - ( 10 Josesito 2021 13:32 )  PTT:32.4 sec  Urinalysis Basic - ( 10 Josesito 2021 13:34 )    Color: Yellow / Appearance: Clear / S.020 / pH: x  Gluc: x / Ketone: NEGATIVE  / Bili: Moderate / Urobili: 1.0 E.U./dL   Blood: x / Protein: NEGATIVE mg/dL / Nitrite: NEGATIVE   Leuk Esterase: NEGATIVE / RBC: x / WBC x   Sq Epi: x / Non Sq Epi: x / Bacteria: x        RADIOLOGY & ADDITIONAL STUDIES:  < from: CT Abdomen and Pelvis w/ Oral Cont and w/ IV Cont (01.10.21 @ 14:54) >  EXAM:  CT ABDOMEN AND PELVIS OC IC                          PROCEDURE DATE:  01/10/2021        INTERPRETATION:  CT SCAN OF ABDOMEN AND PELVIS    History: Abdominal pain.    Technique: CT scan of abdomen and pelvis was performed from lung bases through symphysis pubis. Intravenous and oral contrast material were utilized. Axial, sagittal and coronal reformatted images were reviewed.    Comparison: Images from cholecystostomy tube injection of 2020. MRCP from 2020. Right upper quadrantultrasound from 8/3/2020. CT of the abdomen and pelvis from 2020. Chest CT from 2020.    Findings:    Lower chest: There has been resolution of trace bilateral pleural effusions and trace pericardial effusion. There is again cardiomegaly. Severe coronary artery calcifications/stents and moderate calcification of mitral valve annulus.    Liver: The liver is again enlarged. No focal liver mass. New mild intrahepatic biliary ductal dilatation in both lobes of the liver, with the duct is dilated to the confluence of the right and left hepatic ducts. There is a 2.0 cm long segment of abnormal luminal narrowing and wall thickening and enhancement at the confluence of the hepatic ducts. This is suspicious for cholangiocarcinoma. The more distal common bile duct is normal in caliber, measuring 0.6 cm.    Gallbladder: The gallbladder is mildly distended and contains intraluminal membranes and sludge. No radiopaque stones are seen. There is marked infiltration of the pericholecystic fat.    Spleen: Punctate splenic calcification again noted.    Pancreas:  Normal.    Adrenal glands:  Normal.    Kidneys: 1.2 cm cyst left kidney. Right kidney normal.    Adenopathy:  1.1 cm portacaval lymph node. 1.0 cm lymph node adjacent to common hepatic artery. There is again infiltration the mesenteric fat with sparing of the fat surrounding mesenteric lymph nodes, consistent with mesenteric panniculitis.    Ascites: None.    Gastrointestinal tract: There are a few dilated small bowel loops in the left abdomen, measuring up to 3.2 cm. This likely represents an ileus, as there is gradual tapering of the small bowel to normal caliber. Normal appendix. Colon unremarkable.    Vessels: Large calcified plaque aorta and pelvic arteries. Replaced right hepatic artery arises from superior mesenteric artery.    Pelvic organs: Mildly distended urinary bladder. Enlarged prostate.    Soft tissues: There are again postsurgical changes consisting of infiltration of the subcutaneous fat anterior to the sternum. Small fat-containing umbilical hernia.    Bones: Levoscoliosis of the lumbar spine. Degenerative changes in the spine. Sternotomy wires again noted.    Impression: 1. Since 2020, there is new mild intrahepatic biliary ductal dilatation in both lobes of the liver, with a 2.0 cm mass at the confluence of the hepatic ducts. These findings are suspicious for cholangiocarcinoma.    2. Recurrent acute cholecystitis. The presence of intraluminal membranes and the marked surrounding inflammatory change again raise the suspicion of gangrenous cholecystitis.    3. Mild lymphadenopathy in the right upper quadrant.    4. Small bowel ileus.      Case discussed with Dr. Ha on 1/10/2021 at 4:53 PM.      Thank you for the opportunity to participate in the care of this patient.    SEDRICK JACKSON M.D., RADIOLOGY RESIDENT  This document has been electronically signed.  EBONY LIEBERMAN MD; Attending Radiologist  This document has been electronically signed. Josesito 10 2021  4:59PM

## 2021-01-11 NOTE — PROGRESS NOTE ADULT - ASSESSMENT
65M with PMH of HTN, HLD, DM, TIA (s/p plavix), A fib, CAD s/p CABG x3 on 7/6/20 (Dr. Conde), acute cholecystitis s/p perc dharmesh (Aug 2020, perc dharmesh removed Sept 2020) presents with 1 day history of RUQ pain and dark urine with clinical and radiographic evidence of acute cholecystitis with choledocholithiasis, cholangiocarcinoma cannot be ruled out.     Plan  Admit to surgery team 4, Dr. Weir, Telemetry  NPO, IVF  IV ceftriaxone and flagyl  MRCP  GI consult for ERCP +/- EUS  Dilaudid for pain  Zofran as needed  Cardiology consult. Continue plavix  HSQ DVT PPX  Discussed with attending and chief resident

## 2021-01-11 NOTE — CONSULT NOTE ADULT - SUBJECTIVE AND OBJECTIVE BOX
Patient is a 65y old  Male who presents with a chief complaint of Choledocholithiasis (2021 13:41)    HPI:  65M with PMH of HTN, HLD, DM, TIA (s/p plavix), A fib, CAD s/p CABG x3 on 20 (Dr. Conde), acute cholecystitis s/p perc dharmesh (Aug 2020, perc dharmesh removed 2020) presents with 1 day history of RUQ pain and dark urine. Pt reports sudden onset of RUQ pain starting yesterday described as sharp, constant, no radiation, associated with dark urine. Pain worsens with movement and palpation of RUQ. Denies fever, chills, n/v/d. Passing flatus, having regular bowel movement. Denies CP, Palpitations, SOB. Denies recent travel or sick contacts.    (10 Josesito 2021 17:08)    INTERVAL HPI/OVERNIGHT EVENTS:  Patient was seen and examined at bedside. He notes that he feels well overall. He had a BM this morning, notes the abdominal pain has improved from admission. Patient denies: fever, chills, dizziness, weakness, HA, Changes in vision, CP, palpitations, SOB, cough, N/V/D/C, dysuria, changes in bowel movements, LE edema.      PAST MEDICAL & SURGICAL HISTORY:  Choledocholithiasis    History of TIAs    Hypothyroidism    H/O cardiomyopathy    DM (diabetes mellitus)    HLD (hyperlipidemia)    HTN (hypertension)    No significant past surgical history        SOCIAL HISTORY  Alcohol: none  Tobacco: none  Illicit substance use: none      FAMILY HISTORY:    REVIEW OF SYSTEMS:  CONSTITUTIONAL: No fever, weight loss, or fatigue  EYES: No eye pain, visual disturbances, or discharge  ENMT:  No difficulty hearing, tinnitus, vertigo; No sinus or throat pain  NECK: No pain or stiffness  RESPIRATORY: No cough, wheezing, chills or hemoptysis; No shortness of breath  CARDIOVASCULAR: No chest pain, palpitations, dizziness, or leg swelling  GASTROINTESTINAL: No abdominal or epigastric pain. No nausea, vomiting, or hematemesis; No diarrhea or constipation. No melena or hematochezia.  GENITOURINARY: No dysuria, frequency, hematuria, or incontinence  NEUROLOGICAL: No headaches, memory loss, loss of strength, numbness, or tremors  SKIN: No itching, burning, rashes, or lesions   LYMPH NODES: No enlarged glands  ENDOCRINE: No heat or cold intolerance; No hair loss  MUSCULOSKELETAL: No joint pain or swelling; No muscle, back, or extremity pain  PSYCHIATRIC: No depression, anxiety, mood swings, or difficulty sleeping  HEME/LYMPH: No easy bruising, or bleeding gums  ALLERY AND IMMUNOLOGIC: No hives or eczema    T(C): 36.3 (21 @ 14:07), Max: 37.4 (21 @ 05:12)  HR: 84 (21 @ 14:07) (71 - 102)  BP: 107/66 (21 @ 14:07) (107/66 - 140/69)  RR: 18 (21 @ 14:07) (16 - 20)  SpO2: 99% (21 @ 14:07) (98% - 100%)  Wt(kg): --  I&O's Summary    10 Josesito 2021 07:  -  2021 07:00  --------------------------------------------------------  IN: 890 mL / OUT: 820 mL / NET: 70 mL    2021 07:  -  2021 14:37  --------------------------------------------------------  IN: 480 mL / OUT: 700 mL / NET: -220 mL        PHYSICAL EXAM:  GENERAL: NAD, resting comfortably  HEAD:  Atraumatic, Normocephalic  EYES: EOMI, PERRLA  ENMT: MMM  NECK: Supple, No JVD  NERVOUS SYSTEM:  Alert & Oriented X3, no focal deficits  CHEST/LUNG: Clear to percussion bilaterally; No rales, rhonchi, wheezing, or rubs  HEART: Regular rate and rhythm; No murmurs, rubs, or gallops  ABDOMEN: Soft, Nontender, Nondistended; Bowel sounds present  EXTREMITIES:  2+ Peripheral Pulses, No clubbing, cyanosis, or edema      LABS:                        9.9    16.47 )-----------( 351      ( 2021 07:05 )             31.5         135  |  98  |  17  ----------------------------<  150<H>  3.8   |  24  |  0.92    Ca    9.6      2021 07:05  Phos  3.6       Mg     1.2         TPro  7.2  /  Alb  3.6  /  TBili  5.0<H>  /  DBili  3.6<H>  /  AST  275<H>  /  ALT  647<H>  /  AlkPhos  474<H>      PT/INR - ( 10 Josesito 2021 13:32 )   PT: 14.5 sec;   INR: 1.22          PTT - ( 10 Josesito 2021 13:32 )  PTT:32.4 sec  Urinalysis Basic - ( 10 Josesito 2021 13:34 )    Color: Yellow / Appearance: Clear / S.020 / pH: x  Gluc: x / Ketone: NEGATIVE  / Bili: Moderate / Urobili: 1.0 E.U./dL   Blood: x / Protein: NEGATIVE mg/dL / Nitrite: NEGATIVE   Leuk Esterase: NEGATIVE / RBC: x / WBC x   Sq Epi: x / Non Sq Epi: x / Bacteria: x      CAPILLARY BLOOD GLUCOSE      POCT Blood Glucose.: 109 mg/dL (2021 12:14)  POCT Blood Glucose.: 144 mg/dL (2021 06:46)  POCT Blood Glucose.: 87 mg/dL (10 Josesito 2021 21:32)        Urinalysis Basic - ( 10 Josesito 2021 13:34 )    Color: Yellow / Appearance: Clear / S.020 / pH: x  Gluc: x / Ketone: NEGATIVE  / Bili: Moderate / Urobili: 1.0 E.U./dL   Blood: x / Protein: NEGATIVE mg/dL / Nitrite: NEGATIVE   Leuk Esterase: NEGATIVE / RBC: x / WBC x   Sq Epi: x / Non Sq Epi: x / Bacteria: x        MEDICATIONS  (STANDING):  cefTRIAXone   IVPB 1000 milliGRAM(s) IV Intermittent every 24 hours  dextrose 40% Gel 15 Gram(s) Oral once  dextrose 5%. 1000 milliLiter(s) (50 mL/Hr) IV Continuous <Continuous>  dextrose 5%. 1000 milliLiter(s) (100 mL/Hr) IV Continuous <Continuous>  dextrose 50% Injectable 25 Gram(s) IV Push once  dextrose 50% Injectable 12.5 Gram(s) IV Push once  dextrose 50% Injectable 25 Gram(s) IV Push once  furosemide    Tablet 40 milliGRAM(s) Oral daily  glucagon  Injectable 1 milliGRAM(s) IntraMuscular once  heparin   Injectable 5000 Unit(s) SubCutaneous every 8 hours  influenza   Vaccine 0.5 milliLiter(s) IntraMuscular once  insulin lispro (ADMELOG) corrective regimen sliding scale   SubCutaneous every 6 hours  lactated ringers. 1000 milliLiter(s) (70 mL/Hr) IV Continuous <Continuous>  levothyroxine 175 MICROGram(s) Oral daily  metoprolol succinate ER 25 milliGRAM(s) Oral daily  metroNIDAZOLE  IVPB 500 milliGRAM(s) IV Intermittent every 8 hours    MEDICATIONS  (PRN):  HYDROmorphone  Injectable 0.5 milliGRAM(s) IV Push every 6 hours PRN Moderate Pain (4 - 6)  ondansetron Injectable 4 milliGRAM(s) IV Push every 6 hours PRN Nausea  polyethylene glycol 3350 17 Gram(s) Oral once PRN constipaiton      RADIOLOGY & ADDITIONAL TESTS:    Imaging Personally Reviewed:  [ ] YES  [ ] NO    Consultant(s) Notes Reviewed:  [ ] YES  [ ] NO    Care Discussed with Consultants/Other Providers [ ] YES  [ ] NO

## 2021-01-11 NOTE — PROGRESS NOTE ADULT - ASSESSMENT
65M w/ PMHx of HTN, HLD, DM, CAD s/p CABG 7/20, afib, hx of TIA and hx of acute cholecystitis s/p perc dharmesh 8/20 p/w 2d of abdominal pain.  GI consulted for biliary dilation.    1. Intrahepatic biliary dilation - CT demonstrates intraabdominal LAD and new mild intrahepatic biliary ductal dilatation with a 2.0 cm long segment of abnormal luminal narrowing, wall thickening, enhancement at the confluence of the hepatic duct, suspicious for cholangiocarcinoma. Distal CBD measures 0.6 cm.   - Continue empiric antibiotics  - Appreciate Cardiology evaluation and risk stratification  - Continue to hold Plavix - patient last received on 1/10; due to bleeding risk of EUS w/FNA, will defer until later in the week  - Continue supportive management  - Obtain MRCP    2. Elevated LT - Patient with elevated LTs which are now downtrending, and CT/US with findings concerning for cholecystitis.    - Continue to trend LT  - Continue antibiotics and supportive care  - Obtain MRCP  - Management of suspected cholecystitis per surgical team, consider percutaneous cholecystostomy    Recommendations discussed with primary team  Case discussed with attending physician

## 2021-01-11 NOTE — CONSULT NOTE ADULT - PROBLEM SELECTOR RECOMMENDATION 2
RCRI: 2/Class III (hx of CAD, hx of TIA), 10.1% of 30 day risk of death, MI or cardiac arrest  Dexter: 0.1% risk of MI or cardiac arrest intraoperatively or up to 30 days post-op  NSQIP: 0.2% for cardiac complication  Mets are >4, 4 flights of stairs without CP/dyspnea  EKG: NSR with nonspecific St changes  No active chest pain, no shortness of breath, euvolemic on exam  Patient is Intermediate risk for urgent low to intermediate risk procedures  Cardiology consulted in setting of CAD s/p CABG, may proceed as planned without any further cardiac work up

## 2021-01-11 NOTE — PROGRESS NOTE ADULT - SUBJECTIVE AND OBJECTIVE BOX
HPI:   65M PMH HTN, CAD (CABG 2020, LIMA-LAD, SVG-prox marginal, SVG-PDA), Afib (previously on Eliquis, recently d/c'ed), hx of cholecystitis s/p perc dharmesh (Aug 2020) presented with R side abdominal pain with dark urine and found to have choledocholithiasis. Cardiology consulted for pre-operative evaluation for the following urgent procedures: ERCP and lap dharmesh possible open. He was seen by cardiology on 1/10 and considered intermediate risk for MRCP/ possible lap dharmesh and may proceed as planned without any further cardiac work up. Advised to hold asa/plavix prior to surgery, to resume post op    VITAL SIGNS:  Vital Signs Last 24 Hrs  T(C): 36.8 (2021 09:15), Max: 37.4 (2021 05:12)  T(F): 98.3 (2021 09:15), Max: 99.3 (2021 05:12)  HR: 84 (2021 09:15) (71 - 102)  BP: 122/69 (2021 09:15) (117/66 - 140/69)  BP(mean): --  RR: 18 (2021 09:15) (16 - 20)  SpO2: 99% (2021 09:15) (98% - 100%)      MEDICATIONS:  MEDICATIONS  (STANDING):  cefTRIAXone   IVPB 1000 milliGRAM(s) IV Intermittent every 24 hours  dextrose 40% Gel 15 Gram(s) Oral once  dextrose 5%. 1000 milliLiter(s) (50 mL/Hr) IV Continuous <Continuous>  dextrose 5%. 1000 milliLiter(s) (100 mL/Hr) IV Continuous <Continuous>  dextrose 50% Injectable 25 Gram(s) IV Push once  dextrose 50% Injectable 12.5 Gram(s) IV Push once  dextrose 50% Injectable 25 Gram(s) IV Push once  furosemide    Tablet 40 milliGRAM(s) Oral daily  glucagon  Injectable 1 milliGRAM(s) IntraMuscular once  heparin   Injectable 5000 Unit(s) SubCutaneous every 8 hours  influenza   Vaccine 0.5 milliLiter(s) IntraMuscular once  insulin lispro (ADMELOG) corrective regimen sliding scale   SubCutaneous every 6 hours  lactated ringers. 1000 milliLiter(s) (70 mL/Hr) IV Continuous <Continuous>  levothyroxine 175 MICROGram(s) Oral daily  metoprolol succinate ER 25 milliGRAM(s) Oral daily  metroNIDAZOLE  IVPB 500 milliGRAM(s) IV Intermittent every 8 hours    MEDICATIONS  (PRN):  HYDROmorphone  Injectable 0.5 milliGRAM(s) IV Push every 6 hours PRN Moderate Pain (4 - 6)  ondansetron Injectable 4 milliGRAM(s) IV Push every 6 hours PRN Nausea  polyethylene glycol 3350 17 Gram(s) Oral once PRN constipaiton      ALLERGIES:  Allergies    No Known Allergies    Intolerances        LABS:                        9.9    16.47 )-----------( 351      ( 2021 07:05 )             31.5         135  |  98  |  17  ----------------------------<  150<H>  3.8   |  24  |  0.92    Ca    9.6      2021 07:05  Phos  3.6       Mg     1.2         TPro  7.2  /  Alb  3.6  /  TBili  5.0<H>  /  DBili  3.6<H>  /  AST  275<H>  /  ALT  647<H>  /  AlkPhos  474<H>      PT/INR - ( 10 Josesito 2021 13:32 )   PT: 14.5 sec;   INR: 1.22          PTT - ( 10 Josesito 2021 13:32 )  PTT:32.4 sec  Urinalysis Basic - ( 10 Josesito 2021 13:34 )    Color: Yellow / Appearance: Clear / S.020 / pH: x  Gluc: x / Ketone: NEGATIVE  / Bili: Moderate / Urobili: 1.0 E.U./dL   Blood: x / Protein: NEGATIVE mg/dL / Nitrite: NEGATIVE   Leuk Esterase: NEGATIVE / RBC: x / WBC x   Sq Epi: x / Non Sq Epi: x / Bacteria: x      CAPILLARY BLOOD GLUCOSE      POCT Blood Glucose.: 109 mg/dL (2021 12:14)      RADIOLOGY & ADDITIONAL TESTS: Reviewed.    ASSESSMENT:    PLAN:

## 2021-01-11 NOTE — CONSULT NOTE ADULT - CONSULT REQUESTED DATE/TIME
10-Josesito-2021 22:19
10-Josesito-2021 15:44
11-Jan-2021 10:49
10-Josesito-2021 21:13
11-Jan-2021 09:00

## 2021-01-11 NOTE — PROGRESS NOTE ADULT - SUBJECTIVE AND OBJECTIVE BOX
GASTROENTEROLOGY PROGRESS NOTE  Patient seen and examined at bedside. Patient with some mild R sided tenderness that is improved with his pain regimen. Denies fevers or chills. Endorses hunger, otherwise NAD.    PERTINENT REVIEW OF SYSTEMS:  CONSTITUTIONAL: No weakness, fevers or chills  HEENT: No visual changes; No vertigo or throat pain   GASTROINTESTINAL: As above  NEUROLOGICAL: No numbness or weakness  SKIN: No itching, burning, rashes, or lesions     Allergies    No Known Allergies    Intolerances      MEDICATIONS:  MEDICATIONS  (STANDING):  cefTRIAXone   IVPB 1000 milliGRAM(s) IV Intermittent every 24 hours  dextrose 40% Gel 15 Gram(s) Oral once  dextrose 5%. 1000 milliLiter(s) (50 mL/Hr) IV Continuous <Continuous>  dextrose 5%. 1000 milliLiter(s) (100 mL/Hr) IV Continuous <Continuous>  dextrose 50% Injectable 25 Gram(s) IV Push once  dextrose 50% Injectable 12.5 Gram(s) IV Push once  dextrose 50% Injectable 25 Gram(s) IV Push once  furosemide    Tablet 40 milliGRAM(s) Oral daily  glucagon  Injectable 1 milliGRAM(s) IntraMuscular once  heparin   Injectable 5000 Unit(s) SubCutaneous every 8 hours  influenza   Vaccine 0.5 milliLiter(s) IntraMuscular once  insulin lispro (ADMELOG) corrective regimen sliding scale   SubCutaneous every 6 hours  lactated ringers. 1000 milliLiter(s) (70 mL/Hr) IV Continuous <Continuous>  levothyroxine 175 MICROGram(s) Oral daily  metoprolol succinate ER 25 milliGRAM(s) Oral daily  metroNIDAZOLE  IVPB 500 milliGRAM(s) IV Intermittent every 8 hours    MEDICATIONS  (PRN):  HYDROmorphone  Injectable 0.5 milliGRAM(s) IV Push every 6 hours PRN Moderate Pain (4 - 6)  ondansetron Injectable 4 milliGRAM(s) IV Push every 6 hours PRN Nausea  polyethylene glycol 3350 17 Gram(s) Oral once PRN constipaiton    Vital Signs Last 24 Hrs  T(C): 36.4 (2021 17:02), Max: 37.4 (2021 05:12)  T(F): 97.5 (2021 17:02), Max: 99.3 (2021 05:12)  HR: 96 (2021 17:02) (72 - 102)  BP: 124/69 (2021 17:02) (107/66 - 140/69)  BP(mean): --  RR: 17 (2021 17:02) (16 - 20)  SpO2: 97% (2021 17:02) (97% - 100%)    01-10 @ 07: @ 07:00  --------------------------------------------------------  IN: 890 mL / OUT: 820 mL / NET: 70 mL     @ 07: @ 17:28  --------------------------------------------------------  IN: 600 mL / OUT: 700 mL / NET: -100 mL      PHYSICAL EXAM:    General: Well developed; well nourished; in no acute distress, sitting up in chair  HEENT: MMM, conjunctiva and sclera clear  Gastrointestinal: Mild RUQ tenderness  Skin: Warm and dry. No obvious rash    LABS:                        9.9    16.47 )-----------( 351      ( 2021 07:05 )             31.5         135  |  98  |  17  ----------------------------<  150<H>  3.8   |  24  |  0.92    Ca    9.6      2021 07:05  Phos  3.6       Mg     1.2         TPro  7.2  /  Alb  3.6  /  TBili  5.0<H>  /  DBili  3.6<H>  /  AST  275<H>  /  ALT  647<H>  /  AlkPhos  474<H>      PT/INR - ( 10 Josesito 2021 13:32 )   PT: 14.5 sec;   INR: 1.22          PTT - ( 10 Josesito 2021 13:32 )  PTT:32.4 sec      Urinalysis Basic - ( 10 Josesito 2021 13:34 )    Color: Yellow / Appearance: Clear / S.020 / pH: x  Gluc: x / Ketone: NEGATIVE  / Bili: Moderate / Urobili: 1.0 E.U./dL   Blood: x / Protein: NEGATIVE mg/dL / Nitrite: NEGATIVE   Leuk Esterase: NEGATIVE / RBC: x / WBC x   Sq Epi: x / Non Sq Epi: x / Bacteria: x                Culture - Urine (collected 10 Josesito 2021 14:43)  Source: .Urine Clean Catch (Midstream)  Final Report (2021 09:48):    No growth      RADIOLOGY & ADDITIONAL STUDIES:  Reviewed

## 2021-01-11 NOTE — PROGRESS NOTE ADULT - ASSESSMENT
65M PMH HTN, CAD (CABG 7/2020, LIMA-LAD, SVG-prox marginal, SVG-PDA), Afib (previously on Eliquis, recently d/c'ed), hx of cholecystitis s/p perc dharmesh (Aug 2020) presented with R side abdominal pain with dark urine and found to have choledocholithiasis. Cardiology consulted for pre-operative evaluation for the following urgent procedures: ERCP and lap dharmesh possible open. He was seen by cardiology on 1/10 and considered intermediate risk for MRCP/ possible lap dharmesh and may proceed as planned without any further cardiac work up.       Preop cardiac clearance:   Pt s/p 3v CABG 7/20 with post op Afib. Was dc'd on ASA/eliquis. He follows up with Dr Muñiz, outpatient cardiologist and has been on asa/plavix. Eliquis DC'd likey due to maintenance of NSR post CABG.  He was seen by cardiology on 1/10 and considered intermediate risk for MRCP/ possible lap dharmesh and may proceed as planned without any further cardiac work up.   -Last echo 7/20: normal EF  -Advised to hold asa/plavix prior to surgery, to resume post op  -Pt considered intermediate risk for MRCP/ possible lap dharmesh and may proceed as planned without any further cardiac work up.  -Will continue to follow post op . Patient was on Eliquis in past post op as he developed post op a fib( 3V CABG 7/20)  .  Will monitor hr, rhythm post op and consider resuming       Case discussed with cardiology attending. Cards will continue to follow

## 2021-01-11 NOTE — PROGRESS NOTE ADULT - SUBJECTIVE AND OBJECTIVE BOX
65M with PMH of HTN, HLD, DM, TIA (s/p plavix), A fib, CAD s/p CABG x3 on 7/6/20 (Dr. Conde), acute cholecystitis s/p perc dharmesh (Aug 2020, perc dharmesh removed Sept 2020) presents with 1 day history of RUQ pain and dark urine.    INTERVAL: No acute events overnight. Pt was seen and examined at bedside. Pain is well controlled.    PAST MEDICAL & SURGICAL HISTORY:  Choledocholithiasis    History of TIAs    Hypothyroidism    H/O cardiomyopathy    DM (diabetes mellitus)    HLD (hyperlipidemia)    HTN (hypertension)    No significant past surgical history      No Known Allergies    MEDICATIONS  (STANDING):  cefTRIAXone   IVPB 1000 milliGRAM(s) IV Intermittent every 24 hours  clopidogrel Tablet 75 milliGRAM(s) Oral daily  dextrose 40% Gel 15 Gram(s) Oral once  dextrose 5%. 1000 milliLiter(s) (50 mL/Hr) IV Continuous <Continuous>  dextrose 5%. 1000 milliLiter(s) (100 mL/Hr) IV Continuous <Continuous>  dextrose 50% Injectable 25 Gram(s) IV Push once  dextrose 50% Injectable 12.5 Gram(s) IV Push once  dextrose 50% Injectable 25 Gram(s) IV Push once  furosemide    Tablet 40 milliGRAM(s) Oral daily  glucagon  Injectable 1 milliGRAM(s) IntraMuscular once  heparin   Injectable 5000 Unit(s) SubCutaneous every 8 hours  influenza   Vaccine 0.5 milliLiter(s) IntraMuscular once  insulin lispro (ADMELOG) corrective regimen sliding scale   SubCutaneous every 6 hours  lactated ringers. 1000 milliLiter(s) (70 mL/Hr) IV Continuous <Continuous>  levothyroxine 175 MICROGram(s) Oral daily  metoprolol succinate ER 25 milliGRAM(s) Oral daily  metroNIDAZOLE  IVPB 500 milliGRAM(s) IV Intermittent every 8 hours    MEDICATIONS  (PRN):  HYDROmorphone  Injectable 0.5 milliGRAM(s) IV Push every 6 hours PRN Moderate Pain (4 - 6)  ondansetron Injectable 4 milliGRAM(s) IV Push every 6 hours PRN Nausea      Objective    ICU Vital Signs Last 24 Hrs  T(C): 37.4 (11 Jan 2021 05:12), Max: 37.4 (11 Jan 2021 05:12)  T(F): 99.3 (11 Jan 2021 05:12), Max: 99.3 (11 Jan 2021 05:12)  HR: 100 (11 Jan 2021 06:04) (71 - 102)  BP: 117/66 (11 Jan 2021 06:04) (117/66 - 144/82)  RR: 20 (11 Jan 2021 05:12) (16 - 20)  SpO2: 100% (11 Jan 2021 05:12) (98% - 100%)      Physical Exam:  General: NAD, resting comfortably in bed  HEENT: MMM  Pulmonary: nonlabored breathing, normal resp effort  Cardiovascular: RRR  Abdominal: soft, nondistended, TTP RUQ, no rebound or guarding, Snyder's negative  Extremities: WWP, no edema, no calf tenderness  Neuro: no focal deficits  Psych: pleasant                          9.9    16.47 )-----------( 351      ( 11 Jan 2021 07:05 )             31.5     01-10    133<L>  |  94<L>  |  22  ----------------------------<  108<H>  4.5   |  25  |  1.43<H>    Ca    9.5      10 Josesito 2021 13:35    TPro  8.0  /  Alb  3.9  /  TBili  5.9<H>  /  DBili  x   /  AST  479<H>  /  ALT  924<H>  /  AlkPhos  462<H>  01-10      I&O's Summary    10 Josesito 2021 07:01  -  11 Jan 2021 07:00  --------------------------------------------------------  IN: 890 mL / OUT: 820 mL / NET: 70 mL

## 2021-01-12 ENCOUNTER — APPOINTMENT (OUTPATIENT)
Dept: NEUROLOGY | Facility: CLINIC | Age: 66
End: 2021-01-12

## 2021-01-12 LAB
ALBUMIN SERPL ELPH-MCNC: 3.4 G/DL — SIGNIFICANT CHANGE UP (ref 3.3–5)
ALP SERPL-CCNC: 424 U/L — HIGH (ref 40–120)
ALT FLD-CCNC: 477 U/L — HIGH (ref 10–45)
ANION GAP SERPL CALC-SCNC: 12 MMOL/L — SIGNIFICANT CHANGE UP (ref 5–17)
AST SERPL-CCNC: 179 U/L — HIGH (ref 10–40)
BILIRUB SERPL-MCNC: 4.4 MG/DL — HIGH (ref 0.2–1.2)
BUN SERPL-MCNC: 15 MG/DL — SIGNIFICANT CHANGE UP (ref 7–23)
CALCIUM SERPL-MCNC: 9.3 MG/DL — SIGNIFICANT CHANGE UP (ref 8.4–10.5)
CHLORIDE SERPL-SCNC: 98 MMOL/L — SIGNIFICANT CHANGE UP (ref 96–108)
CO2 SERPL-SCNC: 28 MMOL/L — SIGNIFICANT CHANGE UP (ref 22–31)
CREAT SERPL-MCNC: 0.75 MG/DL — SIGNIFICANT CHANGE UP (ref 0.5–1.3)
GLUCOSE BLDC GLUCOMTR-MCNC: 130 MG/DL — HIGH (ref 70–99)
GLUCOSE BLDC GLUCOMTR-MCNC: 135 MG/DL — HIGH (ref 70–99)
GLUCOSE BLDC GLUCOMTR-MCNC: 137 MG/DL — HIGH (ref 70–99)
GLUCOSE BLDC GLUCOMTR-MCNC: 237 MG/DL — HIGH (ref 70–99)
GLUCOSE BLDC GLUCOMTR-MCNC: 92 MG/DL — SIGNIFICANT CHANGE UP (ref 70–99)
GLUCOSE SERPL-MCNC: 119 MG/DL — HIGH (ref 70–99)
HCT VFR BLD CALC: 29.5 % — LOW (ref 39–50)
HGB BLD-MCNC: 9.4 G/DL — LOW (ref 13–17)
MAGNESIUM SERPL-MCNC: 1.7 MG/DL — SIGNIFICANT CHANGE UP (ref 1.6–2.6)
MCHC RBC-ENTMCNC: 29.3 PG — SIGNIFICANT CHANGE UP (ref 27–34)
MCHC RBC-ENTMCNC: 31.9 GM/DL — LOW (ref 32–36)
MCV RBC AUTO: 91.9 FL — SIGNIFICANT CHANGE UP (ref 80–100)
NRBC # BLD: 0 /100 WBCS — SIGNIFICANT CHANGE UP (ref 0–0)
PHOSPHATE SERPL-MCNC: 3.1 MG/DL — SIGNIFICANT CHANGE UP (ref 2.5–4.5)
PLATELET # BLD AUTO: 344 K/UL — SIGNIFICANT CHANGE UP (ref 150–400)
POTASSIUM SERPL-MCNC: 3.7 MMOL/L — SIGNIFICANT CHANGE UP (ref 3.5–5.3)
POTASSIUM SERPL-SCNC: 3.7 MMOL/L — SIGNIFICANT CHANGE UP (ref 3.5–5.3)
PROT SERPL-MCNC: 7.3 G/DL — SIGNIFICANT CHANGE UP (ref 6–8.3)
RBC # BLD: 3.21 M/UL — LOW (ref 4.2–5.8)
RBC # FLD: 12.3 % — SIGNIFICANT CHANGE UP (ref 10.3–14.5)
SODIUM SERPL-SCNC: 138 MMOL/L — SIGNIFICANT CHANGE UP (ref 135–145)
WBC # BLD: 9.36 K/UL — SIGNIFICANT CHANGE UP (ref 3.8–10.5)
WBC # FLD AUTO: 9.36 K/UL — SIGNIFICANT CHANGE UP (ref 3.8–10.5)

## 2021-01-12 PROCEDURE — 99233 SBSQ HOSP IP/OBS HIGH 50: CPT

## 2021-01-12 PROCEDURE — 99232 SBSQ HOSP IP/OBS MODERATE 35: CPT | Mod: GC

## 2021-01-12 RX ORDER — OXYCODONE HYDROCHLORIDE 5 MG/1
5 TABLET ORAL EVERY 6 HOURS
Refills: 0 | Status: DISCONTINUED | OUTPATIENT
Start: 2021-01-12 | End: 2021-01-15

## 2021-01-12 RX ORDER — POTASSIUM CHLORIDE 20 MEQ
10 PACKET (EA) ORAL
Refills: 0 | Status: COMPLETED | OUTPATIENT
Start: 2021-01-12 | End: 2021-01-12

## 2021-01-12 RX ORDER — MAGNESIUM SULFATE 500 MG/ML
1 VIAL (ML) INJECTION ONCE
Refills: 0 | Status: COMPLETED | OUTPATIENT
Start: 2021-01-12 | End: 2021-01-12

## 2021-01-12 RX ORDER — ACETAMINOPHEN 500 MG
650 TABLET ORAL EVERY 6 HOURS
Refills: 0 | Status: DISCONTINUED | OUTPATIENT
Start: 2021-01-12 | End: 2021-01-15

## 2021-01-12 RX ORDER — OXYCODONE HYDROCHLORIDE 5 MG/1
2.5 TABLET ORAL EVERY 6 HOURS
Refills: 0 | Status: DISCONTINUED | OUTPATIENT
Start: 2021-01-12 | End: 2021-01-15

## 2021-01-12 RX ADMIN — Medication 100 MILLIGRAM(S): at 13:57

## 2021-01-12 RX ADMIN — Medication 175 MICROGRAM(S): at 07:55

## 2021-01-12 RX ADMIN — HEPARIN SODIUM 5000 UNIT(S): 5000 INJECTION INTRAVENOUS; SUBCUTANEOUS at 21:18

## 2021-01-12 RX ADMIN — Medication 100 GRAM(S): at 08:40

## 2021-01-12 RX ADMIN — HYDROMORPHONE HYDROCHLORIDE 0.5 MILLIGRAM(S): 2 INJECTION INTRAMUSCULAR; INTRAVENOUS; SUBCUTANEOUS at 09:50

## 2021-01-12 RX ADMIN — Medication 100 MILLIEQUIVALENT(S): at 10:54

## 2021-01-12 RX ADMIN — CEFTRIAXONE 100 MILLIGRAM(S): 500 INJECTION, POWDER, FOR SOLUTION INTRAMUSCULAR; INTRAVENOUS at 17:18

## 2021-01-12 RX ADMIN — Medication 100 MILLIGRAM(S): at 21:18

## 2021-01-12 RX ADMIN — Medication 25 MILLIGRAM(S): at 06:40

## 2021-01-12 RX ADMIN — Medication 100 MILLIEQUIVALENT(S): at 09:51

## 2021-01-12 RX ADMIN — Medication 650 MILLIGRAM(S): at 18:25

## 2021-01-12 RX ADMIN — HEPARIN SODIUM 5000 UNIT(S): 5000 INJECTION INTRAVENOUS; SUBCUTANEOUS at 13:57

## 2021-01-12 RX ADMIN — Medication 100 MILLIGRAM(S): at 06:34

## 2021-01-12 RX ADMIN — SODIUM CHLORIDE 70 MILLILITER(S): 9 INJECTION, SOLUTION INTRAVENOUS at 06:39

## 2021-01-12 RX ADMIN — Medication 40 MILLIGRAM(S): at 06:39

## 2021-01-12 RX ADMIN — Medication 650 MILLIGRAM(S): at 11:57

## 2021-01-12 RX ADMIN — HEPARIN SODIUM 5000 UNIT(S): 5000 INJECTION INTRAVENOUS; SUBCUTANEOUS at 06:39

## 2021-01-12 RX ADMIN — Medication 100 MILLIEQUIVALENT(S): at 11:56

## 2021-01-12 RX ADMIN — Medication 2: at 17:14

## 2021-01-12 NOTE — PROGRESS NOTE ADULT - SUBJECTIVE AND OBJECTIVE BOX
Patient is a 65y old  Male who presents with a chief complaint of Choledocholithiasis (12 Jan 2021 15:10)      INTERVAL HPI/OVERNIGHT EVENTS:  Seen by me early this afternoon, tolerating liquid diet. Still with pain on RUQ. No nausea or vomiting. S/p MRCP done yesterday.    Review of Systems: 12 point review of systems otherwise negative    MEDICATIONS  (STANDING):  cefTRIAXone   IVPB 1000 milliGRAM(s) IV Intermittent every 24 hours  dextrose 40% Gel 15 Gram(s) Oral once  dextrose 5%. 1000 milliLiter(s) (50 mL/Hr) IV Continuous <Continuous>  dextrose 5%. 1000 milliLiter(s) (100 mL/Hr) IV Continuous <Continuous>  dextrose 50% Injectable 25 Gram(s) IV Push once  dextrose 50% Injectable 12.5 Gram(s) IV Push once  dextrose 50% Injectable 25 Gram(s) IV Push once  furosemide    Tablet 40 milliGRAM(s) Oral daily  glucagon  Injectable 1 milliGRAM(s) IntraMuscular once  heparin   Injectable 5000 Unit(s) SubCutaneous every 8 hours  influenza   Vaccine 0.5 milliLiter(s) IntraMuscular once  insulin lispro (ADMELOG) corrective regimen sliding scale   SubCutaneous every 6 hours  levothyroxine 175 MICROGram(s) Oral daily  metoprolol succinate ER 25 milliGRAM(s) Oral daily  metroNIDAZOLE  IVPB 500 milliGRAM(s) IV Intermittent every 8 hours    MEDICATIONS  (PRN):  acetaminophen   Tablet .. 650 milliGRAM(s) Oral every 6 hours PRN Mild Pain (1 - 3)  ondansetron Injectable 4 milliGRAM(s) IV Push every 6 hours PRN Nausea  oxyCODONE    IR 2.5 milliGRAM(s) Oral every 6 hours PRN Moderate Pain (4 - 6)  oxyCODONE    IR 5 milliGRAM(s) Oral every 6 hours PRN Severe Pain (7 - 10)  polyethylene glycol 3350 17 Gram(s) Oral once PRN constipaiton      Allergies    No Known Allergies    Intolerances          Vital Signs Last 24 Hrs  T(C): 36.2 (12 Jan 2021 16:30), Max: 36.5 (12 Jan 2021 08:40)  T(F): 97.2 (12 Jan 2021 16:30), Max: 97.7 (12 Jan 2021 08:40)  HR: 75 (12 Jan 2021 16:30) (74 - 91)  BP: 129/66 (12 Jan 2021 16:30) (107/57 - 133/67)  BP(mean): --  RR: 16 (12 Jan 2021 16:30) (16 - 18)  SpO2: 100% (12 Jan 2021 16:30) (97% - 100%)  CAPILLARY BLOOD GLUCOSE      POCT Blood Glucose.: 237 mg/dL (12 Jan 2021 17:01)  POCT Blood Glucose.: 130 mg/dL (12 Jan 2021 11:29)  POCT Blood Glucose.: 137 mg/dL (12 Jan 2021 08:10)  POCT Blood Glucose.: 135 mg/dL (12 Jan 2021 00:36)      01-11 @ 07:01  -  01-12 @ 07:00  --------------------------------------------------------  IN: 1580 mL / OUT: 700 mL / NET: 880 mL    01-12 @ 07:01 - 01-12 @ 19:46  --------------------------------------------------------  IN: 860 mL / OUT: 0 mL / NET: 860 mL        Physical Exam:    Daily Height in cm: 167.64 (12 Jan 2021 04:31)    Daily   General:  Well appearing, NAD, not cachetic  HEENT:  +Icteric  CV: S1S2 ok  Lungs:  CTA B/L, no wheezes, rales, rhonchi  Abdomen:  Soft, mild to moderate TTP RUQ, no guarding or rebound, not distended  Extremities:  2+ pulses, no c/c, no edema  Skin:  Warm and dry, no rashes  :  No palma  Neuro:  AAOx3, non-focal, CN II-XII grossly intact  No Restraints    LABS:                        9.4    9.36  )-----------( 344      ( 12 Jan 2021 07:07 )             29.5     01-12    138  |  98  |  15  ----------------------------<  119<H>  3.7   |  28  |  0.75    Ca    9.3      12 Jan 2021 07:07  Phos  3.1     01-12  Mg     1.7     01-12    TPro  7.3  /  Alb  3.4  /  TBili  4.4<H>  /  DBili  x   /  AST  179<H>  /  ALT  477<H>  /  AlkPhos  424<H>  01-12            RADIOLOGY & ADDITIONAL TESTS:

## 2021-01-12 NOTE — PROGRESS NOTE ADULT - ASSESSMENT
65M with PMH of HTN, HLD, DM, TIA (s/p plavix), A fib, CAD s/p CABG x3 on 7/6/20 (Dr. Conde), acute cholecystitis s/p perc dharmesh (Aug 2020, perc dharmesh removed Sept 2020) presents with 1 day history of RUQ pain and dark urine with clinical and radiographic evidence of acute cholecystitis with choledocholithiasis, cholangiocarcinoma cannot be ruled out.     NPO/IVF  F/u MRCP final read  Abx (ceftriaxone, flagyl)  Pain/nausea control PRN   Home meds as appropriate (Metoprolol, Plavix, holding statin)   Surgical oncology following- ebenezer recs  GI following- possible ERCP/EUS with Bx pending final MRCP read  Cardiology consult: c/w metoprolol, can hold statin for transaminitis and plavix/ASA for procedures  SQH/SCDs  AM labs

## 2021-01-12 NOTE — PROGRESS NOTE ADULT - ASSESSMENT
65M with PMH of HTN, HLD, DM, TIA (s/p plavix), A fib, CAD s/p CABG x3 on 7/6/20 (Dr. Conde), acute cholecystitis s/p perc dharmesh (Aug 2020, perc dharmesh removed Sept 2020) presents with 1 day history of RUQ pain with clinical and radiographic evidence of acute cholecystitis with choledocholithiasis, cholangiocarcinoma cannot be ruled out

## 2021-01-12 NOTE — PROGRESS NOTE ADULT - ASSESSMENT
65M PMH HTN, CAD (CABG 7/2020, LIMA-LAD, SVG-prox marginal, SVG-PDA), Afib (previously on Eliquis, recently d/c'ed), hx of cholecystitis s/p perc dharmesh (Aug 2020) presented with R side abdominal pain with dark urine and found to have choledocholithiasis. Cardiology consulted for pre-operative evaluation .  He was seen by cardiology on 1/10 and considered intermediate risk for MRCP and team was advised to proceed as planned without any further cardiac work up. Patient  is now s/p mrcp with  no choledocholithiasis, but concerning for cholangiocarcinoma.  Plan for potential EUS/ERCP on Friday for tissue acquisition and stenting as per primary team/ GI      Preop cardiac clearance:   Pt s/p 3v CABG 7/20 with post op Afib. Was dc'd on ASA/eliquis. He follows up with Dr Muñiz, outpatient cardiologist and has been on asa/plavix. Eliquis DC'd likey due to maintenance of NSR post CABG.  He was seen by cardiology on 1/10 and considered intermediate risk for MRCP/ possible lap dharmesh and was advised to  proceed with mrcp  as planned without any further cardiac work up.   -Last echo 7/20: normal EF  -At this time patient is  planned for for potential EUS/ERCP on Friday for tissue acquisition and stenting. Continue to hold aspirin and plavix   -Tele with sinus rythm today  -Will continue to follow post op . Patient was on Eliquis in past post op as he developed post op a fib( 3V CABG 7/20)  .  Will monitor hr, rhythm post op and consider resuming   -Case discussed with cardiology attending. Cards will continue to follow            65M PMH HTN, CAD (CABG 7/2020, LIMA-LAD, SVG-prox marginal, SVG-PDA), Afib (previously on Eliquis, recently d/c'ed), hx of cholecystitis s/p perc dharmesh (Aug 2020) presented with R side abdominal pain with dark urine and found to have choledocholithiasis. Cardiology consulted for pre-operative evaluation .  He was seen by cardiology on 1/10 and considered intermediate risk for MRCP and team was advised to proceed as planned without any further cardiac work up. Patient  is now s/p mrcp with  no choledocholithiasis, but concerning for cholangiocarcinoma.  Plan for potential EUS/ERCP on Friday for tissue acquisition and stenting as per primary team/ GI      Preop cardiac clearance:   Pt s/p 3v CABG 7/20 with post op Afib. Was dc'd on ASA/eliquis. He follows up with Dr Muñiz, outpatient cardiologist and has been on asa/plavix. Eliquis DC'd likey due to maintenance of NSR post CABG.  He was seen by cardiology on 1/10 and considered intermediate risk for MRCP/ possible lap dharmesh and was advised to  proceed with mrcp  as planned without any further cardiac work up.   -Last echo 7/20: normal EF  -At this time patient is  planned for for potential EUS/ERCP on Friday for tissue acquisition and stenting. Continue to hold aspirin and plavix   -Tele with sinus rythm today  -Will continue to follow post op . Patient was on Eliquis in the past due to post op a fib( 3V CABG 7/20)  .  Will monitor hr, rhythm post op and consider resuming if recurrent A fib. For now, pt has remained NSR on tele  -Case discussed with cardiology attending. Cards will continue to follow

## 2021-01-12 NOTE — PROGRESS NOTE ADULT - SUBJECTIVE AND OBJECTIVE BOX
The patient is feeling better today, denies abdominal pain, nausea, vomiting , fever or chills.  GI Fellow was at the bedside when I saw him this am and was explaining to him in details about we saw on CT scan and about what are we going to do with possible ERCP/EUS on Thursday  Seen again with Dr. Wagner  Pending Tumor markers  MRCP/MRI: just reported this am  AVSS    MEDICATIONS  (STANDING):  cefTRIAXone   IVPB 1000 milliGRAM(s) IV Intermittent every 24 hours  dextrose 40% Gel 15 Gram(s) Oral once  dextrose 5%. 1000 milliLiter(s) (50 mL/Hr) IV Continuous <Continuous>  dextrose 5%. 1000 milliLiter(s) (100 mL/Hr) IV Continuous <Continuous>  dextrose 50% Injectable 25 Gram(s) IV Push once  dextrose 50% Injectable 12.5 Gram(s) IV Push once  dextrose 50% Injectable 25 Gram(s) IV Push once  furosemide    Tablet 40 milliGRAM(s) Oral daily  glucagon  Injectable 1 milliGRAM(s) IntraMuscular once  heparin   Injectable 5000 Unit(s) SubCutaneous every 8 hours  influenza   Vaccine 0.5 milliLiter(s) IntraMuscular once  insulin lispro (ADMELOG) corrective regimen sliding scale   SubCutaneous every 6 hours  levothyroxine 175 MICROGram(s) Oral daily  metoprolol succinate ER 25 milliGRAM(s) Oral daily  metroNIDAZOLE  IVPB 500 milliGRAM(s) IV Intermittent every 8 hours    MEDICATIONS  (PRN):  acetaminophen   Tablet .. 650 milliGRAM(s) Oral every 6 hours PRN Mild Pain (1 - 3)  ondansetron Injectable 4 milliGRAM(s) IV Push every 6 hours PRN Nausea  oxyCODONE    IR 2.5 milliGRAM(s) Oral every 6 hours PRN Moderate Pain (4 - 6)  oxyCODONE    IR 5 milliGRAM(s) Oral every 6 hours PRN Severe Pain (7 - 10)  polyethylene glycol 3350 17 Gram(s) Oral once PRN constipaiton      Vital Signs Last 24 Hrs  T(C): 36.1 (12 Jan 2021 14:30), Max: 36.5 (12 Jan 2021 08:40)  T(F): 97 (12 Jan 2021 14:30), Max: 97.7 (12 Jan 2021 08:40)  HR: 78 (12 Jan 2021 14:30) (74 - 96)  BP: 121/65 (12 Jan 2021 14:30) (107/57 - 133/67)  BP(mean): --  RR: 18 (12 Jan 2021 14:30) (16 - 18)  SpO2: 99% (12 Jan 2021 14:30) (97% - 99%)    Physical Exam:  General: NAD, resting comfortably in bed, clinically jaundiced  Pulmonary: Nonlabored breathing, no respiratory distress  Cardiovascular: NSR  Abdominal: soft, Mild RUQ tenderness Snyder is negative, ND  Extremities: WWP, normal strength  Neuro: A/O x 3, CNs II-XII grossly intact, no focal deficits, normal motor/sensation  Pulses: palpable distal pulses    I&O's Summary    11 Jan 2021 07:01  -  12 Jan 2021 07:00  --------------------------------------------------------  IN: 1580 mL / OUT: 700 mL / NET: 880 mL    12 Jan 2021 07:01  -  12 Jan 2021 15:10  --------------------------------------------------------  IN: 500 mL / OUT: 0 mL / NET: 500 mL        LABS:                        9.4    9.36  )-----------( 344      ( 12 Jan 2021 07:07 )             29.5     01-12    138  |  98  |  15  ----------------------------<  119<H>  3.7   |  28  |  0.75    Ca    9.3      12 Jan 2021 07:07  Phos  3.1     01-12  Mg     1.7     01-12    TPro  7.3  /  Alb  3.4  /  TBili  4.4<H>  /  DBili  x   /  AST  179<H>  /  ALT  477<H>  /  AlkPhos  424<H>  01-12        CAPILLARY BLOOD GLUCOSE      POCT Blood Glucose.: 130 mg/dL (12 Jan 2021 11:29)  POCT Blood Glucose.: 137 mg/dL (12 Jan 2021 08:10)  POCT Blood Glucose.: 135 mg/dL (12 Jan 2021 00:36)  POCT Blood Glucose.: 110 mg/dL (11 Jan 2021 17:16)    LIVER FUNCTIONS - ( 12 Jan 2021 07:07 )  Alb: 3.4 g/dL / Pro: 7.3 g/dL / ALK PHOS: 424 U/L / ALT: 477 U/L / AST: 179 U/L / GGT: x             RADIOLOGY & ADDITIONAL STUDIES:  < from: MR MRCP No Cont (01.11.21 @ 22:23) >  FINDINGS: The gallbladder is abnormal. It is distended and irregularly thick-walled.. No cholelithiasis. Moderately dilated intrahepatic bile ducts are seen. There appears to be a 1.5 cm long smooth narrowing of the common hepatic duct. Apparent circumferential soft tissue surrounding the common hepatic duct. The cystic duct entry is into the right lateral aspect of the proximal CBD about 1.2 cm distal to the inferior aspect of the common hepatic duct narrowing.. The common bile duct is normal in diameter.  No filling defects are seen within the common bile duct. No choledocholithiasis. The pancreatic duct is normal in diameter and inserts onto the major papilla.    Unfortunately no diffusion-weighted sequences obtained.    The visualized portions of the pancreas, and spleen are unremarkable. The liver is mildly enlarged. The right lobe of liver measures 19.8 cm in superoinferior dimension. No focal liver lesions identified. Replaced right hepatic artery off the SMA.     The adrenal glands are normal in appearance.  Unremarkable right kidney. Left kidney shows a 1.1 cm cortical cyst in its mid region.    Borderline cardiomegaly. Midline sternotomy.      IMPRESSION: 1.5 cm long common hepatic duct narrowing. Increase in intrahepatic biliary dilatation. Abnormal soft tissue around the common hepatic duct.  Abnormal gallbladder which shows distention and irregular wall thickening. No choledocholithiasis or cholelithiasis.   Recommend ERCP with tissue sampling/washings. Considerations include cholangiocarcinoma, IgG4 cholangitis, extension of xanthogranulomatous cholecystitis or gallbladder carcinoma.        < end of copied text >

## 2021-01-12 NOTE — PROGRESS NOTE ADULT - SUBJECTIVE AND OBJECTIVE BOX
GASTROENTEROLOGY PROGRESS NOTE  Patient seen and examined at bedside. Patient sitting up in bed in NAD. Discussed in detail the findings of MRCP and what they may represent. Denies fevers, chills, nausea, vomiting. Abdominal pain controlled with current pain regimen.    PERTINENT REVIEW OF SYSTEMS:  CONSTITUTIONAL: No weakness, fevers or chills  HEENT: No visual changes; No vertigo or throat pain   GASTROINTESTINAL: As above  NEUROLOGICAL: No numbness or weakness  SKIN: No itching, burning, rashes, or lesions     Allergies    No Known Allergies    Intolerances      MEDICATIONS:  MEDICATIONS  (STANDING):  cefTRIAXone   IVPB 1000 milliGRAM(s) IV Intermittent every 24 hours  dextrose 40% Gel 15 Gram(s) Oral once  dextrose 5%. 1000 milliLiter(s) (50 mL/Hr) IV Continuous <Continuous>  dextrose 5%. 1000 milliLiter(s) (100 mL/Hr) IV Continuous <Continuous>  dextrose 50% Injectable 25 Gram(s) IV Push once  dextrose 50% Injectable 12.5 Gram(s) IV Push once  dextrose 50% Injectable 25 Gram(s) IV Push once  furosemide    Tablet 40 milliGRAM(s) Oral daily  glucagon  Injectable 1 milliGRAM(s) IntraMuscular once  heparin   Injectable 5000 Unit(s) SubCutaneous every 8 hours  influenza   Vaccine 0.5 milliLiter(s) IntraMuscular once  insulin lispro (ADMELOG) corrective regimen sliding scale   SubCutaneous every 6 hours  lactated ringers. 1000 milliLiter(s) (70 mL/Hr) IV Continuous <Continuous>  levothyroxine 175 MICROGram(s) Oral daily  metoprolol succinate ER 25 milliGRAM(s) Oral daily  metroNIDAZOLE  IVPB 500 milliGRAM(s) IV Intermittent every 8 hours  potassium chloride  10 mEq/100 mL IVPB 10 milliEquivalent(s) IV Intermittent every 1 hour    MEDICATIONS  (PRN):  HYDROmorphone  Injectable 0.5 milliGRAM(s) IV Push every 6 hours PRN Moderate Pain (4 - 6)  ondansetron Injectable 4 milliGRAM(s) IV Push every 6 hours PRN Nausea  polyethylene glycol 3350 17 Gram(s) Oral once PRN constipaiton    Vital Signs Last 24 Hrs  T(C): 36.5 (2021 08:40), Max: 36.5 (2021 08:40)  T(F): 97.7 (2021 08:40), Max: 97.7 (2021 08:40)  HR: 74 (2021 08:40) (74 - 96)  BP: 108/77 (2021 08:40) (107/57 - 133/67)  BP(mean): --  RR: 18 (2021 08:40) (16 - 18)  SpO2: 98% (2021 08:40) (97% - 99%)     @ 07:01  -   @ 07:00  --------------------------------------------------------  IN: 1580 mL / OUT: 700 mL / NET: 880 mL      PHYSICAL EXAM:    General: Well developed; well nourished; in no acute distress  HEENT: MMM, conjunctiva and sclera clear  Gastrointestinal: Soft, non-distended, minimally tender in the RUQ; Normal bowel sounds; No hepatosplenomegaly. No rebound or guarding  Skin: Warm and dry. No obvious rash    LABS:                        9.4    9.36  )-----------( 344      ( 2021 07:07 )             29.5         138  |  98  |  15  ----------------------------<  119<H>  3.7   |  28  |  0.75    Ca    9.3      2021 07:07  Phos  3.1       Mg     1.7         TPro  7.3  /  Alb  3.4  /  TBili  4.4<H>  /  DBili  x   /  AST  179<H>  /  ALT  477<H>  /  AlkPhos  424<H>  12    PT/INR - ( 10 Josesito 2021 13:32 )   PT: 14.5 sec;   INR: 1.22          PTT - ( 10 Josesito 2021 13:32 )  PTT:32.4 sec      Urinalysis Basic - ( 10 Josesito 2021 13:34 )    Color: Yellow / Appearance: Clear / S.020 / pH: x  Gluc: x / Ketone: NEGATIVE  / Bili: Moderate / Urobili: 1.0 E.U./dL   Blood: x / Protein: NEGATIVE mg/dL / Nitrite: NEGATIVE   Leuk Esterase: NEGATIVE / RBC: x / WBC x   Sq Epi: x / Non Sq Epi: x / Bacteria: x                Culture - Urine (collected 10 Josesito 2021 14:43)  Source: .Urine Clean Catch (Midstream)  Final Report (2021 09:48):    No growth      RADIOLOGY & ADDITIONAL STUDIES:  Reviewed

## 2021-01-12 NOTE — PROGRESS NOTE ADULT - SUBJECTIVE AND OBJECTIVE BOX
SUBJECTIVE: Patient seen and examined bedside. No acute events overnight. Underwent MRCP, final reads pending this AM. Otherwise resting comfortably in bed. Denies any abdominal pain, nausea, or vomiting.     cefTRIAXone   IVPB 1000 milliGRAM(s) IV Intermittent every 24 hours  furosemide    Tablet 40 milliGRAM(s) Oral daily  heparin   Injectable 5000 Unit(s) SubCutaneous every 8 hours  metoprolol succinate ER 25 milliGRAM(s) Oral daily  metroNIDAZOLE  IVPB 500 milliGRAM(s) IV Intermittent every 8 hours      Vital Signs Last 24 Hrs  T(C): 36.3 (2021 04:31), Max: 36.8 (2021 09:15)  T(F): 97.4 (2021 04:31), Max: 98.3 (2021 09:15)  HR: 89 (2021 04:31) (81 - 96)  BP: 124/62 (2021 04:31) (107/57 - 133/67)  BP(mean): --  RR: 16 (2021 04:31) (16 - 18)  SpO2: 97% (2021 04:31) (97% - 99%)  I&O's Detail    2021 07:01  -  2021 07:00  --------------------------------------------------------  IN:    IV PiggyBack: 250 mL    Lactated Ringers: 1330 mL  Total IN: 1580 mL    OUT:    Voided (mL): 700 mL  Total OUT: 700 mL    Total NET: 880 mL          General: NAD, resting comfortably in bed  C/V: NSR  Pulm: Nonlabored breathing, no respiratory distress  Abd: soft, NT/ND. No rebound or guarding  Extrem: WWP, no edema, SCDs in place        LABS:                        9.9    16.47 )-----------( 351      ( 2021 07:05 )             31.5     01-11    135  |  96  |  19  ----------------------------<  109<H>  3.6   |  24  |  0.80    Ca    9.6      2021 17:22  Phos  4.5       Mg     2.2         TPro  7.2  /  Alb  3.6  /  TBili  5.0<H>  /  DBili  3.6<H>  /  AST  275<H>  /  ALT  647<H>  /  AlkPhos  474<H>      PT/INR - ( 10 Josesito 2021 13:32 )   PT: 14.5 sec;   INR: 1.22          PTT - ( 10 Josesito 2021 13:32 )  PTT:32.4 sec  Urinalysis Basic - ( 10 Josesito 2021 13:34 )    Color: Yellow / Appearance: Clear / S.020 / pH: x  Gluc: x / Ketone: NEGATIVE  / Bili: Moderate / Urobili: 1.0 E.U./dL   Blood: x / Protein: NEGATIVE mg/dL / Nitrite: NEGATIVE   Leuk Esterase: NEGATIVE / RBC: x / WBC x   Sq Epi: x / Non Sq Epi: x / Bacteria: x        RADIOLOGY & ADDITIONAL STUDIES:

## 2021-01-12 NOTE — PROGRESS NOTE ADULT - ASSESSMENT
65M w/ PMHx of HTN, HLD, DM, CAD s/p CABG 7/20, afib, hx of TIA and hx of acute cholecystitis s/p perc dharmesh 8/20 p/w 2d of abdominal pain. GI consulted for biliary dilation.    1. Intrahepatic biliary dilation - CT demonstrates intraabdominal LAD and new mild intrahepatic biliary ductal dilatation with a 2.0 cm long segment of abnormal luminal narrowing, wall thickening, enhancement at the confluence of the hepatic duct, suspicious for cholangiocarcinoma. MRCP with similar findings - no choledocholithiasis, but concerning for cholangiocarcinoma.  - Continue empiric antibiotics  - Appreciate Cardiology evaluation and risk stratification  - Continue to hold Plavix - current ASGE guidelines recommend holding for 5-7 days prior to a sphincterotomy or FNA given elevated bleeding risk  - Plan for potential EUS/ERCP on Friday for tissue acquisition and stenting  - Continue supportive care    2. Elevated LTs - Patient with elevated LTs which are now downtrending, and CT/US with findings concerning for cholecystitis.    - As above  - LTs and leukocytosis improving  - Management of suspected cholecystitis per surgical team, consider percutaneous cholecystostomy    Recommendations discussed with primary team  Case discussed with attending physician

## 2021-01-12 NOTE — PROGRESS NOTE ADULT - ASSESSMENT
65M with PMH of HTN, HLD, DM, TIA (s/p plavix), A fib, CAD s/p CABG x3 on 7/6/20 (Dr. Conde), acute cholecystitis s/p perc dharmesh (Aug 2020, perc dharmesh removed Sept 2020) presents with 1 day history of RUQ pain and dark urine with clinical and radiographic evidence of acute cholecystitis with choledocholithiasis, cholangiocarcinoma cannot be ruled out.     Vitals are stable, Patient is feeling better today, no complaints      MRCP was reported today showed " 1.5 cm long common hepatic duct narrowing. Increase in intrahepatic biliary dilatation. Abnormal soft tissue around the common hepatic duct.  Abnormal gallbladder which shows distention and irregular wall thickening. No choledocholithiasis or cholelithiasis. Diagnosis include cholangiocarcinoma, IgG4 cholangitis, extension of xanthogranulomatous cholecystitis or gallbladder carcinoma."    GI will do ERCP/EUS Thursday or Friday since the patient was on Plavix    Discussed with attending and chief resident:    - Pending ERCP/EUS  - Pending tumor markers  - Surgery team 1 c will continue to FU

## 2021-01-12 NOTE — PROGRESS NOTE ADULT - SUBJECTIVE AND OBJECTIVE BOX
OVERNIGHT EVENTS: none    SUBJECTIVE / INTERVAL HPI: Patient seen and examined at bedside. No acute complaints     VITAL SIGNS:  Vital Signs Last 24 Hrs  T(C): 36.5 (12 Jan 2021 08:40), Max: 36.5 (12 Jan 2021 08:40)  T(F): 97.7 (12 Jan 2021 08:40), Max: 97.7 (12 Jan 2021 08:40)  HR: 74 (12 Jan 2021 08:40) (74 - 96)  BP: 108/77 (12 Jan 2021 08:40) (107/57 - 133/67)  BP(mean): --  RR: 18 (12 Jan 2021 08:40) (16 - 18)  SpO2: 98% (12 Jan 2021 08:40) (97% - 98%)    PHYSICAL EXAM:    General: WDWN  HEENT: NC/AT; PERRL, anicteric sclera; MMM  Neck: supple  Cardiovascular: +S1/S2; RRR  Respiratory: CTA B/L; no W/R/R  Gastrointestinal: soft, distended   Extremities: WWP; no edema, clubbing or cyanosis  Vascular: 2+ radial, DP/PT pulses B/L  Neurological: AAOx3; no focal deficits    MEDICATIONS:  MEDICATIONS  (STANDING):  cefTRIAXone   IVPB 1000 milliGRAM(s) IV Intermittent every 24 hours  dextrose 40% Gel 15 Gram(s) Oral once  dextrose 5%. 1000 milliLiter(s) (50 mL/Hr) IV Continuous <Continuous>  dextrose 5%. 1000 milliLiter(s) (100 mL/Hr) IV Continuous <Continuous>  dextrose 50% Injectable 25 Gram(s) IV Push once  dextrose 50% Injectable 12.5 Gram(s) IV Push once  dextrose 50% Injectable 25 Gram(s) IV Push once  furosemide    Tablet 40 milliGRAM(s) Oral daily  glucagon  Injectable 1 milliGRAM(s) IntraMuscular once  heparin   Injectable 5000 Unit(s) SubCutaneous every 8 hours  influenza   Vaccine 0.5 milliLiter(s) IntraMuscular once  insulin lispro (ADMELOG) corrective regimen sliding scale   SubCutaneous every 6 hours  levothyroxine 175 MICROGram(s) Oral daily  metoprolol succinate ER 25 milliGRAM(s) Oral daily  metroNIDAZOLE  IVPB 500 milliGRAM(s) IV Intermittent every 8 hours    MEDICATIONS  (PRN):  acetaminophen   Tablet .. 650 milliGRAM(s) Oral every 6 hours PRN Mild Pain (1 - 3)  ondansetron Injectable 4 milliGRAM(s) IV Push every 6 hours PRN Nausea  oxyCODONE    IR 2.5 milliGRAM(s) Oral every 6 hours PRN Moderate Pain (4 - 6)  oxyCODONE    IR 5 milliGRAM(s) Oral every 6 hours PRN Severe Pain (7 - 10)  polyethylene glycol 3350 17 Gram(s) Oral once PRN constipaiton      ALLERGIES:  Allergies    No Known Allergies    Intolerances        LABS:                        9.4    9.36  )-----------( 344      ( 12 Jan 2021 07:07 )             29.5     01-12    138  |  98  |  15  ----------------------------<  119<H>  3.7   |  28  |  0.75    Ca    9.3      12 Jan 2021 07:07  Phos  3.1     01-12  Mg     1.7     01-12    TPro  7.3  /  Alb  3.4  /  TBili  4.4<H>  /  DBili  x   /  AST  179<H>  /  ALT  477<H>  /  AlkPhos  424<H>  01-12        CAPILLARY BLOOD GLUCOSE      POCT Blood Glucose.: 130 mg/dL (12 Jan 2021 11:29)      RADIOLOGY & ADDITIONAL TESTS: Reviewed.    ASSESSMENT:    PLAN:

## 2021-01-13 DIAGNOSIS — E83.42 HYPOMAGNESEMIA: ICD-10-CM

## 2021-01-13 LAB
AFP-TM SERPL-MCNC: <1.8 NG/ML — SIGNIFICANT CHANGE UP
ALBUMIN SERPL ELPH-MCNC: 3.7 G/DL — SIGNIFICANT CHANGE UP (ref 3.3–5)
ALP SERPL-CCNC: 431 U/L — HIGH (ref 40–120)
ALT FLD-CCNC: 381 U/L — HIGH (ref 10–45)
ANION GAP SERPL CALC-SCNC: 11 MMOL/L — SIGNIFICANT CHANGE UP (ref 5–17)
ANION GAP SERPL CALC-SCNC: 13 MMOL/L — SIGNIFICANT CHANGE UP (ref 5–17)
AST SERPL-CCNC: 135 U/L — HIGH (ref 10–40)
BILIRUB SERPL-MCNC: 3.4 MG/DL — HIGH (ref 0.2–1.2)
BUN SERPL-MCNC: 11 MG/DL — SIGNIFICANT CHANGE UP (ref 7–23)
BUN SERPL-MCNC: 12 MG/DL — SIGNIFICANT CHANGE UP (ref 7–23)
CALCIUM SERPL-MCNC: 9.2 MG/DL — SIGNIFICANT CHANGE UP (ref 8.4–10.5)
CALCIUM SERPL-MCNC: 9.5 MG/DL — SIGNIFICANT CHANGE UP (ref 8.4–10.5)
CANCER AG19-9 SERPL-ACNC: <2 U/ML — SIGNIFICANT CHANGE UP
CEA SERPL-MCNC: 2.7 NG/ML — SIGNIFICANT CHANGE UP (ref 0–3.8)
CHLORIDE SERPL-SCNC: 98 MMOL/L — SIGNIFICANT CHANGE UP (ref 96–108)
CHLORIDE SERPL-SCNC: 99 MMOL/L — SIGNIFICANT CHANGE UP (ref 96–108)
CO2 SERPL-SCNC: 27 MMOL/L — SIGNIFICANT CHANGE UP (ref 22–31)
CO2 SERPL-SCNC: 28 MMOL/L — SIGNIFICANT CHANGE UP (ref 22–31)
CREAT SERPL-MCNC: 0.72 MG/DL — SIGNIFICANT CHANGE UP (ref 0.5–1.3)
CREAT SERPL-MCNC: 0.84 MG/DL — SIGNIFICANT CHANGE UP (ref 0.5–1.3)
GLUCOSE BLDC GLUCOMTR-MCNC: 111 MG/DL — HIGH (ref 70–99)
GLUCOSE BLDC GLUCOMTR-MCNC: 120 MG/DL — HIGH (ref 70–99)
GLUCOSE BLDC GLUCOMTR-MCNC: 175 MG/DL — HIGH (ref 70–99)
GLUCOSE BLDC GLUCOMTR-MCNC: 99 MG/DL — SIGNIFICANT CHANGE UP (ref 70–99)
GLUCOSE SERPL-MCNC: 115 MG/DL — HIGH (ref 70–99)
GLUCOSE SERPL-MCNC: 188 MG/DL — HIGH (ref 70–99)
HCT VFR BLD CALC: 32.9 % — LOW (ref 39–50)
HGB BLD-MCNC: 10.3 G/DL — LOW (ref 13–17)
MAGNESIUM SERPL-MCNC: 1.4 MG/DL — LOW (ref 1.6–2.6)
MAGNESIUM SERPL-MCNC: 2.2 MG/DL — SIGNIFICANT CHANGE UP (ref 1.6–2.6)
MCHC RBC-ENTMCNC: 29 PG — SIGNIFICANT CHANGE UP (ref 27–34)
MCHC RBC-ENTMCNC: 31.3 GM/DL — LOW (ref 32–36)
MCV RBC AUTO: 92.7 FL — SIGNIFICANT CHANGE UP (ref 80–100)
NRBC # BLD: 0 /100 WBCS — SIGNIFICANT CHANGE UP (ref 0–0)
PHOSPHATE SERPL-MCNC: 2.7 MG/DL — SIGNIFICANT CHANGE UP (ref 2.5–4.5)
PHOSPHATE SERPL-MCNC: 3.4 MG/DL — SIGNIFICANT CHANGE UP (ref 2.5–4.5)
PLATELET # BLD AUTO: 403 K/UL — HIGH (ref 150–400)
POTASSIUM SERPL-MCNC: 3.9 MMOL/L — SIGNIFICANT CHANGE UP (ref 3.5–5.3)
POTASSIUM SERPL-MCNC: 4 MMOL/L — SIGNIFICANT CHANGE UP (ref 3.5–5.3)
POTASSIUM SERPL-SCNC: 3.9 MMOL/L — SIGNIFICANT CHANGE UP (ref 3.5–5.3)
POTASSIUM SERPL-SCNC: 4 MMOL/L — SIGNIFICANT CHANGE UP (ref 3.5–5.3)
PROT SERPL-MCNC: 7.9 G/DL — SIGNIFICANT CHANGE UP (ref 6–8.3)
RBC # BLD: 3.55 M/UL — LOW (ref 4.2–5.8)
RBC # FLD: 12.5 % — SIGNIFICANT CHANGE UP (ref 10.3–14.5)
SODIUM SERPL-SCNC: 138 MMOL/L — SIGNIFICANT CHANGE UP (ref 135–145)
SODIUM SERPL-SCNC: 138 MMOL/L — SIGNIFICANT CHANGE UP (ref 135–145)
WBC # BLD: 10 K/UL — SIGNIFICANT CHANGE UP (ref 3.8–10.5)
WBC # FLD AUTO: 10 K/UL — SIGNIFICANT CHANGE UP (ref 3.8–10.5)

## 2021-01-13 PROCEDURE — 99233 SBSQ HOSP IP/OBS HIGH 50: CPT | Mod: GC

## 2021-01-13 PROCEDURE — 99233 SBSQ HOSP IP/OBS HIGH 50: CPT

## 2021-01-13 PROCEDURE — 99232 SBSQ HOSP IP/OBS MODERATE 35: CPT

## 2021-01-13 RX ORDER — MAGNESIUM SULFATE 500 MG/ML
2 VIAL (ML) INJECTION EVERY 6 HOURS
Refills: 0 | Status: COMPLETED | OUTPATIENT
Start: 2021-01-13 | End: 2021-01-13

## 2021-01-13 RX ORDER — POTASSIUM CHLORIDE 20 MEQ
20 PACKET (EA) ORAL ONCE
Refills: 0 | Status: COMPLETED | OUTPATIENT
Start: 2021-01-13 | End: 2021-01-13

## 2021-01-13 RX ADMIN — Medication 100 MILLIGRAM(S): at 05:28

## 2021-01-13 RX ADMIN — Medication 650 MILLIGRAM(S): at 21:16

## 2021-01-13 RX ADMIN — Medication 1: at 16:53

## 2021-01-13 RX ADMIN — Medication 40 MILLIGRAM(S): at 05:29

## 2021-01-13 RX ADMIN — Medication 25 MILLIGRAM(S): at 05:29

## 2021-01-13 RX ADMIN — HEPARIN SODIUM 5000 UNIT(S): 5000 INJECTION INTRAVENOUS; SUBCUTANEOUS at 13:52

## 2021-01-13 RX ADMIN — Medication 50 GRAM(S): at 17:32

## 2021-01-13 RX ADMIN — HEPARIN SODIUM 5000 UNIT(S): 5000 INJECTION INTRAVENOUS; SUBCUTANEOUS at 05:29

## 2021-01-13 RX ADMIN — HEPARIN SODIUM 5000 UNIT(S): 5000 INJECTION INTRAVENOUS; SUBCUTANEOUS at 21:16

## 2021-01-13 RX ADMIN — Medication 20 MILLIEQUIVALENT(S): at 21:16

## 2021-01-13 RX ADMIN — CEFTRIAXONE 100 MILLIGRAM(S): 500 INJECTION, POWDER, FOR SOLUTION INTRAMUSCULAR; INTRAVENOUS at 16:43

## 2021-01-13 RX ADMIN — Medication 100 MILLIGRAM(S): at 21:16

## 2021-01-13 RX ADMIN — Medication 175 MICROGRAM(S): at 05:29

## 2021-01-13 RX ADMIN — Medication 650 MILLIGRAM(S): at 05:28

## 2021-01-13 RX ADMIN — Medication 50 GRAM(S): at 10:33

## 2021-01-13 RX ADMIN — Medication 100 MILLIGRAM(S): at 12:56

## 2021-01-13 NOTE — PROGRESS NOTE ADULT - SUBJECTIVE AND OBJECTIVE BOX
GASTROENTEROLOGY PROGRESS NOTE  Patient seen and examined at bedside. Patient feeling well, ordering lunch, no acute complaints this morning.    PERTINENT REVIEW OF SYSTEMS:  CONSTITUTIONAL: No weakness, fevers or chills  HEENT: No visual changes; No vertigo or throat pain   GASTROINTESTINAL: No abdominal or epigastric pain. No nausea, vomiting, or hematemesis; No diarrhea or constipation. No melena or hematochezia.  NEUROLOGICAL: No numbness or weakness  SKIN: No itching, burning, rashes, or lesions     Allergies    No Known Allergies    Intolerances      MEDICATIONS:  MEDICATIONS  (STANDING):  cefTRIAXone   IVPB 1000 milliGRAM(s) IV Intermittent every 24 hours  dextrose 40% Gel 15 Gram(s) Oral once  dextrose 5%. 1000 milliLiter(s) (50 mL/Hr) IV Continuous <Continuous>  dextrose 5%. 1000 milliLiter(s) (100 mL/Hr) IV Continuous <Continuous>  dextrose 50% Injectable 25 Gram(s) IV Push once  dextrose 50% Injectable 12.5 Gram(s) IV Push once  dextrose 50% Injectable 25 Gram(s) IV Push once  furosemide    Tablet 40 milliGRAM(s) Oral daily  glucagon  Injectable 1 milliGRAM(s) IntraMuscular once  heparin   Injectable 5000 Unit(s) SubCutaneous every 8 hours  influenza   Vaccine 0.5 milliLiter(s) IntraMuscular once  insulin lispro (ADMELOG) corrective regimen sliding scale   SubCutaneous every 6 hours  levothyroxine 175 MICROGram(s) Oral daily  magnesium sulfate  IVPB 2 Gram(s) IV Intermittent every 6 hours  metoprolol succinate ER 25 milliGRAM(s) Oral daily  metroNIDAZOLE  IVPB 500 milliGRAM(s) IV Intermittent every 8 hours    MEDICATIONS  (PRN):  acetaminophen   Tablet .. 650 milliGRAM(s) Oral every 6 hours PRN Mild Pain (1 - 3)  ondansetron Injectable 4 milliGRAM(s) IV Push every 6 hours PRN Nausea  oxyCODONE    IR 2.5 milliGRAM(s) Oral every 6 hours PRN Moderate Pain (4 - 6)  oxyCODONE    IR 5 milliGRAM(s) Oral every 6 hours PRN Severe Pain (7 - 10)  polyethylene glycol 3350 17 Gram(s) Oral once PRN constipaiton    Vital Signs Last 24 Hrs  T(C): 36.1 (13 Jan 2021 08:49), Max: 36.2 (12 Jan 2021 16:30)  T(F): 96.9 (13 Jan 2021 08:49), Max: 97.2 (12 Jan 2021 16:30)  HR: 69 (13 Jan 2021 08:49) (63 - 78)  BP: 111/63 (13 Jan 2021 08:49) (111/63 - 129/66)  BP(mean): 92 (13 Jan 2021 04:51) (82 - 93)  RR: 17 (13 Jan 2021 08:49) (16 - 18)  SpO2: 96% (13 Jan 2021 08:49) (96% - 100%)    01-12 @ 07:01  -  01-13 @ 07:00  --------------------------------------------------------  IN: 860 mL / OUT: 0 mL / NET: 860 mL    01-13 @ 07:01 - 01-13 @ 11:33  --------------------------------------------------------  IN: 0 mL / OUT: 300 mL / NET: -300 mL      PHYSICAL EXAM:    General: Well developed; well nourished; in no acute distress  HEENT: MMM, conjunctiva and sclera clear  Gastrointestinal: Soft non-tender non-distended; Normal bowel sounds; No hepatosplenomegaly. No rebound or guarding  Skin: Warm and dry. No obvious rash    LABS:                        10.3   10.00 )-----------( 403      ( 13 Jan 2021 08:17 )             32.9     01-13    138  |  98  |  11  ----------------------------<  115<H>  4.0   |  27  |  0.72    Ca    9.5      13 Jan 2021 08:17  Phos  3.4     01-13  Mg     1.4     01-13    TPro  7.9  /  Alb  3.7  /  TBili  3.4<H>  /  DBili  x   /  AST  135<H>  /  ALT  381<H>  /  AlkPhos  431<H>  01-13                      Culture - Urine (collected 10 Josesito 2021 14:43)  Source: .Urine Clean Catch (Midstream)  Final Report (11 Jan 2021 09:48):    No growth      RADIOLOGY & ADDITIONAL STUDIES:  Reviewed

## 2021-01-13 NOTE — PROGRESS NOTE ADULT - SUBJECTIVE AND OBJECTIVE BOX
SUBJECTIVE: Feeling better overall, no N/V, pain improving. Patient seen and examined bedside by chief resident.    cefTRIAXone   IVPB 1000 milliGRAM(s) IV Intermittent every 24 hours  furosemide    Tablet 40 milliGRAM(s) Oral daily  heparin   Injectable 5000 Unit(s) SubCutaneous every 8 hours  metoprolol succinate ER 25 milliGRAM(s) Oral daily  metroNIDAZOLE  IVPB 500 milliGRAM(s) IV Intermittent every 8 hours    MEDICATIONS  (PRN):  acetaminophen   Tablet .. 650 milliGRAM(s) Oral every 6 hours PRN Mild Pain (1 - 3)  ondansetron Injectable 4 milliGRAM(s) IV Push every 6 hours PRN Nausea  oxyCODONE    IR 2.5 milliGRAM(s) Oral every 6 hours PRN Moderate Pain (4 - 6)  oxyCODONE    IR 5 milliGRAM(s) Oral every 6 hours PRN Severe Pain (7 - 10)  polyethylene glycol 3350 17 Gram(s) Oral once PRN constipaiton      I&O's Detail    11 Jan 2021 07:01  -  12 Jan 2021 07:00  --------------------------------------------------------  IN:    IV PiggyBack: 250 mL    Lactated Ringers: 1330 mL  Total IN: 1580 mL    OUT:    Voided (mL): 700 mL  Total OUT: 700 mL    Total NET: 880 mL      12 Jan 2021 07:01  -  13 Jan 2021 06:59  --------------------------------------------------------  IN:    Oral Fluid: 860 mL  Total IN: 860 mL    OUT:  Total OUT: 0 mL    Total NET: 860 mL          Vital Signs Last 24 Hrs  T(C): 36.2 (12 Jan 2021 16:30), Max: 36.5 (12 Jan 2021 08:40)  T(F): 97.2 (12 Jan 2021 16:30), Max: 97.7 (12 Jan 2021 08:40)  HR: 73 (12 Jan 2021 23:53) (63 - 78)  BP: 115/60 (12 Jan 2021 23:53) (108/77 - 129/66)  BP(mean): 82 (12 Jan 2021 23:53) (82 - 93)  RR: 18 (12 Jan 2021 23:53) (16 - 18)  SpO2: 100% (12 Jan 2021 23:53) (98% - 100%)    General: NAD, sitting up  C/V: NSR  Pulm: Nonlabored breathing, no respiratory distress  Abd: soft, NT/ND  Extrem: SCDs in place    LABS:                        9.4    9.36  )-----------( 344      ( 12 Jan 2021 07:07 )             29.5     01-12    138  |  98  |  15  ----------------------------<  119<H>  3.7   |  28  |  0.75    Ca    9.3      12 Jan 2021 07:07  Phos  3.1     01-12  Mg     1.7     01-12    TPro  7.3  /  Alb  3.4  /  TBili  4.4<H>  /  DBili  x   /  AST  179<H>  /  ALT  477<H>  /  AlkPhos  424<H>  01-12          RADIOLOGY & ADDITIONAL STUDIES:  CT Abdomen and Pelvis w/ Oral Cont and w/ IV Cont:   EXAM:  CT ABDOMEN AND PELVIS OC IC                          PROCEDURE DATE:  01/10/2021          INTERPRETATION:  CT SCAN OF ABDOMEN AND PELVIS    History: Abdominal pain.    Technique: CT scan of abdomen and pelvis was performed from lung bases through symphysis pubis. Intravenous and oral contrast material were utilized. Axial, sagittal and coronal reformatted images were reviewed.    Comparison: Images from cholecystostomy tube injection of 9/11/2020. MRCP from 8/4/2020. Right upper quadrantultrasound from 8/3/2020. CT of the abdomen and pelvis from 8/2/2020. Chest CT from 6/30/2020.    Findings:    Lower chest: There has been resolution of trace bilateral pleural effusions and trace pericardial effusion. There is again cardiomegaly. Severe coronary artery calcifications/stents and moderate calcification of mitral valve annulus.    Liver: The liver is again enlarged. No focal liver mass. New mild intrahepatic biliary ductal dilatation in both lobes of the liver, with the duct is dilated to the confluence of the right and left hepatic ducts. There is a 2.0 cm long segment of abnormal luminal narrowing and wall thickening and enhancement at the confluence of the hepatic ducts. This is suspicious for cholangiocarcinoma. The more distal common bile duct is normal in caliber, measuring 0.6 cm.    Gallbladder: The gallbladder is mildly distended and contains intraluminal membranes and sludge. No radiopaque stones are seen. There is marked infiltration of the pericholecystic fat.    Spleen: Punctate splenic calcification again noted.    Pancreas:  Normal.    Adrenal glands:  Normal.    Kidneys: 1.2 cm cyst left kidney. Right kidney normal.    Adenopathy:  1.1 cm portacaval lymph node. 1.0 cm lymph node adjacent to common hepatic artery. There is again infiltration the mesenteric fat with sparing of the fat surrounding mesenteric lymph nodes, consistent with mesenteric panniculitis.    Ascites: None.    Gastrointestinal tract: There are a few dilated small bowel loops in theleft abdomen, measuring up to 3.2 cm. This likely represents an ileus, as there is gradual tapering of the small bowel to normal caliber. Normal appendix. Colon unremarkable.    Vessels: Large calcified plaque aorta and pelvic arteries. Replaced right hepatic artery arises from superior mesenteric artery.    Pelvic organs: Mildly distended urinary bladder. Enlarged prostate.    Soft tissues: There are again postsurgical changes consisting of infiltration of the subcutaneous fat anterior to the sternum. Small fat-containing umbilical hernia.    Bones: Levoscoliosis of the lumbar spine. Degenerative changes in the spine. Sternotomy wires again noted.    Impression: 1. Since 8/4/2020, there is new mild intrahepatic biliary ductal dilatation in both lobes of the liver, with a 2.0 cm mass at the confluence of the hepatic ducts. These findings are suspicious for cholangiocarcinoma.    2. Recurrent acute cholecystitis. The presence of intraluminal membranes and the marked surrounding inflammatory change again raise the suspicion of gangrenous cholecystitis.    3. Mild lymphadenopathy in the right upper quadrant.    4. Small bowel ileus.        Case discussed with Dr. Ha on 1/10/2021 at 4:53 PM.          Thank you for the opportunity to participate in the care of this patient.    SEDRICK JACKSON M.D., RADIOLOGY RESIDENT  This document has been electronically signed.  EBONY LIEBERMAN MD; Attending Radiologist  This document has been electronically signed. Josesito 10 2021  4:59PM (01-10-21 @ 14:54)

## 2021-01-13 NOTE — PROGRESS NOTE ADULT - ASSESSMENT
65M w/ PMHx of HTN, HLD, DM, CAD s/p CABG 7/20, afib, hx of TIA and hx of acute cholecystitis s/p perc dharmesh 8/20 p/w 2d of abdominal pain. GI consulted for biliary dilation.    1. Intrahepatic biliary dilation - CT demonstrates intraabdominal LAD and new mild intrahepatic biliary ductal dilatation with a 2.0 cm long segment of abnormal luminal narrowing, wall thickening, enhancement at the confluence of the hepatic duct, suspicious for cholangiocarcinoma. MRCP with similar findings - no choledocholithiasis, but concerning for cholangiocarcinoma.  - Continue empiric antibiotics  - Appreciate Cardiology evaluation and risk stratification  - Continue to hold Plavix - current ASGE guidelines recommend holding for 5-7 days prior to a sphincterotomy or FNA given elevated bleeding risk  - Plan for potential EUS/ERCP on Friday AM for tissue acquisition and stenting  - Continue supportive care    2. Elevated LTs - Patient with elevated LTs which are now downtrending, and CT/US with findings concerning for cholecystitis.    - As above  - LTs and leukocytosis improving  - Management of suspected cholecystitis per surgical team, consider percutaneous cholecystostomy    Recommendations discussed with primary team  Case discussed with attending physician

## 2021-01-13 NOTE — PROGRESS NOTE ADULT - ASSESSMENT
65M with PMH of HTN, HLD, DM, TIA (s/p plavix), A fib, CAD s/p CABG x3 on 7/6/20 (Dr. Conde), acute cholecystitis s/p perc dharmesh (Aug 2020, perc dharmesh removed Sept 2020) presents with 1 day history of RUQ pain and dark urine with clinical and radiographic evidence of acute cholecystitis with choledocholithiasis, cholangiocarcinoma cannot be ruled out.     CLD, NPO@[place]  Abx (ceftriaxone, flagyl)  Pain/nausea control PRN   Home meds as appropriate (Metoprolol, Plavix, holding statin)   Cardiology consult: c/w metoprolol, can hold statin for transaminitis and plavix for procedures  SQH/SCDs  AM labs  EUS/ERCP 1/14

## 2021-01-13 NOTE — PROGRESS NOTE ADULT - ASSESSMENT
65M with PMH of HTN, HLD, DM, TIA (s/p plavix), A fib, CAD s/p CABG x3 on 7/6/20 (Dr. Conde), acute cholecystitis s/p perc dharmesh (Aug 2020, perc dharmesh removed Sept 2020) presents with 1 day history of RUQ pain and dark urine with clinical and radiographic evidence of acute cholecystitis with choledocholithiasis, cholangiocarcinoma cannot be ruled out.     Vitals are stable, Patient is feeling better today, no complaints  Abdomen is soft, non tender.  LFTs trending down  Tumor markers are all normal  According to primary team the patient may be discharged after ERCP and will come back if additional treatment is warranted    Discussed with Dr. Wagner and chief resident:    Recommendations by Dr. Wagner:    -  ERCP/EUS  with sampling of chad hepatis lymph nodes, CBD stenting. During ERCP Spyglass may provide additional information.  Whether this is inflammation or a [place] tumor, patient will likely need resection of the CBD  - Surgery team 1 c will continue to FU

## 2021-01-13 NOTE — PROGRESS NOTE ADULT - SUBJECTIVE AND OBJECTIVE BOX
Patient is a 65y old  Male who presents with a chief complaint of Choledocholithiasis (13 Jan 2021 06:59)      INTERVAL HPI/OVERNIGHT EVENTS:  Patient was seen and examined at bedside. As per nurse and patient, no o/n events, patient resting comfortably. He states that he feels well overall and the pain has been improving. Patient denies: fever, chills, dizziness, weakness, HA, Changes in vision, CP, palpitations, SOB, cough.      PAST MEDICAL & SURGICAL HISTORY:  Choledocholithiasis    History of TIAs    Hypothyroidism    H/O cardiomyopathy    DM (diabetes mellitus)    HLD (hyperlipidemia)    HTN (hypertension)    No significant past surgical history        T(C): 36.1 (01-13-21 @ 08:49), Max: 36.2 (01-12-21 @ 16:30)  HR: 69 (01-13-21 @ 08:49) (63 - 78)  BP: 111/63 (01-13-21 @ 08:49) (111/63 - 129/66)  RR: 17 (01-13-21 @ 08:49) (16 - 18)  SpO2: 96% (01-13-21 @ 08:49) (96% - 100%)  Wt(kg): --  I&O's Summary    12 Jan 2021 07:01  -  13 Jan 2021 07:00  --------------------------------------------------------  IN: 860 mL / OUT: 0 mL / NET: 860 mL    13 Jan 2021 07:01  -  13 Jan 2021 10:30  --------------------------------------------------------  IN: 0 mL / OUT: 300 mL / NET: -300 mL        PHYSICAL EXAM:  GENERAL: NAD, resting comfortably  HEAD:  Atraumatic, Normocephalic  EYES: EOMI, PERRLA, +mild icterus  ENMT: MMM  NECK: Supple, No JVD  NERVOUS SYSTEM:  Alert & Oriented X3, no focal deficits  CHEST/LUNG: Clear to percussion bilaterally; No rales, rhonchi, wheezing, or rubs  HEART: Regular rate and rhythm; No murmurs, rubs, or gallops  ABDOMEN: Soft, Nontender, Nondistended; Bowel sounds present  EXTREMITIES:  2+ Peripheral Pulses, No clubbing, cyanosis, or edema      LABS:                        10.3   10.00 )-----------( 403      ( 13 Jan 2021 08:17 )             32.9     01-13    138  |  98  |  11  ----------------------------<  115<H>  4.0   |  27  |  0.72    Ca    9.5      13 Jan 2021 08:17  Phos  3.4     01-13  Mg     1.4     01-13    TPro  7.9  /  Alb  3.7  /  TBili  3.4<H>  /  DBili  x   /  AST  135<H>  /  ALT  381<H>  /  AlkPhos  431<H>  01-13        CAPILLARY BLOOD GLUCOSE      POCT Blood Glucose.: 111 mg/dL (13 Jan 2021 07:07)  POCT Blood Glucose.: 92 mg/dL (12 Jan 2021 21:40)  POCT Blood Glucose.: 237 mg/dL (12 Jan 2021 17:01)  POCT Blood Glucose.: 130 mg/dL (12 Jan 2021 11:29)            MEDICATIONS  (STANDING):  cefTRIAXone   IVPB 1000 milliGRAM(s) IV Intermittent every 24 hours  dextrose 40% Gel 15 Gram(s) Oral once  dextrose 5%. 1000 milliLiter(s) (50 mL/Hr) IV Continuous <Continuous>  dextrose 5%. 1000 milliLiter(s) (100 mL/Hr) IV Continuous <Continuous>  dextrose 50% Injectable 25 Gram(s) IV Push once  dextrose 50% Injectable 12.5 Gram(s) IV Push once  dextrose 50% Injectable 25 Gram(s) IV Push once  furosemide    Tablet 40 milliGRAM(s) Oral daily  glucagon  Injectable 1 milliGRAM(s) IntraMuscular once  heparin   Injectable 5000 Unit(s) SubCutaneous every 8 hours  influenza   Vaccine 0.5 milliLiter(s) IntraMuscular once  insulin lispro (ADMELOG) corrective regimen sliding scale   SubCutaneous every 6 hours  levothyroxine 175 MICROGram(s) Oral daily  magnesium sulfate  IVPB 2 Gram(s) IV Intermittent every 6 hours  metoprolol succinate ER 25 milliGRAM(s) Oral daily  metroNIDAZOLE  IVPB 500 milliGRAM(s) IV Intermittent every 8 hours    MEDICATIONS  (PRN):  acetaminophen   Tablet .. 650 milliGRAM(s) Oral every 6 hours PRN Mild Pain (1 - 3)  ondansetron Injectable 4 milliGRAM(s) IV Push every 6 hours PRN Nausea  oxyCODONE    IR 2.5 milliGRAM(s) Oral every 6 hours PRN Moderate Pain (4 - 6)  oxyCODONE    IR 5 milliGRAM(s) Oral every 6 hours PRN Severe Pain (7 - 10)  polyethylene glycol 3350 17 Gram(s) Oral once PRN constipaiton      RADIOLOGY & ADDITIONAL TESTS:    Imaging Personally Reviewed:  [ ] YES  [ ] NO    Consultant(s) Notes Reviewed:  [ ] YES  [ ] NO    Care Discussed with Consultants/Other Providers [ ] YES  [ ] NO DC instructions

## 2021-01-13 NOTE — PROGRESS NOTE ADULT - SUBJECTIVE AND OBJECTIVE BOX
No issues overnight  Mild abdominal pain in RUQ, denies nausea, vomiting , fever or chills  Pending ERCP/EUS Friday morning    MEDICATIONS  (STANDING):  cefTRIAXone   IVPB 1000 milliGRAM(s) IV Intermittent every 24 hours  dextrose 40% Gel 15 Gram(s) Oral once  dextrose 5%. 1000 milliLiter(s) (50 mL/Hr) IV Continuous <Continuous>  dextrose 5%. 1000 milliLiter(s) (100 mL/Hr) IV Continuous <Continuous>  dextrose 50% Injectable 25 Gram(s) IV Push once  dextrose 50% Injectable 12.5 Gram(s) IV Push once  dextrose 50% Injectable 25 Gram(s) IV Push once  furosemide    Tablet 40 milliGRAM(s) Oral daily  glucagon  Injectable 1 milliGRAM(s) IntraMuscular once  heparin   Injectable 5000 Unit(s) SubCutaneous every 8 hours  influenza   Vaccine 0.5 milliLiter(s) IntraMuscular once  insulin lispro (ADMELOG) corrective regimen sliding scale   SubCutaneous every 6 hours  levothyroxine 175 MICROGram(s) Oral daily  magnesium sulfate  IVPB 2 Gram(s) IV Intermittent every 6 hours  metoprolol succinate ER 25 milliGRAM(s) Oral daily  metroNIDAZOLE  IVPB 500 milliGRAM(s) IV Intermittent every 8 hours    MEDICATIONS  (PRN):  acetaminophen   Tablet .. 650 milliGRAM(s) Oral every 6 hours PRN Mild Pain (1 - 3)  ondansetron Injectable 4 milliGRAM(s) IV Push every 6 hours PRN Nausea  oxyCODONE    IR 2.5 milliGRAM(s) Oral every 6 hours PRN Moderate Pain (4 - 6)  oxyCODONE    IR 5 milliGRAM(s) Oral every 6 hours PRN Severe Pain (7 - 10)  polyethylene glycol 3350 17 Gram(s) Oral once PRN constipaiton      Vital Signs Last 24 Hrs  T(C): 36.6 (13 Jan 2021 14:38), Max: 36.6 (13 Jan 2021 14:38)  T(F): 97.8 (13 Jan 2021 14:38), Max: 97.8 (13 Jan 2021 14:38)  HR: 68 (13 Jan 2021 14:38) (63 - 75)  BP: 126/72 (13 Jan 2021 14:38) (111/63 - 129/66)  BP(mean): 92 (13 Jan 2021 04:51) (82 - 93)  RR: 18 (13 Jan 2021 14:38) (16 - 18)  SpO2: 98% (13 Jan 2021 14:38) (96% - 100%)    Physical Exam:  General: NAD, resting comfortably in bed  Pulmonary: Nonlabored breathing, no respiratory distress  Cardiovascular: NSR  Abdominal: soft, NT/ND  Extremities: WWP, normal strength  Neuro: A/O x 3, CNs II-XII grossly intact, no focal deficits, normal motor/sensation  Pulses: palpable distal pulses    I&O's Summary    12 Jan 2021 07:01  -  13 Jan 2021 07:00  --------------------------------------------------------  IN: 860 mL / OUT: 0 mL / NET: 860 mL    13 Jan 2021 07:01  -  13 Jan 2021 15:41  --------------------------------------------------------  IN: 1090 mL / OUT: 300 mL / NET: 790 mL        LABS:                        10.3   10.00 )-----------( 403      ( 13 Jan 2021 08:17 )             32.9     01-13    138  |  98  |  11  ----------------------------<  115<H>  4.0   |  27  |  0.72    Ca    9.5      13 Jan 2021 08:17  Phos  3.4     01-13  Mg     1.4     01-13    TPro  7.9  /  Alb  3.7  /  TBili  3.4<H>  /  DBili  x   /  AST  135<H>  /  ALT  381<H>  /  AlkPhos  431<H>  01-13        CAPILLARY BLOOD GLUCOSE      POCT Blood Glucose.: 99 mg/dL (13 Jan 2021 11:49)  POCT Blood Glucose.: 111 mg/dL (13 Jan 2021 07:07)  POCT Blood Glucose.: 92 mg/dL (12 Jan 2021 21:40)  POCT Blood Glucose.: 237 mg/dL (12 Jan 2021 17:01)    LIVER FUNCTIONS - ( 13 Jan 2021 08:17 )  Alb: 3.7 g/dL / Pro: 7.9 g/dL / ALK PHOS: 431 U/L / ALT: 381 U/L / AST: 135 U/L / GGT: x             RADIOLOGY & ADDITIONAL STUDIES:

## 2021-01-14 LAB
ALBUMIN SERPL ELPH-MCNC: 3.4 G/DL — SIGNIFICANT CHANGE UP (ref 3.3–5)
ALP SERPL-CCNC: 391 U/L — HIGH (ref 40–120)
ALT FLD-CCNC: 275 U/L — HIGH (ref 10–45)
ANION GAP SERPL CALC-SCNC: 13 MMOL/L — SIGNIFICANT CHANGE UP (ref 5–17)
AST SERPL-CCNC: 88 U/L — HIGH (ref 10–40)
BILIRUB SERPL-MCNC: 2.2 MG/DL — HIGH (ref 0.2–1.2)
BUN SERPL-MCNC: 10 MG/DL — SIGNIFICANT CHANGE UP (ref 7–23)
CALCIUM SERPL-MCNC: 9.3 MG/DL — SIGNIFICANT CHANGE UP (ref 8.4–10.5)
CHLORIDE SERPL-SCNC: 102 MMOL/L — SIGNIFICANT CHANGE UP (ref 96–108)
CO2 SERPL-SCNC: 24 MMOL/L — SIGNIFICANT CHANGE UP (ref 22–31)
CREAT SERPL-MCNC: 0.75 MG/DL — SIGNIFICANT CHANGE UP (ref 0.5–1.3)
GLUCOSE BLDC GLUCOMTR-MCNC: 111 MG/DL — HIGH (ref 70–99)
GLUCOSE BLDC GLUCOMTR-MCNC: 155 MG/DL — HIGH (ref 70–99)
GLUCOSE BLDC GLUCOMTR-MCNC: 159 MG/DL — HIGH (ref 70–99)
GLUCOSE BLDC GLUCOMTR-MCNC: 188 MG/DL — HIGH (ref 70–99)
GLUCOSE SERPL-MCNC: 120 MG/DL — HIGH (ref 70–99)
HCT VFR BLD CALC: 30 % — LOW (ref 39–50)
HGB BLD-MCNC: 9.4 G/DL — LOW (ref 13–17)
MAGNESIUM SERPL-MCNC: 1.9 MG/DL — SIGNIFICANT CHANGE UP (ref 1.6–2.6)
MCHC RBC-ENTMCNC: 28.7 PG — SIGNIFICANT CHANGE UP (ref 27–34)
MCHC RBC-ENTMCNC: 31.3 GM/DL — LOW (ref 32–36)
MCV RBC AUTO: 91.5 FL — SIGNIFICANT CHANGE UP (ref 80–100)
NRBC # BLD: 0 /100 WBCS — SIGNIFICANT CHANGE UP (ref 0–0)
PHOSPHATE SERPL-MCNC: 3.1 MG/DL — SIGNIFICANT CHANGE UP (ref 2.5–4.5)
PLATELET # BLD AUTO: 347 K/UL — SIGNIFICANT CHANGE UP (ref 150–400)
POTASSIUM SERPL-MCNC: 4 MMOL/L — SIGNIFICANT CHANGE UP (ref 3.5–5.3)
POTASSIUM SERPL-SCNC: 4 MMOL/L — SIGNIFICANT CHANGE UP (ref 3.5–5.3)
PROT SERPL-MCNC: 7.5 G/DL — SIGNIFICANT CHANGE UP (ref 6–8.3)
RBC # BLD: 3.28 M/UL — LOW (ref 4.2–5.8)
RBC # FLD: 12.8 % — SIGNIFICANT CHANGE UP (ref 10.3–14.5)
SODIUM SERPL-SCNC: 139 MMOL/L — SIGNIFICANT CHANGE UP (ref 135–145)
WBC # BLD: 8.22 K/UL — SIGNIFICANT CHANGE UP (ref 3.8–10.5)
WBC # FLD AUTO: 8.22 K/UL — SIGNIFICANT CHANGE UP (ref 3.8–10.5)

## 2021-01-14 PROCEDURE — 99232 SBSQ HOSP IP/OBS MODERATE 35: CPT | Mod: GC

## 2021-01-14 PROCEDURE — 99233 SBSQ HOSP IP/OBS HIGH 50: CPT

## 2021-01-14 RX ORDER — MAGNESIUM SULFATE 500 MG/ML
1 VIAL (ML) INJECTION ONCE
Refills: 0 | Status: COMPLETED | OUTPATIENT
Start: 2021-01-14 | End: 2021-01-14

## 2021-01-14 RX ORDER — SODIUM CHLORIDE 9 MG/ML
1000 INJECTION, SOLUTION INTRAVENOUS
Refills: 0 | Status: DISCONTINUED | OUTPATIENT
Start: 2021-01-14 | End: 2021-01-15

## 2021-01-14 RX ORDER — ATORVASTATIN CALCIUM 80 MG/1
80 TABLET, FILM COATED ORAL AT BEDTIME
Refills: 0 | Status: DISCONTINUED | OUTPATIENT
Start: 2021-01-14 | End: 2021-01-15

## 2021-01-14 RX ADMIN — Medication 40 MILLIGRAM(S): at 05:13

## 2021-01-14 RX ADMIN — Medication 100 MILLIGRAM(S): at 05:13

## 2021-01-14 RX ADMIN — HEPARIN SODIUM 5000 UNIT(S): 5000 INJECTION INTRAVENOUS; SUBCUTANEOUS at 22:00

## 2021-01-14 RX ADMIN — HEPARIN SODIUM 5000 UNIT(S): 5000 INJECTION INTRAVENOUS; SUBCUTANEOUS at 13:17

## 2021-01-14 RX ADMIN — HEPARIN SODIUM 5000 UNIT(S): 5000 INJECTION INTRAVENOUS; SUBCUTANEOUS at 05:14

## 2021-01-14 RX ADMIN — Medication 25 MILLIGRAM(S): at 05:14

## 2021-01-14 RX ADMIN — Medication 100 GRAM(S): at 07:56

## 2021-01-14 RX ADMIN — Medication 175 MICROGRAM(S): at 05:13

## 2021-01-14 RX ADMIN — ATORVASTATIN CALCIUM 80 MILLIGRAM(S): 80 TABLET, FILM COATED ORAL at 22:01

## 2021-01-14 RX ADMIN — Medication 650 MILLIGRAM(S): at 07:56

## 2021-01-14 RX ADMIN — Medication 100 MILLIGRAM(S): at 22:00

## 2021-01-14 RX ADMIN — Medication 1: at 23:43

## 2021-01-14 RX ADMIN — Medication 1: at 17:47

## 2021-01-14 RX ADMIN — Medication 100 MILLIGRAM(S): at 13:17

## 2021-01-14 RX ADMIN — CEFTRIAXONE 100 MILLIGRAM(S): 500 INJECTION, POWDER, FOR SOLUTION INTRAMUSCULAR; INTRAVENOUS at 17:48

## 2021-01-14 RX ADMIN — Medication 1: at 13:24

## 2021-01-14 NOTE — PROGRESS NOTE ADULT - SUBJECTIVE AND OBJECTIVE BOX
Patient is a 65y old  Male who presents with a chief complaint of Choledocholithiasis (14 Jan 2021 07:04)      INTERVAL HPI/OVERNIGHT EVENTS:  Patient was seen and examined at bedside. As per nurse and patient, no o/n events, patient resting comfortably. No complaints at this time. He notes that he feels stronger everyday. He is hoping to return to work on Sunday if the procedure goes well tomorrow. Denies any nausea, vomiting, notes that the abdominal pain has sig improved. Patient denies: fever, chills, dizziness, weakness, HA, Changes in vision, CP, palpitations, SOB, cough, N/V/D/C, dysuria, LE edema.      PAST MEDICAL & SURGICAL HISTORY:  Choledocholithiasis    History of TIAs    Hypothyroidism    H/O cardiomyopathy    DM (diabetes mellitus)    HLD (hyperlipidemia)    HTN (hypertension)    No significant past surgical history    T(C): 36.8 (01-14-21 @ 08:27), Max: 36.8 (01-14-21 @ 08:27)  HR: 74 (01-14-21 @ 08:27) (66 - 77)  BP: 103/68 (01-14-21 @ 08:27) (103/68 - 146/74)  RR: 18 (01-14-21 @ 08:27) (16 - 18)  SpO2: 100% (01-14-21 @ 08:27) (95% - 100%)  Wt(kg): --  I&O's Summary    13 Jan 2021 07:01  -  14 Jan 2021 07:00  --------------------------------------------------------  IN: 2080 mL / OUT: 1850 mL / NET: 230 mL    14 Jan 2021 07:01  -  14 Jan 2021 10:05  --------------------------------------------------------  IN: 240 mL / OUT: 0 mL / NET: 240 mL        PHYSICAL EXAM:  GENERAL: NAD, resting comfortably  HEAD:  Atraumatic, Normocephalic  EYES: EOMI, PERRLA  ENMT: MMM  NECK: Supple, No JVD  NERVOUS SYSTEM:  Alert & Oriented X3, no focal deficits  CHEST/LUNG: Clear to percussion bilaterally; No rales, rhonchi, wheezing, or rubs  HEART: Regular rate and rhythm; No murmurs, rubs, or gallops  ABDOMEN: Soft, Nontender, Nondistended; Bowel sounds present  EXTREMITIES:  2+ Peripheral Pulses, No clubbing, cyanosis, or edema          LABS:                        9.4    8.22  )-----------( 347      ( 14 Jan 2021 06:33 )             30.0     01-14    139  |  102  |  10  ----------------------------<  120<H>  4.0   |  24  |  0.75    Ca    9.3      14 Jan 2021 06:33  Phos  3.1     01-14  Mg     1.9     01-14    TPro  7.5  /  Alb  3.4  /  TBili  2.2<H>  /  DBili  x   /  AST  88<H>  /  ALT  275<H>  /  AlkPhos  391<H>  01-14        CAPILLARY BLOOD GLUCOSE      POCT Blood Glucose.: 111 mg/dL (14 Jan 2021 05:29)  POCT Blood Glucose.: 120 mg/dL (13 Jan 2021 23:32)  POCT Blood Glucose.: 175 mg/dL (13 Jan 2021 16:50)  POCT Blood Glucose.: 99 mg/dL (13 Jan 2021 11:49)            MEDICATIONS  (STANDING):  cefTRIAXone   IVPB 1000 milliGRAM(s) IV Intermittent every 24 hours  dextrose 40% Gel 15 Gram(s) Oral once  dextrose 5%. 1000 milliLiter(s) (50 mL/Hr) IV Continuous <Continuous>  dextrose 5%. 1000 milliLiter(s) (100 mL/Hr) IV Continuous <Continuous>  dextrose 50% Injectable 25 Gram(s) IV Push once  dextrose 50% Injectable 12.5 Gram(s) IV Push once  dextrose 50% Injectable 25 Gram(s) IV Push once  furosemide    Tablet 40 milliGRAM(s) Oral daily  glucagon  Injectable 1 milliGRAM(s) IntraMuscular once  heparin   Injectable 5000 Unit(s) SubCutaneous every 8 hours  influenza   Vaccine 0.5 milliLiter(s) IntraMuscular once  insulin lispro (ADMELOG) corrective regimen sliding scale   SubCutaneous every 6 hours  lactated ringers. 1000 milliLiter(s) (110 mL/Hr) IV Continuous <Continuous>  levothyroxine 175 MICROGram(s) Oral daily  metoprolol succinate ER 25 milliGRAM(s) Oral daily  metroNIDAZOLE  IVPB 500 milliGRAM(s) IV Intermittent every 8 hours    MEDICATIONS  (PRN):  acetaminophen   Tablet .. 650 milliGRAM(s) Oral every 6 hours PRN Mild Pain (1 - 3)  ondansetron Injectable 4 milliGRAM(s) IV Push every 6 hours PRN Nausea  oxyCODONE    IR 2.5 milliGRAM(s) Oral every 6 hours PRN Moderate Pain (4 - 6)  oxyCODONE    IR 5 milliGRAM(s) Oral every 6 hours PRN Severe Pain (7 - 10)  polyethylene glycol 3350 17 Gram(s) Oral once PRN constipaiton      RADIOLOGY & ADDITIONAL TESTS:    Imaging Personally Reviewed:  [ ] YES  [ ] NO    Consultant(s) Notes Reviewed:  [ ] YES  [ ] NO    Care Discussed with Consultants/Other Providers [ ] YES  [ ] NO

## 2021-01-14 NOTE — PROGRESS NOTE ADULT - ASSESSMENT
65M with PMH of HTN, HLD, DM, TIA (s/p plavix), A fib, CAD s/p CABG x3 on 7/6/20 (Dr. Conde), acute cholecystitis s/p perc dharmesh (Aug 2020, perc dharmesh removed Sept 2020) presents with 1 day history of RUQ pain and dark urine with clinical and radiographic evidence of acute cholecystitis with choledocholithiasis, cholangiocarcinoma cannot be ruled out.     Vitals are stable, Patient is feeling better today, no complaints  Abdomen is soft, non tender.  LFTs continue trending down  Tumor markers are all normal    Discussed with Dr. Wagner and Chief resident:  - Pending ERCP/EUS tomorrow  - Surgery Team 1c will continue to FU

## 2021-01-14 NOTE — PROGRESS NOTE ADULT - ASSESSMENT
65M w/ PMHx of HTN, HLD, DM, CAD s/p CABG 7/20, afib, hx of TIA and hx of acute cholecystitis s/p perc dharmesh 8/20 p/w 2d of abdominal pain. GI consulted for biliary dilation.    1. Intrahepatic biliary dilation - CT demonstrates intraabdominal LAD and new mild intrahepatic biliary ductal dilatation with a 2.0 cm long segment of abnormal luminal narrowing, wall thickening, enhancement at the confluence of the hepatic duct, suspicious for cholangiocarcinoma. MRCP with similar findings - no choledocholithiasis, but concerning for cholangiocarcinoma.  - Continue empiric antibiotics  - Appreciate Cardiology evaluation and risk stratification  - Continue to hold Plavix - current ASGE guidelines recommend holding for 5-7 days prior to a sphincterotomy or FNA given elevated bleeding risk  - Plan for EUS/ERCP tomorrow morning  - NPO at [place] for procedure  - Continue supportive care    Recommendations discussed with primary team  Case discussed with attending physician

## 2021-01-14 NOTE — PROGRESS NOTE ADULT - SUBJECTIVE AND OBJECTIVE BOX
24 hr events:  ON: 6pm labs stable, repleted K with 20 PO, skyler reg. low fat diet, -N/-V, decreased min. abd. pain   1/13: adv to low fat  diet. CEA, CA19, AFP all negative.    SUBJECTIVE:  Pt seen and examined by chief resident. Pt is doing well, resting comfortably on bed. Reports that he's feeling better. Pain improved with Tylenol overnight. No nausea or vomiting. No complaints at this time.    Vital Signs Last 24 Hrs  T(C): 36.6 (14 Jan 2021 04:44), Max: 36.6 (13 Jan 2021 14:38)  T(F): 97.8 (14 Jan 2021 04:44), Max: 97.8 (13 Jan 2021 14:38)  HR: 72 (14 Jan 2021 04:44) (66 - 77)  BP: 146/74 (14 Jan 2021 04:44) (111/63 - 146/74)  BP(mean): --  RR: 18 (14 Jan 2021 04:44) (16 - 18)  SpO2: 100% (14 Jan 2021 04:44) (95% - 100%)    Physical Exam:  General: NAD  Pulmonary: Nonlabored breathing, no respiratory distress  Abdominal: soft, NT/ND, no rebound or guarding.   Extremities: WWP, SCDs in place    I&O's Summary    13 Jan 2021 07:01  -  14 Jan 2021 07:00  --------------------------------------------------------  IN: 2080 mL / OUT: 1850 mL / NET: 230 mL        LABS:                        9.4    8.22  )-----------( 347      ( 14 Jan 2021 06:33 )             30.0     01-13    138  |  99  |  12  ----------------------------<  188<H>  3.9   |  28  |  0.84    Ca    9.2      13 Jan 2021 18:47  Phos  2.7     01-13  Mg     2.2     01-13    TPro  7.9  /  Alb  3.7  /  TBili  3.4<H>  /  DBili  x   /  AST  135<H>  /  ALT  381<H>  /  AlkPhos  431<H>  01-13    CAPILLARY BLOOD GLUCOSE  POCT Blood Glucose.: 111 mg/dL (14 Jan 2021 05:29)  POCT Blood Glucose.: 120 mg/dL (13 Jan 2021 23:32)  POCT Blood Glucose.: 175 mg/dL (13 Jan 2021 16:50)  POCT Blood Glucose.: 99 mg/dL (13 Jan 2021 11:49)  POCT Blood Glucose.: 111 mg/dL (13 Jan 2021 07:07)    LIVER FUNCTIONS - ( 13 Jan 2021 08:17 )  Alb: 3.7 g/dL / Pro: 7.9 g/dL / ALK PHOS: 431 U/L / ALT: 381 U/L / AST: 135 U/L / GGT: x

## 2021-01-14 NOTE — PROGRESS NOTE ADULT - SUBJECTIVE AND OBJECTIVE BOX
No acute issues overnight.  Patient denies having any abdominal symptoms, chest pain, nausea, vomiting or chills.  Pending ERCP/EUS tomorrow  Wants to get back to work on Monday    AVSS      MEDICATIONS  (STANDING):  cefTRIAXone   IVPB 1000 milliGRAM(s) IV Intermittent every 24 hours  dextrose 40% Gel 15 Gram(s) Oral once  dextrose 5%. 1000 milliLiter(s) (50 mL/Hr) IV Continuous <Continuous>  dextrose 5%. 1000 milliLiter(s) (100 mL/Hr) IV Continuous <Continuous>  dextrose 50% Injectable 25 Gram(s) IV Push once  dextrose 50% Injectable 12.5 Gram(s) IV Push once  dextrose 50% Injectable 25 Gram(s) IV Push once  furosemide    Tablet 40 milliGRAM(s) Oral daily  glucagon  Injectable 1 milliGRAM(s) IntraMuscular once  heparin   Injectable 5000 Unit(s) SubCutaneous every 8 hours  influenza   Vaccine 0.5 milliLiter(s) IntraMuscular once  insulin lispro (ADMELOG) corrective regimen sliding scale   SubCutaneous every 6 hours  lactated ringers. 1000 milliLiter(s) (80 mL/Hr) IV Continuous <Continuous>  levothyroxine 175 MICROGram(s) Oral daily  metoprolol succinate ER 25 milliGRAM(s) Oral daily  metroNIDAZOLE  IVPB 500 milliGRAM(s) IV Intermittent every 8 hours    MEDICATIONS  (PRN):  acetaminophen   Tablet .. 650 milliGRAM(s) Oral every 6 hours PRN Mild Pain (1 - 3)  ondansetron Injectable 4 milliGRAM(s) IV Push every 6 hours PRN Nausea  oxyCODONE    IR 2.5 milliGRAM(s) Oral every 6 hours PRN Moderate Pain (4 - 6)  oxyCODONE    IR 5 milliGRAM(s) Oral every 6 hours PRN Severe Pain (7 - 10)  polyethylene glycol 3350 17 Gram(s) Oral once PRN constipaiton      Vital Signs Last 24 Hrs  T(C): 36.8 (14 Jan 2021 08:27), Max: 36.8 (14 Jan 2021 08:27)  T(F): 98.3 (14 Jan 2021 08:27), Max: 98.3 (14 Jan 2021 08:27)  HR: 74 (14 Jan 2021 08:27) (66 - 77)  BP: 103/68 (14 Jan 2021 08:27) (103/68 - 146/74)  BP(mean): --  RR: 18 (14 Jan 2021 08:27) (16 - 18)  SpO2: 100% (14 Jan 2021 08:27) (95% - 100%)    Physical Exam:  General: NAD, resting comfortably in bed  Pulmonary: Nonlabored breathing, no respiratory distress  Cardiovascular: NSR  Abdominal: soft, NT/ND  Extremities: WWP, normal strength  Neuro: A/O x 3, CNs II-XII grossly intact, no focal deficits, normal motor/sensation  Pulses: palpable distal pulses    I&O's Summary    13 Jan 2021 07:01  -  14 Jan 2021 07:00  --------------------------------------------------------  IN: 2080 mL / OUT: 1850 mL / NET: 230 mL    14 Jan 2021 07:01  -  14 Jan 2021 11:34  --------------------------------------------------------  IN: 240 mL / OUT: 0 mL / NET: 240 mL        LABS:                        9.4    8.22  )-----------( 347      ( 14 Jan 2021 06:33 )             30.0     01-14    139  |  102  |  10  ----------------------------<  120<H>  4.0   |  24  |  0.75    Ca    9.3      14 Jan 2021 06:33  Phos  3.1     01-14  Mg     1.9     01-14    TPro  7.5  /  Alb  3.4  /  TBili  2.2<H>  /  DBili  x   /  AST  88<H>  /  ALT  275<H>  /  AlkPhos  391<H>  01-14        CAPILLARY BLOOD GLUCOSE      POCT Blood Glucose.: 111 mg/dL (14 Jan 2021 05:29)  POCT Blood Glucose.: 120 mg/dL (13 Jan 2021 23:32)  POCT Blood Glucose.: 175 mg/dL (13 Jan 2021 16:50)  POCT Blood Glucose.: 99 mg/dL (13 Jan 2021 11:49)    LIVER FUNCTIONS - ( 14 Jan 2021 06:33 )  Alb: 3.4 g/dL / Pro: 7.5 g/dL / ALK PHOS: 391 U/L / ALT: 275 U/L / AST: 88 U/L / GGT: x             RADIOLOGY & ADDITIONAL STUDIES:

## 2021-01-14 NOTE — PROGRESS NOTE ADULT - ASSESSMENT
65M PMH HTN, CAD (CABG 7/2020, LIMA-LAD, SVG-prox marginal, SVG-PDA), Afib (previously on Eliquis, recently d/c'ed), hx of cholecystitis s/p perc dharmesh (Aug 2020) presented with R side abdominal pain with dark urine and found to have choledocholithiasis. Cardiology consulted for pre-operative evaluation .  He was seen by cardiology on 1/10 and considered intermediate risk for MRCP and team was advised to proceed as planned without any further cardiac work up. Patient  is now s/p mrcp with  no choledocholithiasis, but concerning for cholangiocarcinoma.  Plan for potential EUS/ERCP on Friday for tissue acquisition and stenting as per primary team/ GI      Preop cardiac clearance:   Pt s/p 3v CABG 7/20 with post op Afib. Was dc'd on ASA/eliquis. He follows up with Dr Muñiz, outpatient cardiologist and has been on asa/plavix. Eliquis DC'd likey due to maintenance of NSR post CABG.  He was seen by cardiology on 1/10 and considered intermediate risk for MRCP/ possible lap dharmesh and was advised to  proceed with mrcp  as planned without any further cardiac work up.   -Last echo 7/20: normal EF  -At this time patient is  planned for for potential EUS/ERCP in am  for tissue acquisition and stenting. Continue to hold aspirin and plavix at this time   -Tele with sinus rythm today  -Will continue to follow post op . Patient was on Eliquis in the past due to post op a fib( 3V CABG 7/20)  .  Will monitor hr, rhythm post op and consider resuming if recurrent A fib. For now, pt has remained NSR on tele  -Case discussed with cardiology attending. Cards will continue to follow

## 2021-01-14 NOTE — PROGRESS NOTE ADULT - ASSESSMENT
65M with PMH of HTN, HLD, DM, TIA (s/p plavix), A fib, CAD s/p CABG x3 on 7/6/20 (Dr. Conde), acute cholecystitis s/p perc dharmesh (Aug 2020, perc dharmesh removed Sept 2020) presented with 1 day history of RUQ pain and dark urine with clinical and radiographic evidence of acute cholecystitis with choledocholithiasis, cholangiocarcinoma cannot be ruled out.     Low fat diabetic diet  Abx (ceftriaxone, flagyl)  Pain/nausea control PRN   Home meds as appropriate (Metoprolol, levothyroxine, lasix) (Holding Plavix, ASA, statin)   Cardiology consult: c/w metoprolol, can hold statin for transaminitis and plavix for procedures  SQH/SCDs  AM labs  Planning for EUS/ERCP 1/15

## 2021-01-14 NOTE — PROGRESS NOTE ADULT - SUBJECTIVE AND OBJECTIVE BOX
GASTROENTEROLOGY PROGRESS NOTE  Patient seen and examined at bedside. Patient feeling well without complaints.    PERTINENT REVIEW OF SYSTEMS:  CONSTITUTIONAL: No weakness, fevers or chills  HEENT: No visual changes; No vertigo or throat pain   GASTROINTESTINAL: No abdominal or epigastric pain. No nausea, vomiting, or hematemesis; No diarrhea or constipation. No melena or hematochezia.  NEUROLOGICAL: No numbness or weakness  SKIN: No itching, burning, rashes, or lesions     Allergies    No Known Allergies    Intolerances      MEDICATIONS:  MEDICATIONS  (STANDING):  cefTRIAXone   IVPB 1000 milliGRAM(s) IV Intermittent every 24 hours  dextrose 40% Gel 15 Gram(s) Oral once  dextrose 5%. 1000 milliLiter(s) (50 mL/Hr) IV Continuous <Continuous>  dextrose 5%. 1000 milliLiter(s) (100 mL/Hr) IV Continuous <Continuous>  dextrose 50% Injectable 25 Gram(s) IV Push once  dextrose 50% Injectable 12.5 Gram(s) IV Push once  dextrose 50% Injectable 25 Gram(s) IV Push once  glucagon  Injectable 1 milliGRAM(s) IntraMuscular once  heparin   Injectable 5000 Unit(s) SubCutaneous every 8 hours  influenza   Vaccine 0.5 milliLiter(s) IntraMuscular once  insulin lispro (ADMELOG) corrective regimen sliding scale   SubCutaneous every 6 hours  lactated ringers. 1000 milliLiter(s) (80 mL/Hr) IV Continuous <Continuous>  levothyroxine 175 MICROGram(s) Oral daily  metoprolol succinate ER 25 milliGRAM(s) Oral daily  metroNIDAZOLE  IVPB 500 milliGRAM(s) IV Intermittent every 8 hours    MEDICATIONS  (PRN):  acetaminophen   Tablet .. 650 milliGRAM(s) Oral every 6 hours PRN Mild Pain (1 - 3)  ondansetron Injectable 4 milliGRAM(s) IV Push every 6 hours PRN Nausea  oxyCODONE    IR 2.5 milliGRAM(s) Oral every 6 hours PRN Moderate Pain (4 - 6)  oxyCODONE    IR 5 milliGRAM(s) Oral every 6 hours PRN Severe Pain (7 - 10)  polyethylene glycol 3350 17 Gram(s) Oral once PRN constipaiton    Vital Signs Last 24 Hrs  T(C): 36.4 (14 Jan 2021 13:14), Max: 36.8 (14 Jan 2021 08:27)  T(F): 97.5 (14 Jan 2021 13:14), Max: 98.3 (14 Jan 2021 08:27)  HR: 73 (14 Jan 2021 13:14) (66 - 77)  BP: 153/76 (14 Jan 2021 13:14) (103/68 - 153/76)  BP(mean): --  RR: 19 (14 Jan 2021 13:14) (16 - 19)  SpO2: 96% (14 Jan 2021 13:14) (95% - 100%)    01-13 @ 07:01 - 01-14 @ 07:00  --------------------------------------------------------  IN: 2080 mL / OUT: 1850 mL / NET: 230 mL    01-14 @ 07:01 - 01-14 @ 13:44  --------------------------------------------------------  IN: 600 mL / OUT: 0 mL / NET: 600 mL      PHYSICAL EXAM:    General: Well developed; well nourished; in no acute distress  HEENT: MMM, conjunctiva and sclera clear  Gastrointestinal: Soft non-tender non-distended; Normal bowel sounds; No hepatosplenomegaly. No rebound or guarding  Skin: Warm and dry. No obvious rash    LABS:                        9.4    8.22  )-----------( 347      ( 14 Jan 2021 06:33 )             30.0     01-14    139  |  102  |  10  ----------------------------<  120<H>  4.0   |  24  |  0.75    Ca    9.3      14 Jan 2021 06:33  Phos  3.1     01-14  Mg     1.9     01-14    TPro  7.5  /  Alb  3.4  /  TBili  2.2<H>  /  DBili  x   /  AST  88<H>  /  ALT  275<H>  /  AlkPhos  391<H>  01-14                      RADIOLOGY & ADDITIONAL STUDIES:  Reviewed

## 2021-01-14 NOTE — PROGRESS NOTE ADULT - SUBJECTIVE AND OBJECTIVE BOX
OVERNIGHT EVENTS: none    SUBJECTIVE / INTERVAL HPI: Patient seen and examined at bedside. MARGO    VITAL SIGNS:  Vital Signs Last 24 Hrs  T(C): 36.4 (14 Jan 2021 13:14), Max: 36.8 (14 Jan 2021 08:27)  T(F): 97.5 (14 Jan 2021 13:14), Max: 98.3 (14 Jan 2021 08:27)  HR: 73 (14 Jan 2021 13:14) (71 - 77)  BP: 153/76 (14 Jan 2021 13:14) (103/68 - 153/76)  BP(mean): --  RR: 19 (14 Jan 2021 13:14) (17 - 19)  SpO2: 96% (14 Jan 2021 13:14) (95% - 100%)    PHYSICAL EXAM:    General: WDWN  HEENT: Scleral jaundice notable   Neck: supple  Cardiovascular: +S1/S2; RRR  Respiratory: CTA B/L; no W/R/R  Gastrointestinal: soft, distended  Extremities: WWP; no edema, clubbing or cyanosis  Vascular: 2+ radial, DP/PT pulses B/L  Neurological: AAOx3; no focal deficits    MEDICATIONS:  MEDICATIONS  (STANDING):  atorvastatin 80 milliGRAM(s) Oral at bedtime  cefTRIAXone   IVPB 1000 milliGRAM(s) IV Intermittent every 24 hours  dextrose 40% Gel 15 Gram(s) Oral once  dextrose 5%. 1000 milliLiter(s) (50 mL/Hr) IV Continuous <Continuous>  dextrose 5%. 1000 milliLiter(s) (100 mL/Hr) IV Continuous <Continuous>  dextrose 50% Injectable 25 Gram(s) IV Push once  dextrose 50% Injectable 12.5 Gram(s) IV Push once  dextrose 50% Injectable 25 Gram(s) IV Push once  glucagon  Injectable 1 milliGRAM(s) IntraMuscular once  heparin   Injectable 5000 Unit(s) SubCutaneous every 8 hours  influenza   Vaccine 0.5 milliLiter(s) IntraMuscular once  insulin lispro (ADMELOG) corrective regimen sliding scale   SubCutaneous every 6 hours  lactated ringers. 1000 milliLiter(s) (80 mL/Hr) IV Continuous <Continuous>  levothyroxine 175 MICROGram(s) Oral daily  metoprolol succinate ER 25 milliGRAM(s) Oral daily  metroNIDAZOLE  IVPB 500 milliGRAM(s) IV Intermittent every 8 hours    MEDICATIONS  (PRN):  acetaminophen   Tablet .. 650 milliGRAM(s) Oral every 6 hours PRN Mild Pain (1 - 3)  ondansetron Injectable 4 milliGRAM(s) IV Push every 6 hours PRN Nausea  oxyCODONE    IR 2.5 milliGRAM(s) Oral every 6 hours PRN Moderate Pain (4 - 6)  oxyCODONE    IR 5 milliGRAM(s) Oral every 6 hours PRN Severe Pain (7 - 10)  polyethylene glycol 3350 17 Gram(s) Oral once PRN constipaiton      ALLERGIES:  Allergies    No Known Allergies    Intolerances        LABS:                        9.4    8.22  )-----------( 347      ( 14 Jan 2021 06:33 )             30.0     01-14    139  |  102  |  10  ----------------------------<  120<H>  4.0   |  24  |  0.75    Ca    9.3      14 Jan 2021 06:33  Phos  3.1     01-14  Mg     1.9     01-14    TPro  7.5  /  Alb  3.4  /  TBili  2.2<H>  /  DBili  x   /  AST  88<H>  /  ALT  275<H>  /  AlkPhos  391<H>  01-14        CAPILLARY BLOOD GLUCOSE      POCT Blood Glucose.: 188 mg/dL (14 Jan 2021 13:20)      RADIOLOGY & ADDITIONAL TESTS: Reviewed.    ASSESSMENT:    PLAN:

## 2021-01-14 NOTE — PROGRESS NOTE ADULT - ATTENDING COMMENTS
Continue to hold plavix.  For EUS/ERCP on Friday.
Continue to hold plavix.  Will plan for an EUS as well as ERCP tomorrow to evaluate.
F/u MRCP.  Surgical management of cholecystitis.  Pt received plavix yesterday so any endoscopic intervention would be later on this week.
Initial attending contact date   1/11/21   . See resident note written above for details. I reviewed the resident documentation. I have personally seen and examined this patient. I reviewed vitals, labs, medications, cardiac studies, and additional imaging. I agree with the above resident's findings and plans as written above
Seen/discussed with fellow on 1/13  Agree with above
. See fellow note written above for details. I reviewed the fellow documentation. I have personally seen and examined this patient. I reviewed vitals, labs, medications, cardiac studies, and additional imaging. I agree with the above fellow's findings and plans as written above

## 2021-01-15 ENCOUNTER — TRANSCRIPTION ENCOUNTER (OUTPATIENT)
Age: 66
End: 2021-01-15

## 2021-01-15 ENCOUNTER — RESULT REVIEW (OUTPATIENT)
Age: 66
End: 2021-01-15

## 2021-01-15 VITALS — WEIGHT: 188.94 LBS

## 2021-01-15 PROBLEM — K80.50 CALCULUS OF BILE DUCT WITHOUT CHOLANGITIS OR CHOLECYSTITIS WITHOUT OBSTRUCTION: Chronic | Status: ACTIVE | Noted: 2021-01-10

## 2021-01-15 LAB
ALBUMIN SERPL ELPH-MCNC: 3.3 G/DL — SIGNIFICANT CHANGE UP (ref 3.3–5)
ALBUMIN SERPL ELPH-MCNC: 3.5 G/DL — SIGNIFICANT CHANGE UP (ref 3.3–5)
ALP SERPL-CCNC: 327 U/L — HIGH (ref 40–120)
ALP SERPL-CCNC: 340 U/L — HIGH (ref 40–120)
ALT FLD-CCNC: 204 U/L — HIGH (ref 10–45)
ALT FLD-CCNC: 208 U/L — HIGH (ref 10–45)
ANION GAP SERPL CALC-SCNC: 10 MMOL/L — SIGNIFICANT CHANGE UP (ref 5–17)
ANION GAP SERPL CALC-SCNC: 12 MMOL/L — SIGNIFICANT CHANGE UP (ref 5–17)
APTT BLD: 45.4 SEC — HIGH (ref 27.5–35.5)
AST SERPL-CCNC: 66 U/L — HIGH (ref 10–40)
AST SERPL-CCNC: 68 U/L — HIGH (ref 10–40)
BILIRUB SERPL-MCNC: 1.5 MG/DL — HIGH (ref 0.2–1.2)
BILIRUB SERPL-MCNC: 1.7 MG/DL — HIGH (ref 0.2–1.2)
BLD GP AB SCN SERPL QL: NEGATIVE — SIGNIFICANT CHANGE UP
BUN SERPL-MCNC: 11 MG/DL — SIGNIFICANT CHANGE UP (ref 7–23)
BUN SERPL-MCNC: 13 MG/DL — SIGNIFICANT CHANGE UP (ref 7–23)
CALCIUM SERPL-MCNC: 9.3 MG/DL — SIGNIFICANT CHANGE UP (ref 8.4–10.5)
CALCIUM SERPL-MCNC: 9.8 MG/DL — SIGNIFICANT CHANGE UP (ref 8.4–10.5)
CHLORIDE SERPL-SCNC: 101 MMOL/L — SIGNIFICANT CHANGE UP (ref 96–108)
CHLORIDE SERPL-SCNC: 99 MMOL/L — SIGNIFICANT CHANGE UP (ref 96–108)
CO2 SERPL-SCNC: 26 MMOL/L — SIGNIFICANT CHANGE UP (ref 22–31)
CO2 SERPL-SCNC: 27 MMOL/L — SIGNIFICANT CHANGE UP (ref 22–31)
CREAT SERPL-MCNC: 0.7 MG/DL — SIGNIFICANT CHANGE UP (ref 0.5–1.3)
CREAT SERPL-MCNC: 0.77 MG/DL — SIGNIFICANT CHANGE UP (ref 0.5–1.3)
GLUCOSE BLDC GLUCOMTR-MCNC: 131 MG/DL — HIGH (ref 70–99)
GLUCOSE BLDC GLUCOMTR-MCNC: 160 MG/DL — HIGH (ref 70–99)
GLUCOSE SERPL-MCNC: 131 MG/DL — HIGH (ref 70–99)
GLUCOSE SERPL-MCNC: 146 MG/DL — HIGH (ref 70–99)
HCT VFR BLD CALC: 30.8 % — LOW (ref 39–50)
HCT VFR BLD CALC: 32.2 % — LOW (ref 39–50)
HGB BLD-MCNC: 9.5 G/DL — LOW (ref 13–17)
HGB BLD-MCNC: 9.9 G/DL — LOW (ref 13–17)
INR BLD: 1.07 — SIGNIFICANT CHANGE UP (ref 0.88–1.16)
LIDOCAIN IGE QN: 49 U/L — SIGNIFICANT CHANGE UP (ref 7–60)
MAGNESIUM SERPL-MCNC: 1.7 MG/DL — SIGNIFICANT CHANGE UP (ref 1.6–2.6)
MAGNESIUM SERPL-MCNC: 2.2 MG/DL — SIGNIFICANT CHANGE UP (ref 1.6–2.6)
MCHC RBC-ENTMCNC: 28.6 PG — SIGNIFICANT CHANGE UP (ref 27–34)
MCHC RBC-ENTMCNC: 29 PG — SIGNIFICANT CHANGE UP (ref 27–34)
MCHC RBC-ENTMCNC: 30.7 GM/DL — LOW (ref 32–36)
MCHC RBC-ENTMCNC: 30.8 GM/DL — LOW (ref 32–36)
MCV RBC AUTO: 93.1 FL — SIGNIFICANT CHANGE UP (ref 80–100)
MCV RBC AUTO: 93.9 FL — SIGNIFICANT CHANGE UP (ref 80–100)
NRBC # BLD: 0 /100 WBCS — SIGNIFICANT CHANGE UP (ref 0–0)
NRBC # BLD: 0 /100 WBCS — SIGNIFICANT CHANGE UP (ref 0–0)
PHOSPHATE SERPL-MCNC: 3.1 MG/DL — SIGNIFICANT CHANGE UP (ref 2.5–4.5)
PHOSPHATE SERPL-MCNC: 3.3 MG/DL — SIGNIFICANT CHANGE UP (ref 2.5–4.5)
PLATELET # BLD AUTO: 346 K/UL — SIGNIFICANT CHANGE UP (ref 150–400)
PLATELET # BLD AUTO: 356 K/UL — SIGNIFICANT CHANGE UP (ref 150–400)
POTASSIUM SERPL-MCNC: 4.1 MMOL/L — SIGNIFICANT CHANGE UP (ref 3.5–5.3)
POTASSIUM SERPL-MCNC: 4.2 MMOL/L — SIGNIFICANT CHANGE UP (ref 3.5–5.3)
POTASSIUM SERPL-SCNC: 4.1 MMOL/L — SIGNIFICANT CHANGE UP (ref 3.5–5.3)
POTASSIUM SERPL-SCNC: 4.2 MMOL/L — SIGNIFICANT CHANGE UP (ref 3.5–5.3)
PREALB SERPL-MCNC: 16 MG/DL — LOW (ref 20–40)
PROT SERPL-MCNC: 7.4 G/DL — SIGNIFICANT CHANGE UP (ref 6–8.3)
PROT SERPL-MCNC: 7.9 G/DL — SIGNIFICANT CHANGE UP (ref 6–8.3)
PROTHROM AB SERPL-ACNC: 12.8 SEC — SIGNIFICANT CHANGE UP (ref 10.6–13.6)
RBC # BLD: 3.28 M/UL — LOW (ref 4.2–5.8)
RBC # BLD: 3.46 M/UL — LOW (ref 4.2–5.8)
RBC # FLD: 12.8 % — SIGNIFICANT CHANGE UP (ref 10.3–14.5)
RBC # FLD: 13.1 % — SIGNIFICANT CHANGE UP (ref 10.3–14.5)
RH IG SCN BLD-IMP: POSITIVE — SIGNIFICANT CHANGE UP
SODIUM SERPL-SCNC: 136 MMOL/L — SIGNIFICANT CHANGE UP (ref 135–145)
SODIUM SERPL-SCNC: 139 MMOL/L — SIGNIFICANT CHANGE UP (ref 135–145)
WBC # BLD: 10.44 K/UL — SIGNIFICANT CHANGE UP (ref 3.8–10.5)
WBC # BLD: 8.17 K/UL — SIGNIFICANT CHANGE UP (ref 3.8–10.5)
WBC # FLD AUTO: 10.44 K/UL — SIGNIFICANT CHANGE UP (ref 3.8–10.5)
WBC # FLD AUTO: 8.17 K/UL — SIGNIFICANT CHANGE UP (ref 3.8–10.5)

## 2021-01-15 PROCEDURE — 85610 PROTHROMBIN TIME: CPT

## 2021-01-15 PROCEDURE — U0005: CPT

## 2021-01-15 PROCEDURE — 80053 COMPREHEN METABOLIC PANEL: CPT

## 2021-01-15 PROCEDURE — C1769: CPT

## 2021-01-15 PROCEDURE — 99233 SBSQ HOSP IP/OBS HIGH 50: CPT | Mod: GC

## 2021-01-15 PROCEDURE — 85730 THROMBOPLASTIN TIME PARTIAL: CPT

## 2021-01-15 PROCEDURE — 74328 X-RAY BILE DUCT ENDOSCOPY: CPT | Mod: 26

## 2021-01-15 PROCEDURE — 83036 HEMOGLOBIN GLYCOSYLATED A1C: CPT

## 2021-01-15 PROCEDURE — 99232 SBSQ HOSP IP/OBS MODERATE 35: CPT

## 2021-01-15 PROCEDURE — 74181 MRI ABDOMEN W/O CONTRAST: CPT

## 2021-01-15 PROCEDURE — 43259 EGD US EXAM DUODENUM/JEJUNUM: CPT | Mod: 59

## 2021-01-15 PROCEDURE — 86901 BLOOD TYPING SEROLOGIC RH(D): CPT

## 2021-01-15 PROCEDURE — 86301 IMMUNOASSAY TUMOR CA 19-9: CPT

## 2021-01-15 PROCEDURE — 74177 CT ABD & PELVIS W/CONTRAST: CPT

## 2021-01-15 PROCEDURE — 88104 CYTOPATH FL NONGYN SMEARS: CPT

## 2021-01-15 PROCEDURE — 85027 COMPLETE CBC AUTOMATED: CPT

## 2021-01-15 PROCEDURE — 83735 ASSAY OF MAGNESIUM: CPT

## 2021-01-15 PROCEDURE — 36415 COLL VENOUS BLD VENIPUNCTURE: CPT

## 2021-01-15 PROCEDURE — 85025 COMPLETE CBC W/AUTO DIFF WBC: CPT

## 2021-01-15 PROCEDURE — C2625: CPT

## 2021-01-15 PROCEDURE — 87086 URINE CULTURE/COLONY COUNT: CPT

## 2021-01-15 PROCEDURE — 83690 ASSAY OF LIPASE: CPT

## 2021-01-15 PROCEDURE — 88305 TISSUE EXAM BY PATHOLOGIST: CPT | Mod: 26

## 2021-01-15 PROCEDURE — 82105 ALPHA-FETOPROTEIN SERUM: CPT

## 2021-01-15 PROCEDURE — 88104 CYTOPATH FL NONGYN SMEARS: CPT | Mod: 26

## 2021-01-15 PROCEDURE — 93005 ELECTROCARDIOGRAM TRACING: CPT

## 2021-01-15 PROCEDURE — 84100 ASSAY OF PHOSPHORUS: CPT

## 2021-01-15 PROCEDURE — 82378 CARCINOEMBRYONIC ANTIGEN: CPT

## 2021-01-15 PROCEDURE — 76705 ECHO EXAM OF ABDOMEN: CPT

## 2021-01-15 PROCEDURE — 82248 BILIRUBIN DIRECT: CPT

## 2021-01-15 PROCEDURE — 84134 ASSAY OF PREALBUMIN: CPT

## 2021-01-15 PROCEDURE — U0003: CPT

## 2021-01-15 PROCEDURE — 99285 EMERGENCY DEPT VISIT HI MDM: CPT | Mod: 25

## 2021-01-15 PROCEDURE — 81003 URINALYSIS AUTO W/O SCOPE: CPT

## 2021-01-15 PROCEDURE — 99233 SBSQ HOSP IP/OBS HIGH 50: CPT

## 2021-01-15 PROCEDURE — 43274 ERCP DUCT STENT PLACEMENT: CPT

## 2021-01-15 PROCEDURE — 43261 ENDO CHOLANGIOPANCREATOGRAPH: CPT | Mod: 59

## 2021-01-15 PROCEDURE — 88305 TISSUE EXAM BY PATHOLOGIST: CPT

## 2021-01-15 PROCEDURE — 86900 BLOOD TYPING SEROLOGIC ABO: CPT

## 2021-01-15 PROCEDURE — 86850 RBC ANTIBODY SCREEN: CPT

## 2021-01-15 PROCEDURE — 82962 GLUCOSE BLOOD TEST: CPT

## 2021-01-15 PROCEDURE — 80048 BASIC METABOLIC PNL TOTAL CA: CPT

## 2021-01-15 PROCEDURE — 96374 THER/PROPH/DIAG INJ IV PUSH: CPT

## 2021-01-15 RX ORDER — CEFPODOXIME PROXETIL 100 MG
2 TABLET ORAL
Qty: 20 | Refills: 0
Start: 2021-01-15 | End: 2021-01-19

## 2021-01-15 RX ORDER — MAGNESIUM SULFATE 500 MG/ML
2 VIAL (ML) INJECTION ONCE
Refills: 0 | Status: COMPLETED | OUTPATIENT
Start: 2021-01-15 | End: 2021-01-15

## 2021-01-15 RX ORDER — METRONIDAZOLE 500 MG
1 TABLET ORAL
Qty: 15 | Refills: 0
Start: 2021-01-15 | End: 2021-01-19

## 2021-01-15 RX ORDER — FUROSEMIDE 40 MG
1 TABLET ORAL
Qty: 0 | Refills: 0 | DISCHARGE

## 2021-01-15 RX ORDER — ACETAMINOPHEN 500 MG
2 TABLET ORAL
Qty: 0 | Refills: 0 | DISCHARGE
Start: 2021-01-15

## 2021-01-15 RX ORDER — CEFTRIAXONE 500 MG/1
1000 INJECTION, POWDER, FOR SOLUTION INTRAMUSCULAR; INTRAVENOUS EVERY 24 HOURS
Refills: 0 | Status: DISCONTINUED | OUTPATIENT
Start: 2021-01-15 | End: 2021-01-15

## 2021-01-15 RX ORDER — FOLIC ACID 0.8 MG
1 TABLET ORAL
Qty: 0 | Refills: 0 | DISCHARGE

## 2021-01-15 RX ADMIN — Medication 175 MICROGRAM(S): at 05:48

## 2021-01-15 RX ADMIN — Medication 100 MILLIGRAM(S): at 13:01

## 2021-01-15 RX ADMIN — Medication 1: at 13:00

## 2021-01-15 RX ADMIN — Medication 25 MILLIGRAM(S): at 05:48

## 2021-01-15 RX ADMIN — Medication 650 MILLIGRAM(S): at 13:56

## 2021-01-15 RX ADMIN — HEPARIN SODIUM 5000 UNIT(S): 5000 INJECTION INTRAVENOUS; SUBCUTANEOUS at 13:00

## 2021-01-15 RX ADMIN — Medication 100 MILLIGRAM(S): at 05:49

## 2021-01-15 RX ADMIN — SODIUM CHLORIDE 80 MILLILITER(S): 9 INJECTION, SOLUTION INTRAVENOUS at 00:00

## 2021-01-15 RX ADMIN — Medication 50 GRAM(S): at 11:53

## 2021-01-15 RX ADMIN — HEPARIN SODIUM 5000 UNIT(S): 5000 INJECTION INTRAVENOUS; SUBCUTANEOUS at 05:48

## 2021-01-15 NOTE — PROGRESS NOTE ADULT - NSHPATTENDINGPLANDISCUSS_GEN_ALL_CORE
housestaff
Dr. Carmona
housestaff
Surgery Team
patient, housestaff

## 2021-01-15 NOTE — DIETITIAN INITIAL EVALUATION ADULT. - OTHER INFO
65M with PMH of HTN, HLD, DM, TIA (s/p plavix), A fib, CAD s/p CABG x3 on 7/6/20 (Dr. Conde), acute cholecystitis s/p perc dharmesh (Aug 2020, perc dharmesh removed Sept 2020) presented with 1 day history of RUQ pain and dark urine with clinical and radiographic evidence of acute cholecystitis with choledocholithiasis, cholangiocarcinoma cannot be ruled out. S/P MRCP revealing 1.5 cm long common hepatic duct narrowing. Increase in intrahepatic biliary dilatation. No choledocholithiasis or cholelithiasis. Pt for planned OR today for EUS/ ERCP 1/15 for further assessment.     Attempted to complete verbal assessment, but pt was out of room likely in OR. Hx provided by EMR and disc with team. Pt is currently NPO for procedure. Unable to obtain UBW nor appetite PTA. NKFA per EMR. Educational handout on low fat diet guidelines left at pt bedside for him to review- RD with plan to follow per protocol. GI: WDL, last BM 1/11. Skin: WDL, whit score 20. Pt with notd hx of DM but well controlled (A1C 5.1%). Hyperglycemia likely 2/2 inflammatory process or infection- Currently on abx regime. No pain noted at this time. Please see nutrition recs below. RD to follow.

## 2021-01-15 NOTE — DISCHARGE NOTE NURSING/CASE MANAGEMENT/SOCIAL WORK - PATIENT PORTAL LINK FT
You can access the FollowMyHealth Patient Portal offered by [facility] by registering at the following website: [email or web address]. By joining [employer]’s FollowMyHealth portal, you will also be able to view your health information using other applications (apps) compatible with our system.

## 2021-01-15 NOTE — DISCHARGE NOTE NURSING/CASE MANAGEMENT/SOCIAL WORK - NSDCFUADDAPPT_GEN_ALL_CORE_FT
Please follow up with your outpatient cardiologist and your primary care doctor tomorrow or as soon as possible.     Oncology Follow Up:  Please follow up with Dr. Armstrong. We have given his office your number and they should be contacting you within the next few days. If you do not hear from his office by Monday (1/18), please call the office to make an appointment. His office number is [phone].     Surgery Follow Up:  Please follow up with the general surgeon on your case, Dr. Weir on 1/19/2021, please call the office to make an appointment as soon as possible. His office number is [phone].  Also please follow up with the hepatobiliary surgeon, Dr. Wagner on 1/19/2021 or 1/20/21, please call the office to make an appointment as soon as possible. It is very important that you follow up with these surgeons regarding scheduling your surgery for next Thursday, 1/21/20.

## 2021-01-15 NOTE — DISCHARGE NOTE PROVIDER - NSDCFUADDINST_GEN_ALL_CORE_FT
Hold Plavix and aspirin till appointment with cardiology tomorrow for further cardiac risk assessment.    Warning Signs:  Please call your doctor or nurse practitioner if you experience the following:  *You experience new chest pain, pressure, squeezing or tightness.  *New or worsening cough, shortness of breath, or wheeze.  *If you are vomiting and cannot keep down fluids or your medications.  *You are getting dehydrated due to continued vomiting, diarrhea, or other reasons. Signs of dehydration include dry mouth, rapid heartbeat, or feeling dizzy or faint when standing.  *You experience burning when you urinate, have blood in your urine, or experience a discharge.  *Your pain is not improving within 8-12 hours or is not gone within 24 hours.  *You have shaking chills, or fever greater than 101.5 degrees Fahrenheit or 38 degrees Celsius.  *Any change in your symptoms, or any new symptoms that concern you.   Hold Plavix and aspirin till appointment with cardiology tomorrow (1/16/21) for further cardiac risk assessment.     You are being prescribed Cefpodoxime 400mg twice daily for the next five days. Flagyl 800mg to be taken three times daily for the next five days.     Warning Signs:  Please call your doctor or nurse practitioner if you experience the following:  *You experience new chest pain, pressure, squeezing or tightness.  *New or worsening cough, shortness of breath, or wheeze.  *If you are vomiting and cannot keep down fluids or your medications.  *You are getting dehydrated due to continued vomiting, diarrhea, or other reasons. Signs of dehydration include dry mouth, rapid heartbeat, or feeling dizzy or faint when standing.  *You experience burning when you urinate, have blood in your urine, or experience a discharge.  *Your pain is not improving within 8-12 hours or is not gone within 24 hours.  *You have shaking chills, or fever greater than 101.5 degrees Fahrenheit or 38 degrees Celsius.  *Any change in your symptoms, or any new symptoms that concern you.   Hold Plavix and aspirin till appointment with cardiology tomorrow (1/16/21) for further cardiac risk assessment. Please continue to hold Lasix. Please continue metoprolol ER at home.     You are being prescribed Cefpodoxime 400mg twice daily for the next five days. Flagyl 800mg to be taken three times daily for the next five days.     Warning Signs:  Please call your doctor or nurse practitioner if you experience the following:  *You experience new chest pain, pressure, squeezing or tightness.  *New or worsening cough, shortness of breath, or wheeze.  *If you are vomiting and cannot keep down fluids or your medications.  *You are getting dehydrated due to continued vomiting, diarrhea, or other reasons. Signs of dehydration include dry mouth, rapid heartbeat, or feeling dizzy or faint when standing.  *You experience burning when you urinate, have blood in your urine, or experience a discharge.  *Your pain is not improving within 8-12 hours or is not gone within 24 hours.  *You have shaking chills, or fever greater than 101.5 degrees Fahrenheit or 38 degrees Celsius.  *Any change in your symptoms, or any new symptoms that concern you.   Since we are planning your surgery for next Thursday, 1/21/21, the cardiologist's recommend that you hold your Plavix and aspirin until you have your surgery. Please follow up with your outpatient cardiologist tomorrow (1/16/21) regarding this and for further cardiac risk assessment. Please continue to hold Lasix. Please continue metoprolol ER at home.     You are being prescribed Cefpodoxime 400mg twice daily for the next five days. Flagyl 800mg to be taken three times daily for the next five days.     Warning Signs:  Please call your doctor or nurse practitioner if you experience the following:  *You experience new chest pain, pressure, squeezing or tightness.  *New or worsening cough, shortness of breath, or wheeze.  *If you are vomiting and cannot keep down fluids or your medications.  *You are getting dehydrated due to continued vomiting, diarrhea, or other reasons. Signs of dehydration include dry mouth, rapid heartbeat, or feeling dizzy or faint when standing.  *You experience burning when you urinate, have blood in your urine, or experience a discharge.  *Your pain is not improving within 8-12 hours or is not gone within 24 hours.  *You have shaking chills, or fever greater than 101.5 degrees Fahrenheit or 38 degrees Celsius.  *Any change in your symptoms, or any new symptoms that concern you.

## 2021-01-15 NOTE — PROGRESS NOTE ADULT - PROBLEM SELECTOR PLAN 5
Continue to hold statin for now in setting of transaminitis

## 2021-01-15 NOTE — PROGRESS NOTE ADULT - REASON FOR ADMISSION
Choledocholithiasis
Elevated LTs
Choledocholithiasis
Elevated LTs
Choledocholithiasis
Choledocholithiasis

## 2021-01-15 NOTE — PROGRESS NOTE ADULT - ASSESSMENT
65M with PMH of HTN, HLD, DM, TIA (s/p plavix), A fib, CAD s/p CABG x3 on 7/6/20 (Dr. Conde), acute cholecystitis s/p perc dharmesh (Aug 2020, perc dharmesh removed Sept 2020) presented with 1 day history of RUQ pain and dark urine with clinical and radiographic evidence of acute cholecystitis with choledocholithiasis, cholangiocarcinoma cannot be ruled out.     Low fat diabetic diet// NPO @MN with IVF @80cc for procedure today  Abx (ceftriaxone, flagyl)  Pain/nausea control PRN   Home meds as appropriate (Metoprolol, levothyroxine, statin) (Holding Plavix, ASA, lasix)   Cardiology consult  SQH/SCDs  AM labs  EUS/ERCP today with GI

## 2021-01-15 NOTE — PRE-OP CHECKLIST - SELECT TESTS ORDERED
CBC/CMP/PT/PTT/INR/Type and Screen/Urinalysis/EKG/COVID CBC/CMP/PT/PTT/INR/Type and Screen/Urinalysis/EKG/CXR/COVID

## 2021-01-15 NOTE — PROGRESS NOTE ADULT - SUBJECTIVE AND OBJECTIVE BOX
Seen and examined at the bedside, patient has no complaints.  Going to ERCP/EUS  [facility]        MEDICATIONS  (STANDING):  atorvastatin 80 milliGRAM(s) Oral at bedtime  cefTRIAXone   IVPB 1000 milliGRAM(s) IV Intermittent every 24 hours  dextrose 40% Gel 15 Gram(s) Oral once  dextrose 5%. 1000 milliLiter(s) (50 mL/Hr) IV Continuous <Continuous>  dextrose 5%. 1000 milliLiter(s) (100 mL/Hr) IV Continuous <Continuous>  dextrose 50% Injectable 25 Gram(s) IV Push once  dextrose 50% Injectable 25 Gram(s) IV Push once  dextrose 50% Injectable 12.5 Gram(s) IV Push once  glucagon  Injectable 1 milliGRAM(s) IntraMuscular once  heparin   Injectable 5000 Unit(s) SubCutaneous every 8 hours  influenza   Vaccine 0.5 milliLiter(s) IntraMuscular once  insulin lispro (ADMELOG) corrective regimen sliding scale   SubCutaneous every 6 hours  lactated ringers. 1000 milliLiter(s) (80 mL/Hr) IV Continuous <Continuous>  levothyroxine 175 MICROGram(s) Oral daily  metoprolol succinate ER 25 milliGRAM(s) Oral daily  metroNIDAZOLE  IVPB 500 milliGRAM(s) IV Intermittent every 8 hours    MEDICATIONS  (PRN):  acetaminophen   Tablet .. 650 milliGRAM(s) Oral every 6 hours PRN Mild Pain (1 - 3)  ondansetron Injectable 4 milliGRAM(s) IV Push every 6 hours PRN Nausea  oxyCODONE    IR 2.5 milliGRAM(s) Oral every 6 hours PRN Moderate Pain (4 - 6)  oxyCODONE    IR 5 milliGRAM(s) Oral every 6 hours PRN Severe Pain (7 - 10)  polyethylene glycol 3350 17 Gram(s) Oral once PRN constipaiton      Vital Signs Last 24 Hrs  T(C): 36.6 (15 Josesito 2021 04:24), Max: 36.9 (14 Jan 2021 20:15)  T(F): 97.9 (15 Josesito 2021 04:24), Max: 98.4 (14 Jan 2021 20:15)  HR: 68 (15 Josesito 2021 04:24) (68 - 73)  BP: 134/75 (15 Josesito 2021 04:24) (116/66 - 153/76)  BP(mean): --  RR: 19 (15 Josesito 2021 04:24) (19 - 19)  SpO2: 98% (15 Joessito 2021 04:24) (96% - 99%)    Physical Exam:  General: NAD, resting comfortably in bed  Pulmonary: Nonlabored breathing, no respiratory distress  Cardiovascular: NSR  Abdominal: soft, NT/ND  Extremities: WWP, normal strength  Neuro: A/O x 3, CNs II-XII grossly intact, no focal deficits, normal motor/sensation  Pulses: palpable distal pulses    I&O's Summary    14 Jan 2021 07:01  -  15 Josesito 2021 07:00  --------------------------------------------------------  IN: 1610 mL / OUT: 1700 mL / NET: -90 mL        LABS:                        9.5    8.17  )-----------( 356      ( 15 Josesito 2021 07:08 )             30.8     01-15    139  |  101  |  13  ----------------------------<  131<H>  4.2   |  26  |  0.77    Ca    9.3      15 Josesito 2021 07:07  Phos  3.1     01-15  Mg     1.7     01-15    TPro  7.4  /  Alb  3.3  /  TBili  1.5<H>  /  DBili  x   /  AST  66<H>  /  ALT  204<H>  /  AlkPhos  340<H>  01-15    PT/INR - ( 15 Josesito 2021 07:08 )   PT: 12.8 sec;   INR: 1.07          PTT - ( 15 Josesito 2021 07:08 )  PTT:45.4 sec    CAPILLARY BLOOD GLUCOSE      POCT Blood Glucose.: 131 mg/dL (15 Josesito 2021 06:27)  POCT Blood Glucose.: 159 mg/dL (14 Jan 2021 23:37)  POCT Blood Glucose.: 155 mg/dL (14 Jan 2021 17:04)  POCT Blood Glucose.: 188 mg/dL (14 Jan 2021 13:20)    LIVER FUNCTIONS - ( 15 Josesito 2021 07:07 )  Alb: 3.3 g/dL / Pro: 7.4 g/dL / ALK PHOS: 340 U/L / ALT: 204 U/L / AST: 66 U/L / GGT: x             RADIOLOGY & ADDITIONAL STUDIES:

## 2021-01-15 NOTE — DISCHARGE NOTE PROVIDER - NSDCCPTREATMENT_GEN_ALL_CORE_FT
PRINCIPAL PROCEDURE  Procedure: ERCP with sphincterotomy  Findings and Treatment: EUS with brushing

## 2021-01-15 NOTE — PROGRESS NOTE ADULT - SUBJECTIVE AND OBJECTIVE BOX
Patient is a 65y old  Male who presents with a chief complaint of Choledocholithiasis (15 Josesito 2021 10:41)      INTERVAL HPI/OVERNIGHT EVENTS:  Patient was seen and examined at bedside. Doing well, resting after ERCP. Awaiting final results and what the next steps are. Patient denies: fever, chills, dizziness, weakness, HA, Changes in vision, CP, palpitations, SOB, cough.    PAST MEDICAL & SURGICAL HISTORY:  Choledocholithiasis    History of TIAs    Hypothyroidism    H/O cardiomyopathy    DM (diabetes mellitus)    HLD (hyperlipidemia)    HTN (hypertension)    No significant past surgical history    T(C): 36.6 (01-15-21 @ 04:24), Max: 36.9 (01-14-21 @ 20:15)  HR: 68 (01-15-21 @ 04:24) (68 - 73)  BP: 134/75 (01-15-21 @ 04:24) (116/66 - 153/76)  RR: 19 (01-15-21 @ 04:24) (19 - 19)  SpO2: 98% (01-15-21 @ 04:24) (96% - 99%)  Wt(kg): --  I&O's Summary    14 Jan 2021 07:01  -  15 Josesito 2021 07:00  --------------------------------------------------------  IN: 1610 mL / OUT: 1700 mL / NET: -90 mL        PHYSICAL EXAM:  GENERAL: NAD, resting comfortably  HEAD:  Atraumatic, Normocephalic  EYES: EOMI, PERRLA  ENMT: MMM  NECK: Supple, No JVD  NERVOUS SYSTEM:  Alert & Oriented X3, no focal deficits  CHEST/LUNG: Clear to percussion bilaterally; No rales, rhonchi, wheezing, or rubs  HEART: Regular rate and rhythm; No murmurs, rubs, or gallops  ABDOMEN: Soft, Nontender, Nondistended; Bowel sounds present  EXTREMITIES:  2+ Peripheral Pulses, No clubbing, cyanosis, or edema        LABS:                        9.5    8.17  )-----------( 356      ( 15 Josesito 2021 07:08 )             30.8     01-15    139  |  101  |  13  ----------------------------<  131<H>  4.2   |  26  |  0.77    Ca    9.3      15 Josesito 2021 07:07  Phos  3.1     01-15  Mg     1.7     01-15    TPro  7.4  /  Alb  3.3  /  TBili  1.5<H>  /  DBili  x   /  AST  66<H>  /  ALT  204<H>  /  AlkPhos  340<H>  01-15    PT/INR - ( 15 Josesito 2021 07:08 )   PT: 12.8 sec;   INR: 1.07          PTT - ( 15 Josesito 2021 07:08 )  PTT:45.4 sec    CAPILLARY BLOOD GLUCOSE      POCT Blood Glucose.: 160 mg/dL (15 Josesito 2021 12:47)  POCT Blood Glucose.: 131 mg/dL (15 Josesito 2021 06:27)  POCT Blood Glucose.: 159 mg/dL (14 Jan 2021 23:37)  POCT Blood Glucose.: 155 mg/dL (14 Jan 2021 17:04)  POCT Blood Glucose.: 188 mg/dL (14 Jan 2021 13:20)            MEDICATIONS  (STANDING):  atorvastatin 80 milliGRAM(s) Oral at bedtime  cefTRIAXone   IVPB 1000 milliGRAM(s) IV Intermittent every 24 hours  dextrose 40% Gel 15 Gram(s) Oral once  dextrose 5%. 1000 milliLiter(s) (50 mL/Hr) IV Continuous <Continuous>  dextrose 5%. 1000 milliLiter(s) (100 mL/Hr) IV Continuous <Continuous>  dextrose 50% Injectable 25 Gram(s) IV Push once  dextrose 50% Injectable 12.5 Gram(s) IV Push once  dextrose 50% Injectable 25 Gram(s) IV Push once  glucagon  Injectable 1 milliGRAM(s) IntraMuscular once  heparin   Injectable 5000 Unit(s) SubCutaneous every 8 hours  influenza   Vaccine 0.5 milliLiter(s) IntraMuscular once  insulin lispro (ADMELOG) corrective regimen sliding scale   SubCutaneous every 6 hours  lactated ringers. 1000 milliLiter(s) (80 mL/Hr) IV Continuous <Continuous>  levothyroxine 175 MICROGram(s) Oral daily  metoprolol succinate ER 25 milliGRAM(s) Oral daily  metroNIDAZOLE  IVPB 500 milliGRAM(s) IV Intermittent every 8 hours    MEDICATIONS  (PRN):  acetaminophen   Tablet .. 650 milliGRAM(s) Oral every 6 hours PRN Mild Pain (1 - 3)  ondansetron Injectable 4 milliGRAM(s) IV Push every 6 hours PRN Nausea  oxyCODONE    IR 2.5 milliGRAM(s) Oral every 6 hours PRN Moderate Pain (4 - 6)  oxyCODONE    IR 5 milliGRAM(s) Oral every 6 hours PRN Severe Pain (7 - 10)  polyethylene glycol 3350 17 Gram(s) Oral once PRN constipaiton      RADIOLOGY & ADDITIONAL TESTS:    Imaging Personally Reviewed:  [ ] YES  [ ] NO    Consultant(s) Notes Reviewed:  [ ] YES  [ ] NO    Care Discussed with Consultants/Other Providers [ ] YES  [ ] NO

## 2021-01-15 NOTE — PROGRESS NOTE ADULT - PROVIDER SPECIALTY LIST ADULT
Gastroenterology
Cardiology
Gastroenterology
Gastroenterology
Surgery
Cardiology
Gastroenterology
Surgery
Hospitalist

## 2021-01-15 NOTE — DIETITIAN INITIAL EVALUATION ADULT. - DIET TYPE
Recommend advance to CST CHO Low fat diet with evening snack when medically feasible/low fat/consistent carbohydrate (evening snack)

## 2021-01-15 NOTE — PROGRESS NOTE ADULT - PROBLEM SELECTOR PLAN 1
Care per Surgery  MRCP done yesterday reveals-1.5 cm long common hepatic duct narrowing. Increase in intrahepatic biliary dilatation. Abnormal soft tissue around the common hepatic duct. Abnormal gallbladder which shows distention and irregular wall thickening. No choledocholithiasis or cholelithiasis. Diagnosis include cholangiocarcinoma, IgG4 cholangitis, extension of xanthogranulomatous cholecystitis or gallbladder carcinoma  Apprec GI recs, plan for EUS/ERCP Thursday/Friday as pt was on plavix  Transaminitis is improving  C/w pain control and current Abx
Care per Surgery  MRCP reveals-1.5 cm long common hepatic duct narrowing. Increase in intrahepatic biliary dilatation. Abnormal soft tissue around the common hepatic duct. Abnormal gallbladder which shows distention and irregular wall thickening. No choledocholithiasis or cholelithiasis. Diagnosis include cholangiocarcinoma, IgG4 cholangitis, extension of xanthogranulomatous cholecystitis or gallbladder carcinoma  Apprec GI recs, plan for EUS/ERCP Thursday/Friday as pt was on plavix  Transaminitis is improving  C/w pain control and current Abx
Care per Surgery  MRCP reveals-1.5 cm long common hepatic duct narrowing. Increase in intrahepatic biliary dilatation. Abnormal soft tissue around the common hepatic duct. Abnormal gallbladder which shows distention and irregular wall thickening. No choledocholithiasis or cholelithiasis. Diagnosis include cholangiocarcinoma, IgG4 cholangitis, extension of xanthogranulomatous cholecystitis or gallbladder carcinoma  Apprec GI recs, underwent ERCP today- concern for Cholangiocarcinoma of biliary tract. GI rec surg onc, appreciate further recs  Transaminitis is improving, continue to trend  C/w pain control and current Abx
Care per Surgery  MRCP reveals-1.5 cm long common hepatic duct narrowing. Increase in intrahepatic biliary dilatation. Abnormal soft tissue around the common hepatic duct. Abnormal gallbladder which shows distention and irregular wall thickening. No choledocholithiasis or cholelithiasis. Diagnosis include cholangiocarcinoma, IgG4 cholangitis, extension of xanthogranulomatous cholecystitis or gallbladder carcinoma  Apprec GI recs, plan for EUS/ERCP Friday as pt was on plavix  Transaminitis is improving  C/w pain control and current Abx

## 2021-01-15 NOTE — DISCHARGE NOTE PROVIDER - NSDCCPCAREPLAN_GEN_ALL_CORE_FT
PRINCIPAL DISCHARGE DIAGNOSIS  Diagnosis: Cholecystitis  Assessment and Plan of Treatment:       SECONDARY DISCHARGE DIAGNOSES  Diagnosis: Elevated LFTs  Assessment and Plan of Treatment:      PRINCIPAL DISCHARGE DIAGNOSIS  Diagnosis: Cholecystitis  Assessment and Plan of Treatment: acute gangrenous      SECONDARY DISCHARGE DIAGNOSES  Diagnosis: HLD (hyperlipidemia)  Assessment and Plan of Treatment: HLD (hyperlipidemia)    Diagnosis: Hypomagnesemia  Assessment and Plan of Treatment: Hypomagnesemia    Diagnosis: Anemia  Assessment and Plan of Treatment: Anemia    Diagnosis: Hypothyroidism  Assessment and Plan of Treatment: Hypothyroidism    Diagnosis: DM (diabetes mellitus)  Assessment and Plan of Treatment: DM (diabetes mellitus)    Diagnosis: HLD (hyperlipidemia)  Assessment and Plan of Treatment: HLD (hyperlipidemia)    Diagnosis: HTN (hypertension)  Assessment and Plan of Treatment: HTN (hypertension)    Diagnosis: CAD (coronary artery disease)  Assessment and Plan of Treatment: CAD (coronary artery disease)    Diagnosis: Elevated LFTs  Assessment and Plan of Treatment:

## 2021-01-15 NOTE — PROGRESS NOTE ADULT - PROBLEM SELECTOR PLAN 4
Obtain collateral from PMD re: HTN meds  C/w toprol For now  Cards rec holding lasix 1/15, follow up final recs
Obtain collateral from PMD re: HTN meds  C/w toprol and lasix for now

## 2021-01-15 NOTE — PROGRESS NOTE ADULT - PROBLEM SELECTOR PLAN 8
Overall stable  Likely of chronic disease, can obtain iron studies

## 2021-01-15 NOTE — DISCHARGE NOTE PROVIDER - NSDCMRMEDTOKEN_GEN_ALL_CORE_FT
atorvastatin 80 mg oral tablet: 1 tab(s) orally once a day  folic acid 1 mg oral tablet: 1 tab(s) orally once a day  furosemide 40 mg oral tablet: 1 tab(s) orally once a day  glucometer (per patient&#x27;s insurance): Test blood sugars four times a day. Dispense #1 glucometer.  Insulin Pen Needles, 4mm: 1 application subcutaneously 4 times a day. ** Use with insulin pen **   Januvia 100 mg oral tablet: 1 tab(s) orally once a day  lancets: 1 application subcutaneously 4 times a day   levothyroxine 175 mcg (0.175 mg) oral tablet: 1 tab(s) orally once a day  metFORMIN 500 mg oral tablet: 1 tab(s) orally 3 times a day   metroNIDAZOLE 500 mg oral tablet: 1 tab(s) orally 3 times a day MDD:3; stop date 8/9/20   acetaminophen 325 mg oral tablet: 2 tab(s) orally every 6 hours, As needed, Mild Pain (1 - 3)  atorvastatin 80 mg oral tablet: 1 tab(s) orally once a day  cefpodoxime 200 mg oral tablet: 2 tab(s) orally 2 times a day   glucometer (per patient&#x27;s insurance): Test blood sugars four times a day. Dispense #1 glucometer.  Insulin Pen Needles, 4mm: 1 application subcutaneously 4 times a day. ** Use with insulin pen **   Januvia 100 mg oral tablet: 1 tab(s) orally once a day  lancets: 1 application subcutaneously 4 times a day   levothyroxine 175 mcg (0.175 mg) oral tablet: 1 tab(s) orally once a day  metFORMIN 500 mg oral tablet: 1 tab(s) orally 3 times a day   metroNIDAZOLE 500 mg oral tablet: 1 tab(s) orally 3 times a day MDD:3; stop date 8/9/20

## 2021-01-15 NOTE — DISCHARGE NOTE PROVIDER - PROVIDER TOKENS
PROVIDER:[[id number]],SCHEDULEDAPPT:[01/22/2021]],PROVIDER:[[id number]],SCHEDULEDAPPT:[01/19/2021],ESTABLISHEDPATIENT:[T]],PROVIDER:[[id number]],SCHEDULEDAPPT:[01/21/2021]] PROVIDER:[[id number]],SCHEDULEDAPPT:[01/22/2021]],PROVIDER:[[id number]],SCHEDULEDAPPT:[01/19/2021],ESTABLISHEDPATIENT:[T]],PROVIDER:[TOKEN:[[id number]],SCHEDULEDAPPT:[01/21/2021]],PROVIDER:[TOKEN:[[id number]],SCHEDULEDAPPT:[01/16/2021]] PROVIDER:[TOKEN:[[id number]],FOLLOWUP:[1-3 days]],PROVIDER:[TOKEN:[[id number]],FOLLOWUP:[1-3 days],ESTABLISHEDPATIENT:[T]],PROVIDER:[TOKEN:[[id number]],FOLLOWUP:[1-3 days]],PROVIDER:[[id number]],FOLLOWUP:[1-3 days]]

## 2021-01-15 NOTE — PROGRESS NOTE ADULT - SUBJECTIVE AND OBJECTIVE BOX
OVERNIGHT EVENTS:    SUBJECTIVE / INTERVAL HPI: Patient seen and examined at bedside.     VITAL SIGNS:  Vital Signs Last 24 Hrs  T(C): 36.6 (15 Josesito 2021 04:24), Max: 36.9 (14 Jan 2021 20:15)  T(F): 97.9 (15 Josesito 2021 04:24), Max: 98.4 (14 Jan 2021 20:15)  HR: 68 (15 Josesito 2021 04:24) (68 - 72)  BP: 134/75 (15 Josesito 2021 04:24) (116/66 - 134/75)  BP(mean): --  RR: 19 (15 Josesito 2021 04:24) (19 - 19)  SpO2: 98% (15 Josesito 2021 04:24) (97% - 99%)    PHYSICAL EXAM:    General: WDWN  Neck: supple  Cardiovascular: +S1/S2; RRR  Respiratory: CTA B/L; no W/R/R  Gastrointestinal: soft, NT/ND; +BSx4  Extremities: WWP; no edema, clubbing or cyanosis  Vascular: 2+ radial, DP/PT pulses B/L  Neurological: AAOx3; no focal deficits    MEDICATIONS:  MEDICATIONS  (STANDING):  atorvastatin 80 milliGRAM(s) Oral at bedtime  cefTRIAXone   IVPB 1000 milliGRAM(s) IV Intermittent every 24 hours  dextrose 40% Gel 15 Gram(s) Oral once  dextrose 5%. 1000 milliLiter(s) (50 mL/Hr) IV Continuous <Continuous>  dextrose 5%. 1000 milliLiter(s) (100 mL/Hr) IV Continuous <Continuous>  dextrose 50% Injectable 25 Gram(s) IV Push once  dextrose 50% Injectable 12.5 Gram(s) IV Push once  dextrose 50% Injectable 25 Gram(s) IV Push once  glucagon  Injectable 1 milliGRAM(s) IntraMuscular once  heparin   Injectable 5000 Unit(s) SubCutaneous every 8 hours  influenza   Vaccine 0.5 milliLiter(s) IntraMuscular once  insulin lispro (ADMELOG) corrective regimen sliding scale   SubCutaneous every 6 hours  lactated ringers. 1000 milliLiter(s) (80 mL/Hr) IV Continuous <Continuous>  levothyroxine 175 MICROGram(s) Oral daily  metoprolol succinate ER 25 milliGRAM(s) Oral daily  metroNIDAZOLE  IVPB 500 milliGRAM(s) IV Intermittent every 8 hours    MEDICATIONS  (PRN):  acetaminophen   Tablet .. 650 milliGRAM(s) Oral every 6 hours PRN Mild Pain (1 - 3)  ondansetron Injectable 4 milliGRAM(s) IV Push every 6 hours PRN Nausea  oxyCODONE    IR 2.5 milliGRAM(s) Oral every 6 hours PRN Moderate Pain (4 - 6)  oxyCODONE    IR 5 milliGRAM(s) Oral every 6 hours PRN Severe Pain (7 - 10)  polyethylene glycol 3350 17 Gram(s) Oral once PRN constipaiton      ALLERGIES:  Allergies    No Known Allergies    Intolerances        LABS:                        9.5    8.17  )-----------( 356      ( 15 Josesito 2021 07:08 )             30.8     01-15    139  |  101  |  13  ----------------------------<  131<H>  4.2   |  26  |  0.77    Ca    9.3      15 Josesito 2021 07:07  Phos  3.1     01-15  Mg     1.7     01-15    TPro  7.4  /  Alb  3.3  /  TBili  1.5<H>  /  DBili  x   /  AST  66<H>  /  ALT  204<H>  /  AlkPhos  340<H>  01-15    PT/INR - ( 15 Josesito 2021 07:08 )   PT: 12.8 sec;   INR: 1.07          PTT - ( 15 Josesito 2021 07:08 )  PTT:45.4 sec    CAPILLARY BLOOD GLUCOSE      POCT Blood Glucose.: 160 mg/dL (15 Josesito 2021 12:47)      RADIOLOGY & ADDITIONAL TESTS: Reviewed.    ASSESSMENT:    PLAN:  OVERNIGHT EVENTS:laquita    SUBJECTIVE / INTERVAL HPI: Patient seen and examined at bedside. laquita    VITAL SIGNS:  Vital Signs Last 24 Hrs  T(C): 36.6 (15 Josesito 2021 04:24), Max: 36.9 (14 Jan 2021 20:15)  T(F): 97.9 (15 Josesito 2021 04:24), Max: 98.4 (14 Jan 2021 20:15)  HR: 68 (15 Josesito 2021 04:24) (68 - 72)  BP: 134/75 (15 Josesito 2021 04:24) (116/66 - 134/75)  BP(mean): --  RR: 19 (15 Josesito 2021 04:24) (19 - 19)  SpO2: 98% (15 Josesito 2021 04:24) (97% - 99%)    PHYSICAL EXAM:    General: WDWN  Neck: supple  Cardiovascular: +S1/S2; RRR  Respiratory: CTA B/L; no W/R/R  Gastrointestinal: soft, NT/ND; +BSx4  Extremities: WWP; no edema, clubbing or cyanosis  Vascular: 2+ radial, DP/PT pulses B/L  Neurological: AAOx3; no focal deficits    MEDICATIONS:  MEDICATIONS  (STANDING):  atorvastatin 80 milliGRAM(s) Oral at bedtime  cefTRIAXone   IVPB 1000 milliGRAM(s) IV Intermittent every 24 hours  dextrose 40% Gel 15 Gram(s) Oral once  dextrose 5%. 1000 milliLiter(s) (50 mL/Hr) IV Continuous <Continuous>  dextrose 5%. 1000 milliLiter(s) (100 mL/Hr) IV Continuous <Continuous>  dextrose 50% Injectable 25 Gram(s) IV Push once  dextrose 50% Injectable 12.5 Gram(s) IV Push once  dextrose 50% Injectable 25 Gram(s) IV Push once  glucagon  Injectable 1 milliGRAM(s) IntraMuscular once  heparin   Injectable 5000 Unit(s) SubCutaneous every 8 hours  influenza   Vaccine 0.5 milliLiter(s) IntraMuscular once  insulin lispro (ADMELOG) corrective regimen sliding scale   SubCutaneous every 6 hours  lactated ringers. 1000 milliLiter(s) (80 mL/Hr) IV Continuous <Continuous>  levothyroxine 175 MICROGram(s) Oral daily  metoprolol succinate ER 25 milliGRAM(s) Oral daily  metroNIDAZOLE  IVPB 500 milliGRAM(s) IV Intermittent every 8 hours    MEDICATIONS  (PRN):  acetaminophen   Tablet .. 650 milliGRAM(s) Oral every 6 hours PRN Mild Pain (1 - 3)  ondansetron Injectable 4 milliGRAM(s) IV Push every 6 hours PRN Nausea  oxyCODONE    IR 2.5 milliGRAM(s) Oral every 6 hours PRN Moderate Pain (4 - 6)  oxyCODONE    IR 5 milliGRAM(s) Oral every 6 hours PRN Severe Pain (7 - 10)  polyethylene glycol 3350 17 Gram(s) Oral once PRN constipaiton      ALLERGIES:  Allergies    No Known Allergies    Intolerances        LABS:                        9.5    8.17  )-----------( 356      ( 15 Josesito 2021 07:08 )             30.8     01-15    139  |  101  |  13  ----------------------------<  131<H>  4.2   |  26  |  0.77    Ca    9.3      15 Josesito 2021 07:07  Phos  3.1     01-15  Mg     1.7     01-15    TPro  7.4  /  Alb  3.3  /  TBili  1.5<H>  /  DBili  x   /  AST  66<H>  /  ALT  204<H>  /  AlkPhos  340<H>  01-15    PT/INR - ( 15 Josesito 2021 07:08 )   PT: 12.8 sec;   INR: 1.07          PTT - ( 15 Josesito 2021 07:08 )  PTT:45.4 sec    CAPILLARY BLOOD GLUCOSE      POCT Blood Glucose.: 160 mg/dL (15 Josesito 2021 12:47)      RADIOLOGY & ADDITIONAL TESTS: Reviewed.    ASSESSMENT:    PLAN:

## 2021-01-15 NOTE — DISCHARGE NOTE PROVIDER - NSDCFUADDAPPT_GEN_ALL_CORE_FT
Please follow up with cardiologist tomorrow.    Please follow up with Dr. Armstrong on 1/22/2021 (CALL OFFICE FOR APPOINTMENT).    Please follow up with Dr. Weir on 1/19/2021 (CALL OFFICE FOR APPOINTMENT).    Please follow up with Dr. Wagner on 1/21/2021 (CALL OFFICE FOR APPOINTMENT).   Please follow up with your outpatient cardiologist and your primary care doctor tomorrow or as soon as possible.     Oncology Follow Up:  Please follow up with Dr. Armstrong. We have given his office your number and they should be contacting you within the next few days. If you do not hear from his office by Monday (1/18), please call the office to make an appointment. His office number is [phone].     Surgery Follow Up:  Please follow up with the general surgeon on your case, Dr. Weir on 1/19/2021, please call the office to make an appointment as soon as possible. His office number is [phone].  Also please follow up with the hepatobiliary surgeon, Dr. Wagner on 1/19/2021 or 1/20/21, please call the office to make an appointment as soon as possible. It is very important that you follow up with these surgeons regarding scheduling your surgery for next Thursday, 1/21/20.

## 2021-01-15 NOTE — DISCHARGE NOTE PROVIDER - HOSPITAL COURSE
65M with PMH of HTN, HLD, DM, TIA (s/p plavix), A fib, CAD s/p CABG x3 on 7/6/20 (Dr. Conde), acute cholecystitis s/p perc dharmesh (Aug 2020) presented on 1/10/21 with RUQ pain and dark urine with radiographic evidence consistent with acute cholecystitis with choledocholithiasis. Patient was subsequently treated with antibiotics and underwent MRCP. CT A/P on 1/10 noted a new intrahepatic biliary ductal dilation on both lobes of the liver at the confluence of the R and L hepatic ducts. There was a 2.0 cm long segment of abnormal luminal narrowing. Pt subsequently underwent a EUS and ERCP with sphincterotomy. 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 Biopsy was sent to pathology. Patient is currently hemodynamically stable with stable vital signs. Patient is tolerating a normal diet with no nausea or vomiting. Patient is ready for discharge with follow up with general surgery, heme/onc and surgical oncology.   Jass Ortiz is a 65M with PMH of HTN, HLD, DM, TIA (s/p eliquis), A fib, CAD s/p CABG x3 on DAPT (7/6/20 on Dr. Conde), acute cholecystitis s/p perc dharmesh (Aug 2020 and removal on Sep 2020) who presented on 1/10/21 with RUQ pain and dark urine with radiographic evidence consistent with acute cholecystitis with choledocholithiasis. Patient was subsequently treated with antibiotics and underwent MRCP. CT A/P on 1/10 noted a new intrahepatic biliary ductal dilation on both lobes of the liver at the confluence of the R and L hepatic ducts, there was a 2.0 cm long segment of abnormal luminal narrowing along with 1.1cm portacaval lymph node 1cm lymph node adjacent to common hepatic artery. MRCP on 1/11/21 demonstrated 1.5 cm long common hepatic duct narrowing, increase in intrahepatic biliary dilatation, abnormal soft tissue around the common hepatic duct along with abnormal gallbladder which shows distention and irregular wall thickening. Patient underwent EUS and ERCP with sphincterotomy and hilar mass brushings along with left and right hepatic duct stent placement. Biopsy was sent to pathology. Patient is currently hemodynamically stable with stable vital signs. Patient is tolerating a normal diet with no nausea or vomiting. Patient is ready for discharge and will follow up with general surgery, heme/onc, surgical oncology, and cardiology.   Endoscopic findings were discussed with the patient at length including detailed instructions to follow up with consulting services respectively. Patient confirmed understanding follow up instructions and confirmed follow up appointment with Cardiologist on 1/16/21 to discuss cardiac risk assessment and resumption of DAPT in setting of upcoming operative intervention. Patient is to hold DAPT (ASA and Plavix) at this time given possible upcoming intervention and was discussed with cardiology. Should the patient not undergo operative intervention on 1/21/21 patient is to follow up immediately with cardiology to discuss resumption of DAPT.

## 2021-01-15 NOTE — PROGRESS NOTE ADULT - PROBLEM/PLAN-2
Stage 11: Additional Anesthesia Volume In Cc: 0
DISPLAY PLAN FREE TEXT

## 2021-01-15 NOTE — DISCHARGE NOTE PROVIDER - CARE PROVIDER_API CALL
Dominic Armstrong)  Hematology; Medical Oncology  [address], [address]  [place] [address]  Phone: [phone]  Fax: [phone]  Scheduled Appointment: 01/22/2021    Shaw Weir)  ColonRectal Surgery; Surgery  [address], [address]  [place] [address]  Phone: [phone]  Fax: [phone]  Established Patient  Scheduled Appointment: 01/19/2021    Rahel Wagner)  Surgery  [address]  [place] [address]  Phone: [phone]  Fax: [phone]  Scheduled Appointment: 01/21/2021   Dominic Armstrong)  Hematology; Medical Oncology  [address], [address]  [place] [address]  Phone: [phone]  Fax: [phone]  Scheduled Appointment: 01/22/2021    Shaw Weir)  ColonRectal Surgery; Surgery  [address], [address]  [place] [address]  Phone: [phone]  Fax: [phone]  Established Patient  Scheduled Appointment: 01/19/2021    Rahel Wagner)  Surgery  [address]  [place] [address]  Phone: [phone]  Fax: [phone]  Scheduled Appointment: 01/21/2021    Francis Muñiz  CARDIOVASCULAR DISEASE  [address] [place] [address]  Phone: [phone]  Fax: [phone]  Scheduled Appointment: 01/16/2021   Dominic Armstrong)  Hematology; Medical Oncology  [address], [address]  [place] [address]  Phone: [phone]  Fax: [phone]  Follow Up Time: 1-3 days    Shaw Weir)  ColonRectal Surgery; Surgery  [address], [address]  [place] [address]  Phone: [phone]  Fax: [phone]  Established Patient  Follow Up Time: 1-3 days    Rahel Wagner)  Surgery  [address]  [place] [address]  Phone: [phone]  Fax: [phone]  Follow Up Time: 1-3 days    Francis Muñiz  CARDIOVASCULAR DISEASE  [address] [place] [address]  Phone: [phone]  Fax: [phone]  Follow Up Time: 1-3 days

## 2021-01-15 NOTE — CHART NOTE - NSCHARTNOTEFT_GEN_A_CORE
Please see report in Results tab.    RECOMMENDATIONS  Follow-up pathology  Continue Ceftriaxone/Flagyl  Clear liquid diet  Trend LTs  Please hold any systemic anticoagulation or Plavix for an additional 48-72 hours  Appreciate surgical oncology recommendations

## 2021-01-15 NOTE — PROGRESS NOTE ADULT - SUBJECTIVE AND OBJECTIVE BOX
24 hr events:  ON: NPO@MN/IVF@80, AM synthroid/metroprolol given, preop cxr ordered for AM (none on this admission)   1/14: NPO@mn for EUS/ERCP. dc'd lasix as per cardiology.     SUBJECTIVE:  Pt seen and examined by chief resident. Pt is doing well, resting comfortably on bed. Pain controlled.  No nausea or vomiting. No complaints at this time.    Vital Signs Last 24 Hrs  T(C): 36.6 (15 Josesito 2021 04:24), Max: 36.9 (14 Jan 2021 20:15)  T(F): 97.9 (15 Josesito 2021 04:24), Max: 98.4 (14 Jan 2021 20:15)  HR: 68 (15 Josesito 2021 04:24) (68 - 73)  BP: 134/75 (15 Josesito 2021 04:24) (116/66 - 153/76)  RR: 19 (15 Josesito 2021 04:24) (19 - 19)  SpO2: 98% (15 Josesito 2021 04:24) (96% - 99%)    Physical Exam:  General: NAD  Pulmonary: Nonlabored breathing, no respiratory distress  Abdominal: soft, NT/ND, no rebound or guarding.   Extremities: WWP, SCDs in place      I&O's Summary    14 Jan 2021 07:01  -  15 Josesito 2021 07:00  --------------------------------------------------------  IN: 1610 mL / OUT: 1700 mL / NET: -90 mL        LABS:                        9.5    8.17  )-----------( 356      ( 15 Josesito 2021 07:08 )             30.8     01-15    139  |  101  |  x   ----------------------------<  x   4.2   |  x   |  x     Ca    9.3      14 Jan 2021 06:33  Phos  3.1     01-15  Mg     1.7     01-15    TPro  7.5  /  Alb  3.4  /  TBili  2.2<H>  /  DBili  x   /  AST  88<H>  /  ALT  275<H>  /  AlkPhos  391<H>  01-14    PT/INR - ( 15 Josesito 2021 07:08 )   PT: 12.8 sec;   INR: 1.07          PTT - ( 15 Josesito 2021 07:08 )  PTT:45.4 sec    CAPILLARY BLOOD GLUCOSE  POCT Blood Glucose.: 131 mg/dL (15 Josesito 2021 06:27)  POCT Blood Glucose.: 159 mg/dL (14 Jan 2021 23:37)  POCT Blood Glucose.: 155 mg/dL (14 Jan 2021 17:04)  POCT Blood Glucose.: 188 mg/dL (14 Jan 2021 13:20)    LIVER FUNCTIONS - ( 14 Jan 2021 06:33 )  Alb: 3.4 g/dL / Pro: 7.5 g/dL / ALK PHOS: 391 U/L / ALT: 275 U/L / AST: 88 U/L / GGT: x

## 2021-01-15 NOTE — PROGRESS NOTE ADULT - ASSESSMENT
65M with PMH of HTN, HLD, DM, TIA (s/p plavix), A fib, CAD s/p CABG x3 on 7/6/20 (Dr. Conde), acute cholecystitis s/p perc dharmesh (Aug 2020, perc dharmesh removed Sept 2020) presents with 1 day history of RUQ pain and dark urine with clinical and radiographic evidence of acute cholecystitis with choledocholithiasis, cholangiocarcinoma cannot be ruled out.     Vitals are stable, Patient is feeling better today, no complaints  Abdomen is soft, non tender.  LFTs continue trending down  Tumor markers are all normal    Updated:    - Verbal report obtained from GI fellow Dr. Carmona:  ERCP was done and 2 stents were placed one in R and one in L hepatic ducts, EUS was done but FNA was not done, instead brush biopsy was obtained    Plan:  - Pending Bx results.   65M with PMH of HTN, HLD, DM, TIA (s/p plavix), A fib, CAD s/p CABG x3 on 7/6/20 (Dr. Conde), acute cholecystitis s/p perc dharmesh (Aug 2020, perc dharmesh removed Sept 2020) presents with 1 day history of RUQ pain and dark urine with clinical and radiographic evidence of acute cholecystitis with choledocholithiasis, cholangiocarcinoma cannot be ruled out.     Vitals are stable, Patient is feeling better today, no complaints  Abdomen is soft, non tender.  LFTs continue trending down  Tumor markers are all normal    Updated:    - Verbal report obtained from GI fellow Dr. Carmona:  ERCP was done and 2 stents were placed one in R and one in L hepatic ducts, EUS was done but FNA was not done, instead brush biopsy was obtained    Patient is adamant he wants to be discharged today and will come back later for surgery  Plan:  - Pending Bx results, the likelihood for cholangiocarcinoma is high,  -   we will plan on taking the patient to OR next Thursday 1/22/2021 for ex-lap, hepatectomy, CBD resection and hepatico-jejunostomy

## 2021-01-15 NOTE — PROGRESS NOTE ADULT - PROBLEM SELECTOR PLAN 3
Optimized by cardiology, continue to hold off ASA/plavix  Continue with telemetry monitoring-remains on NSR

## 2021-01-15 NOTE — PROGRESS NOTE ADULT - ASSESSMENT
65M PMH HTN, CAD (CABG 7/2020, LIMA-LAD, SVG-prox marginal, SVG-PDA), Afib (previously on Eliquis, recently d/c'ed), hx of cholecystitis s/p perc dharmesh (Aug 2020) presented with R side abdominal pain with dark urine and found to have choledocholithiasis. Cardiology consulted for pre-operative evaluation .  He was seen by cardiology on 1/10 and considered intermediate risk for MRCP and team was advised to proceed as planned without any further cardiac work up. Patient  is now s/p mrcp with  no choledocholithiasis, but concerning for cholangiocarcinoma.  Pt now s/p p EUS/ERCP     Preop cardiac clearance:   Pt s/p 3v CABG 7/20 with post op Afib. Was dc'd on ASA/eliquis. He follows up with Dr Muñiz, outpatient cardiologist and has been on asa/plavix. Eliquis DC'd likey due to maintenance of NSR post CABG.  He was seen by cardiology on 1/10 and considered intermediate risk for MRCP/ possible lap dharmesh and was advised to  proceed with mrcp  as planned without any further cardiac work up.   -Last echo 7/20: normal EF  -s/p EUS /ERCP . Aspirin and Plavix has been held during course of hospital stay and would recommend to continue to hold in anticipation of upcoming procedure. As per primary team possible plan for cholecystectomy in next week. Please obtain final date and hold dapt appropriately as would not like patient to be off aspirin indefinitely. Obtain collateral from neuro regarding continuing plavix. He reports being on plavix following TIA " many years ago"   -Tele with sinus rhythm today  -Patient was on Eliquis in the past due to post op a fib( 3V CABG 7/20)  . Has been in sinus during course of admission. No plan to initiate eliquis   -Case discussed with cardiology attending. Cards will continue to follow 65M PMH HTN, CAD (CABG 7/2020, LIMA-LAD, SVG-prox marginal, SVG-PDA), Afib (previously on Eliquis, recently d/c'ed), hx of cholecystitis s/p perc dharmesh (Aug 2020) presented with R side abdominal pain with dark urine and found to have choledocholithiasis. Cardiology consulted for pre-operative evaluation .  He was seen by cardiology on 1/10 and considered intermediate risk for MRCP and team was advised to proceed as planned without any further cardiac work up. Patient  is now s/p mrcp with  no choledocholithiasis, but concerning for cholangiocarcinoma.  Pt now s/p p EUS/ERCP     Preop cardiac clearance:   Pt s/p 3v CABG 7/20 with post op Afib. Was dc'd on ASA/eliquis. He follows up with Dr Muñiz, outpatient cardiologist and has been on asa/plavix. Eliquis DC'd likey due to maintenance of NSR post CABG.  He was seen by cardiology on 1/10 and considered intermediate risk for MRCP/ possible lap dharmesh and was advised to  proceed with mrcp  as planned without any further cardiac work up.   -Last echo 7/20: normal EF  -s/p EUS /ERCP . Aspirin and Plavix has been held during course of hospital stay and would recommend to continue to hold in anticipation of upcoming procedure(diagnostic laparoscopy and possible exploratory laparotomy and possible hepatic segmentectomy)  . As per primary team possible plan for cholecystectomy in next week. Please obtain final date and hold DAPT  appropriately as would not like patient to be off aspirin indefinitely. Obtain collateral from pcp regarding continuing plavix. Unclear why he is on plavix?  -Tele with sinus rhythm today  -Patient was on Eliquis in the past due to post op a fib( 3V CABG 7/20)  . Has been in sinus during course of admission. No plan to initiate eliquis   -Case discussed with cardiology attending. Cards will continue to follow 65M PMH HTN, CAD (CABG 7/2020, LIMA-LAD, SVG-prox marginal, SVG-PDA), Afib (previously on Eliquis, recently d/c'ed), hx of cholecystitis s/p perc dharmesh (Aug 2020) presented with R side abdominal pain with dark urine and found to have choledocholithiasis. Cardiology consulted for pre-operative evaluation .  He was seen by cardiology on 1/10 and considered intermediate risk for MRCP and team was advised to proceed as planned without any further cardiac work up. Patient  is now s/p mrcp with  no choledocholithiasis, but concerning for cholangiocarcinoma.  Pt now s/p p EUS/ERCP     Post op visit following EUS/ERCP:   - back from EUS/ ERCP, DAPT held   - primary team plans to discharge and readmit for diag laparaoscopy, possible exploratory laparotomy and hepatic segmentectomy.   - Requesting a pre-op risk stratification     Pre-op risk stratification for exploratory laparotomy   - Pt s/p CABG (Aug 2020), TIA, hx of IHD with more than 4 mets exercise capacity = intermediate risk patient   - going for intermediate to high risk procedure  - RCSI:  3, Class 4 risk: 15% 30-day risk of death, MI, or cardiac arrest  - would recommend holding DAPT until after laparoscopy/ laparotomy   - discontinue lasix on discharge and indefinitely   - continue MTP on discharge and sveta-operatively     He needs to take ASA indefinitely given hx of TIA and CABG will resume following Thurday's procedure   Weak Class IIb recommendation to stay on Plavix for 1 year post CABG   Will resume ASA after Thursdays procedure and have him follow up with his primary Dr. Muñiz

## 2021-01-15 NOTE — PROGRESS NOTE ADULT - PROBLEM SELECTOR PLAN 6
C/w ISS and diabetic diet

## 2021-01-18 LAB — NON-GYNECOLOGICAL CYTOLOGY STUDY: SIGNIFICANT CHANGE UP

## 2021-01-19 ENCOUNTER — APPOINTMENT (OUTPATIENT)
Dept: SURGICAL ONCOLOGY | Facility: CLINIC | Age: 66
End: 2021-01-19
Payer: COMMERCIAL

## 2021-01-19 VITALS
HEIGHT: 69 IN | SYSTOLIC BLOOD PRESSURE: 149 MMHG | TEMPERATURE: 98 F | OXYGEN SATURATION: 99 % | BODY MASS INDEX: 27.11 KG/M2 | HEART RATE: 83 BPM | WEIGHT: 183 LBS | DIASTOLIC BLOOD PRESSURE: 80 MMHG

## 2021-01-19 DIAGNOSIS — Z80.0 FAMILY HISTORY OF MALIGNANT NEOPLASM OF DIGESTIVE ORGANS: ICD-10-CM

## 2021-01-19 DIAGNOSIS — Z80.1 FAMILY HISTORY OF MALIGNANT NEOPLASM OF TRACHEA, BRONCHUS AND LUNG: ICD-10-CM

## 2021-01-19 DIAGNOSIS — Z80.3 FAMILY HISTORY OF MALIGNANT NEOPLASM OF BREAST: ICD-10-CM

## 2021-01-19 PROCEDURE — 99214 OFFICE O/P EST MOD 30 MIN: CPT

## 2021-01-19 PROCEDURE — 99072 ADDL SUPL MATRL&STAF TM PHE: CPT

## 2021-01-19 RX ORDER — PANTOPRAZOLE SODIUM 40 MG/1
40 TABLET, DELAYED RELEASE ORAL
Qty: 30 | Refills: 0 | Status: DISCONTINUED | COMMUNITY
Start: 2020-07-16 | End: 2021-01-19

## 2021-01-19 RX ORDER — CLOPIDOGREL 75 MG/1
75 TABLET, FILM COATED ORAL DAILY
Qty: 30 | Refills: 0 | Status: DISCONTINUED | COMMUNITY
Start: 2020-10-19 | End: 2021-01-19

## 2021-01-19 RX ORDER — CHOLECALCIFEROL (VITAMIN D3) 1250 MCG
1.25 MG CAPSULE ORAL
Refills: 0 | Status: DISCONTINUED | COMMUNITY
Start: 2020-07-01 | End: 2021-01-19

## 2021-01-19 RX ORDER — METOPROLOL TARTRATE 100 MG/1
100 TABLET ORAL
Qty: 60 | Refills: 3 | Status: DISCONTINUED | COMMUNITY
Start: 2020-07-01 | End: 2021-01-19

## 2021-01-19 RX ORDER — SITAGLIPTIN 100 MG/1
100 TABLET, FILM COATED ORAL DAILY
Qty: 90 | Refills: 0 | Status: DISCONTINUED | COMMUNITY
Start: 2020-07-01 | End: 2021-01-19

## 2021-01-21 NOTE — HISTORY OF PRESENT ILLNESS
[de-identified] : Patient Name: LYDIA SHEN \par MRN: [id number] \par [place] MRN: [id number] \par Referring Provider: none \par Oncologist:\par Date: 1/19/2021\par \par Diagnosis: Cholangiocarcinoma\par \par 65 year male with a PMH HTN, HLD, DM, TIA (sp eliquis), Afib, CAD sp CABG x3 on DAPT (7/5/20 Dr. Conde was on Plavix), acute cholecystitis sp perc dharmesh (8/2020 and removal on 9/2020) who presented to the ED on 1/10/2021 with RUQ pain and dark urine. Imaging did show evidence of acute cholecystitis with choledocholithiasis. He was given antibiotics. and CT AP showed a new intrahepatic biliary ductal dilatation in both lobes of the liver. MRCP showed a 1.5 cm long common hepatic duct narrowing, increase in intrahepatic biliary dilatation, abnormal soft tissue around the common hepatic duct. EUS and ERCP with sphincterotomy and hilar mass brushings was done and he also had a left and right hepatic duct stent placement. Brushings were negative for malignant cells. He followed up with Dr. Shaw Weir today for a surgical consultation and will proceed with a cholecystectomy next week.\par \par Of note, he has been off Plavix since his hospital discharge and is only taking baby aspirin.\par \par Currently, Mr. SHEN denies abdominal pain and discomfort, admits to having decreased appetite, and denies nausea or vomiting. He is slightly constipated. He denies recent unintentional weight loss. He denies fevers, chills, or night sweats.\par \par Functional Status: Mr. SHEN is able to walk one mile without fatigue or dyspnea.\par \par

## 2021-01-21 NOTE — ASSESSMENT
[FreeTextEntry1] : 65 M with likely [place] tumor, who is now seen in my office for consideration of resection\par PMH: PMH: [place], HLD, DM, TIA, Afib, CAD s/p CABG x3 on DAPT (7/2020) \par [id number] percutaneous cholecystostomy for gangrenous cholecystitis\par Sep 2020 removal of c-tube\par [id number] CT AP: new mild intrahepatic biliary ductal dilatation in both lobes of the liver, with a 2.0 cm mass at the confluence of the hepatic ducts. Recurrent (gangrenous?) acute cholecystitis. 1.1 cm portacaval lymph node. 1.0 cm lymph node adjacent to common hepatic artery.\par [id number] MRCP: 1.5 cm long common hepatic duct narrowing. Increase in intrahepatic biliary dilatation. Abnormal soft tissue around the common hepatic duct. Abnormal gallbladder which shows distention and irregular wall thickening. No choledocholithiasis or cholelithiasis.\par [id number] AFP < 1.8; CEA 2.7; CA 19.9 < 2\par [id number] ERCP: protruding lesion in CBD just distal to the bifurcation (Bismuth I). Brushing negative for malignant cells. BL stent placement.\par [id number] EUS: 1.5cm lesion highly suspicious for cholangiocarcinoma in CBD distal to the bifurcation. Biopsies were not taken due to the risk of seeding.\par \par On my review of the imaging listed above, the replaced right hepatic artery seems to be surrounded by tumor. The left lobe is quite small, and may not provide enough parenchyma, should I right hepatectomy be required for tumor clearance. Additionally, the soft tissue density is abutting the confluence of the portal vein seems, which seems somewhat distorted - in this case the tumor would likely be unresectable.\par I have asked Mr. Ortiz to obtain IgG4 to r/o autoimmune process, as well as CT C to r/o lung mets.\par We will discuss his case at the upcoming tumor board on Monday Jan 25.\par \par As of now, I am inclined to bring Mr. Ortiz to the operating room for a diagnostic laparoscopy, to r/o metastatic or unresectable disease. If none of them is apparent, we will obtain liver volumetrics and perform right PVE prior to resection. Additionally, we will attempt lymph node bx during laparoscopy, and if any of them is negative would probably advise neoadjuvant chemo.\par

## 2021-01-21 NOTE — PHYSICAL EXAM
[Normal Neck Lymph Nodes] : normal neck lymph nodes  [Normal Supraclavicular Lymph Nodes] : normal supraclavicular lymph nodes [Normal] : oriented to person, place and time, with appropriate affect [de-identified] : Anicteric [de-identified] : S1,S2, regular rate and rhythm. No murmurs heard. [de-identified] : Clear throughout. No wheezes heard. [de-identified] : Warm, dry. CABG scar present

## 2021-01-21 NOTE — RESULTS/DATA
[FreeTextEntry1] : Diagnostic Studies\par Date: 1/15/2021\par Study: EUS/ERCP ([facility])\par Results: Impressions:  \par  Stricture of common hepatic duct. S/p ERCP with access sphincterotomy, \par sphincterotomy, brushing, stent placement Appears consistent with a Bismuth I \par hilar cholangiocarcinoma, although we await final pathology results.  \par  Cholangiocarcinoma of biliary tract. Probable based off of cholangiogram \par findings and EUS findings.  \par \par Date: 1/11/2021\par Study: MR MRCP ([facility])\par Results: IMPRESSION: 1.5 cm long common hepatic duct narrowing. Increase in intrahepatic biliary dilatation. Abnormal soft tissue around the common hepatic duct.\par Abnormal gallbladder which shows distention and irregular wall thickening. No choledocholithiasis or cholelithiasis.\par  Recommend ERCP with tissue sampling/washings. Considerations include cholangiocarcinoma, IgG4 cholangitis, extension of xanthogranulomatous cholecystitis or gallbladder carcinoma.\par \par Date: 1/10/2021\par Study: US Abdomen ([facility])\par Results: \par IMPRESSION:\par 1. As on the CT scan of 1/10/2021, there are findings suspicious for gangrenous acute cholecystitis.\par 2. Dilated common hepatic duct. On the CT scan there is a suspicion of a mass at the confluence of the hepatic ducts. Cholangiocarcinoma should be ruled out.\par \par \par Date: 1/10/2021\par Study: CT AP WWO ([facility])\par Results: Impression: 1. Since 8/4/2020, there is new mild intrahepatic biliary ductal dilatation in both lobes of the liver, with a 2.0 cm mass at the confluence of the hepatic ducts. These findings are suspicious for cholangiocarcinoma.\par 2. Recurrent acute cholecystitis. The presence of intraluminal membranes and the marked surrounding inflammatory change again raise the suspicion of gangrenous cholecystitis.\par 3. Mild lymphadenopathy in the right upper quadrant.\par 4. Small bowel ileus.\par \par Labs:\par Date: 1/15/2021\par Results: TBili 1.7, Alk Phos 327, AST 68, \par \par Pathology:\par *see advanced result citation for complete report\par \par \par \par

## 2021-01-27 ENCOUNTER — TRANSCRIPTION ENCOUNTER (OUTPATIENT)
Age: 66
End: 2021-01-27

## 2021-01-27 VITALS
HEART RATE: 82 BPM | OXYGEN SATURATION: 99 % | HEIGHT: 69 IN | DIASTOLIC BLOOD PRESSURE: 75 MMHG | WEIGHT: 186.07 LBS | SYSTOLIC BLOOD PRESSURE: 160 MMHG | TEMPERATURE: 97 F | RESPIRATION RATE: 16 BRPM

## 2021-01-27 LAB — IGG4 SER-MCNC: 102 MG/DL

## 2021-01-27 NOTE — PATIENT PROFILE ADULT - STATED REASON FOR ADMISSION
staging laparoscopy possible cholecystectomy possible open bile duct resection with hepaticojejunostomy and partial hepatectomy

## 2021-01-28 ENCOUNTER — INPATIENT (INPATIENT)
Facility: HOSPITAL | Age: 66
LOS: 0 days | Discharge: HOME | DRG: 422 | End: 2021-01-28
Attending: SURGERY | Admitting: SURGERY
Payer: COMMERCIAL

## 2021-01-28 VITALS
RESPIRATION RATE: 23 BRPM | DIASTOLIC BLOOD PRESSURE: 79 MMHG | SYSTOLIC BLOOD PRESSURE: 154 MMHG | OXYGEN SATURATION: 94 % | HEART RATE: 80 BPM

## 2021-01-28 DIAGNOSIS — Z95.1 PRESENCE OF AORTOCORONARY BYPASS GRAFT: Chronic | ICD-10-CM

## 2021-01-28 LAB
GLUCOSE BLDC GLUCOMTR-MCNC: 112 MG/DL — HIGH (ref 70–99)
GLUCOSE BLDC GLUCOMTR-MCNC: 123 MG/DL — HIGH (ref 70–99)

## 2021-01-28 PROCEDURE — 86900 BLOOD TYPING SEROLOGIC ABO: CPT

## 2021-01-28 PROCEDURE — 86850 RBC ANTIBODY SCREEN: CPT

## 2021-01-28 PROCEDURE — 49320 DIAG LAPARO SEPARATE PROC: CPT

## 2021-01-28 PROCEDURE — 82962 GLUCOSE BLOOD TEST: CPT

## 2021-01-28 PROCEDURE — 86901 BLOOD TYPING SEROLOGIC RH(D): CPT

## 2021-01-28 RX ORDER — GLUCAGON INJECTION, SOLUTION 0.5 MG/.1ML
1 INJECTION, SOLUTION SUBCUTANEOUS ONCE
Refills: 0 | Status: DISCONTINUED | OUTPATIENT
Start: 2021-01-28 | End: 2021-01-28

## 2021-01-28 RX ORDER — IBUPROFEN 200 MG
400 TABLET ORAL EVERY 6 HOURS
Refills: 0 | Status: DISCONTINUED | OUTPATIENT
Start: 2021-01-28 | End: 2021-01-28

## 2021-01-28 RX ORDER — SODIUM CHLORIDE 9 MG/ML
1000 INJECTION, SOLUTION INTRAVENOUS
Refills: 0 | Status: DISCONTINUED | OUTPATIENT
Start: 2021-01-28 | End: 2021-01-28

## 2021-01-28 RX ORDER — DEXTROSE 50 % IN WATER 50 %
15 SYRINGE (ML) INTRAVENOUS ONCE
Refills: 0 | Status: DISCONTINUED | OUTPATIENT
Start: 2021-01-28 | End: 2021-01-28

## 2021-01-28 RX ORDER — INSULIN LISPRO 100/ML
VIAL (ML) SUBCUTANEOUS
Refills: 0 | Status: DISCONTINUED | OUTPATIENT
Start: 2021-01-28 | End: 2021-01-28

## 2021-01-28 RX ORDER — DEXTROSE 50 % IN WATER 50 %
25 SYRINGE (ML) INTRAVENOUS ONCE
Refills: 0 | Status: DISCONTINUED | OUTPATIENT
Start: 2021-01-28 | End: 2021-01-28

## 2021-01-28 RX ORDER — DEXTROSE 50 % IN WATER 50 %
12.5 SYRINGE (ML) INTRAVENOUS ONCE
Refills: 0 | Status: DISCONTINUED | OUTPATIENT
Start: 2021-01-28 | End: 2021-01-28

## 2021-01-28 RX ORDER — ACETAMINOPHEN 500 MG
650 TABLET ORAL EVERY 6 HOURS
Refills: 0 | Status: DISCONTINUED | OUTPATIENT
Start: 2021-01-28 | End: 2021-01-28

## 2021-01-28 RX ORDER — HYDROMORPHONE HYDROCHLORIDE 2 MG/ML
0.5 INJECTION INTRAMUSCULAR; INTRAVENOUS; SUBCUTANEOUS ONCE
Refills: 0 | Status: DISCONTINUED | OUTPATIENT
Start: 2021-01-28 | End: 2021-01-28

## 2021-01-28 RX ADMIN — Medication 650 MILLIGRAM(S): at 16:44

## 2021-01-28 RX ADMIN — HYDROMORPHONE HYDROCHLORIDE 0.5 MILLIGRAM(S): 2 INJECTION INTRAMUSCULAR; INTRAVENOUS; SUBCUTANEOUS at 15:13

## 2021-01-28 NOTE — H&P ADULT - ASSESSMENT
65M hx HTN, HLD, DM, TIA (s/p eliquis), A fib, CAD s/p CABG x3 on DAPT (7/6/20 w/ Dr. Conde), acute cholecystitis s/p perc dharmesh (Aug 2020 and removal on Sep 2020) recently admitted w/ new 1.5 cm hepatic duct lesion s/p ERCP/EUS w/ placement of hepatic duct stents x2. Brushings were negative for malignancy, no biopsy taken 2/2 risk of seeding. Patient denies any acute complaints at this time.  - proceed to OR

## 2021-01-28 NOTE — H&P ADULT - HISTORY OF PRESENT ILLNESS
65M hx HTN, HLD, DM, TIA (s/p eliquis), A fib, CAD s/p CABG x3 on DAPT (7/6/20 w/ Dr. Conde), acute cholecystitis s/p perc dharmesh (Aug 2020 and removal on Sep 2020) recently admitted w/ new 1.5 cm hepatic duct lesion s/p ERCP/EUS w/ placement of hepatic duct stents x2. Brushings were negative for malignancy, no biopsy taken 2/2 risk of seeding. Patient denies any acute complaints at this time.

## 2021-01-28 NOTE — H&P ADULT - NSICDXPASTMEDICALHX_GEN_ALL_CORE_FT
PAST MEDICAL HISTORY:  Choledocholithiasis     DM (diabetes mellitus)     H/O cardiomyopathy     History of TIAs     HLD (hyperlipidemia) denies    HTN (hypertension)     Hypothyroidism

## 2021-01-28 NOTE — BRIEF OPERATIVE NOTE - OPERATION/FINDINGS
Hx percutaneous cholecystostomy s/p removal, 1.5 cm biliary mass distal to confluence of R and L hepatic duct c/f cholangiocarcinoma, biliary stent placement x2. Diagnostic laparoscopy performed for staging. Not gross metastases identified on liver, mesentery, or 4 quadrants of abdomen. Gallbladder encased in inflammatory fat, CBD and chad unable to be visualized without extensive dissection. Hemostasis achieved. Fascia and skin closed primarily.

## 2021-01-29 PROBLEM — E78.5 HYPERLIPIDEMIA, UNSPECIFIED: Chronic | Status: ACTIVE | Noted: 2020-06-24

## 2021-02-02 ENCOUNTER — APPOINTMENT (OUTPATIENT)
Dept: SURGICAL ONCOLOGY | Facility: CLINIC | Age: 66
End: 2021-02-02
Payer: COMMERCIAL

## 2021-02-03 DIAGNOSIS — Z79.01 LONG TERM (CURRENT) USE OF ANTICOAGULANTS: ICD-10-CM

## 2021-02-03 DIAGNOSIS — Z79.02 LONG TERM (CURRENT) USE OF ANTITHROMBOTICS/ANTIPLATELETS: ICD-10-CM

## 2021-02-03 DIAGNOSIS — E11.9 TYPE 2 DIABETES MELLITUS WITHOUT COMPLICATIONS: ICD-10-CM

## 2021-02-03 DIAGNOSIS — E03.9 HYPOTHYROIDISM, UNSPECIFIED: ICD-10-CM

## 2021-02-03 DIAGNOSIS — I10 ESSENTIAL (PRIMARY) HYPERTENSION: ICD-10-CM

## 2021-02-03 DIAGNOSIS — E78.5 HYPERLIPIDEMIA, UNSPECIFIED: ICD-10-CM

## 2021-02-03 DIAGNOSIS — Z79.4 LONG TERM (CURRENT) USE OF INSULIN: ICD-10-CM

## 2021-02-03 DIAGNOSIS — I25.10 ATHEROSCLEROTIC HEART DISEASE OF NATIVE CORONARY ARTERY WITHOUT ANGINA PECTORIS: ICD-10-CM

## 2021-02-03 DIAGNOSIS — Z86.73 PERSONAL HISTORY OF TRANSIENT ISCHEMIC ATTACK (TIA), AND CEREBRAL INFARCTION WITHOUT RESIDUAL DEFICITS: ICD-10-CM

## 2021-02-03 DIAGNOSIS — Z95.1 PRESENCE OF AORTOCORONARY BYPASS GRAFT: ICD-10-CM

## 2021-02-03 DIAGNOSIS — Z79.82 LONG TERM (CURRENT) USE OF ASPIRIN: ICD-10-CM

## 2021-02-03 DIAGNOSIS — K83.1 OBSTRUCTION OF BILE DUCT: ICD-10-CM

## 2021-02-03 DIAGNOSIS — Z79.84 LONG TERM (CURRENT) USE OF ORAL HYPOGLYCEMIC DRUGS: ICD-10-CM

## 2021-02-09 ENCOUNTER — OUTPATIENT (OUTPATIENT)
Dept: OUTPATIENT SERVICES | Facility: HOSPITAL | Age: 66
LOS: 1 days | End: 2021-02-09
Payer: COMMERCIAL

## 2021-02-09 ENCOUNTER — APPOINTMENT (OUTPATIENT)
Dept: SURGICAL ONCOLOGY | Facility: CLINIC | Age: 66
End: 2021-02-09
Payer: COMMERCIAL

## 2021-02-09 VITALS
WEIGHT: 193 LBS | HEART RATE: 73 BPM | SYSTOLIC BLOOD PRESSURE: 132 MMHG | TEMPERATURE: 98 F | BODY MASS INDEX: 28.58 KG/M2 | DIASTOLIC BLOOD PRESSURE: 70 MMHG | HEIGHT: 69 IN | OXYGEN SATURATION: 100 %

## 2021-02-09 DIAGNOSIS — Z95.1 PRESENCE OF AORTOCORONARY BYPASS GRAFT: Chronic | ICD-10-CM

## 2021-02-09 DIAGNOSIS — Z01.818 ENCOUNTER FOR OTHER PREPROCEDURAL EXAMINATION: ICD-10-CM

## 2021-02-09 DIAGNOSIS — K81.0 ACUTE CHOLECYSTITIS: ICD-10-CM

## 2021-02-09 LAB
A1C WITH ESTIMATED AVERAGE GLUCOSE RESULT: 5 % — SIGNIFICANT CHANGE UP (ref 4–5.6)
ALBUMIN SERPL ELPH-MCNC: 3.8 G/DL — SIGNIFICANT CHANGE UP (ref 3.3–5)
ALP SERPL-CCNC: 120 U/L — SIGNIFICANT CHANGE UP (ref 40–120)
ALT FLD-CCNC: 26 U/L — SIGNIFICANT CHANGE UP (ref 10–45)
ANION GAP SERPL CALC-SCNC: 11 MMOL/L — SIGNIFICANT CHANGE UP (ref 5–17)
APTT BLD: 28.9 SEC — SIGNIFICANT CHANGE UP (ref 27.5–35.5)
AST SERPL-CCNC: 18 U/L — SIGNIFICANT CHANGE UP (ref 10–40)
BASOPHILS # BLD AUTO: 0.07 K/UL — SIGNIFICANT CHANGE UP (ref 0–0.2)
BASOPHILS NFR BLD AUTO: 0.7 % — SIGNIFICANT CHANGE UP (ref 0–2)
BILIRUB DIRECT SERPL-MCNC: <0.2 MG/DL — SIGNIFICANT CHANGE UP (ref 0–0.2)
BILIRUB INDIRECT FLD-MCNC: SIGNIFICANT CHANGE UP MG/DL (ref 0.2–1)
BILIRUB SERPL-MCNC: 0.6 MG/DL — SIGNIFICANT CHANGE UP (ref 0.2–1.2)
BLD GP AB SCN SERPL QL: NEGATIVE — SIGNIFICANT CHANGE UP
BLD GP AB SCN SERPL QL: NEGATIVE — SIGNIFICANT CHANGE UP
BUN SERPL-MCNC: 26 MG/DL — HIGH (ref 7–23)
CALCIUM SERPL-MCNC: 9.5 MG/DL — SIGNIFICANT CHANGE UP (ref 8.4–10.5)
CHLORIDE SERPL-SCNC: 106 MMOL/L — SIGNIFICANT CHANGE UP (ref 96–108)
CO2 SERPL-SCNC: 26 MMOL/L — SIGNIFICANT CHANGE UP (ref 22–31)
CREAT SERPL-MCNC: 0.92 MG/DL — SIGNIFICANT CHANGE UP (ref 0.5–1.3)
EOSINOPHIL # BLD AUTO: 0.35 K/UL — SIGNIFICANT CHANGE UP (ref 0–0.5)
EOSINOPHIL NFR BLD AUTO: 3.4 % — SIGNIFICANT CHANGE UP (ref 0–6)
ESTIMATED AVERAGE GLUCOSE: 97 MG/DL — SIGNIFICANT CHANGE UP (ref 68–114)
GLUCOSE SERPL-MCNC: 175 MG/DL — HIGH (ref 70–99)
HCT VFR BLD CALC: 26.7 % — LOW (ref 39–50)
HGB BLD-MCNC: 8.1 G/DL — LOW (ref 13–17)
IMM GRANULOCYTES NFR BLD AUTO: 0.5 % — SIGNIFICANT CHANGE UP (ref 0–1.5)
INR BLD: 1.02 — SIGNIFICANT CHANGE UP (ref 0.88–1.16)
LYMPHOCYTES # BLD AUTO: 1.82 K/UL — SIGNIFICANT CHANGE UP (ref 1–3.3)
LYMPHOCYTES # BLD AUTO: 17.6 % — SIGNIFICANT CHANGE UP (ref 13–44)
MCHC RBC-ENTMCNC: 28.8 PG — SIGNIFICANT CHANGE UP (ref 27–34)
MCHC RBC-ENTMCNC: 30.3 GM/DL — LOW (ref 32–36)
MCV RBC AUTO: 95 FL — SIGNIFICANT CHANGE UP (ref 80–100)
MONOCYTES # BLD AUTO: 0.67 K/UL — SIGNIFICANT CHANGE UP (ref 0–0.9)
MONOCYTES NFR BLD AUTO: 6.5 % — SIGNIFICANT CHANGE UP (ref 2–14)
NEUTROPHILS # BLD AUTO: 7.41 K/UL — HIGH (ref 1.8–7.4)
NEUTROPHILS NFR BLD AUTO: 71.3 % — SIGNIFICANT CHANGE UP (ref 43–77)
NRBC # BLD: 0 /100 WBCS — SIGNIFICANT CHANGE UP (ref 0–0)
PLATELET # BLD AUTO: 341 K/UL — SIGNIFICANT CHANGE UP (ref 150–400)
POTASSIUM SERPL-MCNC: 5.2 MMOL/L — SIGNIFICANT CHANGE UP (ref 3.5–5.3)
POTASSIUM SERPL-SCNC: 5.2 MMOL/L — SIGNIFICANT CHANGE UP (ref 3.5–5.3)
PROT SERPL-MCNC: 7.2 G/DL — SIGNIFICANT CHANGE UP (ref 6–8.3)
PROTHROM AB SERPL-ACNC: 12.2 SEC — SIGNIFICANT CHANGE UP (ref 10.6–13.6)
RBC # BLD: 2.81 M/UL — LOW (ref 4.2–5.8)
RBC # FLD: 13.6 % — SIGNIFICANT CHANGE UP (ref 10.3–14.5)
RH IG SCN BLD-IMP: POSITIVE — SIGNIFICANT CHANGE UP
RH IG SCN BLD-IMP: POSITIVE — SIGNIFICANT CHANGE UP
SODIUM SERPL-SCNC: 143 MMOL/L — SIGNIFICANT CHANGE UP (ref 135–145)
WBC # BLD: 10.37 K/UL — SIGNIFICANT CHANGE UP (ref 3.8–10.5)
WBC # FLD AUTO: 10.37 K/UL — SIGNIFICANT CHANGE UP (ref 3.8–10.5)

## 2021-02-09 PROCEDURE — 85025 COMPLETE CBC W/AUTO DIFF WBC: CPT

## 2021-02-09 PROCEDURE — 80048 BASIC METABOLIC PNL TOTAL CA: CPT

## 2021-02-09 PROCEDURE — 80076 HEPATIC FUNCTION PANEL: CPT

## 2021-02-09 PROCEDURE — 86850 RBC ANTIBODY SCREEN: CPT

## 2021-02-09 PROCEDURE — 83036 HEMOGLOBIN GLYCOSYLATED A1C: CPT

## 2021-02-09 PROCEDURE — 99024 POSTOP FOLLOW-UP VISIT: CPT

## 2021-02-09 PROCEDURE — 85730 THROMBOPLASTIN TIME PARTIAL: CPT

## 2021-02-09 PROCEDURE — 86900 BLOOD TYPING SEROLOGIC ABO: CPT

## 2021-02-09 PROCEDURE — 85610 PROTHROMBIN TIME: CPT

## 2021-02-09 PROCEDURE — 86901 BLOOD TYPING SEROLOGIC RH(D): CPT

## 2021-02-09 RX ORDER — METOPROLOL TARTRATE 25 MG/1
25 TABLET, FILM COATED ORAL
Refills: 0 | Status: ACTIVE | COMMUNITY

## 2021-02-09 RX ORDER — METRONIDAZOLE 500 MG/1
500 TABLET ORAL
Refills: 0 | Status: DISCONTINUED | COMMUNITY
End: 2021-02-09

## 2021-02-09 RX ORDER — CEFPODOXIME PROXETIL 200 MG/1
200 TABLET, FILM COATED ORAL
Refills: 0 | Status: DISCONTINUED | COMMUNITY
End: 2021-02-09

## 2021-02-09 RX ORDER — SITAGLIPTIN 100 MG/1
TABLET, FILM COATED ORAL
Refills: 0 | Status: ACTIVE | COMMUNITY

## 2021-02-16 NOTE — ASSESSMENT
[FreeTextEntry1] : This is a 65 y.o. man with h/o cholecystitis and narrowing of biliary tree.\par Recent laparoscopy did not reveal any metastatic disease but did show a phlegmon of the RUQ.\par We will perform a robotic/open cholecystectomy in an attempt to obtain more tissue for diagnosis.

## 2021-02-16 NOTE — REASON FOR VISIT
[Family Member] : family member [de-identified] : Laparoscopy [de-identified] : 1/28/2021 [de-identified] : 5

## 2021-02-16 NOTE — PHYSICAL EXAM
[Normal] : well developed, well nourished, in no acute distress [de-identified] : Soft, non tender, surgical sites are healing well without signs of infection

## 2021-02-16 NOTE — HISTORY OF PRESENT ILLNESS
[FreeTextEntry1] : Patient Name: LYDIA SHEN \par MRN: [id number] \par [place] MRN: [id number]\par Referring Provider: none \par Oncologist: na\par Date: 2/9/2021\par \par Diagnosis: Cholangiocarcinoma\par Operative Date: 1/28/2021\par Procedure: Laparoscopy\par \par He presents for a scheduled post operative visit. He is 12 days s/p laparoscopy and feels well overall except for abdominal discomfort. He had right sided abdominal pain prior to the laparoscopy and it is now improved although still present at random times. It is relieved with PRN OTC Tylenol.\par \par Currently, Mr. SHEN denies fevers, chills, or night sweats. He is tolerating a regular diet and is having regular bowel movements. Pain is controlled.\par \par Final Pathology Showed n/a\par \par

## 2021-03-03 ENCOUNTER — TRANSCRIPTION ENCOUNTER (OUTPATIENT)
Age: 66
End: 2021-03-03

## 2021-03-03 VITALS
RESPIRATION RATE: 17 BRPM | TEMPERATURE: 98 F | DIASTOLIC BLOOD PRESSURE: 78 MMHG | HEART RATE: 61 BPM | SYSTOLIC BLOOD PRESSURE: 162 MMHG | HEIGHT: 69 IN | WEIGHT: 191.14 LBS | OXYGEN SATURATION: 99 %

## 2021-03-03 NOTE — PRE-OP CHECKLIST - 3.
CABG pt which was done July.2020 in [facility], Plavix stopped in Febl 22, 2021 and ASpirin 81mg po taken yesterday 8pm

## 2021-03-04 ENCOUNTER — RESULT REVIEW (OUTPATIENT)
Age: 66
End: 2021-03-04

## 2021-03-04 ENCOUNTER — INPATIENT (INPATIENT)
Facility: HOSPITAL | Age: 66
LOS: 5 days | Discharge: HOME | DRG: 409 | End: 2021-03-10
Attending: SURGERY | Admitting: SURGERY
Payer: COMMERCIAL

## 2021-03-04 ENCOUNTER — APPOINTMENT (OUTPATIENT)
Dept: SURGICAL ONCOLOGY | Facility: HOSPITAL | Age: 66
End: 2021-03-04

## 2021-03-04 DIAGNOSIS — Z95.1 PRESENCE OF AORTOCORONARY BYPASS GRAFT: Chronic | ICD-10-CM

## 2021-03-04 DIAGNOSIS — Z41.9 ENCOUNTER FOR PROCEDURE FOR PURPOSES OTHER THAN REMEDYING HEALTH STATE, UNSPECIFIED: Chronic | ICD-10-CM

## 2021-03-04 LAB
ALBUMIN SERPL ELPH-MCNC: 3.6 G/DL — SIGNIFICANT CHANGE UP (ref 3.3–5)
ALP SERPL-CCNC: 232 U/L — HIGH (ref 40–120)
ALT FLD-CCNC: 115 U/L — HIGH (ref 10–45)
ANION GAP SERPL CALC-SCNC: 9 MMOL/L — SIGNIFICANT CHANGE UP (ref 5–17)
APTT BLD: 29.8 SEC — SIGNIFICANT CHANGE UP (ref 27.5–35.5)
AST SERPL-CCNC: 56 U/L — HIGH (ref 10–40)
BASE EXCESS BLDA CALC-SCNC: -2.4 MMOL/L — LOW (ref -2–3)
BILIRUB SERPL-MCNC: 0.8 MG/DL — SIGNIFICANT CHANGE UP (ref 0.2–1.2)
BUN SERPL-MCNC: 11 MG/DL — SIGNIFICANT CHANGE UP (ref 7–23)
CA-I BLDA-SCNC: 1.15 MMOL/L — SIGNIFICANT CHANGE UP (ref 1.12–1.3)
CALCIUM SERPL-MCNC: 8.9 MG/DL — SIGNIFICANT CHANGE UP (ref 8.4–10.5)
CHLORIDE SERPL-SCNC: 103 MMOL/L — SIGNIFICANT CHANGE UP (ref 96–108)
CO2 SERPL-SCNC: 24 MMOL/L — SIGNIFICANT CHANGE UP (ref 22–31)
COHGB MFR BLDA: 1.1 % — SIGNIFICANT CHANGE UP
CREAT SERPL-MCNC: 0.72 MG/DL — SIGNIFICANT CHANGE UP (ref 0.5–1.3)
GLUCOSE BLDC GLUCOMTR-MCNC: 122 MG/DL — HIGH (ref 70–99)
GLUCOSE BLDC GLUCOMTR-MCNC: 138 MG/DL — HIGH (ref 70–99)
GLUCOSE BLDC GLUCOMTR-MCNC: 166 MG/DL — HIGH (ref 70–99)
GLUCOSE SERPL-MCNC: 180 MG/DL — HIGH (ref 70–99)
GRAM STN FLD: SIGNIFICANT CHANGE UP
HCO3 BLDA-SCNC: 21 MMOL/L — SIGNIFICANT CHANGE UP (ref 21–28)
HCT VFR BLD CALC: 25.6 % — LOW (ref 39–50)
HGB BLD-MCNC: 7.7 G/DL — LOW (ref 13–17)
HGB BLDA-MCNC: 10.5 G/DL — LOW (ref 13–17)
INR BLD: 1.4 — HIGH (ref 0.88–1.16)
MAGNESIUM SERPL-MCNC: 1.3 MG/DL — LOW (ref 1.6–2.6)
MCHC RBC-ENTMCNC: 26.6 PG — LOW (ref 27–34)
MCHC RBC-ENTMCNC: 30.1 GM/DL — LOW (ref 32–36)
MCV RBC AUTO: 88.3 FL — SIGNIFICANT CHANGE UP (ref 80–100)
METHGB MFR BLDA: 0.4 % — SIGNIFICANT CHANGE UP
NRBC # BLD: 0 /100 WBCS — SIGNIFICANT CHANGE UP (ref 0–0)
O2 CT VFR BLDA CALC: 15.1 ML/DL — SIGNIFICANT CHANGE UP (ref 15–23)
OXYHGB MFR BLDA: 98 % — SIGNIFICANT CHANGE UP (ref 94–100)
PCO2 BLDA: 31 MMHG — LOW (ref 35–48)
PH BLDA: 7.45 — SIGNIFICANT CHANGE UP (ref 7.35–7.45)
PHOSPHATE SERPL-MCNC: 3.2 MG/DL — SIGNIFICANT CHANGE UP (ref 2.5–4.5)
PLATELET # BLD AUTO: 365 K/UL — SIGNIFICANT CHANGE UP (ref 150–400)
PO2 BLDA: 255 MMHG — HIGH (ref 83–108)
POTASSIUM BLDA-SCNC: 4.2 MMOL/L — SIGNIFICANT CHANGE UP (ref 3.5–4.9)
POTASSIUM SERPL-MCNC: 4.1 MMOL/L — SIGNIFICANT CHANGE UP (ref 3.5–5.3)
POTASSIUM SERPL-SCNC: 4.1 MMOL/L — SIGNIFICANT CHANGE UP (ref 3.5–5.3)
PROT SERPL-MCNC: 6.8 G/DL — SIGNIFICANT CHANGE UP (ref 6–8.3)
PROTHROM AB SERPL-ACNC: 16.5 SEC — HIGH (ref 10.6–13.6)
RBC # BLD: 2.9 M/UL — LOW (ref 4.2–5.8)
RBC # FLD: 13.3 % — SIGNIFICANT CHANGE UP (ref 10.3–14.5)
SAO2 % BLDA: 100 % — SIGNIFICANT CHANGE UP (ref 95–100)
SODIUM BLDA-SCNC: 137 MMOL/L — LOW (ref 138–146)
SODIUM SERPL-SCNC: 136 MMOL/L — SIGNIFICANT CHANGE UP (ref 135–145)
SPECIMEN SOURCE: SIGNIFICANT CHANGE UP
WBC # BLD: 18.82 K/UL — HIGH (ref 3.8–10.5)
WBC # FLD AUTO: 18.82 K/UL — HIGH (ref 3.8–10.5)

## 2021-03-04 PROCEDURE — 47600 CHOLECYSTECTOMY: CPT | Mod: 22

## 2021-03-04 PROCEDURE — 88305 TISSUE EXAM BY PATHOLOGIST: CPT | Mod: 26

## 2021-03-04 PROCEDURE — 88331 PATH CONSLTJ SURG 1 BLK 1SPC: CPT | Mod: 26

## 2021-03-04 PROCEDURE — 88173 CYTOPATH EVAL FNA REPORT: CPT | Mod: 26

## 2021-03-04 PROCEDURE — 88304 TISSUE EXAM BY PATHOLOGIST: CPT | Mod: 26

## 2021-03-04 RX ORDER — ASPIRIN/CALCIUM CARB/MAGNESIUM 324 MG
81 TABLET ORAL DAILY
Refills: 0 | Status: DISCONTINUED | OUTPATIENT
Start: 2021-03-05 | End: 2021-03-10

## 2021-03-04 RX ORDER — DEXTROSE 50 % IN WATER 50 %
25 SYRINGE (ML) INTRAVENOUS ONCE
Refills: 0 | Status: DISCONTINUED | OUTPATIENT
Start: 2021-03-04 | End: 2021-03-10

## 2021-03-04 RX ORDER — INSULIN LISPRO 100/ML
VIAL (ML) SUBCUTANEOUS
Refills: 0 | Status: DISCONTINUED | OUTPATIENT
Start: 2021-03-04 | End: 2021-03-05

## 2021-03-04 RX ORDER — HYDROMORPHONE HYDROCHLORIDE 2 MG/ML
0.5 INJECTION INTRAMUSCULAR; INTRAVENOUS; SUBCUTANEOUS EVERY 4 HOURS
Refills: 0 | Status: DISCONTINUED | OUTPATIENT
Start: 2021-03-04 | End: 2021-03-06

## 2021-03-04 RX ORDER — SODIUM CHLORIDE 9 MG/ML
1000 INJECTION, SOLUTION INTRAVENOUS
Refills: 0 | Status: DISCONTINUED | OUTPATIENT
Start: 2021-03-04 | End: 2021-03-10

## 2021-03-04 RX ORDER — ONDANSETRON 8 MG/1
4 TABLET, FILM COATED ORAL EVERY 6 HOURS
Refills: 0 | Status: DISCONTINUED | OUTPATIENT
Start: 2021-03-04 | End: 2021-03-10

## 2021-03-04 RX ORDER — DEXTROSE 50 % IN WATER 50 %
12.5 SYRINGE (ML) INTRAVENOUS ONCE
Refills: 0 | Status: DISCONTINUED | OUTPATIENT
Start: 2021-03-04 | End: 2021-03-10

## 2021-03-04 RX ORDER — HEPARIN SODIUM 5000 [USP'U]/ML
5000 INJECTION INTRAVENOUS; SUBCUTANEOUS EVERY 8 HOURS
Refills: 0 | Status: DISCONTINUED | OUTPATIENT
Start: 2021-03-05 | End: 2021-03-08

## 2021-03-04 RX ORDER — LEVOTHYROXINE SODIUM 125 MCG
175 TABLET ORAL DAILY
Refills: 0 | Status: DISCONTINUED | OUTPATIENT
Start: 2021-03-04 | End: 2021-03-10

## 2021-03-04 RX ORDER — ASPIRIN/CALCIUM CARB/MAGNESIUM 324 MG
1 TABLET ORAL
Qty: 0 | Refills: 0 | DISCHARGE

## 2021-03-04 RX ORDER — SODIUM CHLORIDE 9 MG/ML
1000 INJECTION INTRAMUSCULAR; INTRAVENOUS; SUBCUTANEOUS
Refills: 0 | Status: DISCONTINUED | OUTPATIENT
Start: 2021-03-04 | End: 2021-03-08

## 2021-03-04 RX ORDER — METOPROLOL TARTRATE 50 MG
25 TABLET ORAL DAILY
Refills: 0 | Status: DISCONTINUED | OUTPATIENT
Start: 2021-03-04 | End: 2021-03-10

## 2021-03-04 RX ORDER — BUPIVACAINE 13.3 MG/ML
20 INJECTION, SUSPENSION, LIPOSOMAL INFILTRATION ONCE
Refills: 0 | Status: DISCONTINUED | OUTPATIENT
Start: 2021-03-04 | End: 2021-03-04

## 2021-03-04 RX ORDER — HYDROMORPHONE HYDROCHLORIDE 2 MG/ML
0.75 INJECTION INTRAMUSCULAR; INTRAVENOUS; SUBCUTANEOUS EVERY 4 HOURS
Refills: 0 | Status: DISCONTINUED | OUTPATIENT
Start: 2021-03-04 | End: 2021-03-06

## 2021-03-04 RX ORDER — DEXTROSE 50 % IN WATER 50 %
15 SYRINGE (ML) INTRAVENOUS ONCE
Refills: 0 | Status: DISCONTINUED | OUTPATIENT
Start: 2021-03-04 | End: 2021-03-10

## 2021-03-04 RX ORDER — GLUCAGON INJECTION, SOLUTION 0.5 MG/.1ML
1 INJECTION, SOLUTION SUBCUTANEOUS ONCE
Refills: 0 | Status: DISCONTINUED | OUTPATIENT
Start: 2021-03-04 | End: 2021-03-10

## 2021-03-04 RX ORDER — ATORVASTATIN CALCIUM 80 MG/1
80 TABLET, FILM COATED ORAL AT BEDTIME
Refills: 0 | Status: DISCONTINUED | OUTPATIENT
Start: 2021-03-04 | End: 2021-03-10

## 2021-03-04 RX ORDER — METOPROLOL TARTRATE 50 MG
0 TABLET ORAL
Qty: 0 | Refills: 0 | DISCHARGE

## 2021-03-04 RX ORDER — MAGNESIUM SULFATE 500 MG/ML
2 VIAL (ML) INJECTION ONCE
Refills: 0 | Status: COMPLETED | OUTPATIENT
Start: 2021-03-04 | End: 2021-03-04

## 2021-03-04 RX ADMIN — ATORVASTATIN CALCIUM 80 MILLIGRAM(S): 80 TABLET, FILM COATED ORAL at 22:36

## 2021-03-04 RX ADMIN — Medication 1: at 21:54

## 2021-03-04 RX ADMIN — Medication 50 GRAM(S): at 22:55

## 2021-03-04 RX ADMIN — SODIUM CHLORIDE 110 MILLILITER(S): 9 INJECTION INTRAMUSCULAR; INTRAVENOUS; SUBCUTANEOUS at 21:50

## 2021-03-04 RX ADMIN — HYDROMORPHONE HYDROCHLORIDE 0.75 MILLIGRAM(S): 2 INJECTION INTRAMUSCULAR; INTRAVENOUS; SUBCUTANEOUS at 22:05

## 2021-03-04 RX ADMIN — HYDROMORPHONE HYDROCHLORIDE 0.75 MILLIGRAM(S): 2 INJECTION INTRAMUSCULAR; INTRAVENOUS; SUBCUTANEOUS at 21:50

## 2021-03-04 NOTE — PACU DISCHARGE NOTE - COMMENTS
Patient is A&OX4. Still with residual pain 3/10. S/p Dilaudid. VSS. NS at 110cc/hr. fs 166 and coverage given. Abdomen with midline incision dressed with Medipore and 4 lap sites with Dermabond and staples. Right lower quadrant JAMAL and Graff 16F in place. NPO X meds. Patient made aware. Hbg 7.7. Surgery aware and will order early lab draw on 3/5/21. Floor nurse notified. Magnesium IV started. Report given to floor RN. Met PACU requirements. Patient taken down to room via bed accompanied by 2 RNs in no NAD

## 2021-03-04 NOTE — H&P ADULT - NSICDXPASTSURGICALHX_GEN_ALL_CORE_FT
PAST SURGICAL HISTORY:  S/P CABG (coronary artery bypass graft) x3 7/6/2020    Surgery, elective insertion of tube to drain gall bladder infection

## 2021-03-04 NOTE — BRIEF OPERATIVE NOTE - OPERATION/FINDINGS
Robot-assisted laparoscopic cholecystectomy attempted however could not progress d/t degree of inflammation btw GB & surrounding structures. Abdomen entered via midline laparotomy. GB pus aspirated and submitted for cultures. Resected free edge of GB wall. Performed marsupialization of GB. 19Fr Chau drain looped in GB fossa w/ omentum packed into space. Good hemostasis at end of case. Midline fascia closed w/ #1 looped PDS.

## 2021-03-04 NOTE — H&P ADULT - ASSESSMENT
65M hx HTN, HLD, DM, TIA (s/p Eliquis), Afib, CAD s/p CABG x3 on DAPT (7/6/20 w/ Dr. Conde), acute cholecystitis s/p perc dharmesh (Aug 2020 and removal on Sep 2020), recently admitted w/ new 1.5 cm hepatic duct lesion s/p ERCP/EUS w/ placement of hepatic duct stents x2. Brushings were negative for malignancy, no biopsy taken 2/2 risk of seeding. S/p diagnostic laparoscopy 1/28/21 w/ no metastatic findings. No complaints at this time.

## 2021-03-04 NOTE — PROGRESS NOTE ADULT - SUBJECTIVE AND OBJECTIVE BOX
POST-OPERATIVE NOTE    Procedure: Lap convert to Open Marsupialization/Subtotal Colectomy    Diagnosis/Indication: Gall bladder inflammation     Surgeon: Dr. Wagner    S: Pt c/o of pain in PACU however, controlling it with meds. Denies CP, SOB, LINO, calf tenderness. Denies nausea, vomiting.    O:  T(C): 36.3 (03-04-21 @ 21:14), Max: 36.3 (03-04-21 @ 21:14)  T(F): 97.3 (03-04-21 @ 21:14), Max: 97.3 (03-04-21 @ 21:14)  HR: 86 (03-04-21 @ 23:13) (69 - 86)  BP: 123/59 (03-04-21 @ 23:13) (96/47 - 123/59)  RR: 16 (03-04-21 @ 23:13) (12 - 34)  SpO2: 98% (03-04-21 @ 23:13) (95% - 99%)  Wt(kg): --                        7.7    18.82 )-----------( 365      ( 04 Mar 2021 21:43 )             25.6     03-04    136  |  103  |  11  ----------------------------<  180<H>  4.1   |  24  |  0.72    Ca    8.9      04 Mar 2021 21:43  Phos  3.2     03-04  Mg     1.3     03-04    TPro  6.8  /  Alb  3.6  /  TBili  0.8  /  DBili  x   /  AST  56<H>  /  ALT  115<H>  /  AlkPhos  232<H>  03-04    Gen: NAD, resting comfortably in bed  C/V: NSR  Pulm: Nonlabored breathing, no respiratory distress  Abd: soft, NT/ND, midline incision, covered with gauze which is clean, dry and intact with 2 JPs to suction sero/sang output  Extrem: WWP, no calf edema or tenderness, SCDs in place    A/P: 65y Male s/p above procedure  Diet: NPO  IVF: NS 100cc/hr  Pain/nausea control  STAT labs   Graff  ASA/SQH/SCDs/OOBA/IS  Dispo pending pain control, PO tolerance, clinical improvement POST-OPERATIVE NOTE    Procedure: Lap convert to Open Marsupialization/Subtotal Colectomy    Diagnosis/Indication: Gall bladder inflammation     Surgeon: Dr. Wagner    S: Pt c/o of pain in PACU however, controlling it with meds. Denies CP, SOB, LINO, calf tenderness. Denies nausea, vomiting.    O:  T(C): 36.3 (03-04-21 @ 21:14), Max: 36.3 (03-04-21 @ 21:14)  T(F): 97.3 (03-04-21 @ 21:14), Max: 97.3 (03-04-21 @ 21:14)  HR: 86 (03-04-21 @ 23:13) (69 - 86)  BP: 123/59 (03-04-21 @ 23:13) (96/47 - 123/59)  RR: 16 (03-04-21 @ 23:13) (12 - 34)  SpO2: 98% (03-04-21 @ 23:13) (95% - 99%)  Wt(kg): --                        7.7    18.82 )-----------( 365      ( 04 Mar 2021 21:43 )             25.6     03-04    136  |  103  |  11  ----------------------------<  180<H>  4.1   |  24  |  0.72    Ca    8.9      04 Mar 2021 21:43  Phos  3.2     03-04  Mg     1.3     03-04    TPro  6.8  /  Alb  3.6  /  TBili  0.8  /  DBili  x   /  AST  56<H>  /  ALT  115<H>  /  AlkPhos  232<H>  03-04    Gen: NAD, resting comfortably in bed  C/V: NSR  Pulm: Nonlabored breathing, no respiratory distress  Abd: soft, NT/ND, midline incision, covered with gauze which is clean, dry and intact with 1 JPs to suction sero/sang output  Extrem: WWP, no calf edema or tenderness, SCDs in place    A/P: 65y Male s/p above procedure  Diet: NPO  IVF: NS 100cc/hr  Pain/nausea control  STAT labs   Graff  ASA/SQH/SCDs/OOBA/IS  Dispo pending pain control, PO tolerance, clinical improvement

## 2021-03-05 LAB
ALBUMIN SERPL ELPH-MCNC: 3.3 G/DL — SIGNIFICANT CHANGE UP (ref 3.3–5)
ALBUMIN SERPL ELPH-MCNC: 3.6 G/DL — SIGNIFICANT CHANGE UP (ref 3.3–5)
ALP SERPL-CCNC: 203 U/L — HIGH (ref 40–120)
ALP SERPL-CCNC: 217 U/L — HIGH (ref 40–120)
ALT FLD-CCNC: 111 U/L — HIGH (ref 10–45)
ALT FLD-CCNC: 99 U/L — HIGH (ref 10–45)
ANION GAP SERPL CALC-SCNC: 10 MMOL/L — SIGNIFICANT CHANGE UP (ref 5–17)
ANION GAP SERPL CALC-SCNC: 12 MMOL/L — SIGNIFICANT CHANGE UP (ref 5–17)
AST SERPL-CCNC: 42 U/L — HIGH (ref 10–40)
AST SERPL-CCNC: 51 U/L — HIGH (ref 10–40)
BILIRUB SERPL-MCNC: 0.9 MG/DL — SIGNIFICANT CHANGE UP (ref 0.2–1.2)
BILIRUB SERPL-MCNC: 1 MG/DL — SIGNIFICANT CHANGE UP (ref 0.2–1.2)
BLD GP AB SCN SERPL QL: NEGATIVE — SIGNIFICANT CHANGE UP
BUN SERPL-MCNC: 13 MG/DL — SIGNIFICANT CHANGE UP (ref 7–23)
BUN SERPL-MCNC: 14 MG/DL — SIGNIFICANT CHANGE UP (ref 7–23)
CALCIUM SERPL-MCNC: 8.7 MG/DL — SIGNIFICANT CHANGE UP (ref 8.4–10.5)
CALCIUM SERPL-MCNC: 9.2 MG/DL — SIGNIFICANT CHANGE UP (ref 8.4–10.5)
CHLORIDE SERPL-SCNC: 102 MMOL/L — SIGNIFICANT CHANGE UP (ref 96–108)
CHLORIDE SERPL-SCNC: 102 MMOL/L — SIGNIFICANT CHANGE UP (ref 96–108)
CO2 SERPL-SCNC: 23 MMOL/L — SIGNIFICANT CHANGE UP (ref 22–31)
CO2 SERPL-SCNC: 24 MMOL/L — SIGNIFICANT CHANGE UP (ref 22–31)
CREAT SERPL-MCNC: 0.81 MG/DL — SIGNIFICANT CHANGE UP (ref 0.5–1.3)
CREAT SERPL-MCNC: 0.87 MG/DL — SIGNIFICANT CHANGE UP (ref 0.5–1.3)
GLUCOSE BLDC GLUCOMTR-MCNC: 182 MG/DL — HIGH (ref 70–99)
GLUCOSE BLDC GLUCOMTR-MCNC: 195 MG/DL — HIGH (ref 70–99)
GLUCOSE BLDC GLUCOMTR-MCNC: 227 MG/DL — HIGH (ref 70–99)
GLUCOSE BLDC GLUCOMTR-MCNC: 257 MG/DL — HIGH (ref 70–99)
GLUCOSE SERPL-MCNC: 249 MG/DL — HIGH (ref 70–99)
GLUCOSE SERPL-MCNC: 263 MG/DL — HIGH (ref 70–99)
HCT VFR BLD CALC: 25.5 % — LOW (ref 39–50)
HCT VFR BLD CALC: 26.2 % — LOW (ref 39–50)
HGB BLD-MCNC: 7.6 G/DL — LOW (ref 13–17)
HGB BLD-MCNC: 7.8 G/DL — LOW (ref 13–17)
MAGNESIUM SERPL-MCNC: 1.8 MG/DL — SIGNIFICANT CHANGE UP (ref 1.6–2.6)
MAGNESIUM SERPL-MCNC: 2.1 MG/DL — SIGNIFICANT CHANGE UP (ref 1.6–2.6)
MCHC RBC-ENTMCNC: 26.2 PG — LOW (ref 27–34)
MCHC RBC-ENTMCNC: 26.5 PG — LOW (ref 27–34)
MCHC RBC-ENTMCNC: 29.8 GM/DL — LOW (ref 32–36)
MCHC RBC-ENTMCNC: 29.8 GM/DL — LOW (ref 32–36)
MCV RBC AUTO: 87.9 FL — SIGNIFICANT CHANGE UP (ref 80–100)
MCV RBC AUTO: 88.9 FL — SIGNIFICANT CHANGE UP (ref 80–100)
NRBC # BLD: 0 /100 WBCS — SIGNIFICANT CHANGE UP (ref 0–0)
NRBC # BLD: 0 /100 WBCS — SIGNIFICANT CHANGE UP (ref 0–0)
PHOSPHATE SERPL-MCNC: 3.6 MG/DL — SIGNIFICANT CHANGE UP (ref 2.5–4.5)
PHOSPHATE SERPL-MCNC: 3.6 MG/DL — SIGNIFICANT CHANGE UP (ref 2.5–4.5)
PLATELET # BLD AUTO: 356 K/UL — SIGNIFICANT CHANGE UP (ref 150–400)
PLATELET # BLD AUTO: 366 K/UL — SIGNIFICANT CHANGE UP (ref 150–400)
POTASSIUM SERPL-MCNC: 4.5 MMOL/L — SIGNIFICANT CHANGE UP (ref 3.5–5.3)
POTASSIUM SERPL-MCNC: 4.6 MMOL/L — SIGNIFICANT CHANGE UP (ref 3.5–5.3)
POTASSIUM SERPL-SCNC: 4.5 MMOL/L — SIGNIFICANT CHANGE UP (ref 3.5–5.3)
POTASSIUM SERPL-SCNC: 4.6 MMOL/L — SIGNIFICANT CHANGE UP (ref 3.5–5.3)
PROT SERPL-MCNC: 6.6 G/DL — SIGNIFICANT CHANGE UP (ref 6–8.3)
PROT SERPL-MCNC: 6.8 G/DL — SIGNIFICANT CHANGE UP (ref 6–8.3)
RBC # BLD: 2.87 M/UL — LOW (ref 4.2–5.8)
RBC # BLD: 2.98 M/UL — LOW (ref 4.2–5.8)
RBC # FLD: 13.5 % — SIGNIFICANT CHANGE UP (ref 10.3–14.5)
RBC # FLD: 13.6 % — SIGNIFICANT CHANGE UP (ref 10.3–14.5)
RH IG SCN BLD-IMP: POSITIVE — SIGNIFICANT CHANGE UP
SODIUM SERPL-SCNC: 136 MMOL/L — SIGNIFICANT CHANGE UP (ref 135–145)
SODIUM SERPL-SCNC: 137 MMOL/L — SIGNIFICANT CHANGE UP (ref 135–145)
WBC # BLD: 13.44 K/UL — HIGH (ref 3.8–10.5)
WBC # BLD: 15.18 K/UL — HIGH (ref 3.8–10.5)
WBC # FLD AUTO: 13.44 K/UL — HIGH (ref 3.8–10.5)
WBC # FLD AUTO: 15.18 K/UL — HIGH (ref 3.8–10.5)

## 2021-03-05 RX ORDER — HYDROMORPHONE HYDROCHLORIDE 2 MG/ML
0.25 INJECTION INTRAMUSCULAR; INTRAVENOUS; SUBCUTANEOUS ONCE
Refills: 0 | Status: DISCONTINUED | OUTPATIENT
Start: 2021-03-05 | End: 2021-03-05

## 2021-03-05 RX ORDER — INSULIN LISPRO 100/ML
VIAL (ML) SUBCUTANEOUS
Refills: 0 | Status: DISCONTINUED | OUTPATIENT
Start: 2021-03-05 | End: 2021-03-10

## 2021-03-05 RX ORDER — MAGNESIUM SULFATE 500 MG/ML
1 VIAL (ML) INJECTION ONCE
Refills: 0 | Status: COMPLETED | OUTPATIENT
Start: 2021-03-05 | End: 2021-03-05

## 2021-03-05 RX ADMIN — HYDROMORPHONE HYDROCHLORIDE 0.5 MILLIGRAM(S): 2 INJECTION INTRAMUSCULAR; INTRAVENOUS; SUBCUTANEOUS at 04:25

## 2021-03-05 RX ADMIN — HEPARIN SODIUM 5000 UNIT(S): 5000 INJECTION INTRAVENOUS; SUBCUTANEOUS at 21:29

## 2021-03-05 RX ADMIN — HYDROMORPHONE HYDROCHLORIDE 0.25 MILLIGRAM(S): 2 INJECTION INTRAMUSCULAR; INTRAVENOUS; SUBCUTANEOUS at 22:05

## 2021-03-05 RX ADMIN — Medication 3: at 07:39

## 2021-03-05 RX ADMIN — HYDROMORPHONE HYDROCHLORIDE 0.25 MILLIGRAM(S): 2 INJECTION INTRAMUSCULAR; INTRAVENOUS; SUBCUTANEOUS at 22:45

## 2021-03-05 RX ADMIN — HYDROMORPHONE HYDROCHLORIDE 0.75 MILLIGRAM(S): 2 INJECTION INTRAMUSCULAR; INTRAVENOUS; SUBCUTANEOUS at 09:22

## 2021-03-05 RX ADMIN — Medication 2: at 22:05

## 2021-03-05 RX ADMIN — HYDROMORPHONE HYDROCHLORIDE 0.75 MILLIGRAM(S): 2 INJECTION INTRAMUSCULAR; INTRAVENOUS; SUBCUTANEOUS at 19:30

## 2021-03-05 RX ADMIN — HEPARIN SODIUM 5000 UNIT(S): 5000 INJECTION INTRAVENOUS; SUBCUTANEOUS at 13:01

## 2021-03-05 RX ADMIN — Medication 2: at 18:15

## 2021-03-05 RX ADMIN — HYDROMORPHONE HYDROCHLORIDE 0.75 MILLIGRAM(S): 2 INJECTION INTRAMUSCULAR; INTRAVENOUS; SUBCUTANEOUS at 14:28

## 2021-03-05 RX ADMIN — Medication 100 GRAM(S): at 05:34

## 2021-03-05 RX ADMIN — Medication 175 MICROGRAM(S): at 06:33

## 2021-03-05 RX ADMIN — HEPARIN SODIUM 5000 UNIT(S): 5000 INJECTION INTRAVENOUS; SUBCUTANEOUS at 04:08

## 2021-03-05 RX ADMIN — Medication 25 MILLIGRAM(S): at 06:33

## 2021-03-05 RX ADMIN — ATORVASTATIN CALCIUM 80 MILLIGRAM(S): 80 TABLET, FILM COATED ORAL at 21:29

## 2021-03-05 RX ADMIN — Medication 81 MILLIGRAM(S): at 13:01

## 2021-03-05 RX ADMIN — HYDROMORPHONE HYDROCHLORIDE 0.5 MILLIGRAM(S): 2 INJECTION INTRAMUSCULAR; INTRAVENOUS; SUBCUTANEOUS at 04:09

## 2021-03-05 RX ADMIN — HYDROMORPHONE HYDROCHLORIDE 0.25 MILLIGRAM(S): 2 INJECTION INTRAMUSCULAR; INTRAVENOUS; SUBCUTANEOUS at 13:01

## 2021-03-05 RX ADMIN — Medication 2: at 12:35

## 2021-03-05 NOTE — PHYSICAL THERAPY INITIAL EVALUATION ADULT - GENERAL OBSERVATIONS, REHAB EVAL
pt received semi-supine in bed +IV, +tele, +JAMAL drain and abdominal incision, brother present, c/o abdominal pain premedicated by MARIA D Stringer

## 2021-03-05 NOTE — PHYSICAL THERAPY INITIAL EVALUATION ADULT - MD ORDER
Cholecystectomy, partial 04-Mar-2021 22:16:31  Evelyne Dorsey  Diagnostic laparoscopy 04-Mar-2021 22:15:55  Evelyne Dorsey

## 2021-03-05 NOTE — PHYSICAL THERAPY INITIAL EVALUATION ADULT - ADDITIONAL COMMENTS
pt states that he lives w/ his wife in an elevator access apt building w/ no stairs to enter. Denies use of DME for ambulation. His brother will also be staying w/ him on d/c to assist. Denies hx of recent falls. States he was independent in ADLs prior to this admission

## 2021-03-05 NOTE — PHYSICAL THERAPY INITIAL EVALUATION ADULT - GAIT DEVIATIONS NOTED, PT EVAL
decreased haydee/increased time in double stance/decreased step length/decreased weight-shifting ability

## 2021-03-05 NOTE — PROGRESS NOTE ADULT - SUBJECTIVE AND OBJECTIVE BOX
SUBJECTIVE: Patient seen and examined bedside. Resting comfortably in bed. Reporting sveta-incisional abdominal pain. Requesting to be able to drink water. Denies nausea or vomiting. Has not passed flatus or BMs since procedure. Denies f/c, n/v, CP, SOB, hematochezia, melena, weakness or pain in extremities.     aspirin  chewable 81 milliGRAM(s) Oral daily  heparin   Injectable 5000 Unit(s) SubCutaneous every 8 hours  metoprolol succinate ER 25 milliGRAM(s) Oral daily      Vital Signs Last 24 Hrs  T(C): 37.2 (05 Mar 2021 04:06), Max: 37.2 (05 Mar 2021 04:06)  T(F): 99 (05 Mar 2021 04:06), Max: 99 (05 Mar 2021 04:06)  HR: 92 (05 Mar 2021 06:30) (69 - 97)  BP: 118/58 (05 Mar 2021 06:30) (96/47 - 123/59)  BP(mean): 81 (04 Mar 2021 22:30) (68 - 81)  RR: 18 (05 Mar 2021 06:30) (12 - 34)  SpO2: 95% (05 Mar 2021 06:30) (95% - 99%)  I&O's Detail    04 Mar 2021 07:01  -  05 Mar 2021 06:59  --------------------------------------------------------  IN:    IV PiggyBack: 100 mL    sodium chloride 0.9%: 880 mL  Total IN: 980 mL    OUT:    Bulb (mL): 110 mL    Indwelling Catheter - Urethral (mL): 465 mL  Total OUT: 575 mL    Total NET: 405 mL          General: NAD, resting comfortably in bed  C/V: NSR  Pulm: Nonlabored breathing, no respiratory distress  Abd: soft, mildly distended, moderate sveta-incisional TTP. No rebound or guarding. JAMAL with SSF.   Extrem: WWP, no edema, SCDs in place  Incisions: midline dressing c/d/i        LABS:                        7.6    13.44 )-----------( 356      ( 05 Mar 2021 06:47 )             25.5     03-05    137  |  102  |  13  ----------------------------<  249<H>  4.6   |  23  |  0.81    Ca    9.2      05 Mar 2021 04:18  Phos  3.6     03-05  Mg     1.8     03-05    TPro  6.8  /  Alb  3.6  /  TBili  1.0  /  DBili  x   /  AST  51<H>  /  ALT  111<H>  /  AlkPhos  217<H>  03-05    PT/INR - ( 04 Mar 2021 21:43 )   PT: 16.5 sec;   INR: 1.40          PTT - ( 04 Mar 2021 21:43 )  PTT:29.8 sec

## 2021-03-06 LAB
-  AMPICILLIN/SULBACTAM: SIGNIFICANT CHANGE UP
-  AMPICILLIN: SIGNIFICANT CHANGE UP
-  CEFAZOLIN: SIGNIFICANT CHANGE UP
-  CEFEPIME: SIGNIFICANT CHANGE UP
-  CEFTRIAXONE: SIGNIFICANT CHANGE UP
-  CIPROFLOXACIN: SIGNIFICANT CHANGE UP
-  ERTAPENEM: SIGNIFICANT CHANGE UP
-  GENTAMICIN: SIGNIFICANT CHANGE UP
-  PIPERACILLIN/TAZOBACTAM: SIGNIFICANT CHANGE UP
-  TOBRAMYCIN: SIGNIFICANT CHANGE UP
-  TRIMETHOPRIM/SULFAMETHOXAZOLE: SIGNIFICANT CHANGE UP
ANION GAP SERPL CALC-SCNC: 10 MMOL/L — SIGNIFICANT CHANGE UP (ref 5–17)
BUN SERPL-MCNC: 18 MG/DL — SIGNIFICANT CHANGE UP (ref 7–23)
CALCIUM SERPL-MCNC: 9 MG/DL — SIGNIFICANT CHANGE UP (ref 8.4–10.5)
CHLORIDE SERPL-SCNC: 100 MMOL/L — SIGNIFICANT CHANGE UP (ref 96–108)
CO2 SERPL-SCNC: 25 MMOL/L — SIGNIFICANT CHANGE UP (ref 22–31)
CREAT SERPL-MCNC: 0.91 MG/DL — SIGNIFICANT CHANGE UP (ref 0.5–1.3)
GLUCOSE BLDC GLUCOMTR-MCNC: 169 MG/DL — HIGH (ref 70–99)
GLUCOSE BLDC GLUCOMTR-MCNC: 169 MG/DL — HIGH (ref 70–99)
GLUCOSE BLDC GLUCOMTR-MCNC: 185 MG/DL — HIGH (ref 70–99)
GLUCOSE BLDC GLUCOMTR-MCNC: 197 MG/DL — HIGH (ref 70–99)
GLUCOSE SERPL-MCNC: 170 MG/DL — HIGH (ref 70–99)
HCT VFR BLD CALC: 24.9 % — LOW (ref 39–50)
HGB BLD-MCNC: 7.7 G/DL — LOW (ref 13–17)
MAGNESIUM SERPL-MCNC: 1.9 MG/DL — SIGNIFICANT CHANGE UP (ref 1.6–2.6)
MCHC RBC-ENTMCNC: 27 PG — SIGNIFICANT CHANGE UP (ref 27–34)
MCHC RBC-ENTMCNC: 30.9 GM/DL — LOW (ref 32–36)
MCV RBC AUTO: 87.4 FL — SIGNIFICANT CHANGE UP (ref 80–100)
METHOD TYPE: SIGNIFICANT CHANGE UP
NRBC # BLD: 0 /100 WBCS — SIGNIFICANT CHANGE UP (ref 0–0)
PHOSPHATE SERPL-MCNC: 3.1 MG/DL — SIGNIFICANT CHANGE UP (ref 2.5–4.5)
PLATELET # BLD AUTO: 326 K/UL — SIGNIFICANT CHANGE UP (ref 150–400)
POTASSIUM SERPL-MCNC: 4.9 MMOL/L — SIGNIFICANT CHANGE UP (ref 3.5–5.3)
POTASSIUM SERPL-SCNC: 4.9 MMOL/L — SIGNIFICANT CHANGE UP (ref 3.5–5.3)
RBC # BLD: 2.85 M/UL — LOW (ref 4.2–5.8)
RBC # FLD: 14 % — SIGNIFICANT CHANGE UP (ref 10.3–14.5)
SODIUM SERPL-SCNC: 135 MMOL/L — SIGNIFICANT CHANGE UP (ref 135–145)
WBC # BLD: 16.8 K/UL — HIGH (ref 3.8–10.5)
WBC # FLD AUTO: 16.8 K/UL — HIGH (ref 3.8–10.5)

## 2021-03-06 PROCEDURE — 93010 ELECTROCARDIOGRAM REPORT: CPT

## 2021-03-06 RX ORDER — METRONIDAZOLE 500 MG
500 TABLET ORAL EVERY 8 HOURS
Refills: 0 | Status: DISCONTINUED | OUTPATIENT
Start: 2021-03-07 | End: 2021-03-08

## 2021-03-06 RX ORDER — HYDROMORPHONE HYDROCHLORIDE 2 MG/ML
0.5 INJECTION INTRAMUSCULAR; INTRAVENOUS; SUBCUTANEOUS ONCE
Refills: 0 | Status: DISCONTINUED | OUTPATIENT
Start: 2021-03-06 | End: 2021-03-06

## 2021-03-06 RX ORDER — METRONIDAZOLE 500 MG
TABLET ORAL
Refills: 0 | Status: DISCONTINUED | OUTPATIENT
Start: 2021-03-06 | End: 2021-03-08

## 2021-03-06 RX ORDER — OXYCODONE HYDROCHLORIDE 5 MG/1
5 TABLET ORAL EVERY 6 HOURS
Refills: 0 | Status: DISCONTINUED | OUTPATIENT
Start: 2021-03-06 | End: 2021-03-10

## 2021-03-06 RX ORDER — MAGNESIUM SULFATE 500 MG/ML
1 VIAL (ML) INJECTION ONCE
Refills: 0 | Status: COMPLETED | OUTPATIENT
Start: 2021-03-06 | End: 2021-03-06

## 2021-03-06 RX ORDER — OXYCODONE HYDROCHLORIDE 5 MG/1
10 TABLET ORAL EVERY 6 HOURS
Refills: 0 | Status: DISCONTINUED | OUTPATIENT
Start: 2021-03-06 | End: 2021-03-10

## 2021-03-06 RX ORDER — METRONIDAZOLE 500 MG
500 TABLET ORAL ONCE
Refills: 0 | Status: COMPLETED | OUTPATIENT
Start: 2021-03-06 | End: 2021-03-06

## 2021-03-06 RX ORDER — TAMSULOSIN HYDROCHLORIDE 0.4 MG/1
0.4 CAPSULE ORAL AT BEDTIME
Refills: 0 | Status: DISCONTINUED | OUTPATIENT
Start: 2021-03-06 | End: 2021-03-07

## 2021-03-06 RX ORDER — CEFTRIAXONE 500 MG/1
1000 INJECTION, POWDER, FOR SOLUTION INTRAMUSCULAR; INTRAVENOUS EVERY 24 HOURS
Refills: 0 | Status: DISCONTINUED | OUTPATIENT
Start: 2021-03-06 | End: 2021-03-08

## 2021-03-06 RX ADMIN — Medication 175 MICROGRAM(S): at 07:43

## 2021-03-06 RX ADMIN — CEFTRIAXONE 100 MILLIGRAM(S): 500 INJECTION, POWDER, FOR SOLUTION INTRAMUSCULAR; INTRAVENOUS at 19:14

## 2021-03-06 RX ADMIN — HYDROMORPHONE HYDROCHLORIDE 0.75 MILLIGRAM(S): 2 INJECTION INTRAMUSCULAR; INTRAVENOUS; SUBCUTANEOUS at 01:05

## 2021-03-06 RX ADMIN — HYDROMORPHONE HYDROCHLORIDE 0.75 MILLIGRAM(S): 2 INJECTION INTRAMUSCULAR; INTRAVENOUS; SUBCUTANEOUS at 08:20

## 2021-03-06 RX ADMIN — OXYCODONE HYDROCHLORIDE 10 MILLIGRAM(S): 5 TABLET ORAL at 12:18

## 2021-03-06 RX ADMIN — HEPARIN SODIUM 5000 UNIT(S): 5000 INJECTION INTRAVENOUS; SUBCUTANEOUS at 06:53

## 2021-03-06 RX ADMIN — OXYCODONE HYDROCHLORIDE 5 MILLIGRAM(S): 5 TABLET ORAL at 19:41

## 2021-03-06 RX ADMIN — Medication 100 GRAM(S): at 09:16

## 2021-03-06 RX ADMIN — Medication 2: at 18:01

## 2021-03-06 RX ADMIN — Medication 2: at 12:19

## 2021-03-06 RX ADMIN — SODIUM CHLORIDE 60 MILLILITER(S): 9 INJECTION INTRAMUSCULAR; INTRAVENOUS; SUBCUTANEOUS at 18:01

## 2021-03-06 RX ADMIN — HYDROMORPHONE HYDROCHLORIDE 0.5 MILLIGRAM(S): 2 INJECTION INTRAMUSCULAR; INTRAVENOUS; SUBCUTANEOUS at 04:05

## 2021-03-06 RX ADMIN — HYDROMORPHONE HYDROCHLORIDE 0.5 MILLIGRAM(S): 2 INJECTION INTRAMUSCULAR; INTRAVENOUS; SUBCUTANEOUS at 03:34

## 2021-03-06 RX ADMIN — Medication 100 MILLIGRAM(S): at 20:18

## 2021-03-06 RX ADMIN — ATORVASTATIN CALCIUM 80 MILLIGRAM(S): 80 TABLET, FILM COATED ORAL at 21:09

## 2021-03-06 RX ADMIN — Medication 25 MILLIGRAM(S): at 06:53

## 2021-03-06 RX ADMIN — HYDROMORPHONE HYDROCHLORIDE 0.75 MILLIGRAM(S): 2 INJECTION INTRAMUSCULAR; INTRAVENOUS; SUBCUTANEOUS at 07:44

## 2021-03-06 RX ADMIN — Medication 2: at 08:40

## 2021-03-06 RX ADMIN — OXYCODONE HYDROCHLORIDE 5 MILLIGRAM(S): 5 TABLET ORAL at 20:40

## 2021-03-06 RX ADMIN — TAMSULOSIN HYDROCHLORIDE 0.4 MILLIGRAM(S): 0.4 CAPSULE ORAL at 21:09

## 2021-03-06 RX ADMIN — HEPARIN SODIUM 5000 UNIT(S): 5000 INJECTION INTRAVENOUS; SUBCUTANEOUS at 19:14

## 2021-03-06 RX ADMIN — HEPARIN SODIUM 5000 UNIT(S): 5000 INJECTION INTRAVENOUS; SUBCUTANEOUS at 13:00

## 2021-03-06 RX ADMIN — HYDROMORPHONE HYDROCHLORIDE 0.75 MILLIGRAM(S): 2 INJECTION INTRAMUSCULAR; INTRAVENOUS; SUBCUTANEOUS at 00:37

## 2021-03-06 RX ADMIN — OXYCODONE HYDROCHLORIDE 10 MILLIGRAM(S): 5 TABLET ORAL at 12:59

## 2021-03-06 RX ADMIN — Medication 81 MILLIGRAM(S): at 12:19

## 2021-03-06 NOTE — PROGRESS NOTE ADULT - SUBJECTIVE AND OBJECTIVE BOX
POD:   Procedure:     SUBJECTIVE: Patient seen and examined by chief resident on morning rounds.        Vital Signs Last 24 Hrs  T(C): 36.8 (06 Mar 2021 05:15), Max: 37.2 (06 Mar 2021 00:34)  T(F): 98.3 (06 Mar 2021 05:15), Max: 98.9 (06 Mar 2021 00:34)  HR: 90 (06 Mar 2021 05:15) (80 - 94)  BP: 151/72 (06 Mar 2021 05:15) (115/63 - 157/70)  BP(mean): --  RR: 18 (06 Mar 2021 05:15) (16 - 19)  SpO2: 95% (06 Mar 2021 05:15) (95% - 96%)    Physical Exam:  General: NAD  Pulmonary: Nonlabored breathing, no respiratory distress  Abdominal: soft, nondistended, nontender with no rebound or guarding  Extremities: WWP, normal strength, no clubbing/cyanosis/edema  Neuro: A/O x3    Lines/drains/tubes:    I&O's Summary    04 Mar 2021 07:01  -  05 Mar 2021 07:00  --------------------------------------------------------  IN: 980 mL / OUT: 575 mL / NET: 405 mL    05 Mar 2021 07:01  -  06 Mar 2021 06:19  --------------------------------------------------------  IN: 960 mL / OUT: 1765 mL / NET: -805 mL        LABS:                        7.6    13.44 )-----------( 356      ( 05 Mar 2021 06:47 )             25.5     03-05    136  |  102  |  14  ----------------------------<  263<H>  4.5   |  24  |  0.87    Ca    8.7      05 Mar 2021 06:47  Phos  3.6     03-05  Mg     2.1     03-05    TPro  6.6  /  Alb  3.3  /  TBili  0.9  /  DBili  x   /  AST  42<H>  /  ALT  99<H>  /  AlkPhos  203<H>  03-05    PT/INR - ( 04 Mar 2021 21:43 )   PT: 16.5 sec;   INR: 1.40          PTT - ( 04 Mar 2021 21:43 )  PTT:29.8 sec    CAPILLARY BLOOD GLUCOSE      POCT Blood Glucose.: 182 mg/dL (05 Mar 2021 22:01)  POCT Blood Glucose.: 195 mg/dL (05 Mar 2021 17:31)  POCT Blood Glucose.: 227 mg/dL (05 Mar 2021 12:02)  POCT Blood Glucose.: 257 mg/dL (05 Mar 2021 07:22)    LIVER FUNCTIONS - ( 05 Mar 2021 06:47 )  Alb: 3.3 g/dL / Pro: 6.6 g/dL / ALK PHOS: 203 U/L / ALT: 99 U/L / AST: 42 U/L / GGT: x             RADIOLOGY & ADDITIONAL STUDIES:     POD: 2  Procedure: Laparoscopic converted to open marsupilization / subtotal cholecystectomy    SUBJECTIVE: Yesterday Graff catheter was removed, and the patient passed his ToV. He is passing flatus but did not have bowel movement. He is tolerating CLD w/o nausea or vomiting. The patient then did not have urine output till AM and was straigh cathed in the morning w/ 700 mL of urine. This morning the patient is feeling well. He was examined at the bedside by the chief resident. The patient denies having chest pain, SOB, dizziness, chills. His JAMAL drain output overnight was 20 mL and 65 mL in the last 24 hours.      Vital Signs Last 24 Hrs  T(C): 36.8 (06 Mar 2021 05:15), Max: 37.2 (06 Mar 2021 00:34)  T(F): 98.3 (06 Mar 2021 05:15), Max: 98.9 (06 Mar 2021 00:34)  HR: 90 (06 Mar 2021 05:15) (80 - 94)  BP: 151/72 (06 Mar 2021 05:15) (115/63 - 157/70)  BP(mean): --  RR: 18 (06 Mar 2021 05:15) (16 - 19)  SpO2: 95% (06 Mar 2021 05:15) (95% - 96%)    Physical Exam:  General: NAD, resting comfortably in the bed  Pulmonary: Nonlabored breathing, no respiratory distress  Abdominal: soft, ND, mild sveta-incisional TTP, no rebound tenderness, guarding, rigidity, JAMAL x1 w/ SS, incision C/D/I  Extremities: WWP, normal strength, no clubbing/cyanosis/edema  Neuro: AAOx3    Lines/drains/tubes:    I&O's Summary    04 Mar 2021 07:01  -  05 Mar 2021 07:00  --------------------------------------------------------  IN: 980 mL / OUT: 575 mL / NET: 405 mL    05 Mar 2021 07:01  -  06 Mar 2021 06:19  --------------------------------------------------------  IN: 960 mL / OUT: 1765 mL / NET: -805 mL        LABS:                        7.6    13.44 )-----------( 356      ( 05 Mar 2021 06:47 )             25.5     03-05    136  |  102  |  14  ----------------------------<  263<H>  4.5   |  24  |  0.87    Ca    8.7      05 Mar 2021 06:47  Phos  3.6     03-05  Mg     2.1     03-05    TPro  6.6  /  Alb  3.3  /  TBili  0.9  /  DBili  x   /  AST  42<H>  /  ALT  99<H>  /  AlkPhos  203<H>  03-05    PT/INR - ( 04 Mar 2021 21:43 )   PT: 16.5 sec;   INR: 1.40          PTT - ( 04 Mar 2021 21:43 )  PTT:29.8 sec    CAPILLARY BLOOD GLUCOSE      POCT Blood Glucose.: 182 mg/dL (05 Mar 2021 22:01)  POCT Blood Glucose.: 195 mg/dL (05 Mar 2021 17:31)  POCT Blood Glucose.: 227 mg/dL (05 Mar 2021 12:02)  POCT Blood Glucose.: 257 mg/dL (05 Mar 2021 07:22)    LIVER FUNCTIONS - ( 05 Mar 2021 06:47 )  Alb: 3.3 g/dL / Pro: 6.6 g/dL / ALK PHOS: 203 U/L / ALT: 99 U/L / AST: 42 U/L / GGT: x             RADIOLOGY & ADDITIONAL STUDIES:

## 2021-03-07 LAB
-  AMIKACIN: SIGNIFICANT CHANGE UP
-  AMPICILLIN/SULBACTAM: SIGNIFICANT CHANGE UP
-  AMPICILLIN: SIGNIFICANT CHANGE UP
-  CEFAZOLIN: SIGNIFICANT CHANGE UP
-  CEFTRIAXONE: SIGNIFICANT CHANGE UP
-  CIPROFLOXACIN: SIGNIFICANT CHANGE UP
-  ERTAPENEM: SIGNIFICANT CHANGE UP
-  GENTAMICIN: SIGNIFICANT CHANGE UP
-  MEROPENEM: SIGNIFICANT CHANGE UP
-  PIPERACILLIN/TAZOBACTAM: SIGNIFICANT CHANGE UP
-  TOBRAMYCIN: SIGNIFICANT CHANGE UP
-  TRIMETHOPRIM/SULFAMETHOXAZOLE: SIGNIFICANT CHANGE UP
ANION GAP SERPL CALC-SCNC: 10 MMOL/L — SIGNIFICANT CHANGE UP (ref 5–17)
BUN SERPL-MCNC: 13 MG/DL — SIGNIFICANT CHANGE UP (ref 7–23)
CALCIUM SERPL-MCNC: 8.8 MG/DL — SIGNIFICANT CHANGE UP (ref 8.4–10.5)
CHLORIDE SERPL-SCNC: 101 MMOL/L — SIGNIFICANT CHANGE UP (ref 96–108)
CO2 SERPL-SCNC: 23 MMOL/L — SIGNIFICANT CHANGE UP (ref 22–31)
CREAT SERPL-MCNC: 0.83 MG/DL — SIGNIFICANT CHANGE UP (ref 0.5–1.3)
CULTURE RESULTS: SIGNIFICANT CHANGE UP
GLUCOSE BLDC GLUCOMTR-MCNC: 128 MG/DL — HIGH (ref 70–99)
GLUCOSE BLDC GLUCOMTR-MCNC: 169 MG/DL — HIGH (ref 70–99)
GLUCOSE BLDC GLUCOMTR-MCNC: 173 MG/DL — HIGH (ref 70–99)
GLUCOSE BLDC GLUCOMTR-MCNC: 206 MG/DL — HIGH (ref 70–99)
GLUCOSE SERPL-MCNC: 160 MG/DL — HIGH (ref 70–99)
HCT VFR BLD CALC: 25.2 % — LOW (ref 39–50)
HGB BLD-MCNC: 7.6 G/DL — LOW (ref 13–17)
MAGNESIUM SERPL-MCNC: 1.8 MG/DL — SIGNIFICANT CHANGE UP (ref 1.6–2.6)
MCHC RBC-ENTMCNC: 26.5 PG — LOW (ref 27–34)
MCHC RBC-ENTMCNC: 30.2 GM/DL — LOW (ref 32–36)
MCV RBC AUTO: 87.8 FL — SIGNIFICANT CHANGE UP (ref 80–100)
METHOD TYPE: SIGNIFICANT CHANGE UP
METHOD TYPE: SIGNIFICANT CHANGE UP
NRBC # BLD: 0 /100 WBCS — SIGNIFICANT CHANGE UP (ref 0–0)
ORGANISM # SPEC MICROSCOPIC CNT: SIGNIFICANT CHANGE UP
PHOSPHATE SERPL-MCNC: 3.6 MG/DL — SIGNIFICANT CHANGE UP (ref 2.5–4.5)
PLATELET # BLD AUTO: 318 K/UL — SIGNIFICANT CHANGE UP (ref 150–400)
POTASSIUM SERPL-MCNC: 4.9 MMOL/L — SIGNIFICANT CHANGE UP (ref 3.5–5.3)
POTASSIUM SERPL-SCNC: 4.9 MMOL/L — SIGNIFICANT CHANGE UP (ref 3.5–5.3)
RBC # BLD: 2.87 M/UL — LOW (ref 4.2–5.8)
RBC # FLD: 13.7 % — SIGNIFICANT CHANGE UP (ref 10.3–14.5)
SODIUM SERPL-SCNC: 134 MMOL/L — LOW (ref 135–145)
SPECIMEN SOURCE: SIGNIFICANT CHANGE UP
WBC # BLD: 13.78 K/UL — HIGH (ref 3.8–10.5)
WBC # FLD AUTO: 13.78 K/UL — HIGH (ref 3.8–10.5)

## 2021-03-07 RX ORDER — MAGNESIUM SULFATE 500 MG/ML
1 VIAL (ML) INJECTION ONCE
Refills: 0 | Status: COMPLETED | OUTPATIENT
Start: 2021-03-07 | End: 2021-03-07

## 2021-03-07 RX ORDER — TAMSULOSIN HYDROCHLORIDE 0.4 MG/1
0.8 CAPSULE ORAL AT BEDTIME
Refills: 0 | Status: DISCONTINUED | OUTPATIENT
Start: 2021-03-07 | End: 2021-03-10

## 2021-03-07 RX ADMIN — OXYCODONE HYDROCHLORIDE 10 MILLIGRAM(S): 5 TABLET ORAL at 17:57

## 2021-03-07 RX ADMIN — OXYCODONE HYDROCHLORIDE 10 MILLIGRAM(S): 5 TABLET ORAL at 19:21

## 2021-03-07 RX ADMIN — Medication 100 GRAM(S): at 09:28

## 2021-03-07 RX ADMIN — Medication 100 MILLIGRAM(S): at 13:36

## 2021-03-07 RX ADMIN — HEPARIN SODIUM 5000 UNIT(S): 5000 INJECTION INTRAVENOUS; SUBCUTANEOUS at 19:22

## 2021-03-07 RX ADMIN — Medication 100 MILLIGRAM(S): at 06:13

## 2021-03-07 RX ADMIN — Medication 4: at 12:38

## 2021-03-07 RX ADMIN — CEFTRIAXONE 100 MILLIGRAM(S): 500 INJECTION, POWDER, FOR SOLUTION INTRAMUSCULAR; INTRAVENOUS at 18:20

## 2021-03-07 RX ADMIN — ATORVASTATIN CALCIUM 80 MILLIGRAM(S): 80 TABLET, FILM COATED ORAL at 22:26

## 2021-03-07 RX ADMIN — Medication 25 MILLIGRAM(S): at 06:14

## 2021-03-07 RX ADMIN — OXYCODONE HYDROCHLORIDE 10 MILLIGRAM(S): 5 TABLET ORAL at 02:18

## 2021-03-07 RX ADMIN — Medication 175 MICROGRAM(S): at 06:14

## 2021-03-07 RX ADMIN — TAMSULOSIN HYDROCHLORIDE 0.8 MILLIGRAM(S): 0.4 CAPSULE ORAL at 22:26

## 2021-03-07 RX ADMIN — SODIUM CHLORIDE 60 MILLILITER(S): 9 INJECTION INTRAMUSCULAR; INTRAVENOUS; SUBCUTANEOUS at 17:45

## 2021-03-07 RX ADMIN — HEPARIN SODIUM 5000 UNIT(S): 5000 INJECTION INTRAVENOUS; SUBCUTANEOUS at 12:43

## 2021-03-07 RX ADMIN — Medication 100 MILLIGRAM(S): at 22:26

## 2021-03-07 RX ADMIN — OXYCODONE HYDROCHLORIDE 10 MILLIGRAM(S): 5 TABLET ORAL at 03:00

## 2021-03-07 RX ADMIN — Medication 2: at 17:45

## 2021-03-07 RX ADMIN — Medication 81 MILLIGRAM(S): at 12:43

## 2021-03-07 RX ADMIN — HEPARIN SODIUM 5000 UNIT(S): 5000 INJECTION INTRAVENOUS; SUBCUTANEOUS at 03:42

## 2021-03-07 NOTE — PROGRESS NOTE ADULT - SUBJECTIVE AND OBJECTIVE BOX
INTERVAL HPI/OVERNIGHT EVENTS: MARGO, saturating in low 90%, was put on 2 L NC, stabilized, was encouraged to IS, passed ToV     STATUS POST:  3/4: Lap convert to Open Marsupialization/Subtotal Cholecystectomy    POST OPERATIVE DAY #: 3    SUBJECTIVE: Pt seen and examined at bedside this am by surgery team. No acute complaints. Tolerating diet, pain well controlled. +F/-BM. Denies f/n/v/cp/sob.    MEDICATIONS  (STANDING):  aspirin  chewable 81 milliGRAM(s) Oral daily  atorvastatin 80 milliGRAM(s) Oral at bedtime  cefTRIAXone   IVPB 1000 milliGRAM(s) IV Intermittent every 24 hours  dextrose 40% Gel 15 Gram(s) Oral once  dextrose 5%. 1000 milliLiter(s) (50 mL/Hr) IV Continuous <Continuous>  dextrose 5%. 1000 milliLiter(s) (100 mL/Hr) IV Continuous <Continuous>  dextrose 50% Injectable 25 Gram(s) IV Push once  dextrose 50% Injectable 12.5 Gram(s) IV Push once  dextrose 50% Injectable 25 Gram(s) IV Push once  glucagon  Injectable 1 milliGRAM(s) IntraMuscular once  heparin   Injectable 5000 Unit(s) SubCutaneous every 8 hours  insulin lispro (ADMELOG) corrective regimen sliding scale   SubCutaneous Before meals and at bedtime  levothyroxine 175 MICROGram(s) Oral daily  metoprolol succinate ER 25 milliGRAM(s) Oral daily  metroNIDAZOLE  IVPB 500 milliGRAM(s) IV Intermittent every 8 hours  metroNIDAZOLE  IVPB      sodium chloride 0.9%. 1000 milliLiter(s) (60 mL/Hr) IV Continuous <Continuous>  tamsulosin 0.4 milliGRAM(s) Oral at bedtime    MEDICATIONS  (PRN):  ondansetron Injectable 4 milliGRAM(s) IV Push every 6 hours PRN Nausea  oxyCODONE    IR 5 milliGRAM(s) Oral every 6 hours PRN Moderate Pain (4 - 6)  oxyCODONE    IR 10 milliGRAM(s) Oral every 6 hours PRN Severe Pain (7 - 10)      Vital Signs Last 24 Hrs  T(C): 36.8 (07 Mar 2021 15:26), Max: 37.6 (06 Mar 2021 20:56)  T(F): 98.2 (07 Mar 2021 15:26), Max: 99.7 (06 Mar 2021 20:56)  HR: 81 (07 Mar 2021 15:26) (81 - 93)  BP: 121/67 (07 Mar 2021 15:26) (121/67 - 166/79)  BP(mean): --  RR: 18 (07 Mar 2021 15:26) (18 - 20)  SpO2: 99% (07 Mar 2021 15:26) (98% - 99%)    PHYSICAL EXAM:    General: NAD, resting comfortably in the bed  Pulmonary: Nonlabored breathing, no respiratory distress  Abdominal: soft, ND, mild sveta-incisional TTP, no rebound tenderness, guarding, rigidity, JAMAL x1 w/ SS, incision C/D/I  Extremities: WWP, normal strength, no clubbing/cyanosis/edema  Neuro: AAOx3    I&O's Detail    06 Mar 2021 07:01  -  07 Mar 2021 07:00  --------------------------------------------------------  IN:    IV PiggyBack: 350 mL    sodium chloride 0.9%: 1200 mL  Total IN: 1550 mL    OUT:    Bulb (mL): 216 mL    Intermittent Catheterization - Urethral (mL): 1200 mL    Voided (mL): 810 mL  Total OUT: 2226 mL    Total NET: -676 mL      07 Mar 2021 07:01  -  07 Mar 2021 17:26  --------------------------------------------------------  IN:    IV PiggyBack: 100 mL    sodium chloride 0.9%: 600 mL  Total IN: 700 mL    OUT:    Bulb (mL): 7.5 mL    Voided (mL): 525 mL  Total OUT: 532.5 mL    Total NET: 167.5 mL          LABS:                        7.6    13.78 )-----------( 318      ( 07 Mar 2021 07:05 )             25.2     03-07    134<L>  |  101  |  13  ----------------------------<  160<H>  4.9   |  23  |  0.83    Ca    8.8      07 Mar 2021 07:05  Phos  3.6     03-07  Mg     1.8     03-07            RADIOLOGY & ADDITIONAL STUDIES:

## 2021-03-08 LAB
ANION GAP SERPL CALC-SCNC: 8 MMOL/L — SIGNIFICANT CHANGE UP (ref 5–17)
BLD GP AB SCN SERPL QL: NEGATIVE — SIGNIFICANT CHANGE UP
BUN SERPL-MCNC: 10 MG/DL — SIGNIFICANT CHANGE UP (ref 7–23)
CALCIUM SERPL-MCNC: 8.4 MG/DL — SIGNIFICANT CHANGE UP (ref 8.4–10.5)
CHLORIDE SERPL-SCNC: 102 MMOL/L — SIGNIFICANT CHANGE UP (ref 96–108)
CO2 SERPL-SCNC: 24 MMOL/L — SIGNIFICANT CHANGE UP (ref 22–31)
CREAT SERPL-MCNC: 0.75 MG/DL — SIGNIFICANT CHANGE UP (ref 0.5–1.3)
GLUCOSE BLDC GLUCOMTR-MCNC: 155 MG/DL — HIGH (ref 70–99)
GLUCOSE BLDC GLUCOMTR-MCNC: 191 MG/DL — HIGH (ref 70–99)
GLUCOSE BLDC GLUCOMTR-MCNC: 195 MG/DL — HIGH (ref 70–99)
GLUCOSE BLDC GLUCOMTR-MCNC: 210 MG/DL — HIGH (ref 70–99)
GLUCOSE SERPL-MCNC: 165 MG/DL — HIGH (ref 70–99)
HCT VFR BLD CALC: 21.3 % — LOW (ref 39–50)
HCT VFR BLD CALC: 21.5 % — LOW (ref 39–50)
HCT VFR BLD CALC: 25.3 % — LOW (ref 39–50)
HGB BLD-MCNC: 6.6 G/DL — CRITICAL LOW (ref 13–17)
HGB BLD-MCNC: 6.7 G/DL — CRITICAL LOW (ref 13–17)
HGB BLD-MCNC: 8 G/DL — LOW (ref 13–17)
MAGNESIUM SERPL-MCNC: 1.6 MG/DL — SIGNIFICANT CHANGE UP (ref 1.6–2.6)
MCHC RBC-ENTMCNC: 26.3 PG — LOW (ref 27–34)
MCHC RBC-ENTMCNC: 26.5 PG — LOW (ref 27–34)
MCHC RBC-ENTMCNC: 27 PG — SIGNIFICANT CHANGE UP (ref 27–34)
MCHC RBC-ENTMCNC: 31 GM/DL — LOW (ref 32–36)
MCHC RBC-ENTMCNC: 31.2 GM/DL — LOW (ref 32–36)
MCHC RBC-ENTMCNC: 31.6 GM/DL — LOW (ref 32–36)
MCV RBC AUTO: 83.8 FL — SIGNIFICANT CHANGE UP (ref 80–100)
MCV RBC AUTO: 84.9 FL — SIGNIFICANT CHANGE UP (ref 80–100)
MCV RBC AUTO: 86.7 FL — SIGNIFICANT CHANGE UP (ref 80–100)
NON-GYNECOLOGICAL CYTOLOGY STUDY: SIGNIFICANT CHANGE UP
NRBC # BLD: 0 /100 WBCS — SIGNIFICANT CHANGE UP (ref 0–0)
PHOSPHATE SERPL-MCNC: 3.6 MG/DL — SIGNIFICANT CHANGE UP (ref 2.5–4.5)
PLATELET # BLD AUTO: 288 K/UL — SIGNIFICANT CHANGE UP (ref 150–400)
PLATELET # BLD AUTO: 318 K/UL — SIGNIFICANT CHANGE UP (ref 150–400)
PLATELET # BLD AUTO: 324 K/UL — SIGNIFICANT CHANGE UP (ref 150–400)
POTASSIUM SERPL-MCNC: 4.9 MMOL/L — SIGNIFICANT CHANGE UP (ref 3.5–5.3)
POTASSIUM SERPL-SCNC: 4.9 MMOL/L — SIGNIFICANT CHANGE UP (ref 3.5–5.3)
RBC # BLD: 2.48 M/UL — LOW (ref 4.2–5.8)
RBC # BLD: 2.51 M/UL — LOW (ref 4.2–5.8)
RBC # BLD: 3.02 M/UL — LOW (ref 4.2–5.8)
RBC # FLD: 13.2 % — SIGNIFICANT CHANGE UP (ref 10.3–14.5)
RBC # FLD: 13.5 % — SIGNIFICANT CHANGE UP (ref 10.3–14.5)
RBC # FLD: 13.9 % — SIGNIFICANT CHANGE UP (ref 10.3–14.5)
RH IG SCN BLD-IMP: POSITIVE — SIGNIFICANT CHANGE UP
SODIUM SERPL-SCNC: 134 MMOL/L — LOW (ref 135–145)
WBC # BLD: 8.61 K/UL — SIGNIFICANT CHANGE UP (ref 3.8–10.5)
WBC # BLD: 8.87 K/UL — SIGNIFICANT CHANGE UP (ref 3.8–10.5)
WBC # BLD: 9.71 K/UL — SIGNIFICANT CHANGE UP (ref 3.8–10.5)
WBC # FLD AUTO: 8.61 K/UL — SIGNIFICANT CHANGE UP (ref 3.8–10.5)
WBC # FLD AUTO: 8.87 K/UL — SIGNIFICANT CHANGE UP (ref 3.8–10.5)
WBC # FLD AUTO: 9.71 K/UL — SIGNIFICANT CHANGE UP (ref 3.8–10.5)

## 2021-03-08 RX ORDER — METRONIDAZOLE 500 MG
500 TABLET ORAL EVERY 8 HOURS
Refills: 0 | Status: DISCONTINUED | OUTPATIENT
Start: 2021-03-08 | End: 2021-03-10

## 2021-03-08 RX ORDER — CEFPODOXIME PROXETIL 100 MG
200 TABLET ORAL EVERY 12 HOURS
Refills: 0 | Status: DISCONTINUED | OUTPATIENT
Start: 2021-03-08 | End: 2021-03-10

## 2021-03-08 RX ADMIN — OXYCODONE HYDROCHLORIDE 10 MILLIGRAM(S): 5 TABLET ORAL at 18:39

## 2021-03-08 RX ADMIN — SODIUM CHLORIDE 60 MILLILITER(S): 9 INJECTION INTRAMUSCULAR; INTRAVENOUS; SUBCUTANEOUS at 13:25

## 2021-03-08 RX ADMIN — Medication 2: at 12:12

## 2021-03-08 RX ADMIN — Medication 500 MILLIGRAM(S): at 13:20

## 2021-03-08 RX ADMIN — OXYCODONE HYDROCHLORIDE 10 MILLIGRAM(S): 5 TABLET ORAL at 05:12

## 2021-03-08 RX ADMIN — Medication 4: at 17:35

## 2021-03-08 RX ADMIN — Medication 200 MILLIGRAM(S): at 17:35

## 2021-03-08 RX ADMIN — Medication 81 MILLIGRAM(S): at 11:32

## 2021-03-08 RX ADMIN — Medication 25 MILLIGRAM(S): at 05:20

## 2021-03-08 RX ADMIN — Medication 500 MILLIGRAM(S): at 21:29

## 2021-03-08 RX ADMIN — OXYCODONE HYDROCHLORIDE 10 MILLIGRAM(S): 5 TABLET ORAL at 17:46

## 2021-03-08 RX ADMIN — TAMSULOSIN HYDROCHLORIDE 0.8 MILLIGRAM(S): 0.4 CAPSULE ORAL at 21:29

## 2021-03-08 RX ADMIN — OXYCODONE HYDROCHLORIDE 10 MILLIGRAM(S): 5 TABLET ORAL at 04:12

## 2021-03-08 RX ADMIN — HEPARIN SODIUM 5000 UNIT(S): 5000 INJECTION INTRAVENOUS; SUBCUTANEOUS at 11:32

## 2021-03-08 RX ADMIN — Medication 175 MICROGRAM(S): at 05:20

## 2021-03-08 RX ADMIN — Medication 100 MILLIGRAM(S): at 05:20

## 2021-03-08 RX ADMIN — ATORVASTATIN CALCIUM 80 MILLIGRAM(S): 80 TABLET, FILM COATED ORAL at 21:29

## 2021-03-08 RX ADMIN — HEPARIN SODIUM 5000 UNIT(S): 5000 INJECTION INTRAVENOUS; SUBCUTANEOUS at 04:12

## 2021-03-08 RX ADMIN — Medication 2: at 07:49

## 2021-03-08 RX ADMIN — Medication 2: at 22:33

## 2021-03-08 NOTE — PROVIDER CONTACT NOTE (CRITICAL VALUE NOTIFICATION) - BACKGROUND
s/p lap convert to open subtotal dharmesh, has 4 lap sites w/ dermabond & staples; and midline sutures w/ staples NADIR; JAMAL x 1 on RUQ
s/p Lap convert to Open Marsupialization/Subtotal Cholecystectomy (03/04/21).

## 2021-03-08 NOTE — PROVIDER CONTACT NOTE (OTHER) - ACTION/TREATMENT ORDERED:
Straight cath x 1 ordered
Team aware- no order
Haja KEMP notified. Ordered to give pt more time like 30mins, possible to put a palma

## 2021-03-08 NOTE — PROGRESS NOTE ADULT - SUBJECTIVE AND OBJECTIVE BOX
SUBJECTIVE: Pt seen and examined at bedside with chief. Pt denies any complaints. Pain well controlled. Tolerating clears without N/V. Admits to BF    MEDICATIONS  (STANDING):  aspirin  chewable 81 milliGRAM(s) Oral daily  atorvastatin 80 milliGRAM(s) Oral at bedtime  cefpodoxime 200 milliGRAM(s) Oral every 12 hours  dextrose 40% Gel 15 Gram(s) Oral once  dextrose 5%. 1000 milliLiter(s) (50 mL/Hr) IV Continuous <Continuous>  dextrose 5%. 1000 milliLiter(s) (100 mL/Hr) IV Continuous <Continuous>  dextrose 50% Injectable 25 Gram(s) IV Push once  dextrose 50% Injectable 12.5 Gram(s) IV Push once  dextrose 50% Injectable 25 Gram(s) IV Push once  glucagon  Injectable 1 milliGRAM(s) IntraMuscular once  heparin   Injectable 5000 Unit(s) SubCutaneous every 8 hours  insulin lispro (ADMELOG) corrective regimen sliding scale   SubCutaneous Before meals and at bedtime  levothyroxine 175 MICROGram(s) Oral daily  metoprolol succinate ER 25 milliGRAM(s) Oral daily  metroNIDAZOLE    Tablet 500 milliGRAM(s) Oral every 8 hours  sodium chloride 0.9%. 1000 milliLiter(s) (60 mL/Hr) IV Continuous <Continuous>  tamsulosin 0.8 milliGRAM(s) Oral at bedtime    MEDICATIONS  (PRN):  ondansetron Injectable 4 milliGRAM(s) IV Push every 6 hours PRN Nausea  oxyCODONE    IR 5 milliGRAM(s) Oral every 6 hours PRN Moderate Pain (4 - 6)  oxyCODONE    IR 10 milliGRAM(s) Oral every 6 hours PRN Severe Pain (7 - 10)      Vital Signs Last 24 Hrs  T(C): 36.4 (08 Mar 2021 04:08), Max: 37.1 (07 Mar 2021 17:23)  T(F): 97.6 (08 Mar 2021 04:08), Max: 98.8 (07 Mar 2021 17:23)  HR: 86 (08 Mar 2021 04:08) (81 - 86)  BP: 151/67 (08 Mar 2021 04:08) (121/67 - 161/71)  BP(mean): --  RR: 18 (08 Mar 2021 04:08) (17 - 18)  SpO2: 100% (08 Mar 2021 04:08) (97% - 100%)    PHYSICAL EXAM:      Constitutional: A&Ox3    Respiratory: non labored breathing, no respiratory distress    Cardiovascular: NSR, RRR    Gastrointestinal: soft ND, NT                 Incision: CDI, JPx1 SS    Genitourinary: palma to gravity     Extremities: (-) edema                  I&O's Detail    07 Mar 2021 07:01  -  08 Mar 2021 07:00  --------------------------------------------------------  IN:    IV PiggyBack: 550 mL    Oral Fluid: 150 mL    sodium chloride 0.9%: 1140 mL  Total IN: 1840 mL    OUT:    Bulb (mL): 96.5 mL    Intermittent Catheterization - Urethral (mL): 910 mL    Voided (mL): 1905 mL  Total OUT: 2911.5 mL    Total NET: -1071.5 mL          LABS:                        6.6    9.71  )-----------( 318      ( 08 Mar 2021 07:41 )             21.3     03-08    134<L>  |  102  |  10  ----------------------------<  165<H>  4.9   |  24  |  0.75    Ca    8.4      08 Mar 2021 07:41  Phos  3.6     03-08  Mg     1.6     03-08            RADIOLOGY & ADDITIONAL STUDIES:

## 2021-03-09 LAB
ANION GAP SERPL CALC-SCNC: 8 MMOL/L — SIGNIFICANT CHANGE UP (ref 5–17)
BUN SERPL-MCNC: 9 MG/DL — SIGNIFICANT CHANGE UP (ref 7–23)
CALCIUM SERPL-MCNC: 9.1 MG/DL — SIGNIFICANT CHANGE UP (ref 8.4–10.5)
CHLORIDE SERPL-SCNC: 99 MMOL/L — SIGNIFICANT CHANGE UP (ref 96–108)
CO2 SERPL-SCNC: 26 MMOL/L — SIGNIFICANT CHANGE UP (ref 22–31)
CREAT SERPL-MCNC: 0.75 MG/DL — SIGNIFICANT CHANGE UP (ref 0.5–1.3)
GLUCOSE BLDC GLUCOMTR-MCNC: 139 MG/DL — HIGH (ref 70–99)
GLUCOSE BLDC GLUCOMTR-MCNC: 184 MG/DL — HIGH (ref 70–99)
GLUCOSE BLDC GLUCOMTR-MCNC: 190 MG/DL — HIGH (ref 70–99)
GLUCOSE BLDC GLUCOMTR-MCNC: 256 MG/DL — HIGH (ref 70–99)
GLUCOSE SERPL-MCNC: 184 MG/DL — HIGH (ref 70–99)
HCT VFR BLD CALC: 26.6 % — LOW (ref 39–50)
HGB BLD-MCNC: 8.5 G/DL — LOW (ref 13–17)
MAGNESIUM SERPL-MCNC: 1.6 MG/DL — SIGNIFICANT CHANGE UP (ref 1.6–2.6)
MCHC RBC-ENTMCNC: 26.7 PG — LOW (ref 27–34)
MCHC RBC-ENTMCNC: 32 GM/DL — SIGNIFICANT CHANGE UP (ref 32–36)
MCV RBC AUTO: 83.6 FL — SIGNIFICANT CHANGE UP (ref 80–100)
NRBC # BLD: 0 /100 WBCS — SIGNIFICANT CHANGE UP (ref 0–0)
PHOSPHATE SERPL-MCNC: 3.5 MG/DL — SIGNIFICANT CHANGE UP (ref 2.5–4.5)
PLATELET # BLD AUTO: 369 K/UL — SIGNIFICANT CHANGE UP (ref 150–400)
POTASSIUM SERPL-MCNC: 4.3 MMOL/L — SIGNIFICANT CHANGE UP (ref 3.5–5.3)
POTASSIUM SERPL-SCNC: 4.3 MMOL/L — SIGNIFICANT CHANGE UP (ref 3.5–5.3)
RBC # BLD: 3.18 M/UL — LOW (ref 4.2–5.8)
RBC # FLD: 13.9 % — SIGNIFICANT CHANGE UP (ref 10.3–14.5)
SODIUM SERPL-SCNC: 133 MMOL/L — LOW (ref 135–145)
WBC # BLD: 8.81 K/UL — SIGNIFICANT CHANGE UP (ref 3.8–10.5)
WBC # FLD AUTO: 8.81 K/UL — SIGNIFICANT CHANGE UP (ref 3.8–10.5)

## 2021-03-09 RX ORDER — SODIUM CHLORIDE 9 MG/ML
1 INJECTION INTRAMUSCULAR; INTRAVENOUS; SUBCUTANEOUS THREE TIMES A DAY
Refills: 0 | Status: DISCONTINUED | OUTPATIENT
Start: 2021-03-09 | End: 2021-03-10

## 2021-03-09 RX ORDER — MAGNESIUM SULFATE 500 MG/ML
2 VIAL (ML) INJECTION ONCE
Refills: 0 | Status: COMPLETED | OUTPATIENT
Start: 2021-03-09 | End: 2021-03-09

## 2021-03-09 RX ADMIN — OXYCODONE HYDROCHLORIDE 10 MILLIGRAM(S): 5 TABLET ORAL at 13:24

## 2021-03-09 RX ADMIN — Medication 2: at 21:57

## 2021-03-09 RX ADMIN — Medication 200 MILLIGRAM(S): at 06:41

## 2021-03-09 RX ADMIN — Medication 2: at 07:45

## 2021-03-09 RX ADMIN — Medication 500 MILLIGRAM(S): at 13:18

## 2021-03-09 RX ADMIN — SODIUM CHLORIDE 1 GRAM(S): 9 INJECTION INTRAMUSCULAR; INTRAVENOUS; SUBCUTANEOUS at 21:53

## 2021-03-09 RX ADMIN — Medication 500 MILLIGRAM(S): at 06:41

## 2021-03-09 RX ADMIN — Medication 6: at 12:03

## 2021-03-09 RX ADMIN — OXYCODONE HYDROCHLORIDE 10 MILLIGRAM(S): 5 TABLET ORAL at 14:10

## 2021-03-09 RX ADMIN — Medication 200 MILLIGRAM(S): at 17:51

## 2021-03-09 RX ADMIN — Medication 25 MILLIGRAM(S): at 06:41

## 2021-03-09 RX ADMIN — OXYCODONE HYDROCHLORIDE 10 MILLIGRAM(S): 5 TABLET ORAL at 06:38

## 2021-03-09 RX ADMIN — Medication 81 MILLIGRAM(S): at 12:03

## 2021-03-09 RX ADMIN — OXYCODONE HYDROCHLORIDE 10 MILLIGRAM(S): 5 TABLET ORAL at 22:15

## 2021-03-09 RX ADMIN — OXYCODONE HYDROCHLORIDE 10 MILLIGRAM(S): 5 TABLET ORAL at 05:38

## 2021-03-09 RX ADMIN — SODIUM CHLORIDE 1 GRAM(S): 9 INJECTION INTRAMUSCULAR; INTRAVENOUS; SUBCUTANEOUS at 09:35

## 2021-03-09 RX ADMIN — ATORVASTATIN CALCIUM 80 MILLIGRAM(S): 80 TABLET, FILM COATED ORAL at 21:52

## 2021-03-09 RX ADMIN — Medication 175 MICROGRAM(S): at 06:41

## 2021-03-09 RX ADMIN — OXYCODONE HYDROCHLORIDE 10 MILLIGRAM(S): 5 TABLET ORAL at 23:20

## 2021-03-09 RX ADMIN — TAMSULOSIN HYDROCHLORIDE 0.8 MILLIGRAM(S): 0.4 CAPSULE ORAL at 21:53

## 2021-03-09 RX ADMIN — SODIUM CHLORIDE 1 GRAM(S): 9 INJECTION INTRAMUSCULAR; INTRAVENOUS; SUBCUTANEOUS at 13:18

## 2021-03-09 RX ADMIN — Medication 10 MILLIGRAM(S): at 12:02

## 2021-03-09 RX ADMIN — Medication 500 MILLIGRAM(S): at 21:53

## 2021-03-09 RX ADMIN — Medication 50 GRAM(S): at 09:35

## 2021-03-09 NOTE — DIETITIAN INITIAL EVALUATION ADULT. - OTHER INFO
65M hx HTN, HLD, DM (A1C 5.0%), TIA (s/p Eliquis), Afib, CAD s/p CABG x3 on DAPT (7/6/20 w/ Dr. Conde), acute cholecystitis s/p perc dharmesh (Aug 2020 and removal on Sep 2020), recently admitted w/ new 1.5 cm hepatic duct lesion s/p ERCP/EUS w/ placement of hepatic duct stents x2. Brushings were negative for malignancy, no biopsy taken 2/2 risk of seeding. S/p diagnostic laparoscopy 1/28/21 w/ no metastatic findings. Now s/p Open Marsupialization, Subtotal Cholecystectomy performed on 3/4.    On assessment, pt resting in bed without complaints. Currently on low fat diet tolerating PO well consuming >50% of meals. No n/v/d/c. No abd distention. No abd discomfort. 8/10 abd pain but well controlled at time of assessment. Surgical incision, whit score 20. Noted good PO intake PTA and UBW consistent with admission weight. NKFA. Education provided on low fat diet guidelines- edu handout left at bedside. Pt receptive. Please see nutr recs below. RD to follow.

## 2021-03-09 NOTE — PROGRESS NOTE ADULT - SUBJECTIVE AND OBJECTIVE BOX
SUBJECTIVE: Pt seen and examined at bedside with chief. Pt denies any complaints. Pain well controlled. Tolerating diet without N/V. Admits to BF    MEDICATIONS  (STANDING):  aspirin  chewable 81 milliGRAM(s) Oral daily  atorvastatin 80 milliGRAM(s) Oral at bedtime  cefpodoxime 200 milliGRAM(s) Oral every 12 hours  dextrose 40% Gel 15 Gram(s) Oral once  dextrose 5%. 1000 milliLiter(s) (50 mL/Hr) IV Continuous <Continuous>  dextrose 5%. 1000 milliLiter(s) (100 mL/Hr) IV Continuous <Continuous>  dextrose 50% Injectable 25 Gram(s) IV Push once  dextrose 50% Injectable 12.5 Gram(s) IV Push once  dextrose 50% Injectable 25 Gram(s) IV Push once  glucagon  Injectable 1 milliGRAM(s) IntraMuscular once  insulin lispro (ADMELOG) corrective regimen sliding scale   SubCutaneous Before meals and at bedtime  levothyroxine 175 MICROGram(s) Oral daily  metoprolol succinate ER 25 milliGRAM(s) Oral daily  metroNIDAZOLE    Tablet 500 milliGRAM(s) Oral every 8 hours  tamsulosin 0.8 milliGRAM(s) Oral at bedtime    MEDICATIONS  (PRN):  ondansetron Injectable 4 milliGRAM(s) IV Push every 6 hours PRN Nausea  oxyCODONE    IR 5 milliGRAM(s) Oral every 6 hours PRN Moderate Pain (4 - 6)  oxyCODONE    IR 10 milliGRAM(s) Oral every 6 hours PRN Severe Pain (7 - 10)      Vital Signs Last 24 Hrs  T(C): 36.4 (09 Mar 2021 05:22), Max: 37 (08 Mar 2021 09:04)  T(F): 97.6 (09 Mar 2021 05:22), Max: 98.6 (08 Mar 2021 09:04)  HR: 82 (09 Mar 2021 05:22) (78 - 96)  BP: 147/74 (09 Mar 2021 05:22) (128/88 - 164/79)  BP(mean): --  RR: 17 (09 Mar 2021 05:22) (15 - 18)  SpO2: 98% (09 Mar 2021 05:22) (96% - 100%)    PHYSICAL EXAM:      Constitutional: A&Ox3    Respiratory: non labored breathing, no respiratory distress    Cardiovascular: NSR, RRR    Gastrointestinal: soft ND, NT                 Incision: CDI, JPx1 SS    Genitourinary: Graff to gravity     Extremities: (-) edema                  I&O's Detail    08 Mar 2021 07:01  -  09 Mar 2021 07:00  --------------------------------------------------------  IN:    Oral Fluid: 2060 mL    PRBCs (Packed Red Blood Cells): 300 mL    sodium chloride 0.9%: 240 mL  Total IN: 2600 mL    OUT:    Bulb (mL): 302.5 mL    Indwelling Catheter - Urethral (mL): 3000 mL    Intermittent Catheterization - Urethral (mL): 550 mL  Total OUT: 3852.5 mL    Total NET: -1252.5 mL          LABS:                        8.5    8.81  )-----------( 369      ( 09 Mar 2021 07:31 )             26.6     03-08    134<L>  |  102  |  10  ----------------------------<  165<H>  4.9   |  24  |  0.75    Ca    8.4      08 Mar 2021 07:41  Phos  3.6     03-08  Mg     1.6     03-08            RADIOLOGY & ADDITIONAL STUDIES:

## 2021-03-10 ENCOUNTER — TRANSCRIPTION ENCOUNTER (OUTPATIENT)
Age: 66
End: 2021-03-10

## 2021-03-10 VITALS
TEMPERATURE: 98 F | DIASTOLIC BLOOD PRESSURE: 74 MMHG | HEART RATE: 82 BPM | OXYGEN SATURATION: 100 % | SYSTOLIC BLOOD PRESSURE: 134 MMHG | RESPIRATION RATE: 18 BRPM

## 2021-03-10 LAB
GLUCOSE BLDC GLUCOMTR-MCNC: 150 MG/DL — HIGH (ref 70–99)
GLUCOSE BLDC GLUCOMTR-MCNC: 239 MG/DL — HIGH (ref 70–99)
HCT VFR BLD CALC: 26.2 % — LOW (ref 39–50)
HGB BLD-MCNC: 8.4 G/DL — LOW (ref 13–17)
MCHC RBC-ENTMCNC: 26.8 PG — LOW (ref 27–34)
MCHC RBC-ENTMCNC: 32.1 GM/DL — SIGNIFICANT CHANGE UP (ref 32–36)
MCV RBC AUTO: 83.7 FL — SIGNIFICANT CHANGE UP (ref 80–100)
NRBC # BLD: 0 /100 WBCS — SIGNIFICANT CHANGE UP (ref 0–0)
PLATELET # BLD AUTO: 376 K/UL — SIGNIFICANT CHANGE UP (ref 150–400)
RBC # BLD: 3.13 M/UL — LOW (ref 4.2–5.8)
RBC # FLD: 13.9 % — SIGNIFICANT CHANGE UP (ref 10.3–14.5)
SURGICAL PATHOLOGY STUDY: SIGNIFICANT CHANGE UP
WBC # BLD: 8.76 K/UL — SIGNIFICANT CHANGE UP (ref 3.8–10.5)
WBC # FLD AUTO: 8.76 K/UL — SIGNIFICANT CHANGE UP (ref 3.8–10.5)

## 2021-03-10 PROCEDURE — 87075 CULTR BACTERIA EXCEPT BLOOD: CPT

## 2021-03-10 PROCEDURE — 36430 TRANSFUSION BLD/BLD COMPNT: CPT

## 2021-03-10 PROCEDURE — 80053 COMPREHEN METABOLIC PANEL: CPT

## 2021-03-10 PROCEDURE — 88305 TISSUE EXAM BY PATHOLOGIST: CPT

## 2021-03-10 PROCEDURE — 88331 PATH CONSLTJ SURG 1 BLK 1SPC: CPT

## 2021-03-10 PROCEDURE — 86850 RBC ANTIBODY SCREEN: CPT

## 2021-03-10 PROCEDURE — 84100 ASSAY OF PHOSPHORUS: CPT

## 2021-03-10 PROCEDURE — S2900: CPT

## 2021-03-10 PROCEDURE — 93005 ELECTROCARDIOGRAM TRACING: CPT

## 2021-03-10 PROCEDURE — 85730 THROMBOPLASTIN TIME PARTIAL: CPT

## 2021-03-10 PROCEDURE — 85027 COMPLETE CBC AUTOMATED: CPT

## 2021-03-10 PROCEDURE — 88173 CYTOPATH EVAL FNA REPORT: CPT

## 2021-03-10 PROCEDURE — 36415 COLL VENOUS BLD VENIPUNCTURE: CPT

## 2021-03-10 PROCEDURE — 82330 ASSAY OF CALCIUM: CPT

## 2021-03-10 PROCEDURE — 80048 BASIC METABOLIC PNL TOTAL CA: CPT

## 2021-03-10 PROCEDURE — 97116 GAIT TRAINING THERAPY: CPT

## 2021-03-10 PROCEDURE — 87186 SC STD MICRODIL/AGAR DIL: CPT

## 2021-03-10 PROCEDURE — 97162 PT EVAL MOD COMPLEX 30 MIN: CPT

## 2021-03-10 PROCEDURE — 82962 GLUCOSE BLOOD TEST: CPT

## 2021-03-10 PROCEDURE — 85018 HEMOGLOBIN: CPT

## 2021-03-10 PROCEDURE — 85610 PROTHROMBIN TIME: CPT

## 2021-03-10 PROCEDURE — 87205 SMEAR GRAM STAIN: CPT

## 2021-03-10 PROCEDURE — 84132 ASSAY OF SERUM POTASSIUM: CPT

## 2021-03-10 PROCEDURE — 97530 THERAPEUTIC ACTIVITIES: CPT

## 2021-03-10 PROCEDURE — 86923 COMPATIBILITY TEST ELECTRIC: CPT

## 2021-03-10 PROCEDURE — 84295 ASSAY OF SERUM SODIUM: CPT

## 2021-03-10 PROCEDURE — 88304 TISSUE EXAM BY PATHOLOGIST: CPT

## 2021-03-10 PROCEDURE — 86901 BLOOD TYPING SEROLOGIC RH(D): CPT

## 2021-03-10 PROCEDURE — 87070 CULTURE OTHR SPECIMN AEROBIC: CPT

## 2021-03-10 PROCEDURE — 83735 ASSAY OF MAGNESIUM: CPT

## 2021-03-10 PROCEDURE — P9016: CPT

## 2021-03-10 PROCEDURE — 86900 BLOOD TYPING SEROLOGIC ABO: CPT

## 2021-03-10 PROCEDURE — C1889: CPT

## 2021-03-10 RX ORDER — METRONIDAZOLE 500 MG
1 TABLET ORAL
Qty: 0 | Refills: 0 | DISCHARGE
Start: 2021-03-10

## 2021-03-10 RX ORDER — CEFPODOXIME PROXETIL 100 MG
1 TABLET ORAL
Qty: 10 | Refills: 0
Start: 2021-03-10 | End: 2021-03-14

## 2021-03-10 RX ORDER — OXYCODONE HYDROCHLORIDE 5 MG/1
1 TABLET ORAL
Qty: 12 | Refills: 0
Start: 2021-03-10 | End: 2021-03-12

## 2021-03-10 RX ORDER — CLOPIDOGREL BISULFATE 75 MG/1
1 TABLET, FILM COATED ORAL
Qty: 0 | Refills: 0 | DISCHARGE

## 2021-03-10 RX ORDER — CEFPODOXIME PROXETIL 100 MG
1 TABLET ORAL
Qty: 0 | Refills: 0 | DISCHARGE
Start: 2021-03-10

## 2021-03-10 RX ORDER — METRONIDAZOLE 500 MG
1 TABLET ORAL
Qty: 15 | Refills: 0
Start: 2021-03-10 | End: 2021-03-14

## 2021-03-10 RX ADMIN — Medication 25 MILLIGRAM(S): at 06:39

## 2021-03-10 RX ADMIN — OXYCODONE HYDROCHLORIDE 10 MILLIGRAM(S): 5 TABLET ORAL at 08:30

## 2021-03-10 RX ADMIN — Medication 200 MILLIGRAM(S): at 06:38

## 2021-03-10 RX ADMIN — OXYCODONE HYDROCHLORIDE 10 MILLIGRAM(S): 5 TABLET ORAL at 08:02

## 2021-03-10 RX ADMIN — Medication 4: at 12:06

## 2021-03-10 RX ADMIN — Medication 500 MILLIGRAM(S): at 06:39

## 2021-03-10 RX ADMIN — Medication 81 MILLIGRAM(S): at 11:46

## 2021-03-10 RX ADMIN — Medication 175 MICROGRAM(S): at 07:30

## 2021-03-10 RX ADMIN — SODIUM CHLORIDE 1 GRAM(S): 9 INJECTION INTRAMUSCULAR; INTRAVENOUS; SUBCUTANEOUS at 06:39

## 2021-03-10 NOTE — PROGRESS NOTE ADULT - REASON FOR ADMISSION
laparoscopic cholecystectomy

## 2021-03-10 NOTE — DISCHARGE NOTE PROVIDER - NSDCFUADDINST_GEN_ALL_CORE_FT
To request medical records, please call [phone]. If you wish to request medical records, please call [phone].    Take antibiotics as directed x 5 days. For severe pain you may take oxycodone as directed/as needed. Do not exceed 4 tablets/24 hours.

## 2021-03-10 NOTE — DISCHARGE NOTE PROVIDER - CARE PROVIDER_API CALL
Rahel Wagner)  Surgery  [address]  [place], [place] [facility]  Phone: [phone]  Fax: [phone]  Follow Up Time:

## 2021-03-10 NOTE — PROGRESS NOTE ADULT - SUBJECTIVE AND OBJECTIVE BOX
SUBJECTIVE: Pt seen and examined at bedside with chief. Pt denies any complaints. Pain well controlled. Tolerating diet without N/V. Admits to BF    MEDICATIONS  (STANDING):  aspirin  chewable 81 milliGRAM(s) Oral daily  atorvastatin 80 milliGRAM(s) Oral at bedtime  cefpodoxime 200 milliGRAM(s) Oral every 12 hours  dextrose 40% Gel 15 Gram(s) Oral once  dextrose 5%. 1000 milliLiter(s) (50 mL/Hr) IV Continuous <Continuous>  dextrose 5%. 1000 milliLiter(s) (100 mL/Hr) IV Continuous <Continuous>  dextrose 50% Injectable 25 Gram(s) IV Push once  dextrose 50% Injectable 12.5 Gram(s) IV Push once  dextrose 50% Injectable 25 Gram(s) IV Push once  glucagon  Injectable 1 milliGRAM(s) IntraMuscular once  insulin lispro (ADMELOG) corrective regimen sliding scale   SubCutaneous Before meals and at bedtime  levothyroxine 175 MICROGram(s) Oral daily  metoprolol succinate ER 25 milliGRAM(s) Oral daily  metroNIDAZOLE    Tablet 500 milliGRAM(s) Oral every 8 hours  sodium chloride 1 Gram(s) Oral three times a day  tamsulosin 0.8 milliGRAM(s) Oral at bedtime    MEDICATIONS  (PRN):  ondansetron Injectable 4 milliGRAM(s) IV Push every 6 hours PRN Nausea  oxyCODONE    IR 5 milliGRAM(s) Oral every 6 hours PRN Moderate Pain (4 - 6)  oxyCODONE    IR 10 milliGRAM(s) Oral every 6 hours PRN Severe Pain (7 - 10)      Vital Signs Last 24 Hrs  T(C): 36.7 (10 Mar 2021 05:42), Max: 36.7 (10 Mar 2021 05:42)  T(F): 98.1 (10 Mar 2021 05:42), Max: 98.1 (10 Mar 2021 05:42)  HR: 89 (10 Mar 2021 05:42) (74 - 99)  BP: 155/75 (10 Mar 2021 05:42) (129/64 - 159/82)  BP(mean): --  RR: 18 (10 Mar 2021 05:42) (15 - 18)  SpO2: 98% (10 Mar 2021 05:42) (96% - 100%)    PHYSICAL EXAM:      Constitutional: A&Ox3    Respiratory: non labored breathing, no respiratory distress    Cardiovascular: NSR, RRR    Gastrointestinal: soft ND, NT                 Incision: CDI, JPx1 SS    Genitourinary: palma to gravity     Extremities: (-) edema                  I&O's Detail    09 Mar 2021 07:01  -  10 Mar 2021 07:00  --------------------------------------------------------  IN:    IV PiggyBack: 50 mL    Oral Fluid: 1200 mL  Total IN: 1250 mL    OUT:    Bulb (mL): 230 mL    Indwelling Catheter - Urethral (mL): 2350 mL  Total OUT: 2580 mL    Total NET: -1330 mL          LABS:                        8.5    8.81  )-----------( 369      ( 09 Mar 2021 07:31 )             26.6     03-09    133<L>  |  99  |  9   ----------------------------<  184<H>  4.3   |  26  |  0.75    Ca    9.1      09 Mar 2021 07:31  Phos  3.5     03-09  Mg     1.6     03-09            RADIOLOGY & ADDITIONAL STUDIES:

## 2021-03-10 NOTE — DISCHARGE NOTE PROVIDER - NSDCMRMEDTOKEN_GEN_ALL_CORE_FT
aspirin 81 mg oral tablet, chewable: 1 tab(s) orally once a day  atorvastatin 80 mg oral tablet: 1 tab(s) orally once a day  Januvia 100 mg oral tablet: 1 tab(s) orally once a day  levothyroxine 175 mcg (0.175 mg) oral tablet: 1 tab(s) orally once a day  metFORMIN 500 mg oral tablet: 1 tab(s) orally 3 times a day   metoprolol tartrate 25 mg oral tablet: orally once a day  Plavix 75 mg oral tablet: 1 tab(s) orally once a day   aspirin 81 mg oral tablet, chewable: 1 tab(s) orally once a day  atorvastatin 80 mg oral tablet: 1 tab(s) orally once a day  cefpodoxime 200 mg oral tablet: 1 tab(s) orally every 12 hours   cefpodoxime 200 mg oral tablet: 1 tab(s) orally every 12 hours  Januvia 100 mg oral tablet: 1 tab(s) orally once a day  levothyroxine 175 mcg (0.175 mg) oral tablet: 1 tab(s) orally once a day  metFORMIN 500 mg oral tablet: 1 tab(s) orally 3 times a day   metoprolol tartrate 25 mg oral tablet: orally once a day  metroNIDAZOLE 500 mg oral tablet: 1 tab(s) orally every 8 hours  metroNIDAZOLE 500 mg oral tablet: 1 tab(s) orally every 8 hours   oxyCODONE 5 mg oral tablet: 1 tab(s) orally every 6 hours, As Needed -for severe pain MDD:4 tablets

## 2021-03-10 NOTE — PROVIDER CONTACT NOTE (OTHER) - SITUATION
pt voided 40cc and post void bladder scan result is 512
Px reportedly voided 40 ml on his own at 449 am. Bladder scan done at 6 am= 465 ml.
Bladder scan was done as instructed by mary BHATTI which revealed 1046 ml. Px also with desaturation as low as 87% on RA. With O2 2 L NC, O2 sat improved to 96-99%
Pt in 935 Mr Martin had SVT up to 159, pt asymptomatic, no c/o dizziness , chest pain or SOB

## 2021-03-10 NOTE — DISCHARGE NOTE NURSING/CASE MANAGEMENT/SOCIAL WORK - NSDCFUADDAPPT_GEN_ALL_CORE_FT
Please follow up with urology clinic (Dr. Fisher) for follow up regarding palma catheter. Call the office at [phone] to make an appt.

## 2021-03-10 NOTE — DISCHARGE NOTE PROVIDER - HOSPITAL COURSE
65M hx HTN, HLD, DM, TIA (s/p Eliquis), Afib, CAD s/p CABG x3 on DAPT (7/6/20 w/ Dr. Conde), acute cholecystitis s/p perc dharmesh (Aug 2020 and removal on Sep 2020), recently admitted w/ new 1.5 cm hepatic duct lesion s/p ERCP/EUS w/ placement of hepatic duct stents x2. Brushings were negative for malignancy, no biopsy taken 2/2 risk of seeding. S/p diagnostic laparoscopy 1/28/21 w/ no metastatic findings. Pt presents for elective Sx. On 3/4 pt underwent lap converted to open marsupialization/subtotal cholecystectomy, JAMAL drain placed. procedure was uncomplicated. During postop, palma removed, pt failed TOV, failed multiple attempts, palma was placed. OR cultures grew ESBL E.coli, pt remained on abx while hospital. Pt tolerated OP intake, and made adequate UOP. Diet was advanced, pt tolerated well with return of bowel function. At time of dicharge pt aware he will go home with palma catheter and will need to follow up with urologist and general surgeon.   65M hx HTN, HLD, DM, TIA (s/p Eliquis), Afib, CAD s/p CABG x3 on DAPT (7/6/20 w/ Dr. Conde), acute cholecystitis s/p perc dharmesh (Aug 2020 and removal on Sep 2020), recently admitted w/ new 1.5 cm hepatic duct lesion s/p ERCP/EUS w/ placement of hepatic duct stents x2. Brushings were negative for malignancy, no biopsy taken 2/2 risk of seeding. S/p diagnostic laparoscopy 1/28/21 w/ no metastatic findings. Pt presents for elective Sx. On 3/4 pt underwent lap converted to open marsupialization/subtotal cholecystectomy, JAMAL drain placed. procedure was uncomplicated. During postop, palma removed, pt failed TOV, failed multiple attempts, palma was placed. OR cultures grew ESBL E.coli, pt remained on abx while hospital. Pt tolerated OP intake, and made adequate UOP. Diet was advanced, pt tolerated well with return of bowel function. At time of dicharge pt aware he will go home with palma catheter and will need to follow up with urologist and general surgeon. Pt will also be discharged w/ JAMAL drain and understands management, does not require home services.

## 2021-03-10 NOTE — PROGRESS NOTE ADULT - ASSESSMENT
65M hx HTN, HLD, DM, TIA (s/p Eliquis), Afib, CAD s/p CABG x3 on DAPT (7/6/20 w/ Dr. Conde), acute cholecystitis s/p perc dharmesh (Aug 2020 and removal on Sep 2020), recently admitted w/ new 1.5 cm hepatic duct lesion s/p ERCP/EUS w/ placement of hepatic duct stents x2. Brushings were negative for malignancy, no biopsy taken 2/2 risk of seeding. S/p diagnostic laparoscopy 1/28/21 w/ no metastatic findings. Now s/p Open Marsupialization, Subtotal Cholecystectomy performed on 3/4.    Pain/nausea control  Sips of clears/ IVF   Ceftriaxone, Flagyl  HSQ, ASA  Home synthroid and metoprolol started  JAMAL x 1  AM labs, am hgb 6.7 rpt stat  Dispo: PT recs home PT   
65M hx HTN, HLD, DM, TIA (s/p Eliquis), Afib, CAD s/p CABG x3 on DAPT (7/6/20 w/ Dr. Conde), acute cholecystitis s/p perc dharmesh (Aug 2020 and removal on Sep 2020), recently admitted w/ new 1.5 cm hepatic duct lesion s/p ERCP/EUS w/ placement of hepatic duct stents x2. Brushings were negative for malignancy, no biopsy taken 2/2 risk of seeding. S/p diagnostic laparoscopy 1/28/21 w/ no metastatic findings. Now s/p Open Marsupialization, Subtotal Cholecystectomy performed on 3/4.    Pain/nausea control prn  Sips of clears/ IVF   Ceftriaxone, Flagyl  HSQ, ASA  Home synthroid and metoprolol started  JAMAL x 1  AM labs  Dispo: PT recs home PT   
65M hx HTN, HLD, DM, TIA (s/p Eliquis), Afib, CAD s/p CABG x3 on DAPT (7/6/20 w/ Dr. Conde), acute cholecystitis s/p perc dharmesh (Aug 2020 and removal on Sep 2020), recently admitted w/ new 1.5 cm hepatic duct lesion s/p ERCP/EUS w/ placement of hepatic duct stents x2. Brushings were negative for malignancy, no biopsy taken 2/2 risk of seeding. S/p diagnostic laparoscopy 1/28/21 w/ no metastatic findings. Now s/p Open Marsupialization, Subtotal Cholecystectomy performed on 3/4.    dc home  Pain/nausea control  LFD  Cefpodoxime, Flagyl  HSQ, ASA  Home synthroid and metoprolol started  JAMAL x 1  AM cbc  Dispo: PT no needs  
65M hx HTN, HLD, DM, TIA (s/p Eliquis), Afib, CAD s/p CABG x3 on DAPT (7/6/20 w/ Dr. Conde), acute cholecystitis s/p perc dharmesh (Aug 2020 and removal on Sep 2020), recently admitted w/ new 1.5 cm hepatic duct lesion s/p ERCP/EUS w/ placement of hepatic duct stents x2. Brushings were negative for malignancy, no biopsy taken 2/2 risk of seeding. S/p diagnostic laparoscopy 1/28/21 w/ no metastatic findings. Now s/p Open Marsupialization, Subtotal Cholecystectomy performed on 3/4.    Pain/nausea control  LFD  Ceftriaxone, Flagyl  HSQ, ASA  Home synthroid and metoprolol started  JAMAL x 1  AM labs  Dispo: PT recs no needs  
64 yo M w/ PMH of HTN, HLD, DM, TIA (s/p Eliquis), Afib, CAD s/p CABG x3 on DAPT (7/6/20 w/ Dr. Conde), acute cholecystitis s/p percutaneous cholecystostomy (August 2020 and removal on September 2020), recently admitted w/ new 1.5 cm hepatic duct lesion s/p ERCP/EUS w/ placement of hepatic duct stents x2. Brushings were negative for malignancy, no biopsy taken 2/2 risk of seeding. S/p diagnostic laparoscopy 1/28/21 w/ no metastatic findings. Now s/p Open Marsupialization, Subtotal Cholecystectomy performed on 3/4.    Pain/nausea control  CLD/ IVF   SQH, ASA  Home Synthroid and Metoprolol started  JAMAL x 1  AM labs  New ToV   Dispo: PT recs home PT   
65M hx HTN, HLD, DM, TIA (s/p Eliquis), Afib, CAD s/p CABG x3 on DAPT (7/6/20 w/ Dr. Conde), acute cholecystitis s/p perc dharmesh (Aug 2020 and removal on Sep 2020), recently admitted w/ new 1.5 cm hepatic duct lesion s/p ERCP/EUS w/ placement of hepatic duct stents x2. Brushings were negative for malignancy, no biopsy taken 2/2 risk of seeding. S/p diagnostic laparoscopy 1/28/21 w/ no metastatic findings. Now s/p Open Marsupialization, Subtotal Cholecystectomy performed on 3/4.    NPO, IVF NS @ 110cc/hr  Pain/nausea control  Holding SQH and ASA until 4am labs (3/5)  Home synthroid and metoprolol started  JAMAL Graff x 1  AM labs

## 2021-03-10 NOTE — DISCHARGE NOTE PROVIDER - NSDCFUADDAPPT_GEN_ALL_CORE_FT
Please follow up with urology clinic (Dr. Fisher) for follow up regarding palma catheter. Call the office at [phone] to make an appt.

## 2021-03-10 NOTE — PROVIDER CONTACT NOTE (OTHER) - ASSESSMENT
Lungs CTA
A&O x 4, voiding via urinal
Pt VSS, HR 80 ,denies any chest pain , SOB , sitting comfortably in the chair.

## 2021-03-10 NOTE — DISCHARGE NOTE NURSING/CASE MANAGEMENT/SOCIAL WORK - PATIENT PORTAL LINK FT
You can access the FollowMyHealth Patient Portal offered by [facility] by registering at the following website: [email or web address]. By joining [employer]’s FollowMyHealth portal, you will also be able to view your health information using other applications (apps) compatible with our system.

## 2021-03-10 NOTE — PROVIDER CONTACT NOTE (OTHER) - RECOMMENDATIONS
Will be seen by the MD prior to d/c later today
encouraged use of IS. MD was requested to order O2 2 L NC PRN
notified provider via phone

## 2021-03-10 NOTE — DISCHARGE NOTE PROVIDER - NSDCCPCAREPLAN_GEN_ALL_CORE_FT
PRINCIPAL DISCHARGE DIAGNOSIS  Diagnosis: S/P cholecystectomy  Assessment and Plan of Treatment: Follow up with Dr. Wagner in 1-2 weeks. Call the office at the number below to schedule your appointment. You may shower; soap and water over incision sites. Do not scrub. Pat dry when done. No tub bathing or swimming until cleared. Keep incision sites out of the sun as scars will darken. Ambulate as tolerated, but no heavy lifting (>10lbs) or strenuous exercise. You may resume regular diet. You should be urinating at least 3-4x per day. Call the office if you experience increasing abdominal pain, nausea, vomiting, or temperature >101 F.  Follow up with Urologist in 1-2 weeks, call the number provided below to make an appointment in regards to removal of palma catheter.   Palma catheter- empty bag as needed, and record daily output.         PRINCIPAL DISCHARGE DIAGNOSIS  Diagnosis: S/P cholecystectomy  Assessment and Plan of Treatment: Follow up with Dr. Wagner in 1-2 weeks. Call the office at the number below to schedule your appointment. You may shower; soap and water over incision sites. Do not scrub. Pat dry when done. No tub bathing or swimming until cleared. Keep incision sites out of the sun as scars will darken. Ambulate as tolerated, but no heavy lifting (>10lbs) or strenuous exercise. You may resume low fat diet. You should be urinating at least 3-4x per day. Call the office if you experience increasing abdominal pain, nausea, vomiting, or temperature >101 F.  Follow up with Urologist in 1-2 weeks, call the number provided below to make an appointment in regards to removal of palma catheter.   Palma catheter- empty bag as needed, and record daily output.

## 2021-03-10 NOTE — DISCHARGE NOTE PROVIDER - NSDCACTIVITY_GEN_ALL_CORE
Return to Work/School allowed/Walking - Indoors allowed/No heavy lifting/straining/Walking - Outdoors allowed

## 2021-03-11 NOTE — PHYSICAL THERAPY INITIAL EVALUATION ADULT - DIAGNOSIS, PT EVAL
slurred speech
4I: Impaired Joint Mobility, Motor Function, Muscle Performance, and Range of Motion Associated with Bony or Soft Tissue Surgery

## 2021-03-12 ENCOUNTER — OUTPATIENT (OUTPATIENT)
Dept: OUTPATIENT SERVICES | Facility: HOSPITAL | Age: 66
LOS: 1 days | End: 2021-03-12

## 2021-03-12 ENCOUNTER — APPOINTMENT (OUTPATIENT)
Dept: UROLOGY | Facility: CLINIC | Age: 66
End: 2021-03-12

## 2021-03-12 VITALS
TEMPERATURE: 97.7 F | HEART RATE: 73 BPM | SYSTOLIC BLOOD PRESSURE: 106 MMHG | DIASTOLIC BLOOD PRESSURE: 60 MMHG | RESPIRATION RATE: 15 BRPM

## 2021-03-12 DIAGNOSIS — Z41.9 ENCOUNTER FOR PROCEDURE FOR PURPOSES OTHER THAN REMEDYING HEALTH STATE, UNSPECIFIED: Chronic | ICD-10-CM

## 2021-03-12 DIAGNOSIS — Z95.1 PRESENCE OF AORTOCORONARY BYPASS GRAFT: Chronic | ICD-10-CM

## 2021-03-15 ENCOUNTER — APPOINTMENT (OUTPATIENT)
Dept: SURGICAL ONCOLOGY | Facility: CLINIC | Age: 66
End: 2021-03-15
Payer: COMMERCIAL

## 2021-03-15 VITALS
WEIGHT: 185 LBS | HEIGHT: 69 IN | TEMPERATURE: 97.7 F | SYSTOLIC BLOOD PRESSURE: 131 MMHG | BODY MASS INDEX: 27.4 KG/M2 | DIASTOLIC BLOOD PRESSURE: 78 MMHG | OXYGEN SATURATION: 99 % | HEART RATE: 64 BPM

## 2021-03-15 DIAGNOSIS — R33.9 RETENTION OF URINE, UNSPECIFIED: ICD-10-CM

## 2021-03-15 PROCEDURE — 99024 POSTOP FOLLOW-UP VISIT: CPT

## 2021-03-17 DIAGNOSIS — R10.2 PELVIC AND PERINEAL PAIN: ICD-10-CM

## 2021-03-17 DIAGNOSIS — D64.9 ANEMIA, UNSPECIFIED: ICD-10-CM

## 2021-03-17 DIAGNOSIS — E03.9 HYPOTHYROIDISM, UNSPECIFIED: ICD-10-CM

## 2021-03-17 DIAGNOSIS — E11.9 TYPE 2 DIABETES MELLITUS WITHOUT COMPLICATIONS: ICD-10-CM

## 2021-03-17 DIAGNOSIS — I25.10 ATHEROSCLEROTIC HEART DISEASE OF NATIVE CORONARY ARTERY WITHOUT ANGINA PECTORIS: ICD-10-CM

## 2021-03-17 DIAGNOSIS — I10 ESSENTIAL (PRIMARY) HYPERTENSION: ICD-10-CM

## 2021-03-17 DIAGNOSIS — Z53.31 LAPAROSCOPIC SURGICAL PROCEDURE CONVERTED TO OPEN PROCEDURE: ICD-10-CM

## 2021-03-17 DIAGNOSIS — E78.5 HYPERLIPIDEMIA, UNSPECIFIED: ICD-10-CM

## 2021-03-17 DIAGNOSIS — I42.9 CARDIOMYOPATHY, UNSPECIFIED: ICD-10-CM

## 2021-03-17 DIAGNOSIS — K82.8 OTHER SPECIFIED DISEASES OF GALLBLADDER: ICD-10-CM

## 2021-03-17 DIAGNOSIS — K81.0 ACUTE CHOLECYSTITIS: ICD-10-CM

## 2021-03-17 DIAGNOSIS — Z95.1 PRESENCE OF AORTOCORONARY BYPASS GRAFT: ICD-10-CM

## 2021-03-17 DIAGNOSIS — Z86.73 PERSONAL HISTORY OF TRANSIENT ISCHEMIC ATTACK (TIA), AND CEREBRAL INFARCTION WITHOUT RESIDUAL DEFICITS: ICD-10-CM

## 2021-03-18 NOTE — HISTORY OF PRESENT ILLNESS
[FreeTextEntry1] : Patient Name: LYDIA SHEN \par MRN: [id number] \par Shayna MRN: [id number]\par Referring Provider: none \par Oncologist: ernie\par Date: 3/15/21\par \par Diagnosis: \par Operative Date: (1) 1/28/2021 (2) 3/4/21\par Procedure: (1) Laparoscopy (2) Open cholecystectomy\par \par He presents for a scheduled post operative visit. He is 1.5 months s/p laparoscopy and is 11 days s/p open cholecystectomy. He was discharged home on 3/10/21 with JAMAL drain. Drain has put out about 25cc over the past two days\par \par Currently, Mr. SHEN feels well. He has some discomfort at drain site. He is tolerating a low fat diet, he denies nausea, vomiting, diarrhea. He denies fevers or chills.\par \par Final Pathology Showed negative for carcinoma\par \par

## 2021-03-18 NOTE — REASON FOR VISIT
[Family Member] : family member [de-identified] : Open cholecystectomy [de-identified] : 3/4/2021 [de-identified] : 13

## 2021-03-18 NOTE — PHYSICAL EXAM
[Normal] : well developed, well nourished, in no acute distress [de-identified] : Soft, staples removed without difficulty. Drain present with 25 ml clear serosanguineous drainage present

## 2021-03-18 NOTE — DATA REVIEWED
[FreeTextEntry1] : LYDIA SHEN                     2\par \par \par \par Surgical Final Report\par \par \par \par \par Final Diagnosis\par 1. Liver vs Gallbladder rule out cancer:\par -  Liver parenchyma and fibrous tissue with abundant acute\par and chronic\par inflammation.\par - Negative for  carcinoma.\par \par Note: Ig G, Ig G4 immunostains pending and an addendum will\par follow.\par \par 2. Gallbladder biopsy #2:\par - Fibroadipose tissue with abundant acute and chronic\par inflammation.\par - Negative for  carcinoma.\par \par 3. Gallbladder biopsy #2:\par - Fibrous tissue with abundant acute and chronic\par inflammation, and focal giant cell\par reaction.\par - Negative for carcinoma.\par \par Note: Case reviewed intradepartmentally.\par \par Verified by: Taylor Hernandez MD\par (Electronic Signature)\par Reported on: 03/10/21 15:20 EST, [facility], [address] [place] [address]\par Phone: [phone]   Fax: [phone]\par _________________________________________________________________\par \par Intraoperative Consultation\par 1FSA: Liver with marked inflammation and fibrosis; AY/HH,\par 03/04/2021 7:25 PM\par \par Clinical History\par 65 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 The specimen is received fresh for frozen section examination,\par labeled with the patient's idenification and "liver\par \par \par \par \par \par \par LYDIA SHEN                     2\par \par \par \par Surgical Final Report\par \par \par \par \par vs gallbladder rule out cancer." It consists of fragments of\par pink-white soft tissue measuring 0.6 x 0.5 x 0.2 cm in aggregate.\par The specimen is submitted entirely in 1 cassette; 1FSA.\par Chin Lundberg MD 03/04/2021 19:35:16 EST\par 2.The specimen is received in formalin, labeled with the\par patient's identification and "gallbladder biopsy N1."  It\par consists of one irregular fragment of pink-tan fibrous tissue\par measuring 4.1 x 3 x 1 cm, and one of the surface of the specimen\par membranous surface is identified.  The back specimen is a black.\par Section reveals a variegated cut surface.  Representative\par sections are submitted as: 2A-2E.\par 3.The specimen is received in formalin, labeled with the\par patient's identification and "gallbladder biopsy N2."  It\par consists of one irregular fragment of pink-tan soft tissue\par measuring 2 x 1.5 x 0.8 cm.  The entire specimen is submitted in\par one cassette: 3A.\par Teri James MD 03/06/2021 17:38\par \par Disclaimer\par Histological Processing Performed at [facility],\[facility] Department of Pathology, [address] [place] [address].\par \par \par Surgical Consult Report\par \par \par \par \par Disclaimer\par Histological Processing Performed at [facility],\par Department of Pathology, [facility] [address] [place] [address]

## 2021-03-18 NOTE — ASSESSMENT
[FreeTextEntry1] : Mr. Ortiz is recovering well from his partial cholecystectomy.\par No N/V, no F/C. Tolerating a diet without diarrhea.\par JAMAL o/p < 10 ml/Day - removed today.\par \par I explained to Mr. Mckeon that based on intra-operative findings and path results, most likely this is a benign process.\par The narrowing of the hepatic duct was likely secondary to an extensive Mirizzi-type inflammatory process. However at this time, a cholangiocarcinoma has not be ruled out. If this was a cancer, it would likely be not resectable with radical intent, as it involved a right replaced artery, and stomach and duodenum are involved in the mass. \par The case was discussed at our tumor board, and the consensus was that if this was a cholangiocarcinoma it would be locally advanced.\par Nevertheless, we will repeat an MRCP in 1 month, to re-assess any evolution, and have a post-surgical baseline.\par \par The patient still has biliary stents in place. I have referred Mr. Ortiz to Dr. Brown, who will likely suggest progressive addition of multiple plastic stents, to facilitate remodeling of the scar tissue around the duct and prevent/treat a benign stricture.

## 2021-03-19 NOTE — PHYSICAL EXAM
[Normal Appearance] : normal appearance [Edema] : no peripheral edema [] : no respiratory distress [Exaggerated Use Of Accessory Muscles For Inspiration] : no accessory muscle use [Abdomen Soft] : soft [Abdomen Tenderness] : non-tender [Oriented To Time, Place, And Person] : oriented to person, place, and time [FreeTextEntry1] : Graff catheter draining clear yellow urine

## 2021-03-19 NOTE — ASSESSMENT
[FreeTextEntry1] : 65M PMH TIA (s/p Eliquis), Afib, CAD s/p CABG x3 on DAPT (7/6/20 with Dr. Conde), HTN, HLD, DM, acute cholecystitis s/p percutaneous cholecystostomy tube (Aug 2020 and removal on Sep 2020), recently admitted w/ new 1.5 cm hepatic duct lesion s/p ERCP/EUS with placement of hepatic duct stents x2, s/p diagnostic laparoscopy 1/28/21 with no metastatic findings, most recently s/p elective laparoscopic converted to open marsupialization/subtotal cholecystectomy 3/04/21, c/b urinary retention requiring discharge with Graff catheter 3/10/21. Patient passed TOV with 0cc on bladder scan.

## 2021-03-19 NOTE — HISTORY OF PRESENT ILLNESS
[FreeTextEntry1] : 65M PMH TIA (s/p Eliquis), Afib, CAD s/p CABG x3 on DAPT (7/6/20 with Dr. Conde), HTN, HLD, DM, acute cholecystitis s/p percutaneous cholecystostomy tube (Aug 2020 and removal on Sep 2020), recently admitted w/ new 1.5 cm hepatic duct lesion s/p ERCP/EUS with placement of hepatic duct stents x2, s/p diagnostic laparoscopy 1/28/21 with no metastatic findings, most recently s/p elective laparoscopic converted to open marsupialization/subtotal cholecystectomy 3/04/21, c/b urinary retention requiring discharge with Graff catheter 3/10/21. The patient was symptomatic while retaining 300-800cc on bladder scans. His prostate measured approximately 35cc on CT A/P 1/10/21. He denies any prior urologic problems or problems with urination. He states that his catheter has been draining clear yellow urine without problems, and was not discharged with Tamsulosin. He has been ambulatory with bowel function.

## 2021-04-29 ENCOUNTER — APPOINTMENT (OUTPATIENT)
Dept: GASTROENTEROLOGY | Facility: CLINIC | Age: 66
End: 2021-04-29

## 2021-05-06 ENCOUNTER — NON-APPOINTMENT (OUTPATIENT)
Age: 66
End: 2021-05-06

## 2021-06-14 RX ORDER — ALPRAZOLAM 0.5 MG/1
0.5 TABLET ORAL
Qty: 2 | Refills: 0 | Status: ACTIVE | COMMUNITY
Start: 2021-06-14 | End: 1900-01-01

## 2021-06-21 ENCOUNTER — APPOINTMENT (OUTPATIENT)
Dept: SURGICAL ONCOLOGY | Facility: CLINIC | Age: 66
End: 2021-06-21

## 2021-06-23 ENCOUNTER — APPOINTMENT (OUTPATIENT)
Dept: MRI IMAGING | Facility: HOSPITAL | Age: 66
End: 2021-06-23

## 2021-07-01 ENCOUNTER — APPOINTMENT (OUTPATIENT)
Dept: GASTROENTEROLOGY | Facility: CLINIC | Age: 66
End: 2021-07-01
Payer: COMMERCIAL

## 2021-07-01 ENCOUNTER — NON-APPOINTMENT (OUTPATIENT)
Age: 66
End: 2021-07-01

## 2021-07-01 VITALS
TEMPERATURE: 96.7 F | HEIGHT: 69 IN | BODY MASS INDEX: 28.29 KG/M2 | WEIGHT: 191 LBS | HEART RATE: 69 BPM | RESPIRATION RATE: 14 BRPM | SYSTOLIC BLOOD PRESSURE: 110 MMHG | OXYGEN SATURATION: 96 % | DIASTOLIC BLOOD PRESSURE: 60 MMHG

## 2021-07-01 DIAGNOSIS — K83.1 OBSTRUCTION OF BILE DUCT: ICD-10-CM

## 2021-07-01 PROCEDURE — 99072 ADDL SUPL MATRL&STAF TM PHE: CPT

## 2021-07-01 PROCEDURE — 99214 OFFICE O/P EST MOD 30 MIN: CPT

## 2021-07-01 NOTE — ASSESSMENT
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

## 2021-07-01 NOTE — HISTORY OF PRESENT ILLNESS
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

## 2021-07-09 ENCOUNTER — OUTPATIENT (OUTPATIENT)
Dept: OUTPATIENT SERVICES | Facility: HOSPITAL | Age: 66
LOS: 1 days | Discharge: HOME | End: 2021-07-09
Payer: COMMERCIAL

## 2021-07-09 ENCOUNTER — TRANSCRIPTION ENCOUNTER (OUTPATIENT)
Age: 66
End: 2021-07-09

## 2021-07-09 ENCOUNTER — APPOINTMENT (OUTPATIENT)
Dept: GASTROENTEROLOGY | Facility: HOSPITAL | Age: 66
End: 2021-07-09

## 2021-07-09 DIAGNOSIS — Z95.1 PRESENCE OF AORTOCORONARY BYPASS GRAFT: Chronic | ICD-10-CM

## 2021-07-09 DIAGNOSIS — Z41.9 ENCOUNTER FOR PROCEDURE FOR PURPOSES OTHER THAN REMEDYING HEALTH STATE, UNSPECIFIED: Chronic | ICD-10-CM

## 2021-07-09 LAB — GLUCOSE BLDC GLUCOMTR-MCNC: 142 MG/DL — HIGH (ref 70–99)

## 2021-07-09 PROCEDURE — 43275 ERCP REMOVE FORGN BODY DUCT: CPT

## 2021-07-09 PROCEDURE — 43264 ERCP REMOVE DUCT CALCULI: CPT | Mod: 59

## 2021-07-09 PROCEDURE — C1889: CPT

## 2021-07-09 PROCEDURE — 74328 X-RAY BILE DUCT ENDOSCOPY: CPT | Mod: 26

## 2021-07-09 PROCEDURE — C1769: CPT

## 2021-07-09 PROCEDURE — 43264 ERCP REMOVE DUCT CALCULI: CPT

## 2021-07-09 PROCEDURE — 82962 GLUCOSE BLOOD TEST: CPT

## 2021-07-13 DIAGNOSIS — Z79.02 LONG TERM (CURRENT) USE OF ANTITHROMBOTICS/ANTIPLATELETS: ICD-10-CM

## 2021-07-13 DIAGNOSIS — Z86.73 PERSONAL HISTORY OF TRANSIENT ISCHEMIC ATTACK (TIA), AND CEREBRAL INFARCTION WITHOUT RESIDUAL DEFICITS: ICD-10-CM

## 2021-07-13 DIAGNOSIS — E66.9 OBESITY, UNSPECIFIED: ICD-10-CM

## 2021-07-13 DIAGNOSIS — K83.1 OBSTRUCTION OF BILE DUCT: ICD-10-CM

## 2021-07-13 DIAGNOSIS — Z79.82 LONG TERM (CURRENT) USE OF ASPIRIN: ICD-10-CM

## 2021-07-13 DIAGNOSIS — E11.9 TYPE 2 DIABETES MELLITUS WITHOUT COMPLICATIONS: ICD-10-CM

## 2021-07-13 DIAGNOSIS — Z79.84 LONG TERM (CURRENT) USE OF ORAL HYPOGLYCEMIC DRUGS: ICD-10-CM

## 2021-09-16 ENCOUNTER — LABORATORY RESULT (OUTPATIENT)
Age: 66
End: 2021-09-16

## 2021-09-16 ENCOUNTER — APPOINTMENT (OUTPATIENT)
Dept: NEUROLOGY | Facility: CLINIC | Age: 66
End: 2021-09-16
Payer: COMMERCIAL

## 2021-09-16 VITALS
WEIGHT: 213 LBS | HEIGHT: 69 IN | BODY MASS INDEX: 31.55 KG/M2 | SYSTOLIC BLOOD PRESSURE: 144 MMHG | TEMPERATURE: 95.4 F | HEART RATE: 66 BPM | DIASTOLIC BLOOD PRESSURE: 70 MMHG | OXYGEN SATURATION: 97 %

## 2021-09-16 DIAGNOSIS — G56.23 LESION OF ULNAR NERVE, BILATERAL UPPER LIMBS: ICD-10-CM

## 2021-09-16 PROCEDURE — 99215 OFFICE O/P EST HI 40 MIN: CPT

## 2021-09-17 LAB
ERYTHROCYTE [SEDIMENTATION RATE] IN BLOOD BY WESTERGREN METHOD: 22 MM/HR
RHEUMATOID FACT SER QL: <10 IU/ML
TSH SERPL-ACNC: 5.81 UIU/ML

## 2021-10-19 ENCOUNTER — APPOINTMENT (OUTPATIENT)
Dept: ORTHOPEDIC SURGERY | Facility: CLINIC | Age: 66
End: 2021-10-19
Payer: COMMERCIAL

## 2021-10-19 VITALS — BODY MASS INDEX: 31.55 KG/M2 | RESPIRATION RATE: 16 BRPM | HEIGHT: 69 IN | WEIGHT: 213 LBS

## 2021-10-19 DIAGNOSIS — G56.22 LESION OF ULNAR NERVE, LEFT UPPER LIMB: ICD-10-CM

## 2021-10-19 DIAGNOSIS — G56.21 LESION OF ULNAR NERVE, RIGHT UPPER LIMB: ICD-10-CM

## 2021-10-19 PROCEDURE — 99204 OFFICE O/P NEW MOD 45 MIN: CPT

## 2021-10-19 PROCEDURE — 73070 X-RAY EXAM OF ELBOW: CPT | Mod: LT

## 2021-10-19 PROCEDURE — 73130 X-RAY EXAM OF HAND: CPT | Mod: LT

## 2021-11-09 ENCOUNTER — APPOINTMENT (OUTPATIENT)
Dept: ORTHOPEDIC SURGERY | Facility: AMBULATORY SURGERY CENTER | Age: 66
End: 2021-11-09

## 2021-11-16 LAB
ALBUMIN MFR SERPL ELPH: 54.5 %
ALBUMIN SERPL-MCNC: 4.1 G/DL
ALBUMIN/GLOB SERPL: 1.2 RATIO
ALPHA1 GLOB MFR SERPL ELPH: 3.3 %
ALPHA1 GLOB SERPL ELPH-MCNC: 0.2 G/DL
ALPHA2 GLOB MFR SERPL ELPH: 10 %
ALPHA2 GLOB SERPL ELPH-MCNC: 0.8 G/DL
ASIALO-GM1 ANTIBODIES, IGG/IGM: 6 IV
B-GLOBULIN MFR SERPL ELPH: 13.7 %
B-GLOBULIN SERPL ELPH-MCNC: 1 G/DL
GAMMA GLOB FLD ELPH-MCNC: 1.4 G/DL
GAMMA GLOB MFR SERPL ELPH: 18.5 %
GD1A ANTIBODIES, IGG/IGM: 6 IV
GD1B ANTIBODIES, IGG/IGM: 6 IV
GM1 ANTIBODIES, IGG/IGM: 6 IV
GM2 ANTIBODIES, IGG/IGM: 8 IV
GQ1B ANTIBODIES, IGG/IGM: 5 IV
INTERPRETATION SERPL IEP-IMP: NORMAL
MPO AB + PR3 PNL SER: NORMAL
PROT SERPL-MCNC: 7.5 G/DL
PROT SERPL-MCNC: 7.5 G/DL

## 2022-01-04 ENCOUNTER — APPOINTMENT (OUTPATIENT)
Dept: NEUROLOGY | Facility: CLINIC | Age: 67
End: 2022-01-04
Payer: COMMERCIAL

## 2022-01-04 DIAGNOSIS — G56.03 CARPAL TUNNEL SYNDROM,BILATERAL UPPER LIMBS: ICD-10-CM

## 2022-01-04 DIAGNOSIS — G62.89 OTHER SPECIFIED POLYNEUROPATHIES: ICD-10-CM

## 2022-01-04 DIAGNOSIS — R29.898 OTHER SYMPTOMS AND SIGNS INVOLVING THE MUSCULOSKELETAL SYSTEM: ICD-10-CM

## 2022-01-04 PROCEDURE — 99213 OFFICE O/P EST LOW 20 MIN: CPT | Mod: 95

## 2022-01-11 PROBLEM — G56.03 BILATERAL CARPAL TUNNEL SYNDROME: Status: ACTIVE | Noted: 2021-10-19

## 2022-01-11 PROBLEM — G62.89 AXONAL NEUROPATHY: Status: ACTIVE | Noted: 2021-09-16

## 2022-01-11 PROBLEM — R29.898 HAND WEAKNESS: Status: ACTIVE | Noted: 2020-09-15

## 2022-02-02 ENCOUNTER — NON-APPOINTMENT (OUTPATIENT)
Age: 67
End: 2022-02-02

## 2022-02-24 NOTE — CONSULT NOTE ADULT - SUBJECTIVE AND OBJECTIVE BOX
Operative Report: Cold knife conization     Patient Name: Sae Bruce  Date of Procedure: 02/24/22      INDICATIONS: history of CIN3, recently with HPV+ pap and nondiagnostic colposcopic biopsies/ECC    PRE-OP DIAGNOSIS:   1) HPV of cervix  2) Colposcopic biopsy and endocervical curettage nondiagnostic  3) History of high grade dysplasia (CIN3) of cervix       POST-OP DIAGNOSIS:   same    PROCEDURE(S):   Cold knife conization of cervix  Endocervical curettage    ANESTHESIA: Marguerite Burkett MD    ESTIMATED BLOOD LOSS: less than 50 mL       SPECIMENS:  cervical cone specimen, endocervical curettings           COMPLICATIONS:  none    FINDINGS: Normal external genitalia. Transformation zone not visible upon application of Lugol's iodine (ie TZ proximal to external os). DESCRIPTION OF PROCEDURE:   After the procedure was discussed with the patient including all risks, benefits, indications and alternatives informed consent was obtained and placed in the chart. The patient was transferred to the operating room where she was placed in the dorsolithotomy position. SCDs were placed. Anesthetic was administered by anesthesiologist. She was prepped and draped in the normal sterile fashion. Speculum was placed into the vagina and the cervix was well visualized. 1% lidocaine with epinephrine was used to inject the stroma of the cervix around the transformation zone. Lugol's iodine was applied. Stay sutures were placed at 3 o'clock and 9 o'clock with 2-0 vicryl. The cervix was grasped with a single-toothed tenaculum to help with decensus. An 11 blade scalpel was used to circumferentially excise the transformation zone of the cervix angling toward the endocervical canal. The specimen was amputated proximally with the scalpel then tagged with a suture at 12 o'clock and placed in formalin. The specimen was approximately 2 cm wide and 2 cm long.  Then the endocervical canal was curetted and this tissue was collected for pathology. The cone bed of the cervix was then dessicated with the ball tip of the Bovie for hemostasis, then Monsel's applied. There was still slow oozing so the cone bed was packed with a strip of Surgicel hemostatic mesh then the stay sutures were tied together across the cone bed to hold the Surgicel in place. This achieved good hemostasis. The speculum was then removed. There were no complications. Sponge, lap, needle, and instrument counts were correct. The patient was taken to the recovery room in stable condition.      Janine Robert MD  [facility] - OBGYN Surgeon: Dr. Elizondo (Covering for Dr. Conde)    Requesting Physician: Dr. Xavier    HISTORY OF PRESENT ILLNESS:  This is a 64 year old male with history of TA, HTN, HLD, NIDDM, Hypothyroidism, recently diagnosed with 3vCAD via cath and underwent CABG x 3 (LIMA to LAD, SVG to OM, SVG to PDA) with Dr. Conde on 7/6/20 with post-op EF 65%.  Post-operatively, patient developed new onset Atrial Fibrillation, converted to NSR after amiodarone boluses.  He was enrolled in the PACeS clinical trial and randomized into the NOAC group and ultimately started on ASA 325mg QD as well as Eliquis 5mg BID.  Additionally, patient with bilateral pleural effusions on POD 5 s/p bilateral pigtail catheter drainage which were removed that day and he was discharged home on POD 6. On 8/2/20, patient developed acute onset midepigastric supraumbilical pain radiating to his back with post-prandial vomiting that recurred several times overnight without hematemesis, hemoptysis, melena/hematochezia.  He contacted the outpatient call service for CT surgery and was instructed to report to [facility] ED.  In ED, VS unremarkable and labs notable for leukocytosis, WBC 14k and elevated  with Bili 1.9.  He underwent CT C/A/P which incidentally found evidence of choledocholithiasis and was admitted for further evaluation with plan for MRCP on 8/3.  Dr. Elizondo, CT Surgery, consulted given patient's recent cardiac surgical history.      PAST MEDICAL & SURGICAL HISTORY:  History of TIAs  Hypothyroidism  H/O cardiomyopathy  DM (diabetes mellitus)  HLD (hyperlipidemia)  HTN (hypertension)  No significant past surgical history    MEDICATIONS  (STANDING):  aMIOdarone    Tablet 200 milliGRAM(s) Oral daily  aspirin enteric coated 325 milliGRAM(s) Oral daily  atorvastatin 80 milliGRAM(s) Oral at bedtime  cefTRIAXone   IVPB 1000 milliGRAM(s) IV Intermittent every 24 hours  dextrose 5%. 1000 milliLiter(s) (50 mL/Hr) IV Continuous <Continuous>  dextrose 50% Injectable 12.5 Gram(s) IV Push once  dextrose 50% Injectable 25 Gram(s) IV Push once  heparin   Injectable 5000 Unit(s) SubCutaneous every 8 hours  insulin lispro (HumaLOG) corrective regimen sliding scale   SubCutaneous three times a day before meals  lactated ringers. 1000 milliLiter(s) (80 mL/Hr) IV Continuous <Continuous>  levothyroxine 175 MICROGram(s) Oral daily  metoprolol tartrate 100 milliGRAM(s) Oral two times a day  metroNIDAZOLE  IVPB 500 milliGRAM(s) IV Intermittent every 8 hours  pantoprazole    Tablet 40 milliGRAM(s) Oral before breakfast    MEDICATIONS  (PRN):  dextrose 40% Gel 15 Gram(s) Oral once PRN Blood Glucose LESS THAN 70 milliGRAM(s)/deciliter  glucagon  Injectable 1 milliGRAM(s) IntraMuscular once PRN Glucose LESS THAN 70 milligrams/deciliter  HYDROmorphone  Injectable 0.5 milliGRAM(s) IV Push every 4 hours PRN Moderate Pain (4 - 6)  HYDROmorphone  Injectable 1 milliGRAM(s) IV Push every 4 hours PRN Severe Pain (7 - 10)  ondansetron Injectable 4 milliGRAM(s) IV Push every 6 hours PRN Nausea    Allergies    No Known Allergies    Intolerances    SOCIAL HISTORY:  Smoker:  Never smoker   	ETOH use:  Hx of ETOH abuse, quit 2 years ago   	Ilicit Drug use:no  	Occupation:    	Assisted device use (Cane / Walker): none   Live with: wife in condo with elevator    FAMILY HISTORY:  FH: heart failure    Review of Systems:  CONSTITUTIONAL: Denies fevers / chills, sweats, fatigue, weight loss, weight gain                                       NEURO:  Denies paresthesia, seizures, syncope, confusion                                                                                  EYES:  Denies blurry vision, discharge, pain, loss of vision                                                                                    ENMT:  Denies difficulty hearing, vertigo, dysphagia, epistaxis, recent dental work                                       CV:  Denies chest pain, palpitations, LINO, orthopnea                                                                                           RESPIRATORY:  Denies Wheezing, SOB, cough / sputum, hemoptysis                                                               GI:  +Abdominal pain, nausea/vomiting; Denies diarrhea, constipation, melena                                                                          : Denies hematuria, dysuria, urgency, incontinence                                                                                          MUSCULOSKELETAL:  Denies arthritis, joint swelling, muscle weakness                                                             SKIN/BREAST:  Denies rash, itching, hair loss, masses                                                                                              PSYCH:  Denies depression, anxiety, suicidal ideation                                                                                                HEME/LYMPH:  Denies bruises easily, enlarged lymph nodes, tender lymph nodes                                          ENDOCRINE:  Denies cold intolerance, heat intolerance, polydipsia                                                                      Vital Signs Last 24 Hrs  T(C): 36.8 (03 Aug 2020 05:42), Max: 36.8 (03 Aug 2020 05:42)  T(F): 98.2 (03 Aug 2020 05:42), Max: 98.2 (03 Aug 2020 05:42)  HR: 68 (03 Aug 2020 04:22) (61 - 74)  BP: 136/72 (03 Aug 2020 04:22) (136/72 - 175/83)  BP(mean): 99 (03 Aug 2020 04:22) (99 - 99)  RR: 18 (03 Aug 2020 04:22) (18 - 18)  SpO2: 93% (03 Aug 2020 04:22) (93% - 100%)    Physical Exam:  CONSTITUTIONAL:  OOB to chair, no acute distress.   NEURO: AAOx3, no AMS or focal deficits.                       EYES: EOMI b/l, PEERLA b/l. Exhibits appropriate visual acuity.   ENMT: Hearing in tact.  MMM.  No palpable neck masses or hoarseness.   CV: RRR, S1/S2, no m/r/g.   RESPIRATORY:  No acute distress.  CTA b/l, no w/r/r.   GI: +Diffuse abdominal TTP; ND, NBS  : No palma.    MUSCULOSKELETAL: No obvious malformations.  Non-TTP.  Exhibits normal ROM in all extremities.   SKIN / BREAST:  No obvious lesions/abrasions noted.  Normal skin color and turgor.   INCISIONS: MSI healed, no click.                                                            LABS:                        11.9   14.64 )-----------( 257      ( 02 Aug 2020 21:27 )             37.9     08-02    132<L>  |  92<L>  |  20  ----------------------------<  200<H>  4.2   |  28  |  0.94    Ca    9.7      02 Aug 2020 21:27    TPro  8.0  /  Alb  4.1  /  TBili  1.2  /  DBili  x   /  AST  32  /  ALT  29  /  AlkPhos  132<H>  08-02    PT/INR - ( 02 Aug 2020 21:27 )   PT: 17.3 sec;   INR: 1.47          PTT - ( 02 Aug 2020 21:27 )  PTT:30.3 sec    RADIOLOGY & ADDITIONAL STUDIES:    < from: Xray Chest 1 View AP/PA (08.02.20 @ 22:00) >  EXAM:  XR CHEST AP OR PA 1V                          PROCEDURE DATE:  08/02/2020          INTERPRETATION:  EXAM: Chest x-ray    CLINICAL HISTORY: Shortness of breath    TECHNIQUE: AP view of the chest    COMPARISON STUDY: Prior study dated 7/31/2020    FINDINGS:  Cardiac silhouette appears prominent, similar to prior. Status post median sternotomy and cardiac surgery. Small granuloma in the left upper lobe is unchanged. Elevation of right hemidiaphragm, similar to prior. No focal consolidation or pleural effusion. Mild spinal dextroscoliosis. No acute bony abnormality.    IMPRESSION:  No focal consolidation.    < end of copied text >    CT Scan:  < from: CT Abdomen and Pelvis w/ Oral Cont and w/ IV Cont (08.03.20 @ 00:03) >  EXAM:  CT ABDOMEN AND PELVIS OC IC                          PROCEDURE DATE:  08/03/2020          INTERPRETATION:  CT of the ABDOMEN and PELVIS with intravenous contrast dated 8/3/2020 12:03 AM    INDICATION: Abdominal pain.    TECHNIQUE: CT of the abdomen and pelvis with intravenous and oral contrast. Axial, sagittal, and coronal images were obtained and reviewed.    COMPARISON: None.    FINDINGS:    Lower chest: Trace bilateral pleural effusions. Basilar atelectasis. Cardiomegaly. Severe coronary artery and mitral annulus calcifications. Trace pericardial effusion.    Liver: Smooth in contour. No focal mass. Portal and hepatic veins are patent. Periportal edema. Small perihepatic ascites.    Biliary system: Mildly distended gallbladder without evidence of cholelithiasis, wall thickening or pericholecystic fluid. The common bile duct is dilated to 9 mm. No intrahepatic biliary ductal dilatation.    Pancreas: Unremarkable.    Spleen: Unremarkable.    Adrenal glands: Small indeterminant left adrenal nodule. Unremarkable right adrenal gland.    Kidneys: Symmetric parenchymal enhancement. No renal mass. No hydronephrosis. No renal or ureteral stone. Small left renal cyst.  Urinary Bladder: Distended.    Reproductive organs: Enlarged prostate measuring 5 x 4 x 4 cm.    Bowel/Peritoneum: Normal caliber without evidence of obstruction. No appreciable wall thickening. Normal appendix. No extraluminal gas.    Lymph nodes: No lymphadenopathy.    Aorta/IVC: Normal caliber. Moderate atherosclerotic calcifications of the abdominal aorta and its branches.    Abdominal wall: Small fat-containing umbilical hernia.    Bones/Soft tissues: Levoscoliosis of the lumbar spine. Mild degenerative changes of the thoracic and lumbar spines.      IMPRESSION:  Findings suggestive of choledocholithiasis. Consider MRCP for further characterization.    < end of copied text >

## 2022-02-28 ENCOUNTER — APPOINTMENT (OUTPATIENT)
Dept: SURGICAL ONCOLOGY | Facility: CLINIC | Age: 67
End: 2022-02-28
Payer: COMMERCIAL

## 2022-02-28 VITALS
SYSTOLIC BLOOD PRESSURE: 167 MMHG | HEART RATE: 67 BPM | BODY MASS INDEX: 33.47 KG/M2 | WEIGHT: 226 LBS | OXYGEN SATURATION: 97 % | HEIGHT: 69 IN | DIASTOLIC BLOOD PRESSURE: 89 MMHG | TEMPERATURE: 97.2 F

## 2022-02-28 PROCEDURE — 99214 OFFICE O/P EST MOD 30 MIN: CPT

## 2022-02-28 NOTE — RESULTS/DATA
[FreeTextEntry1] : Diagnostic Studies\par \par Date: 7/9/21\par Study: EUS (Dr Brown)\par Results: \par Previous intervention in the major papilla (Biliary stent removed)\par \par Date: 6/22/21\par Study: MRI MRCP ([facility])\par Results: (1) Since the prior examination of 1/25/2021, the patient is status post cholecystectomy. There is a well defined small cyst within the gallbladder fossa. This may represent postsurgical changes such as a seroma.\par (2) No biliary tree dilatation. There is strong suggestion of presence of a common duct stent\par (3) Small left renal cyst. No follow up is needed for cyst\par (4) Status post sternotomy\par (5) Scoliosis

## 2022-02-28 NOTE — PHYSICAL EXAM
[Normal Neck Lymph Nodes] : normal neck lymph nodes  [Normal Supraclavicular Lymph Nodes] : normal supraclavicular lymph nodes [Normal] : oriented to person, place and time, with appropriate affect [de-identified] : anicteric [de-identified] : S1,S2, regular rate and rhythm. No murmurs heard. [de-identified] : Clear throughout. No wheezes heard. [de-identified] : surgical scars present [de-identified] : warm, dry

## 2022-02-28 NOTE — ASSESSMENT
[FreeTextEntry1] : Mr. Ortiz is doing well.\par He denies RUQ pain or jaundice. He has been gaining weight.\par At this point I think it is very unlikely that Mr. Ortiz has any underlying HPB malignancy. \par Nevertheless, I would like to obtain a CT pancreas to make sure nothing has changed since the last MRI in June 2021.\par In case of normal CT no further follow up will be necessary.

## 2022-02-28 NOTE — REVIEW OF SYSTEMS
[Negative] : Heme/Lymph [FreeTextEntry6] : sob [FreeTextEntry8] : as per HPI [de-identified] : neuropathy

## 2022-02-28 NOTE — HISTORY OF PRESENT ILLNESS
[de-identified] : Patient Name: LYDIA SHEN \par MRN: [id number] \par Shayna MRN: [id number] \par Referring Provider: none \par Date: 2/28/22\par \par Diagnosis: cholecystitis?\par Operative Date: (1) 1/28/21 (2) 3/4/21\par Procedure (1) Laparoscopy (2) Open cholecystectomy\par Pathology: negative for carcinoma\par \par He presents for a follow up of cholecystitis. \par He is just about one year s/p open cholecystectomy. \par \par PMH: PMH: TN, HLD, DM, TIA, Afib, CAD s/p CABG x3 on DAPT (7/2020) \par 8/4/2020 percutaneous cholecystostomy for gangrenous cholecystitis\par 9/2020 removal of c-tube\par [id number] CT AP: new mild intrahepatic biliary ductal dilatation in both lobes of the liver, with a 2.0 cm mass at the confluence of the hepatic ducts. Recurrent (gangrenous?) acute cholecystitis. 1.1 cm portacaval lymph node. 1.0 cm lymph node adjacent to common hepatic artery.\par [id number] MRCP: 1.5 cm long common hepatic duct narrowing. Increase in intrahepatic biliary dilatation. Abnormal soft tissue around the common hepatic duct. Abnormal gallbladder which shows distention and irregular wall thickening. No choledocholithiasis or cholelithiasis.\par [id number] AFP < 1.8; CEA 2.7; CA 19.9 < 2\par [id number] ERCP: protruding lesion in CBD just distal to the bifurcation (Bismuth I). Brushing negative for malignant cells. BL stent placement.\par [id number] EUS: 1.5cm lesion highly suspicious for cholangiocarcinoma in CBD distal to the bifurcation. Biopsies were not taken due to the risk of seeding.\par CT C: no mets\par IgG4: 102 (ul 96)\par OR with Reinaldo/Hon on Jan 28\par 384175 lap choley\par MRCP in April 4/20/2020 – rescheduled d/t SOB during scan\par [id number] MRCP small cyst within the GB fossa. + CBD stent present\par [id number] ERCP with Kevin, stent removed\par \par Currently, Mr. SHEN denies abdominal pain and discomfort but does have complaints of acid reflux and gas, denies having decreased appetite, and denies nausea or vomiting. He denies changes in bowel habits and denies recent unintentional weight loss. He denies fevers, chills, or night sweats.\par

## 2022-03-01 LAB
AFP-TM SERPL-MCNC: <1.8 NG/ML
ANION GAP SERPL CALC-SCNC: 13 MMOL/L
BUN SERPL-MCNC: 22 MG/DL
CALCIUM SERPL-MCNC: 9.7 MG/DL
CANCER AG19-9 SERPL-ACNC: <2 U/ML
CEA SERPL-MCNC: 3.6 NG/ML
CHLORIDE SERPL-SCNC: 105 MMOL/L
CO2 SERPL-SCNC: 22 MMOL/L
CREAT SERPL-MCNC: 1.07 MG/DL
EGFR: 77 ML/MIN/1.73M2
GLUCOSE SERPL-MCNC: 167 MG/DL
POTASSIUM SERPL-SCNC: 5.2 MMOL/L
SODIUM SERPL-SCNC: 141 MMOL/L

## 2022-03-11 ENCOUNTER — OUTPATIENT (OUTPATIENT)
Dept: OUTPATIENT SERVICES | Facility: HOSPITAL | Age: 67
LOS: 1 days | End: 2022-03-11
Payer: COMMERCIAL

## 2022-03-11 ENCOUNTER — APPOINTMENT (OUTPATIENT)
Dept: CT IMAGING | Facility: HOSPITAL | Age: 67
End: 2022-03-11
Payer: COMMERCIAL

## 2022-03-11 ENCOUNTER — RESULT REVIEW (OUTPATIENT)
Age: 67
End: 2022-03-11

## 2022-03-11 DIAGNOSIS — Z41.9 ENCOUNTER FOR PROCEDURE FOR PURPOSES OTHER THAN REMEDYING HEALTH STATE, UNSPECIFIED: Chronic | ICD-10-CM

## 2022-03-11 DIAGNOSIS — Z95.1 PRESENCE OF AORTOCORONARY BYPASS GRAFT: Chronic | ICD-10-CM

## 2022-03-11 PROCEDURE — 82565 ASSAY OF CREATININE: CPT

## 2022-03-11 PROCEDURE — 74177 CT ABD & PELVIS W/CONTRAST: CPT

## 2022-03-11 PROCEDURE — 74177 CT ABD & PELVIS W/CONTRAST: CPT | Mod: 26

## 2022-07-14 NOTE — BRIEF OPERATIVE NOTE - SPECIMENS
GB wall x 2 pieces, GB pus Staging Info: By selecting yes to the question above you will include information on AJCC 8 tumor staging in your Mohs note. Information on tumor staging will be automatically added for SCCs on the head and neck. AJCC 8 includes tumor size, tumor depth, perineural involvement and bone invasion.

## 2022-09-24 NOTE — PROGRESS NOTE ADULT - PROBLEM SELECTOR PLAN 2
Medically optimized  Intermediate risk patient  Cards is on board
Medically optimized  Intermediate risk patient  Cards is on board
Medically optimized  Intermediate risk patient  Cards is on board, appreciate recs re starting antiplatelet
Medically optimized  Intermediate risk patient  Cards is on board
No

## 2023-09-22 NOTE — ED ADULT TRIAGE NOTE - AS TEMP SITE
oral Cheiloplasty (Less Than 50%) Text: A decision was made to reconstruct the defect with a  cheiloplasty.  The defect was undermined extensively.  Additional orbicularis oris muscle was excised with a 15 blade scalpel.  The defect was converted into a full thickness wedge, of less than 50% of the vertical height of the lip, to facilite a better cosmetic result.  Small vessels were then tied off with 5-0 monocyrl. The orbicularis oris, superficial fascia, adipose and dermis were then reapproximated.  After the deeper layers were approximated the epidermis was reapproximated with particular care given to realign the vermilion border.

## 2024-01-11 ENCOUNTER — NON-APPOINTMENT (OUTPATIENT)
Age: 69
End: 2024-01-11

## 2024-03-07 NOTE — ED ADULT NURSE NOTE - NSSUHOSCREENINGYN_ED_ALL_ED
----- Message from Piper REINALDOJordan Pope sent at 3/7/2024  3:06 AM CST -----  Regarding: Appointment scheduled from Patient Portal  Contact: [phone]  Appointment For: Piperreinaldo Pope ([id number])  Visit Type:  OFFICE VISIT ([id number])    4/15/2024    1:15 PM  30 mins.  BELEN Fuller    [facility]    Patient Comments:  Numbness, itchiness in leg   Yes - the patient is able to be screened

## 2024-07-31 NOTE — DIETITIAN INITIAL EVALUATION ADULT. - WEIGHT (LBS)
Brief Postoperative Note    Arias Martin  YOB: 1938  [id number]    Pre-operative Diagnosis: Obstructive uropathy    Post-operative Diagnosis: Same    Procedure: Left nephrostomy tube placement    Anesthesia: Moderate Sedation    Surgeons/Assistants: Luiz    Estimated Blood Loss: less than 50     Complications: None    Specimens: Was Obtained: malodorous turbid urine    Findings: Grossly infected urine.  This catheter should be flushed every 6 hours until urine is clear.  Patient needs to be monitored closely for s/s of urosepsis.    I informed Dr. Frost.  The IR nurse discussed the above with the patient's nurse.     Electronically signed by Gordon Dee MD on 7/31/2024 at 4:11 PM    
191

## 2024-12-17 NOTE — H&P ADULT - DOES THIS PATIENT HAVE A HISTORY OF OR HAS BEEN DX WITH HEART FAILURE?
Heart Cath Limitations  -No lifting over 15 pounds for 7 days.  -Avoid driving for 48 hours  -No water submersion (bath, hot tub, swimming), but okay to shower after discharge.    Some bruising is common and should not be alarming.   Advised to return to emergency department ASAP if you notice bright red blood coming from catheter access site or if there is a firm mass/lump that is growing. Potential life threatening condition. Hold pressure on site immediately  Informed to contact the office if they notice new fever, excessive warmth, redness, swelling, or pus draining from site.    ------------------------------------------------------------------------------------------------------------------------------------------------------------------------------------------------------------------------------    Please take your Medications regularly and set up appointments to follow up with your Primary Care Physician, cardiologist & nephrologist soon within 1 week    If having any new, persistent or worsening symptoms including but not limited to chest pain, shorness of breath, loss of consciousness, palpitations , chest or abdomen pressure, left arm pain or pressure, severe headache, especially with new weakness/numbness or tingling in any part of body, any gait or balance issues, any fever/ chill, cough, sputum,  buring with urine , any bleeding or dark stools etc  ,please return to nearest facility for evaluation    Please go through Printed reading material and feel free to reach out in case of any queries, concerns or issues per contact provided      
no

## 2024-12-22 ENCOUNTER — EMERGENCY (EMERGENCY)
Facility: HOSPITAL | Age: 69
LOS: 1 days | Discharge: HOME | End: 2024-12-22
Attending: EMERGENCY MEDICINE | Admitting: EMERGENCY MEDICINE
Payer: MEDICARE

## 2024-12-22 VITALS
DIASTOLIC BLOOD PRESSURE: 78 MMHG | TEMPERATURE: 98 F | HEIGHT: 69 IN | SYSTOLIC BLOOD PRESSURE: 189 MMHG | WEIGHT: 220.02 LBS | HEART RATE: 70 BPM | RESPIRATION RATE: 19 BRPM | OXYGEN SATURATION: 98 %

## 2024-12-22 VITALS
OXYGEN SATURATION: 97 % | SYSTOLIC BLOOD PRESSURE: 179 MMHG | HEART RATE: 68 BPM | TEMPERATURE: 98 F | RESPIRATION RATE: 17 BRPM | DIASTOLIC BLOOD PRESSURE: 91 MMHG

## 2024-12-22 DIAGNOSIS — Z41.9 ENCOUNTER FOR PROCEDURE FOR PURPOSES OTHER THAN REMEDYING HEALTH STATE, UNSPECIFIED: Chronic | ICD-10-CM

## 2024-12-22 DIAGNOSIS — Z95.1 PRESENCE OF AORTOCORONARY BYPASS GRAFT: Chronic | ICD-10-CM

## 2024-12-22 LAB
ALBUMIN SERPL ELPH-MCNC: 4.5 G/DL — SIGNIFICANT CHANGE UP (ref 3.3–5)
ALP SERPL-CCNC: 65 U/L — SIGNIFICANT CHANGE UP (ref 40–120)
ALT FLD-CCNC: 30 U/L — SIGNIFICANT CHANGE UP (ref 10–45)
ANION GAP SERPL CALC-SCNC: 13 MMOL/L — SIGNIFICANT CHANGE UP (ref 5–17)
APPEARANCE UR: CLEAR — SIGNIFICANT CHANGE UP
AST SERPL-CCNC: 27 U/L — SIGNIFICANT CHANGE UP (ref 10–40)
BASOPHILS # BLD AUTO: 0.01 K/UL — SIGNIFICANT CHANGE UP (ref 0–0.2)
BASOPHILS NFR BLD AUTO: 0.1 % — SIGNIFICANT CHANGE UP (ref 0–2)
BILIRUB SERPL-MCNC: 1 MG/DL — SIGNIFICANT CHANGE UP (ref 0.2–1.2)
BILIRUB UR-MCNC: NEGATIVE — SIGNIFICANT CHANGE UP
BUN SERPL-MCNC: 28 MG/DL — HIGH (ref 7–23)
CALCIUM SERPL-MCNC: 9.2 MG/DL — SIGNIFICANT CHANGE UP (ref 8.4–10.5)
CHLORIDE SERPL-SCNC: 98 MMOL/L — SIGNIFICANT CHANGE UP (ref 96–108)
CO2 SERPL-SCNC: 23 MMOL/L — SIGNIFICANT CHANGE UP (ref 22–31)
COLOR SPEC: YELLOW — SIGNIFICANT CHANGE UP
CREAT SERPL-MCNC: 1.39 MG/DL — HIGH (ref 0.5–1.3)
DIFF PNL FLD: ABNORMAL
EGFR: 56 ML/MIN/1.73M2 — LOW
EOSINOPHIL # BLD AUTO: 0 K/UL — SIGNIFICANT CHANGE UP (ref 0–0.5)
EOSINOPHIL NFR BLD AUTO: 0 % — SIGNIFICANT CHANGE UP (ref 0–6)
GLUCOSE SERPL-MCNC: 253 MG/DL — HIGH (ref 70–99)
GLUCOSE UR QL: >=1000 MG/DL
HCT VFR BLD CALC: 36.1 % — LOW (ref 39–50)
HGB BLD-MCNC: 12.1 G/DL — LOW (ref 13–17)
IMM GRANULOCYTES NFR BLD AUTO: 0.6 % — SIGNIFICANT CHANGE UP (ref 0–0.9)
KETONES UR-MCNC: NEGATIVE MG/DL — SIGNIFICANT CHANGE UP
LEUKOCYTE ESTERASE UR-ACNC: NEGATIVE — SIGNIFICANT CHANGE UP
LIDOCAIN IGE QN: 34 U/L — SIGNIFICANT CHANGE UP (ref 7–60)
LYMPHOCYTES # BLD AUTO: 1.4 K/UL — SIGNIFICANT CHANGE UP (ref 1–3.3)
LYMPHOCYTES # BLD AUTO: 9.3 % — LOW (ref 13–44)
MCHC RBC-ENTMCNC: 30.8 PG — SIGNIFICANT CHANGE UP (ref 27–34)
MCHC RBC-ENTMCNC: 33.5 G/DL — SIGNIFICANT CHANGE UP (ref 32–36)
MCV RBC AUTO: 91.9 FL — SIGNIFICANT CHANGE UP (ref 80–100)
MONOCYTES # BLD AUTO: 0.48 K/UL — SIGNIFICANT CHANGE UP (ref 0–0.9)
MONOCYTES NFR BLD AUTO: 3.2 % — SIGNIFICANT CHANGE UP (ref 2–14)
NEUTROPHILS # BLD AUTO: 13.03 K/UL — HIGH (ref 1.8–7.4)
NEUTROPHILS NFR BLD AUTO: 86.8 % — HIGH (ref 43–77)
NITRITE UR-MCNC: NEGATIVE — SIGNIFICANT CHANGE UP
NRBC # BLD: 0 /100 WBCS — SIGNIFICANT CHANGE UP (ref 0–0)
PH UR: 5.5 — SIGNIFICANT CHANGE UP (ref 5–8)
PLATELET # BLD AUTO: 268 K/UL — SIGNIFICANT CHANGE UP (ref 150–400)
POTASSIUM SERPL-MCNC: 5.2 MMOL/L — SIGNIFICANT CHANGE UP (ref 3.5–5.3)
POTASSIUM SERPL-SCNC: 5.2 MMOL/L — SIGNIFICANT CHANGE UP (ref 3.5–5.3)
PROT SERPL-MCNC: 7.7 G/DL — SIGNIFICANT CHANGE UP (ref 6–8.3)
PROT UR-MCNC: >=1000 MG/DL
RBC # BLD: 3.93 M/UL — LOW (ref 4.2–5.8)
RBC # FLD: 11.9 % — SIGNIFICANT CHANGE UP (ref 10.3–14.5)
SODIUM SERPL-SCNC: 134 MMOL/L — LOW (ref 135–145)
SP GR SPEC: 1.02 — SIGNIFICANT CHANGE UP (ref 1–1.03)
UROBILINOGEN FLD QL: 0.2 MG/DL — SIGNIFICANT CHANGE UP (ref 0.2–1)
WBC # BLD: 15.01 K/UL — HIGH (ref 3.8–10.5)
WBC # FLD AUTO: 15.01 K/UL — HIGH (ref 3.8–10.5)

## 2024-12-22 PROCEDURE — 74177 CT ABD & PELVIS W/CONTRAST: CPT | Mod: 26,MC

## 2024-12-22 PROCEDURE — 99285 EMERGENCY DEPT VISIT HI MDM: CPT

## 2024-12-22 RX ORDER — METOPROLOL TARTRATE 100 MG/1
100 TABLET, FILM COATED ORAL ONCE
Refills: 0 | Status: COMPLETED | OUTPATIENT
Start: 2024-12-22 | End: 2024-12-22

## 2024-12-22 RX ORDER — FAMOTIDINE 20 MG/1
20 TABLET, FILM COATED ORAL ONCE
Refills: 0 | Status: COMPLETED | OUTPATIENT
Start: 2024-12-22 | End: 2024-12-22

## 2024-12-22 RX ORDER — IOHEXOL 300 MG/ML
30 INJECTION, SOLUTION INTRAVENOUS ONCE
Refills: 0 | Status: COMPLETED | OUTPATIENT
Start: 2024-12-22 | End: 2024-12-22

## 2024-12-22 RX ORDER — METRONIDAZOLE 500 MG/1
500 TABLET ORAL ONCE
Refills: 0 | Status: COMPLETED | OUTPATIENT
Start: 2024-12-22 | End: 2024-12-22

## 2024-12-22 RX ORDER — CEFTRIAXONE SODIUM 1 G
1000 VIAL (EA) INJECTION ONCE
Refills: 0 | Status: COMPLETED | OUTPATIENT
Start: 2024-12-22 | End: 2024-12-22

## 2024-12-22 RX ORDER — ONDANSETRON HYDROCHLORIDE 4 MG/1
4 TABLET, FILM COATED ORAL ONCE
Refills: 0 | Status: COMPLETED | OUTPATIENT
Start: 2024-12-22 | End: 2024-12-22

## 2024-12-22 RX ORDER — SODIUM CHLORIDE 9 MG/ML
1000 INJECTION, SOLUTION INTRAMUSCULAR; INTRAVENOUS; SUBCUTANEOUS ONCE
Refills: 0 | Status: COMPLETED | OUTPATIENT
Start: 2024-12-22 | End: 2024-12-22

## 2024-12-22 RX ADMIN — IOHEXOL 30 MILLILITER(S): 300 INJECTION, SOLUTION INTRAVENOUS at 20:08

## 2024-12-22 RX ADMIN — FAMOTIDINE 20 MILLIGRAM(S): 20 TABLET, FILM COATED ORAL at 20:07

## 2024-12-22 RX ADMIN — SODIUM CHLORIDE 1000 MILLILITER(S): 9 INJECTION, SOLUTION INTRAMUSCULAR; INTRAVENOUS; SUBCUTANEOUS at 21:00

## 2024-12-22 RX ADMIN — SODIUM CHLORIDE 1000 MILLILITER(S): 9 INJECTION, SOLUTION INTRAMUSCULAR; INTRAVENOUS; SUBCUTANEOUS at 20:08

## 2024-12-22 RX ADMIN — Medication 4 MILLIGRAM(S): at 22:35

## 2024-12-22 RX ADMIN — ONDANSETRON HYDROCHLORIDE 4 MILLIGRAM(S): 4 TABLET, FILM COATED ORAL at 20:08

## 2024-12-22 RX ADMIN — METOPROLOL TARTRATE 100 MILLIGRAM(S): 100 TABLET, FILM COATED ORAL at 22:19

## 2024-12-22 RX ADMIN — Medication 4 MILLIGRAM(S): at 22:12

## 2024-12-22 NOTE — ED PROVIDER NOTE - PROGRESS NOTE DETAILS
abd pain recurring. will give dose morphine. await ct results. bp also elevated on reassessment. didn't take his normal metoprolol 100mg daily at night today, will give dose. MK–CT read as negative patient with elevated white blood cell count has had 4 days of nausea with vomiting lower abdominal discomfort and diarrhea given persistent symptoms given Rocephin and Flagyl in ED will DC on Flagyl/Keflex  advised for follow-up with gastroenterology, in ED patient was retaining urine had full bladder with postvoid residual 230 cc patient refused Graff catheter is able to urinate would like to follow-up with urology return for any difficulty urinating

## 2024-12-22 NOTE — ED PROVIDER NOTE - IV ALTEPLASE DOOR HIDDEN
show Post-Care Instructions: I reviewed with the patient in detail post-care instructions. Patient is not to engage in any heavy lifting, exercise, or swimming for the next 14 days. Should the patient develop any fevers, chills, bleeding, severe pain patient will contact the office immediately.

## 2024-12-22 NOTE — ED PROVIDER NOTE - PSYCHIATRIC, MLM
Alert and oriented to person, place, time/situation. normal mood and affect. no apparent risk to self or others.
19-Mar-2021

## 2024-12-22 NOTE — ED PROVIDER NOTE - NSFOLLOWUPINSTRUCTIONS_ED_ALL_ED_FT
Please follow-up closely with gastroenterology.  Take antibiotics as prescribed.  Should you have any difficulty urinating  blood in urine or painful burning urination please seek emergent medical attention otherwise please follow-up with urology as you were having some mild urinary retention in the emergency department but chose not to receive a Graff catheter.  Should you have any worsening pain please return to the emergency department.      Please reach out to Shanelle Zaragoza ([facility] ED clinical referral coordinator) to assist you with your follow-up appointment.     Monday - Friday 11am-7pm  [phone]  [email or web address]      Viral Gastroenteritis, Adult  Body outline showing the digestive tract, including the stomach, small intestine, and large intestine.  Viral gastroenteritis is also known as the stomach flu. This condition may affect your stomach, your small intestine, and your large intestine. It can cause sudden watery poop (diarrhea), fever, and vomiting. This condition is caused by certain germs (viruses). These germs can be passed from person to person very easily (are contagious).    Having watery poop and vomiting can make you feel weak and cause you to not have enough water in your body (get dehydrated). This can make you tired and thirsty, make you have a dry mouth, and make it so you pee (urinate) less often. It is important to replace the fluids that you lose from having watery poop and vomiting.    What are the causes?  You can get sick by catching germs from other people.  You can also get sick by:  Eating food, drinking water, or touching a surface that has the germs on it (is contaminated).  Sharing utensils or other personal items with a person who is sick.  What increases the risk?  Having a weak body defense system (immune system).  Living with one or more children who are younger than 2 years.  Living in a nursing home.  Going on cruise ships.  What are the signs or symptoms?  Symptoms of this condition start suddenly. Symptoms may last for a few days or for as long as a week.  Common symptoms include:  Watery poop.  Vomiting.  Other symptoms include:  Fever.  Headache.  Feeling tired (fatigue).  Pain in the belly (abdomen).  Chills.  Feeling weak.  Feeling like you may vomit (nauseous).  Muscle aches.  Not feeling hungry.  How is this treated?  This condition typically goes away on its own. The focus of treatment is to replace the fluids that you lose. This condition may be treated with:  An ORS (oral rehydration solution). This is a drink that helps you replace fluids and minerals your body lost. It is sold at pharmacies and stores.  Medicines to help with your symptoms.  Probiotic supplements to reduce symptoms of watery poop.  Fluids given through an IV tube, if needed.  Older adults and people with other diseases or a weak body defense system are at higher risk for not having enough water in the body.    Follow these instructions at home:  Eating and drinking    Three cups showing dark yellow, yellow, and pale yellow pee.  Take an ORS as told by your doctor.  Drink clear fluids in small amounts as you are able. Clear fluids include:  Water.  Ice chips.  Fruit juice that has water added to it (is diluted).  Low-calorie sports drinks.  Drink enough fluid to keep your pee (urine) pale yellow.  Eat small amounts of healthy foods every 3–4 hours as you are able. This may include whole grains, fruits, vegetables, lean meats, and yogurt.  Avoid fluids that have a lot of sugar or caffeine in them. This includes energy drinks, sports drinks, and soda.  Avoid spicy or fatty foods.  Avoid alcohol.  General instructions    Washing hands with soap and water.  Wash your hands often. This is very important after you have watery poop or you vomit. If you cannot use soap and water, use hand .  Make sure that all people in your home wash their hands well and often.  Take over-the-counter and prescription medicines only as told by your doctor.  Rest at home while you get better.  Watch your condition for any changes.  Take a warm bath to help with any burning or pain from having watery poop.  Keep all follow-up visits.  Contact a doctor if:  You cannot keep fluids down.  Your symptoms get worse.  You have new symptoms.  You feel light-headed or dizzy.  You have muscle cramps.  Get help right away if:  You have chest pain.  You have trouble breathing, or you are breathing very fast.  You have a fast heartbeat.  You feel very weak or you faint.  You have a very bad headache, a stiff neck, or both.  You have a rash.  You have very bad pain, cramping, or bloating in your belly.  Your skin feels cold and clammy.  You feel mixed up (confused).  You have pain when you pee.  You have signs of not having enough water in the body, such as:  Dark pee, hardly any pee, or no pee.  Cracked lips.  Dry mouth.  Sunken eyes.  Feeling very sleepy.  Feeling weak.  You have signs of bleeding, such as:  You see blood in your vomit.  Your vomit looks like coffee grounds.  You have bloody or black poop or poop that looks like tar.  These symptoms may be an emergency. Get help right away. Call 911.  Do not wait to see if the symptoms will go away.  Do not drive yourself to the hospital.  Summary  Viral gastroenteritis is also known as the stomach flu.  This condition can cause sudden watery poop (diarrhea), fever, and vomiting.  These germs can be passed from person to person very easily.  Take an ORS (oral rehydration solution) as told by your doctor. This is a drink that is sold at pharmacies and stores.  Wash your hands often, especially after having watery poop or vomiting. If you cannot use soap and water, use hand .  This information is not intended to replace advice given to you by your health care provider. Make sure you discuss any questions you have with your health care provider.

## 2024-12-22 NOTE — ED ADULT NURSE REASSESSMENT NOTE - NS ED NURSE REASSESS COMMENT FT1
Pts /95, denies any chest pain, sob, palpitations or dizziness, asymptomatic. Pt given night dose of toprol as ordered, will reasses.

## 2024-12-22 NOTE — ED PROVIDER NOTE - OBJECTIVE STATEMENT
history of htn, dm, high cholesterol, cholecystectomy, here with abdominal pain/ vomiting. Started about 4 days ago. Went to urgent care and prescribed muscle relaxer. Pain mid abdomen/ lower. No fever/chills. Had some mild diarrhea.

## 2024-12-22 NOTE — ED ADULT NURSE REASSESSMENT NOTE - NS ED NURSE REASSESS COMMENT FT1
Pt received in no acute distress, a&ox3, breathing with ease on room air, 20g heplock to RAC, intact, no edema or redness noted, NS infusing as ordered. Pt drinking contrast, pending CT scan. Pt otherwise comfortable and assisted with all needs. Denies any other c/o. Will monitor.

## 2024-12-22 NOTE — ED PROVIDER NOTE - CARE PROVIDER_API CALL
no
Francis Esteban  Gastroenterology  [address]  [place] [address]  Phone: [phone]  Fax: [phone]  Follow Up Time:

## 2024-12-22 NOTE — ED PROVIDER NOTE - PATIENT PORTAL LINK FT
You can access the FollowMyHealth Patient Portal offered by [facility] by registering at the following website: [email or web address]. By joining [employer]’s FollowMyHealth portal, you will also be able to view your health information using other applications (apps) compatible with our system.

## 2024-12-22 NOTE — ED ADULT NURSE NOTE - OBJECTIVE STATEMENT
Alert and orientedx4, presenting to the ed ambulatory with steady gait, c/o b/l lower abdominal pain, nausea, without fever or diarrhea. Patient denies urinary complaints, states he hasn't bee able to eat due to the pain.

## 2024-12-22 NOTE — ED PROVIDER NOTE - CLINICAL SUMMARY MEDICAL DECISION MAKING FREE TEXT BOX
a few days mid/lower abd pain with n/v. concern for appendicitis/ colitis/ diverticulitis/ uti/ sbo. prior cholecystectomy  labs/ct ordered  zofran/ pepcid/ ivf given initially. morphine for continued pain  bp elevated, didn't take normal metoprolol dose today, given in ED a few days mid/lower abd pain with n/v. concern for appendicitis/ colitis/ diverticulitis/ uti/ sbo. prior cholecystectomy  labs/ct ordered  zofran/ pepcid/ ivf given initially. morphine for continued pain  bp elevated, didn't take normal metoprolol dose today, given in ED  signed out to Dr. Han/ JUSTO Ferrari pending ct results/ reassessment a few days mid/lower abd pain with n/v. concern for appendicitis/ colitis/ diverticulitis/ uti/ sbo. prior cholecystectomy  labs/ct ordered  zofran/ pepcid/ ivf given initially. morphine for continued pain  bp elevated, didn't take normal metoprolol dose today, given in ED  signed out to Dr. Winston/JUSTO Ferrari pending ct results/ reassessment

## 2024-12-22 NOTE — ED ADULT TRIAGE NOTE - CHIEF COMPLAINT QUOTE
c/o suprapubic abdominal pain x1 week unrelieved by medications prescribed to him, unsure of what kind. denies urinary complaints.

## 2024-12-22 NOTE — ED PROVIDER NOTE - NSFOLLOWUPCLINICS_GEN_ALL_ED_FT
[facility] - Urology Clinic  Urology  [address]  [place], [place] [address]  Phone: [phone]  Fax:

## 2024-12-23 PROCEDURE — 96374 THER/PROPH/DIAG INJ IV PUSH: CPT

## 2024-12-23 PROCEDURE — 36415 COLL VENOUS BLD VENIPUNCTURE: CPT

## 2024-12-23 PROCEDURE — 83690 ASSAY OF LIPASE: CPT

## 2024-12-23 PROCEDURE — 85025 COMPLETE CBC W/AUTO DIFF WBC: CPT

## 2024-12-23 PROCEDURE — 96375 TX/PRO/DX INJ NEW DRUG ADDON: CPT

## 2024-12-23 PROCEDURE — 74177 CT ABD & PELVIS W/CONTRAST: CPT | Mod: MC

## 2024-12-23 PROCEDURE — 81001 URINALYSIS AUTO W/SCOPE: CPT

## 2024-12-23 PROCEDURE — 99284 EMERGENCY DEPT VISIT MOD MDM: CPT | Mod: 25

## 2024-12-23 PROCEDURE — 96361 HYDRATE IV INFUSION ADD-ON: CPT

## 2024-12-23 PROCEDURE — 80053 COMPREHEN METABOLIC PANEL: CPT

## 2024-12-23 RX ORDER — TAMSULOSIN HYDROCHLORIDE 0.4 MG/1
1 CAPSULE ORAL
Qty: 7 | Refills: 0
Start: 2024-12-23 | End: 2024-12-29

## 2024-12-23 RX ORDER — CEPHALEXIN 500 MG
1 CAPSULE ORAL
Qty: 14 | Refills: 0
Start: 2024-12-23 | End: 2024-12-29

## 2024-12-23 RX ORDER — METRONIDAZOLE 500 MG/1
1 TABLET ORAL
Qty: 21 | Refills: 0
Start: 2024-12-23 | End: 2024-12-29

## 2024-12-23 RX ADMIN — Medication 100 MILLIGRAM(S): at 00:13

## 2024-12-23 RX ADMIN — METRONIDAZOLE 100 MILLIGRAM(S): 500 TABLET ORAL at 00:35

## 2024-12-24 ENCOUNTER — EMERGENCY (EMERGENCY)
Facility: HOSPITAL | Age: 69
LOS: 1 days | Discharge: HOME | End: 2024-12-24
Attending: EMERGENCY MEDICINE | Admitting: EMERGENCY MEDICINE
Payer: MEDICARE

## 2024-12-24 VITALS
WEIGHT: 225.97 LBS | DIASTOLIC BLOOD PRESSURE: 73 MMHG | OXYGEN SATURATION: 100 % | HEART RATE: 80 BPM | TEMPERATURE: 98 F | RESPIRATION RATE: 18 BRPM | HEIGHT: 69 IN | SYSTOLIC BLOOD PRESSURE: 169 MMHG

## 2024-12-24 VITALS
SYSTOLIC BLOOD PRESSURE: 162 MMHG | HEART RATE: 83 BPM | TEMPERATURE: 98 F | OXYGEN SATURATION: 98 % | RESPIRATION RATE: 16 BRPM | DIASTOLIC BLOOD PRESSURE: 73 MMHG

## 2024-12-24 DIAGNOSIS — K52.9 NONINFECTIVE GASTROENTERITIS AND COLITIS, UNSPECIFIED: ICD-10-CM

## 2024-12-24 DIAGNOSIS — R19.7 DIARRHEA, UNSPECIFIED: ICD-10-CM

## 2024-12-24 DIAGNOSIS — I10 ESSENTIAL (PRIMARY) HYPERTENSION: ICD-10-CM

## 2024-12-24 DIAGNOSIS — R10.30 LOWER ABDOMINAL PAIN, UNSPECIFIED: ICD-10-CM

## 2024-12-24 DIAGNOSIS — E11.9 TYPE 2 DIABETES MELLITUS WITHOUT COMPLICATIONS: ICD-10-CM

## 2024-12-24 DIAGNOSIS — Z90.49 ACQUIRED ABSENCE OF OTHER SPECIFIED PARTS OF DIGESTIVE TRACT: ICD-10-CM

## 2024-12-24 DIAGNOSIS — E78.00 PURE HYPERCHOLESTEROLEMIA, UNSPECIFIED: ICD-10-CM

## 2024-12-24 DIAGNOSIS — Z95.1 PRESENCE OF AORTOCORONARY BYPASS GRAFT: Chronic | ICD-10-CM

## 2024-12-24 DIAGNOSIS — R10.9 UNSPECIFIED ABDOMINAL PAIN: ICD-10-CM

## 2024-12-24 DIAGNOSIS — R33.9 RETENTION OF URINE, UNSPECIFIED: ICD-10-CM

## 2024-12-24 DIAGNOSIS — Z41.9 ENCOUNTER FOR PROCEDURE FOR PURPOSES OTHER THAN REMEDYING HEALTH STATE, UNSPECIFIED: Chronic | ICD-10-CM

## 2024-12-24 DIAGNOSIS — R11.2 NAUSEA WITH VOMITING, UNSPECIFIED: ICD-10-CM

## 2024-12-24 DIAGNOSIS — Z95.1 PRESENCE OF AORTOCORONARY BYPASS GRAFT: ICD-10-CM

## 2024-12-24 DIAGNOSIS — Z79.84 LONG TERM (CURRENT) USE OF ORAL HYPOGLYCEMIC DRUGS: ICD-10-CM

## 2024-12-24 DIAGNOSIS — R11.10 VOMITING, UNSPECIFIED: ICD-10-CM

## 2024-12-24 DIAGNOSIS — M54.50 LOW BACK PAIN, UNSPECIFIED: ICD-10-CM

## 2024-12-24 DIAGNOSIS — Z79.82 LONG TERM (CURRENT) USE OF ASPIRIN: ICD-10-CM

## 2024-12-24 DIAGNOSIS — E87.1 HYPO-OSMOLALITY AND HYPONATREMIA: ICD-10-CM

## 2024-12-24 LAB
ALBUMIN SERPL ELPH-MCNC: 3.9 G/DL — SIGNIFICANT CHANGE UP (ref 3.3–5)
ALP SERPL-CCNC: 62 U/L — SIGNIFICANT CHANGE UP (ref 40–120)
ALT FLD-CCNC: 31 U/L — SIGNIFICANT CHANGE UP (ref 10–45)
ANION GAP SERPL CALC-SCNC: 11 MMOL/L — SIGNIFICANT CHANGE UP (ref 5–17)
ANION GAP SERPL CALC-SCNC: 8 MMOL/L — SIGNIFICANT CHANGE UP (ref 5–17)
AST SERPL-CCNC: 24 U/L — SIGNIFICANT CHANGE UP (ref 10–40)
BILIRUB DIRECT SERPL-MCNC: 0.2 MG/DL — SIGNIFICANT CHANGE UP (ref 0–0.3)
BILIRUB INDIRECT FLD-MCNC: 0.9 MG/DL — SIGNIFICANT CHANGE UP (ref 0.2–1)
BILIRUB SERPL-MCNC: 1.1 MG/DL — SIGNIFICANT CHANGE UP (ref 0.2–1.2)
BUN SERPL-MCNC: 24 MG/DL — HIGH (ref 7–23)
BUN SERPL-MCNC: 26 MG/DL — HIGH (ref 7–23)
CALCIUM SERPL-MCNC: 8.6 MG/DL — SIGNIFICANT CHANGE UP (ref 8.4–10.5)
CALCIUM SERPL-MCNC: 8.7 MG/DL — SIGNIFICANT CHANGE UP (ref 8.4–10.5)
CHLORIDE SERPL-SCNC: 96 MMOL/L — SIGNIFICANT CHANGE UP (ref 96–108)
CHLORIDE SERPL-SCNC: 97 MMOL/L — SIGNIFICANT CHANGE UP (ref 96–108)
CO2 SERPL-SCNC: 23 MMOL/L — SIGNIFICANT CHANGE UP (ref 22–31)
CO2 SERPL-SCNC: 23 MMOL/L — SIGNIFICANT CHANGE UP (ref 22–31)
CREAT SERPL-MCNC: 1.33 MG/DL — HIGH (ref 0.5–1.3)
CREAT SERPL-MCNC: 1.38 MG/DL — HIGH (ref 0.5–1.3)
EGFR: 56 ML/MIN/1.73M2 — LOW
EGFR: 59 ML/MIN/1.73M2 — LOW
GLUCOSE SERPL-MCNC: 229 MG/DL — HIGH (ref 70–99)
GLUCOSE SERPL-MCNC: 263 MG/DL — HIGH (ref 70–99)
HCT VFR BLD CALC: 36 % — LOW (ref 39–50)
HGB BLD-MCNC: 12 G/DL — LOW (ref 13–17)
LIDOCAIN IGE QN: 39 U/L — SIGNIFICANT CHANGE UP (ref 7–60)
MCHC RBC-ENTMCNC: 30.5 PG — SIGNIFICANT CHANGE UP (ref 27–34)
MCHC RBC-ENTMCNC: 33.3 G/DL — SIGNIFICANT CHANGE UP (ref 32–36)
MCV RBC AUTO: 91.6 FL — SIGNIFICANT CHANGE UP (ref 80–100)
NRBC # BLD: 0 /100 WBCS — SIGNIFICANT CHANGE UP (ref 0–0)
PLATELET # BLD AUTO: 256 K/UL — SIGNIFICANT CHANGE UP (ref 150–400)
POTASSIUM SERPL-MCNC: 4.5 MMOL/L — SIGNIFICANT CHANGE UP (ref 3.5–5.3)
POTASSIUM SERPL-MCNC: 4.9 MMOL/L — SIGNIFICANT CHANGE UP (ref 3.5–5.3)
POTASSIUM SERPL-SCNC: 4.5 MMOL/L — SIGNIFICANT CHANGE UP (ref 3.5–5.3)
POTASSIUM SERPL-SCNC: 4.9 MMOL/L — SIGNIFICANT CHANGE UP (ref 3.5–5.3)
PROT SERPL-MCNC: 7.3 G/DL — SIGNIFICANT CHANGE UP (ref 6–8.3)
RBC # BLD: 3.93 M/UL — LOW (ref 4.2–5.8)
RBC # FLD: 11.7 % — SIGNIFICANT CHANGE UP (ref 10.3–14.5)
SODIUM SERPL-SCNC: 127 MMOL/L — LOW (ref 135–145)
SODIUM SERPL-SCNC: 131 MMOL/L — LOW (ref 135–145)
WBC # BLD: 11.5 K/UL — HIGH (ref 3.8–10.5)
WBC # FLD AUTO: 11.5 K/UL — HIGH (ref 3.8–10.5)

## 2024-12-24 PROCEDURE — 99284 EMERGENCY DEPT VISIT MOD MDM: CPT | Mod: 25

## 2024-12-24 PROCEDURE — 85027 COMPLETE CBC AUTOMATED: CPT

## 2024-12-24 PROCEDURE — 74174 CTA ABD&PLVS W/CONTRAST: CPT | Mod: 26,MC

## 2024-12-24 PROCEDURE — 99285 EMERGENCY DEPT VISIT HI MDM: CPT

## 2024-12-24 PROCEDURE — 80048 BASIC METABOLIC PNL TOTAL CA: CPT

## 2024-12-24 PROCEDURE — 74174 CTA ABD&PLVS W/CONTRAST: CPT | Mod: MC

## 2024-12-24 PROCEDURE — 96375 TX/PRO/DX INJ NEW DRUG ADDON: CPT | Mod: XU

## 2024-12-24 PROCEDURE — 71275 CT ANGIOGRAPHY CHEST: CPT | Mod: 26,MC

## 2024-12-24 PROCEDURE — 71275 CT ANGIOGRAPHY CHEST: CPT | Mod: MC

## 2024-12-24 PROCEDURE — 96374 THER/PROPH/DIAG INJ IV PUSH: CPT | Mod: XU

## 2024-12-24 PROCEDURE — 36415 COLL VENOUS BLD VENIPUNCTURE: CPT

## 2024-12-24 PROCEDURE — 83690 ASSAY OF LIPASE: CPT

## 2024-12-24 PROCEDURE — 80076 HEPATIC FUNCTION PANEL: CPT

## 2024-12-24 RX ORDER — ACETAMINOPHEN 500MG 500 MG/1
1000 TABLET, COATED ORAL ONCE
Refills: 0 | Status: COMPLETED | OUTPATIENT
Start: 2024-12-24 | End: 2024-12-24

## 2024-12-24 RX ORDER — IBUPROFEN 200 MG
1 TABLET ORAL
Qty: 30 | Refills: 0
Start: 2024-12-24

## 2024-12-24 RX ORDER — ONDANSETRON HYDROCHLORIDE 4 MG/1
1 TABLET, FILM COATED ORAL
Qty: 20 | Refills: 0
Start: 2024-12-24

## 2024-12-24 RX ORDER — SODIUM CHLORIDE 9 MG/ML
1000 INJECTION, SOLUTION INTRAMUSCULAR; INTRAVENOUS; SUBCUTANEOUS ONCE
Refills: 0 | Status: COMPLETED | OUTPATIENT
Start: 2024-12-24 | End: 2024-12-24

## 2024-12-24 RX ORDER — ONDANSETRON HYDROCHLORIDE 4 MG/1
4 TABLET, FILM COATED ORAL ONCE
Refills: 0 | Status: COMPLETED | OUTPATIENT
Start: 2024-12-24 | End: 2024-12-24

## 2024-12-24 RX ADMIN — ONDANSETRON HYDROCHLORIDE 4 MILLIGRAM(S): 4 TABLET, FILM COATED ORAL at 09:11

## 2024-12-24 RX ADMIN — ACETAMINOPHEN 500MG 400 MILLIGRAM(S): 500 TABLET, COATED ORAL at 09:11

## 2024-12-24 RX ADMIN — SODIUM CHLORIDE 1000 MILLILITER(S): 9 INJECTION, SOLUTION INTRAMUSCULAR; INTRAVENOUS; SUBCUTANEOUS at 09:10

## 2024-12-24 RX ADMIN — Medication 4 MILLIGRAM(S): at 09:37

## 2024-12-24 RX ADMIN — ACETAMINOPHEN 500MG 1000 MILLIGRAM(S): 500 TABLET, COATED ORAL at 09:41

## 2024-12-24 NOTE — ED PROVIDER NOTE - PROGRESS NOTE DETAILS
Refill approved as requested. Labs w improved wbc, na slightly low (corrects to 130), cr similar to prior; rpt bmp sent after ivf and pending.  CTA neg for dissection, abd pathology.  Pt tolerating po's in ed.  If chem wnl, plan dc and will add pain/n meds to regimen w plan to fu w pmd.

## 2024-12-24 NOTE — ED ADULT NURSE NOTE - OBJECTIVE STATEMENT
Pt with DM presents to ED c/o abdominal pain radiating to lower back associated with n/v/d. Reports 10 non bloody emesis episodes and 7 non bloody episodes of diarrhea today. Reports chronic SOB. Denies cp, f/c, ha, dizziness, weakness/numbness/tingling. Reports being here a few days ago with similar symptoms. Requesting pain meds.

## 2024-12-24 NOTE — ED PROVIDER NOTE - CARE PLAN
1 Principal Discharge DX:	Abdominal pain  Secondary Diagnosis:	Back pain  Secondary Diagnosis:	Nausea, vomiting, and diarrhea

## 2024-12-24 NOTE — ED PROVIDER NOTE - PHYSICAL EXAMINATION
VITAL SIGNS: I have reviewed nursing notes and confirm.  CONSTITUTIONAL:  in no acute distress.   SKIN:  warm and dry, no acute rash.   HEAD:  normocephalic, atraumatic.  EYES: PERRL, EOM intact; conjunctiva and sclera clear.  ENT: No nasal discharge; airway clear.   NECK: Supple; non tender.  CARD: S1, S2 normal; no murmurs, gallops, or rubs. Regular rate and rhythm.   RESP:  Clear to auscultation b/l, no wheezes, rales or rhonchi.  ABD: Normal bowel sounds; soft; non-distended; midline abd ttp; no guarding/ rebound. no pulsatile mass  MSK: Normal ROM. No clubbing, cyanosis or edema. no ttp bilat le, mild lumbar and L paralumbar ttp  NEURO: Alert, oriented, grossly unremarkable  PSYCH: Cooperative, mood and affect appropriate.

## 2024-12-24 NOTE — ED PROVIDER NOTE - CLINICAL SUMMARY MEDICAL DECISION MAKING FREE TEXT BOX
Pt c/o recurrent abd pain, n/v/d but also c/o back pain radiating to abd w/o h/o back issues, + midline ttp in abd.  Seen 12/22 for similar but reports back pain worse; labs w wbc 15, cr 1.39, neg ua, ct w/o acute process.  Plan rpt labs, pain/n meds, ivf, cta for dissection to eval for this as well as any other new intra-abd pathology.  reassess. See progress notes for further mdm related documentation.

## 2024-12-24 NOTE — ED PROVIDER NOTE - NSFOLLOWUPINSTRUCTIONS_ED_ALL_ED_FT
Abdominal and back pain, vomiting and diarrhea    Take Zofran (ondasetron) - 1 tab on tongue every 6 hours as needed for nausea.     Take ibuprofen 1 tab every 6 hours with food as needed for pain.  Add tylenol for additional pain relief as needed - use as directed.     Finish the medication prescribed to you previously.     Return for increased pain, vomiting or diarrhea, fever, any other concerns.     Start with clear liquids (water, juice, soda, jello, soup).  Advance to bananas, rice, apple sauce, toast (BRAT) if tolerating clears.  Advance to full diet once tolerating BRAT diet.     Abdominal Pain    Many things can cause abdominal pain. Many times, abdominal pain is not caused by a disease and will improve without treatment. Your health care provider will do a physical exam to determine if there is a dangerous cause of your pain; blood tests and imaging may help determine the cause of your pain. However, in many cases, no cause may be found and you may need further testing as an outpatient. Monitor your abdominal pain for any changes.     SEEK IMMEDIATE MEDICAL CARE IF YOU HAVE ANY OF THE FOLLOWING SYMPTOMS: worsening abdominal pain, uncontrollable vomiting, profuse diarrhea, inability to have bowel movements or pass gas, black or bloody stools, fever accompanying chest pain or back pain, or fainting. These symptoms may represent a serious problem that is an emergency. Do not wait to see if the symptoms will go away. Get medical help right away. Call 911 and do not drive yourself to the hospital.     Back Pain    Back pain is very common in adults. The cause of back pain is rarely dangerous and the pain often gets better over time. The cause of your back pain may not be known and may include strain of muscles or ligaments, degeneration of the spinal disks, or arthritis. Occasionally the pain may radiate down your leg(s). Over-the-counter medicines to reduce pain and inflammation are often the most helpful. Stretching and remaining active frequently helps the healing process.     SEEK IMMEDIATE MEDICAL CARE IF YOU HAVE ANY OF THE FOLLOWING SYMPTOMS: bowel or bladder control problems, unusual weakness or numbness in your arms or legs, nausea or vomiting, abdominal pain, fever, dizziness/lightheadedness.    Nausea / Vomiting    Nausea is the feeling that you have to vomit. As nausea gets worse, it can lead to vomiting. Vomiting puts you at an increased risk for dehydration. Older adults and people with other diseases or a weak immune system are at higher risk for dehydration. Drink clear fluids in small but frequent amounts as tolerated. Eat bland, easy-to-digest foods in small amounts as tolerated.    SEEK IMMEDIATE MEDICAL CARE IF YOU HAVE ANY OF THE FOLLOWING SYMPTOMS: fever, inability to keep sufficient fluids down, black or bloody vomitus, black or bloody stools, lightheadedness/dizziness, chest pain, severe headache, rash, shortness of breath, cold or clammy skin, confusion, pain with urination, or any signs of dehydration.    Diarrhea    Diarrhea is frequent loose or watery bowel movements that has many causes. Diarrhea can make you feel weak and cause you to become dehydrated. Diarrhea typically lasts 2–3 days, but can last longer if it is a sign of something more serious. Drink clear fluids to prevent dehydration. Eat bland, easy-to-digest foods as tolerated.     SEEK IMMEDIATE MEDICAL CARE IF YOU HAVE ANY OF THE FOLLOWING SYMPTOMS: high fevers, lightheadedness/dizziness, chest pain, black or bloody stools, shortness of breath, severe abdominal or back pain, or any signs of dehydration.

## 2024-12-24 NOTE — ED PROVIDER NOTE - OBJECTIVE STATEMENT
70 yo M h/o htn, dm, high cholesterol, cholecystectomy, seen 12/22 for abdominal pain/ vomiting found to have wbc 15 o/w nl labs and ct w/o acute pathology, dc'd w abx returns c/o recurrent 8-10/10 pain in lower back radiating to abd w n/v/d starting last night.  No fever.  No dysuria.  No change w meds.

## 2024-12-24 NOTE — ED PROVIDER NOTE - PATIENT PORTAL LINK FT
You can access the FollowMyHealth Patient Portal offered by [facility] by registering at the following website: [email or web address]. By joining [employer]’s FollowMyHealth portal, you will also be able to view your health information using other applications (apps) compatible with our system.

## 2024-12-24 NOTE — ED PROVIDER NOTE - IV ALTEPLASE DOOR HIDDEN
Last Seen ***  RTC ***  Cancel ***  No-Show ***    Next Appt ***    Incoming Refill From *** via ***    Medication Requested ***    Directions ***    Qty ***    Last Refill ***      Medication Refill Approved Per Refill Protocol         
34
show

## 2025-02-14 NOTE — PRE-OP CHECKLIST - STERILIZATION AFFIRMATION
The patient is Stable - Low risk of patient condition declining or worsening    Shift Goals  Clinical Goals: Stable neuro status, maintain skin integrity, safety  Patient Goals: comfort, sleep  Family Goals: maria esther    Progress made toward(s) clinical / shift goals:  Pt has been AAOx4 throughout the shift, fall/aspiration/spinal precautions in place. Q2h turns, hemovac drain in place at surgical site. Pt updated on POC.     Problem: Neuro Status  Goal: Neuro status will remain stable or improve  Outcome: Progressing  Note: Q4h neuro checks.      Problem: Skin Integrity  Goal: Skin integrity is maintained or improved  Outcome: Progressing  Note: Q2h turns, TAPS system in place, z-michelle pillow, wedges/pillows in use for positioning and offloading, heel float boots in use, heel/sacral mepilex in place, surgical site dressing clean, dry and intact with hemovac drain in place, saunders catheter in place, barrier wipes in use.      Problem: Mobility  Goal: Patient's capacity to carry out activities will improve  Outcome: Progressing  Note: Spinal precautions in place.  to be worn when OOB.        Patient is not progressing towards the following goals:      
The patient is Stable - Low risk of patient condition declining or worsening    Shift Goals  Clinical Goals: neuro stability  Patient Goals: sleep, pain control  Family Goals: JESSICA    Progress made toward(s) clinical / shift goals:  Pt. Is A&O x 4. Follows commands and responds appropriately. Will continue to round hourly and encourage the Pt. To ask questions and voice feelings.          Patient is not progressing towards the following goals:n/a      
The patient is Watcher - Medium risk of patient condition declining or worsening    Shift Goals  Clinical Goals: MAP >85, CT, Patient comfort, Q2hour neuro checks  Patient Goals: Updates, Rest, Improved neuro status  Family Goals: No family present at bedside    Progress made toward(s) clinical / shift goals:        Problem: Knowledge Deficit - Standard  Goal: Patient and family/care givers will demonstrate understanding of plan of care, disease process/condition, diagnostic tests and medications  Outcome: Progressing     Problem: Skin Integrity  Goal: Skin integrity is maintained or improved  Outcome: Progressing     Problem: Pain - Standard  Goal: Alleviation of pain or a reduction in pain to the patient’s comfort goal  Outcome: Progressing       Patient is not progressing towards the following goals:      
n/a

## 2025-03-14 ENCOUNTER — EMERGENCY (EMERGENCY)
Facility: HOSPITAL | Age: 70
LOS: 1 days | Discharge: HOME | End: 2025-03-14
Attending: EMERGENCY MEDICINE | Admitting: EMERGENCY MEDICINE
Payer: MEDICARE

## 2025-03-14 VITALS
SYSTOLIC BLOOD PRESSURE: 148 MMHG | HEART RATE: 72 BPM | DIASTOLIC BLOOD PRESSURE: 75 MMHG | HEIGHT: 69 IN | RESPIRATION RATE: 16 BRPM | OXYGEN SATURATION: 98 % | TEMPERATURE: 98 F | WEIGHT: 199.96 LBS

## 2025-03-14 DIAGNOSIS — Z41.9 ENCOUNTER FOR PROCEDURE FOR PURPOSES OTHER THAN REMEDYING HEALTH STATE, UNSPECIFIED: Chronic | ICD-10-CM

## 2025-03-14 DIAGNOSIS — Z95.1 PRESENCE OF AORTOCORONARY BYPASS GRAFT: Chronic | ICD-10-CM

## 2025-03-14 PROCEDURE — 99285 EMERGENCY DEPT VISIT HI MDM: CPT | Mod: 25

## 2025-03-15 VITALS
HEART RATE: 66 BPM | SYSTOLIC BLOOD PRESSURE: 152 MMHG | OXYGEN SATURATION: 97 % | RESPIRATION RATE: 17 BRPM | DIASTOLIC BLOOD PRESSURE: 88 MMHG

## 2025-03-15 LAB
ANION GAP SERPL CALC-SCNC: 10 MMOL/L — SIGNIFICANT CHANGE UP (ref 5–17)
BASOPHILS # BLD AUTO: 0.09 K/UL — SIGNIFICANT CHANGE UP (ref 0–0.2)
BASOPHILS NFR BLD AUTO: 0.8 % — SIGNIFICANT CHANGE UP (ref 0–2)
BUN SERPL-MCNC: 30 MG/DL — HIGH (ref 7–23)
CALCIUM SERPL-MCNC: 9.2 MG/DL — SIGNIFICANT CHANGE UP (ref 8.4–10.5)
CHLORIDE SERPL-SCNC: 97 MMOL/L — SIGNIFICANT CHANGE UP (ref 96–108)
CO2 SERPL-SCNC: 27 MMOL/L — SIGNIFICANT CHANGE UP (ref 22–31)
CREAT SERPL-MCNC: 1.73 MG/DL — HIGH (ref 0.5–1.3)
EGFR: 43 ML/MIN/1.73M2 — LOW
EGFR: 43 ML/MIN/1.73M2 — LOW
EOSINOPHIL # BLD AUTO: 0.49 K/UL — SIGNIFICANT CHANGE UP (ref 0–0.5)
EOSINOPHIL NFR BLD AUTO: 4.1 % — SIGNIFICANT CHANGE UP (ref 0–6)
FLUAV AG NPH QL: SIGNIFICANT CHANGE UP
FLUBV AG NPH QL: SIGNIFICANT CHANGE UP
GLUCOSE SERPL-MCNC: 220 MG/DL — HIGH (ref 70–99)
HCT VFR BLD CALC: 35.8 % — LOW (ref 39–50)
HGB BLD-MCNC: 11.9 G/DL — LOW (ref 13–17)
IMM GRANULOCYTES NFR BLD AUTO: 0.3 % — SIGNIFICANT CHANGE UP (ref 0–0.9)
LYMPHOCYTES # BLD AUTO: 17.2 % — SIGNIFICANT CHANGE UP (ref 13–44)
LYMPHOCYTES # BLD AUTO: 2.04 K/UL — SIGNIFICANT CHANGE UP (ref 1–3.3)
MCHC RBC-ENTMCNC: 30.7 PG — SIGNIFICANT CHANGE UP (ref 27–34)
MCHC RBC-ENTMCNC: 33.2 G/DL — SIGNIFICANT CHANGE UP (ref 32–36)
MCV RBC AUTO: 92.3 FL — SIGNIFICANT CHANGE UP (ref 80–100)
MONOCYTES # BLD AUTO: 1.09 K/UL — HIGH (ref 0–0.9)
MONOCYTES NFR BLD AUTO: 9.2 % — SIGNIFICANT CHANGE UP (ref 2–14)
NEUTROPHILS # BLD AUTO: 8.12 K/UL — HIGH (ref 1.8–7.4)
NEUTROPHILS NFR BLD AUTO: 68.4 % — SIGNIFICANT CHANGE UP (ref 43–77)
NRBC BLD AUTO-RTO: 0 /100 WBCS — SIGNIFICANT CHANGE UP (ref 0–0)
NT-PROBNP SERPL-SCNC: 779 PG/ML — HIGH (ref 0–300)
PLATELET # BLD AUTO: 270 K/UL — SIGNIFICANT CHANGE UP (ref 150–400)
POTASSIUM SERPL-MCNC: 4.9 MMOL/L — SIGNIFICANT CHANGE UP (ref 3.5–5.3)
POTASSIUM SERPL-SCNC: 4.9 MMOL/L — SIGNIFICANT CHANGE UP (ref 3.5–5.3)
RBC # BLD: 3.88 M/UL — LOW (ref 4.2–5.8)
RBC # FLD: 11.6 % — SIGNIFICANT CHANGE UP (ref 10.3–14.5)
RSV RNA NPH QL NAA+NON-PROBE: SIGNIFICANT CHANGE UP
SARS-COV-2 RNA SPEC QL NAA+PROBE: SIGNIFICANT CHANGE UP
SODIUM SERPL-SCNC: 134 MMOL/L — LOW (ref 135–145)
TROPONIN T, HIGH SENSITIVITY RESULT: 50 NG/L — SIGNIFICANT CHANGE UP (ref 0–51)
TROPONIN T, HIGH SENSITIVITY RESULT: 55 NG/L — CRITICAL HIGH (ref 0–51)
WBC # BLD: 11.87 K/UL — HIGH (ref 3.8–10.5)
WBC # FLD AUTO: 11.87 K/UL — HIGH (ref 3.8–10.5)

## 2025-03-15 PROCEDURE — 71046 X-RAY EXAM CHEST 2 VIEWS: CPT

## 2025-03-15 PROCEDURE — 94640 AIRWAY INHALATION TREATMENT: CPT

## 2025-03-15 PROCEDURE — 84484 ASSAY OF TROPONIN QUANT: CPT

## 2025-03-15 PROCEDURE — 87637 SARSCOV2&INF A&B&RSV AMP PRB: CPT

## 2025-03-15 PROCEDURE — 96374 THER/PROPH/DIAG INJ IV PUSH: CPT

## 2025-03-15 PROCEDURE — 99284 EMERGENCY DEPT VISIT MOD MDM: CPT | Mod: 25

## 2025-03-15 PROCEDURE — 71046 X-RAY EXAM CHEST 2 VIEWS: CPT | Mod: 26

## 2025-03-15 PROCEDURE — 85025 COMPLETE CBC W/AUTO DIFF WBC: CPT

## 2025-03-15 PROCEDURE — 36415 COLL VENOUS BLD VENIPUNCTURE: CPT

## 2025-03-15 PROCEDURE — 80048 BASIC METABOLIC PNL TOTAL CA: CPT

## 2025-03-15 PROCEDURE — 83880 ASSAY OF NATRIURETIC PEPTIDE: CPT

## 2025-03-15 RX ORDER — METHYLPREDNISOLONE ACETATE 80 MG/ML
125 INJECTION, SUSPENSION INTRA-ARTICULAR; INTRALESIONAL; INTRAMUSCULAR; SOFT TISSUE ONCE
Refills: 0 | Status: COMPLETED | OUTPATIENT
Start: 2025-03-15 | End: 2025-03-15

## 2025-03-15 RX ORDER — IPRATROPIUM BROMIDE AND ALBUTEROL SULFATE .5; 2.5 MG/3ML; MG/3ML
3 SOLUTION RESPIRATORY (INHALATION)
Refills: 0 | Status: COMPLETED | OUTPATIENT
Start: 2025-03-15 | End: 2025-03-15

## 2025-03-15 RX ORDER — PREDNISONE 20 MG/1
2 TABLET ORAL
Qty: 8 | Refills: 0
Start: 2025-03-15 | End: 2025-03-18

## 2025-03-15 RX ORDER — HYDROCODONE/HOMATROPINE
5 SYRUP ORAL
Qty: 100 | Refills: 0
Start: 2025-03-15 | End: 2025-03-19

## 2025-03-15 RX ORDER — ALBUTEROL SULFATE 2.5 MG/3ML
2 VIAL, NEBULIZER (ML) INHALATION
Qty: 1 | Refills: 0
Start: 2025-03-15

## 2025-03-15 RX ADMIN — METHYLPREDNISOLONE ACETATE 125 MILLIGRAM(S): 80 INJECTION, SUSPENSION INTRA-ARTICULAR; INTRALESIONAL; INTRAMUSCULAR; SOFT TISSUE at 00:54

## 2025-03-15 RX ADMIN — IPRATROPIUM BROMIDE AND ALBUTEROL SULFATE 3 MILLILITER(S): .5; 2.5 SOLUTION RESPIRATORY (INHALATION) at 00:54

## 2025-03-15 RX ADMIN — IPRATROPIUM BROMIDE AND ALBUTEROL SULFATE 3 MILLILITER(S): .5; 2.5 SOLUTION RESPIRATORY (INHALATION) at 01:15

## 2025-03-15 NOTE — ED PROVIDER NOTE - ENMT, MLM
Airway patent,. Mouth with normal mucosa. Throat has no vesicles, no oropharyngeal exudates and uvula is midline. mild tonsillar erythema, no drooling, no stridor, no trismus

## 2025-03-15 NOTE — ED PROVIDER NOTE - PATIENT PORTAL LINK FT
You can access the FollowMyHealth Patient Portal offered by [facility] by registering at the following website: [email or web address]. By joining [employer]’s FollowMyHealth portal, you will also be able to view your health information using other applications (apps) compatible with our system.

## 2025-03-15 NOTE — ED PROVIDER NOTE - OBJECTIVE STATEMENT
69M hx cabg, htn, high chol, dm, hypothyroid, tia, c/o cough. pt states ongoing for past week.  was having sore throat, cough. saw his PMD and was given a 5 day course of ABX (unsure of name).  completed abx. persistent cough, states feels rattling in his chest. occasional chest discomfort and SOb. no LE swelling. no fevers. no n/v no abd pain. no back pain.

## 2025-03-15 NOTE — ED PROVIDER NOTE - NSFOLLOWUPINSTRUCTIONS_ED_ALL_ED_FT
Follow-up with your primary care physician    Wheezing    WHAT YOU NEED TO KNOW:    Wheezing happens when air flows through a narrowed or blocked airway. Wheezing can happen when you breathe in, breathe out, or both. Wheezes may sound like a whistle, squeal, groan, or creak. Wheezes may also sound musical or high-pitched.    DISCHARGE INSTRUCTIONS:    Call your local emergency number (911 in the [place]) if:    You feel lightheaded, short of breath, and have chest pain.    You are dizzy, confused, or feel faint.    You have sudden trouble breathing.    Your throat feels like it is swelling or feels tight.  Return to the emergency department if:    You cough up blood.    Call your doctor if:    You have a fever.    Your wheezing does not get better or it gets worse.    You have questions or concerns about your condition or care.  Medicines:    Medicines may help open your airways, decrease your symptoms, or treat an infection. They may be given as an inhaler, nebulizer, or pill.    Take your medicine as directed. Contact your healthcare provider if you think your medicine is not helping or if you have side effects. Tell your provider if you are allergic to any medicine. Keep a list of the medicines, vitamins, and herbs you take. Include the amounts, and when and why you take them. Bring the list or the pill bottles to follow-up visits. Carry your medicine list with you in case of an emergency.  Self care:    Return to your usual activity as directed. You may need to limit certain activities. Ask your healthcare provider when it is okay to resume activity.    Take deep breaths and cough several times a day. This will decrease your risk for a lung infection and help decrease wheezing. Take a deep breath and hold it for as long as you can. Let the air out and then cough strongly. Deep breaths help open your airway. You may be given an incentive spirometer to help you take deep breaths. Put the plastic piece in your mouth and take a slow, deep breath in, then let the air out and cough. Repeat these steps 10 times every hour.    Drink liquids as directed. You may need to drink more liquids than usual to thin your mucus and prevent dehydration. Ask how much liquid to drink each day and which liquids are best for you.  Prevent wheezing:    Do not smoke. Nicotine and other chemicals in cigarettes and cigars can cause lung damage. Ask your healthcare provider for information if you currently smoke and need help to quit. E-cigarettes or smokeless tobacco still contain nicotine. Talk to your healthcare provider before you use these products.    Avoid allergy triggers, such as animals, grass, pollen, or dust.  Follow up with your doctor as directed: You may be referred to a specialist. Write down your questions so you remember to ask them during your visits.    Acute Cough    WHAT YOU NEED TO KNOW:    An acute cough can last up to 3 weeks. Common causes of an acute cough include a cold, allergies, or a lung infection.    DISCHARGE INSTRUCTIONS:    Return to the emergency department if:    You have trouble breathing or feel short of breath.    You cough up blood, or you see blood in your mucus.    You faint or feel weak or dizzy.    You have chest pain when you cough or take a deep breath.    You have new wheezing.  Contact your healthcare provider if:    You have a fever.    Your cough lasts longer than 4 weeks.    Your symptoms do not improve with treatment.    You have questions or concerns about your condition or care.  Medicines:    Medicines may be needed to stop the cough, decrease swelling in your airways, or help open your airways. Medicine may also be given to help you cough up mucus. Ask your healthcare provider what over-the-counter medicines you can take. If you have an infection caused by bacteria, you may need antibiotics.    Take your medicine as directed. Contact your healthcare provider if you think your medicine is not helping or if you have side effects. Tell your provider if you are allergic to any medicine. Keep a list of the medicines, vitamins, and herbs you take. Include the amounts, and when and why you take them. Bring the list or the pill bottles to follow-up visits. Carry your medicine list with you in case of an emergency.  Manage your symptoms:    Do not smoke and stay away from others who smoke. Nicotine and other chemicals in cigarettes and cigars can cause lung damage and make your cough worse. Ask your healthcare provider for information if you currently smoke and need help to quit. E-cigarettes or smokeless tobacco still contain nicotine. Talk to your healthcare provider before you use these products.    Drink extra liquids as directed. Liquids will help thin and loosen mucus so you can cough it up. Liquids will also help prevent dehydration. Examples of good liquids to drink include water, fruit juice, and broth. Do not drink liquids that contain caffeine. Caffeine can increase your risk for dehydration. Ask your healthcare provider how much liquid to drink each day.    Rest as directed. Do not do activities that make your cough worse, such as exercise.    Use a humidifier or vaporizer. Use a cool mist humidifier or a vaporizer to increase air moisture in your home. This may make it easier for you to breathe and help decrease your cough.    Eat 2 to 5 mL of honey 2 times each day. Honey can help thin mucus and decrease your cough.    Use cough drops or lozenges. These can help decrease throat irritation and your cough.  Follow up with your healthcare provider as directed: Write down your questions so you remember to ask them during your visits.

## 2025-03-15 NOTE — ED PROVIDER NOTE - WR INTERPRETATION 1
Monitor: The problem is unchanged.  Evaluation: No labs/tests required today.  Assessment/Treatment:  Follow up per specialist managing the condition.     He has failed ketamine, TMS, multiple medications  Will review records from his previous PCP   CXR negative - No pneumothorax, No opacities, No free air, +sternal wires

## 2025-03-15 NOTE — ED ADULT NURSE NOTE - PRIMARY CARE PROVIDER
VSS, afebrile. Cont tele/pulse ox maintained, sats remains >92% on 8L 35% trach collar, RRT notified to wean trach collar. Suctioned occasionally via [place], thin white/creamy secretions noted. Alb tx spaced to q4h. Feed run over 3hrs this morning per Dr. Love due to abd circumference 57cm; 12pm and 4pm feed run over 1hr. Tolerated well, abdomen remains somewhat firm, but no tenderness noted. Vented before each feed. Dr. Pina aware. Pt smiling, playing with toys throughout day. Adequate PO intake, x1 BM. POC reviewed with dads girlfriend via  . Verbalized understanding. Safety and isolation precautions maintained, will cont to monitor.   MD

## 2025-03-15 NOTE — ED PROVIDER NOTE - PROGRESS NOTE DETAILS
feeling better, no chest pain. trop downtrending doubt acute acs. recommend f/u with pmd  I have discussed the discharge plan with the patient. The patient agrees with the plan, as discussed.  The patient understands Emergency Department diagnosis is a preliminary diagnosis often based on limited information and that the patient must adhere to the follow-up plan as discussed.  The patient understands that if the symptoms worsen or if prescribed medications do not have the desired/planned effect that the patient may return to the Emergency Department at any time for further evaluation and treatment.

## 2025-03-15 NOTE — ED ADULT NURSE NOTE - OBJECTIVE STATEMENT
69M presents to ER for SOB and cough x past week with sore throat, occasional chest discomfort. Patient A&Ox4, able to verbalize needs, follows commands. On RA with adequate chest rise and fall. No s/s of acute distress noted. Endorses he had seen his PCP earlier this week and placed on oral course of abx (not sure of name) with no relief of symptoms. Endorses productive and persistent cough with a "rattling sensation" in his chest. Otherwise denies fevers, chills, n/v.

## 2025-03-15 NOTE — ED PROVIDER NOTE - CLINICAL SUMMARY MEDICAL DECISION MAKING FREE TEXT BOX
cough, scattered exp wheeze on exam, no resp distress, no hypoxia, speaking in full sentences, afebrile, no tachycardia, no tachypnea, doubt PE, doubt dissection. possible pna vs viral illness vs bronchospasm vs chf  -check labs  -ekg  -cxr  -duonebs, solumedrol

## 2025-03-18 DIAGNOSIS — J02.9 ACUTE PHARYNGITIS, UNSPECIFIED: ICD-10-CM

## 2025-03-18 DIAGNOSIS — I10 ESSENTIAL (PRIMARY) HYPERTENSION: ICD-10-CM

## 2025-03-18 DIAGNOSIS — E11.9 TYPE 2 DIABETES MELLITUS WITHOUT COMPLICATIONS: ICD-10-CM

## 2025-03-18 DIAGNOSIS — E03.9 HYPOTHYROIDISM, UNSPECIFIED: ICD-10-CM

## 2025-03-18 DIAGNOSIS — R06.02 SHORTNESS OF BREATH: ICD-10-CM

## 2025-03-18 DIAGNOSIS — R06.2 WHEEZING: ICD-10-CM

## 2025-03-18 DIAGNOSIS — R07.89 OTHER CHEST PAIN: ICD-10-CM

## 2025-03-18 DIAGNOSIS — Z95.1 PRESENCE OF AORTOCORONARY BYPASS GRAFT: ICD-10-CM

## 2025-03-18 DIAGNOSIS — R05.1 ACUTE COUGH: ICD-10-CM

## 2025-03-18 DIAGNOSIS — E78.00 PURE HYPERCHOLESTEROLEMIA, UNSPECIFIED: ICD-10-CM

## 2025-06-02 ENCOUNTER — EMERGENCY (EMERGENCY)
Facility: HOSPITAL | Age: 70
LOS: 1 days | End: 2025-06-02
Attending: STUDENT IN AN ORGANIZED HEALTH CARE EDUCATION/TRAINING PROGRAM | Admitting: STUDENT IN AN ORGANIZED HEALTH CARE EDUCATION/TRAINING PROGRAM
Payer: MEDICARE

## 2025-06-02 VITALS
DIASTOLIC BLOOD PRESSURE: 78 MMHG | SYSTOLIC BLOOD PRESSURE: 128 MMHG | RESPIRATION RATE: 18 BRPM | OXYGEN SATURATION: 96 % | TEMPERATURE: 98 F | HEART RATE: 66 BPM

## 2025-06-02 VITALS
DIASTOLIC BLOOD PRESSURE: 72 MMHG | HEART RATE: 70 BPM | OXYGEN SATURATION: 96 % | RESPIRATION RATE: 18 BRPM | TEMPERATURE: 98 F | SYSTOLIC BLOOD PRESSURE: 165 MMHG | WEIGHT: 216.05 LBS

## 2025-06-02 DIAGNOSIS — Z41.9 ENCOUNTER FOR PROCEDURE FOR PURPOSES OTHER THAN REMEDYING HEALTH STATE, UNSPECIFIED: Chronic | ICD-10-CM

## 2025-06-02 DIAGNOSIS — Z95.1 PRESENCE OF AORTOCORONARY BYPASS GRAFT: Chronic | ICD-10-CM

## 2025-06-02 PROCEDURE — 99284 EMERGENCY DEPT VISIT MOD MDM: CPT

## 2025-06-02 PROCEDURE — 99284 EMERGENCY DEPT VISIT MOD MDM: CPT | Mod: 25

## 2025-06-02 PROCEDURE — 74018 RADEX ABDOMEN 1 VIEW: CPT

## 2025-06-02 PROCEDURE — 74018 RADEX ABDOMEN 1 VIEW: CPT | Mod: 26

## 2025-06-02 PROCEDURE — 82962 GLUCOSE BLOOD TEST: CPT

## 2025-06-02 RX ORDER — POLYETHYLENE GLYCOL 3350 17 G/17G
17 POWDER, FOR SOLUTION ORAL
Qty: 1 | Refills: 0
Start: 2025-06-02 | End: 2025-06-29

## 2025-06-02 NOTE — ED PROVIDER NOTE - OBJECTIVE STATEMENT
69M hx CAD s/p CABG, HTN, HLD, DM, hypothyroid, TIA, p/w 1 day of constipation. States he makes BM everyday. now stating he feels like he has a lot of feces stuck in his abdomen, making him feel bloated. no pain. tried senna without improvement. states this happenes sometimes, had enema with good result in the past. no vomiting. tolerating fluid. has hx of cholecystectomy. On contrary to triage note, patient had urine 2 hours PTA.

## 2025-06-02 NOTE — ED PROVIDER NOTE - CLINICAL SUMMARY MEDICAL DECISION MAKING FREE TEXT BOX
likely function constipation. pt has no vomiting or abd tenderness. will get a screening xray but low suspicion for obstruction. can try laxative and disimpaction.

## 2025-06-02 NOTE — ED PROVIDER NOTE - PROGRESS NOTE DETAILS
large stool burden. able to disimpact some. Chaperone: Nurse Lvov patient had a BM in the ED and states his discomfort immediately resolve. able to make urine in the ED. miralax prescribed. return precaution given. will dc

## 2025-06-02 NOTE — ED ADULT NURSE NOTE - OBJECTIVE STATEMENT
69yroM c/o constipation  x days. Pt reports their last bowl movement was Saturday, today they felt some abdominal discomfort, bloating and couldn't go to the bathroom. Pt took a laxative tea with no relief. Pmhx of gallbladder removal. Pt denies n/v/d, cp, sob, fever/chills, blood in stool. Pt ambulatory with steady gait,

## 2025-06-02 NOTE — ED PROVIDER NOTE - PATIENT PORTAL LINK FT
You can access the FollowMyHealth Patient Portal offered by [facility] by registering at the following website: [email or web address]. By joining [facility]’s FollowMyHealth portal, you will also be able to view your health information using other applications (apps) compatible with our system.

## 2025-06-05 DIAGNOSIS — Z86.73 PERSONAL HISTORY OF TRANSIENT ISCHEMIC ATTACK (TIA), AND CEREBRAL INFARCTION WITHOUT RESIDUAL DEFICITS: ICD-10-CM

## 2025-06-05 DIAGNOSIS — K59.00 CONSTIPATION, UNSPECIFIED: ICD-10-CM

## 2025-06-05 DIAGNOSIS — E03.9 HYPOTHYROIDISM, UNSPECIFIED: ICD-10-CM

## 2025-06-05 DIAGNOSIS — I25.10 ATHEROSCLEROTIC HEART DISEASE OF NATIVE CORONARY ARTERY WITHOUT ANGINA PECTORIS: ICD-10-CM

## 2025-06-05 DIAGNOSIS — Z95.1 PRESENCE OF AORTOCORONARY BYPASS GRAFT: ICD-10-CM

## 2025-06-05 DIAGNOSIS — I10 ESSENTIAL (PRIMARY) HYPERTENSION: ICD-10-CM

## 2025-06-05 DIAGNOSIS — E11.9 TYPE 2 DIABETES MELLITUS WITHOUT COMPLICATIONS: ICD-10-CM

## 2025-06-05 DIAGNOSIS — E78.5 HYPERLIPIDEMIA, UNSPECIFIED: ICD-10-CM

## 2025-07-09 VITALS
HEIGHT: 69 IN | RESPIRATION RATE: 18 BRPM | TEMPERATURE: 97 F | HEART RATE: 70 BPM | DIASTOLIC BLOOD PRESSURE: 73 MMHG | WEIGHT: 216.05 LBS | OXYGEN SATURATION: 97 % | SYSTOLIC BLOOD PRESSURE: 156 MMHG

## 2025-07-09 NOTE — H&P ADULT - HISTORY OF PRESENT ILLNESS
CARDIOLOGIST: Dr. Khoury  PHARMACY:     Pt is 68yo M w/ PMHx of HTN, HLD, DM T2, TIA, Hx of AFib (no longer on Eliquis), CAD (s/p CABG LIMA-LAD, SVG-OM, SVG-PDA 2020) who presented to outpatient cardiologist, Dr. Khoury, endorsing worsening exertional dyspnea that resolves with rest. Pt subsequently had abnormal NST. Pt denies CP, dizziness, syncope, orthopnea, PND, LE edema, abdominal pain, N/V, fever/chills, URI symptoms.     TTE (4/2025): LVEF 55-60%, mild LVH, borderline LA enlargement, trace-mild MR.   NST (6/4/25): small reversible defect inferior wall; small-medium sized reversible defect lateral wall; small nonreversible defect high lateral wall, medium nonreversible defect anterior wall.     In light of patient's risk factors, CCS Class III anginal equivalent symptoms, and abnormal NST, patient now presents to [facility] for cardiac catheterization with possible intervention if clinically indicated.  CARDIOLOGIST: Dr. Khoury  PHARMACY:  [facility] [address] [facility]     Pt is 68yo M w/ PMHx of HTN, HLD, DM T2, TIA, Hx of AFib (no longer on Eliquis), CAD (s/p CABG LIMA-LAD, SVG-OM, SVG-PDA 2020) who presented to outpatient cardiologist, Dr. Khoury, endorsing worsening exertional dyspnea that resolves with rest. Pt subsequently had abnormal NST. Pt denies CP, dizziness, syncope, orthopnea, PND, LE edema, abdominal pain, N/V, fever/chills, URI symptoms.     TTE (4/2025): LVEF 55-60%, mild LVH, borderline LA enlargement, trace-mild MR.   NST (6/4/25): small reversible defect inferior wall; small-medium sized reversible defect lateral wall; small nonreversible defect high lateral wall, medium nonreversible defect anterior wall.     In light of patient's risk factors, CCS Class III anginal equivalent symptoms, and abnormal NST, patient now presents to [facility] for cardiac catheterization with possible intervention if clinically indicated.

## 2025-07-09 NOTE — H&P ADULT - ASSESSMENT
70yo M w/ PMHx of HTN, HLD, DM T2, TIA, Hx of AFib (no longer on Eliquis), CAD (s/p CABG LIMA-LAD, SVG-OM, SVG-PDA 2020) who presents for left cardiac catheterization with possible intervention due to patient's risk factors, CCS Class III anginal symptoms, and abnormal NST.       - EKG:   NSR @ 70 bpm with first degree AV block, lateral ischemia   - ASA:  III         Mallampati: II  - H/H stable: 12.1/37.3        Platelets/Coags stable. Cr:   - Patient denies hematochieza, hematuria, easy bruising, or signs of bleeding.   - Patient loaded with  Aspirin 325 mg PO x 1 and already took his Plavix 75 mg PO x 1 (reports compliance with Plavix)   - Patient started on IV  cc bolus x 1 and started on IV NS 75 cc/hr x 2 hours   - Hgb a1c: 6.3     Started on: MISS   - Patient is a suitable candidate for moderate sedation.     Risks & benefits of procedure and alternative therapy have been explained to the patient including but not limited to: allergic reaction, bleeding w/possible need for blood transfusion, infection, renal and vascular compromise, limb damage, arrhythmia, stroke, vessel dissection/perforation, Myocardial infarction, emergent CABG. Informed consent obtained and in chart.    68yo M w/ PMHx of HTN, HLD, DM T2, TIA, Hx of AFib (no longer on Eliquis), CAD (s/p CABG LIMA-LAD, SVG-OM, SVG-PDA 2020) who presents for left cardiac catheterization with possible intervention due to patient's risk factors, CCS Class III anginal symptoms, and abnormal NST.       - EKG:   NSR @ 70 bpm with first degree AV block, lateral ischemia   - ASA:  III         Mallampati: II  - H/H stable: 12.1/37.3        Platelets/Coags stable. Cr: 1.63, unknown baseline, interventional cardiology fellow aware   - Patient denies hematochieza, hematuria, easy bruising, or signs of bleeding.   - Patient loaded with  Aspirin 325 mg PO x 1 and already took his Plavix 75 mg PO x 1 (reports compliance with Plavix)   - Patient started on IV  cc bolus x 1 and started on IV NS 75 cc/hr x 2 hours   - Hgb a1c: 6.3     Started on: MISS   - Patient is a suitable candidate for moderate sedation.     Risks & benefits of procedure and alternative therapy have been explained to the patient including but not limited to: allergic reaction, bleeding w/possible need for blood transfusion, infection, renal and vascular compromise, limb damage, arrhythmia, stroke, vessel dissection/perforation, Myocardial infarction, emergent CABG. Informed consent obtained and in chart.

## 2025-07-11 ENCOUNTER — OUTPATIENT (OUTPATIENT)
Dept: OUTPATIENT SERVICES | Facility: HOSPITAL | Age: 70
LOS: 1 days | End: 2025-07-11
Payer: MEDICARE

## 2025-07-11 DIAGNOSIS — Z41.9 ENCOUNTER FOR PROCEDURE FOR PURPOSES OTHER THAN REMEDYING HEALTH STATE, UNSPECIFIED: Chronic | ICD-10-CM

## 2025-07-11 DIAGNOSIS — Z95.1 PRESENCE OF AORTOCORONARY BYPASS GRAFT: Chronic | ICD-10-CM

## 2025-07-11 LAB
A1C WITH ESTIMATED AVERAGE GLUCOSE RESULT: 6.2 % — HIGH (ref 4–5.6)
ADD ON TEST-SPECIMEN IN LAB: SIGNIFICANT CHANGE UP
ALBUMIN SERPL ELPH-MCNC: 4.3 G/DL — SIGNIFICANT CHANGE UP (ref 3.3–5)
ALP SERPL-CCNC: 82 U/L — SIGNIFICANT CHANGE UP (ref 40–120)
ALT FLD-CCNC: 39 U/L — SIGNIFICANT CHANGE UP (ref 10–45)
ANION GAP SERPL CALC-SCNC: 10 MMOL/L — SIGNIFICANT CHANGE UP (ref 5–17)
APTT BLD: 29.5 SEC — SIGNIFICANT CHANGE UP (ref 26.1–36.8)
AST SERPL-CCNC: 27 U/L — SIGNIFICANT CHANGE UP (ref 10–40)
BASOPHILS # BLD AUTO: 0.06 K/UL — SIGNIFICANT CHANGE UP (ref 0–0.2)
BASOPHILS NFR BLD AUTO: 0.8 % — SIGNIFICANT CHANGE UP (ref 0–2)
BILIRUB SERPL-MCNC: 1.3 MG/DL — HIGH (ref 0.2–1.2)
BUN SERPL-MCNC: 22 MG/DL — SIGNIFICANT CHANGE UP (ref 7–23)
CALCIUM SERPL-MCNC: 9.4 MG/DL — SIGNIFICANT CHANGE UP (ref 8.4–10.5)
CHLORIDE SERPL-SCNC: 103 MMOL/L — SIGNIFICANT CHANGE UP (ref 96–108)
CHOLEST SERPL-MCNC: 105 MG/DL — SIGNIFICANT CHANGE UP
CK MB CFR SERPL CALC: 11.4 NG/ML — HIGH (ref 0–6.7)
CK SERPL-CCNC: 458 U/L — HIGH (ref 30–200)
CO2 SERPL-SCNC: 24 MMOL/L — SIGNIFICANT CHANGE UP (ref 22–31)
CREAT SERPL-MCNC: 1.63 MG/DL — HIGH (ref 0.5–1.3)
EGFR: 46 ML/MIN/1.73M2 — LOW
EGFR: 46 ML/MIN/1.73M2 — LOW
EOSINOPHIL # BLD AUTO: 0.34 K/UL — SIGNIFICANT CHANGE UP (ref 0–0.5)
EOSINOPHIL NFR BLD AUTO: 4.3 % — SIGNIFICANT CHANGE UP (ref 0–6)
ESTIMATED AVERAGE GLUCOSE: 131 MG/DL — HIGH (ref 68–114)
GLUCOSE SERPL-MCNC: 126 MG/DL — HIGH (ref 70–99)
HCT VFR BLD CALC: 37.3 % — LOW (ref 39–50)
HDLC SERPL-MCNC: 40 MG/DL — LOW
HGB BLD-MCNC: 12.1 G/DL — LOW (ref 13–17)
IMM GRANULOCYTES # BLD AUTO: 0.02 K/UL — SIGNIFICANT CHANGE UP (ref 0–0.07)
IMM GRANULOCYTES NFR BLD AUTO: 0.3 % — SIGNIFICANT CHANGE UP (ref 0–0.9)
INR BLD: 1.1 — SIGNIFICANT CHANGE UP (ref 0.85–1.16)
LDLC SERPL-MCNC: 46 MG/DL — SIGNIFICANT CHANGE UP
LIPID PNL WITH DIRECT LDL SERPL: 46 MG/DL — SIGNIFICANT CHANGE UP
LYMPHOCYTES # BLD AUTO: 1.62 K/UL — SIGNIFICANT CHANGE UP (ref 1–3.3)
LYMPHOCYTES NFR BLD AUTO: 20.5 % — SIGNIFICANT CHANGE UP (ref 13–44)
MAGNESIUM SERPL-MCNC: 2.2 MG/DL — SIGNIFICANT CHANGE UP (ref 1.6–2.6)
MCHC RBC-ENTMCNC: 29.9 PG — SIGNIFICANT CHANGE UP (ref 27–34)
MCHC RBC-ENTMCNC: 32.4 G/DL — SIGNIFICANT CHANGE UP (ref 32–36)
MCV RBC AUTO: 92.1 FL — SIGNIFICANT CHANGE UP (ref 80–100)
MONOCYTES # BLD AUTO: 0.65 K/UL — SIGNIFICANT CHANGE UP (ref 0–0.9)
MONOCYTES NFR BLD AUTO: 8.2 % — SIGNIFICANT CHANGE UP (ref 2–14)
NEUTROPHILS # BLD AUTO: 5.22 K/UL — SIGNIFICANT CHANGE UP (ref 1.8–7.4)
NEUTROPHILS NFR BLD AUTO: 65.9 % — SIGNIFICANT CHANGE UP (ref 43–77)
NONHDLC SERPL-MCNC: 65 MG/DL — SIGNIFICANT CHANGE UP
NRBC # BLD AUTO: 0 K/UL — SIGNIFICANT CHANGE UP (ref 0–0)
NRBC # FLD: 0 K/UL — SIGNIFICANT CHANGE UP (ref 0–0)
NRBC BLD AUTO-RTO: 0 /100 WBCS — SIGNIFICANT CHANGE UP (ref 0–0)
PLATELET # BLD AUTO: 241 K/UL — SIGNIFICANT CHANGE UP (ref 150–400)
PMV BLD: 9.4 FL — SIGNIFICANT CHANGE UP (ref 7–13)
POTASSIUM SERPL-MCNC: 4.6 MMOL/L — SIGNIFICANT CHANGE UP (ref 3.5–5.3)
POTASSIUM SERPL-SCNC: 4.6 MMOL/L — SIGNIFICANT CHANGE UP (ref 3.5–5.3)
PROT SERPL-MCNC: 7.3 G/DL — SIGNIFICANT CHANGE UP (ref 6–8.3)
PROTHROM AB SERPL-ACNC: 12.6 SEC — SIGNIFICANT CHANGE UP (ref 9.9–13.4)
RBC # BLD: 4.05 M/UL — LOW (ref 4.2–5.8)
RBC # FLD: 11.9 % — SIGNIFICANT CHANGE UP (ref 10.3–14.5)
SODIUM SERPL-SCNC: 137 MMOL/L — SIGNIFICANT CHANGE UP (ref 135–145)
TRIGL SERPL-MCNC: 103 MG/DL — SIGNIFICANT CHANGE UP
WBC # BLD: 7.91 K/UL — SIGNIFICANT CHANGE UP (ref 3.8–10.5)
WBC # FLD AUTO: 7.91 K/UL — SIGNIFICANT CHANGE UP (ref 3.8–10.5)

## 2025-07-11 PROCEDURE — 82962 GLUCOSE BLOOD TEST: CPT

## 2025-07-11 PROCEDURE — 80061 LIPID PANEL: CPT

## 2025-07-11 PROCEDURE — 93455 CORONARY ART/GRFT ANGIO S&I: CPT

## 2025-07-11 PROCEDURE — 93010 ELECTROCARDIOGRAM REPORT: CPT

## 2025-07-11 PROCEDURE — 85730 THROMBOPLASTIN TIME PARTIAL: CPT

## 2025-07-11 PROCEDURE — 85025 COMPLETE CBC W/AUTO DIFF WBC: CPT

## 2025-07-11 PROCEDURE — 83036 HEMOGLOBIN GLYCOSYLATED A1C: CPT

## 2025-07-11 PROCEDURE — 36415 COLL VENOUS BLD VENIPUNCTURE: CPT

## 2025-07-11 PROCEDURE — 82550 ASSAY OF CK (CPK): CPT

## 2025-07-11 PROCEDURE — 80053 COMPREHEN METABOLIC PANEL: CPT

## 2025-07-11 PROCEDURE — 93005 ELECTROCARDIOGRAM TRACING: CPT

## 2025-07-11 PROCEDURE — 85610 PROTHROMBIN TIME: CPT

## 2025-07-11 PROCEDURE — 83735 ASSAY OF MAGNESIUM: CPT

## 2025-07-11 PROCEDURE — 82553 CREATINE MB FRACTION: CPT

## 2025-07-11 RX ORDER — DEXTROSE 50 % IN WATER 50 %
25 SYRINGE (ML) INTRAVENOUS ONCE
Refills: 0 | Status: DISCONTINUED | OUTPATIENT
Start: 2025-07-11 | End: 2025-07-26

## 2025-07-11 RX ORDER — ATORVASTATIN CALCIUM 80 MG/1
1 TABLET, FILM COATED ORAL
Refills: 0 | DISCHARGE

## 2025-07-11 RX ORDER — CLOPIDOGREL BISULFATE 75 MG/1
1 TABLET, FILM COATED ORAL
Refills: 0 | DISCHARGE

## 2025-07-11 RX ORDER — INSULIN LISPRO 100 U/ML
INJECTION, SOLUTION INTRAVENOUS; SUBCUTANEOUS
Refills: 0 | Status: DISCONTINUED | OUTPATIENT
Start: 2025-07-11 | End: 2025-07-26

## 2025-07-11 RX ORDER — DEXTROSE 50 % IN WATER 50 %
15 SYRINGE (ML) INTRAVENOUS ONCE
Refills: 0 | Status: DISCONTINUED | OUTPATIENT
Start: 2025-07-11 | End: 2025-07-26

## 2025-07-11 RX ORDER — SITAGLIPTIN 100 MG/1
1 TABLET, FILM COATED ORAL
Refills: 0 | DISCHARGE

## 2025-07-11 RX ORDER — GLUCAGON 3 MG/1
1 POWDER NASAL ONCE
Refills: 0 | Status: DISCONTINUED | OUTPATIENT
Start: 2025-07-11 | End: 2025-07-26

## 2025-07-11 RX ORDER — EMPAGLIFLOZIN 25 MG/1
1 TABLET, FILM COATED ORAL
Refills: 0 | DISCHARGE

## 2025-07-11 RX ORDER — DEXTROSE 50 % IN WATER 50 %
12.5 SYRINGE (ML) INTRAVENOUS ONCE
Refills: 0 | Status: DISCONTINUED | OUTPATIENT
Start: 2025-07-11 | End: 2025-07-26

## 2025-07-11 RX ORDER — SODIUM CHLORIDE 9 G/1000ML
1000 INJECTION, SOLUTION INTRAVENOUS
Refills: 0 | Status: DISCONTINUED | OUTPATIENT
Start: 2025-07-11 | End: 2025-07-26

## 2025-07-11 RX ORDER — METFORMIN HYDROCHLORIDE 850 MG/1
1 TABLET ORAL
Refills: 0 | DISCHARGE

## 2025-07-11 RX ORDER — CLOPIDOGREL BISULFATE 75 MG/1
75 TABLET, FILM COATED ORAL ONCE
Refills: 0 | Status: DISCONTINUED | OUTPATIENT
Start: 2025-07-11 | End: 2025-07-11

## 2025-07-11 RX ORDER — ASPIRIN 325 MG
325 TABLET ORAL ONCE
Refills: 0 | Status: COMPLETED | OUTPATIENT
Start: 2025-07-11 | End: 2025-07-11

## 2025-07-11 RX ORDER — LEVOTHYROXINE SODIUM 300 MCG
1 TABLET ORAL
Refills: 0 | DISCHARGE

## 2025-07-11 RX ORDER — METOPROLOL SUCCINATE 50 MG/1
1 TABLET, EXTENDED RELEASE ORAL
Refills: 0 | DISCHARGE

## 2025-07-11 RX ADMIN — Medication 1000 MILLILITER(S): at 16:04

## 2025-07-11 RX ADMIN — Medication 200 MILLILITER(S): at 21:40

## 2025-07-11 RX ADMIN — Medication 75 MILLILITER(S): at 16:05

## 2025-07-11 RX ADMIN — Medication 325 MILLIGRAM(S): at 16:05

## 2025-07-11 NOTE — PROGRESS NOTE ADULT - SUBJECTIVE AND OBJECTIVE BOX
Interventional Cardiology [facility]SON[facility] Discharge Note    Patient without complaints. Ambulated and voided without difficulties  Afebrile, VSS  Ext: Right Groin: No hematoma, no bruit, dressing; C/D/I  Pulses:    intact DP/PT to baseline     A/P:  68yo M w/ PMHx of HTN, HLD, DM T2, TIA, Hx of AFib (no longer on Eliquis), CAD (s/p CABG LIMA-LAD, SVG-OM, SVG-PDA 2020) who presents for left cardiac catheterization with possible intervention due to patient's risk factors, CCS Class III anginal symptoms, and abnormal NST.     Pt is now s/p diagnostic cardiac cath on 7/11/25: Patent LIMA-LAD, patent SVG-OM1, patent SVG-RCA, %, LCx 100%, RCA 99%. R FA AS.    1.	Stable for discharge as per attending Dr. Khoury after bed rest, pt voids, groin/wrist stable and 30 minutes of ambulation.  2.	Follow-up with PMD/Cardiologist Dr. Jacinto 1-2 weeks  3.	Discharged forms signed and copies in chart

## 2025-07-14 DIAGNOSIS — I25.82 CHRONIC TOTAL OCCLUSION OF CORONARY ARTERY: ICD-10-CM

## 2025-07-14 DIAGNOSIS — I25.710 ATHEROSCLEROSIS OF AUTOLOGOUS VEIN CORONARY ARTERY BYPASS GRAFT(S) WITH UNSTABLE ANGINA PECTORIS: ICD-10-CM

## 2025-07-14 DIAGNOSIS — R94.39 ABNORMAL RESULT OF OTHER CARDIOVASCULAR FUNCTION STUDY: ICD-10-CM
